# Patient Record
Sex: MALE | Race: OTHER | HISPANIC OR LATINO | URBAN - METROPOLITAN AREA
[De-identification: names, ages, dates, MRNs, and addresses within clinical notes are randomized per-mention and may not be internally consistent; named-entity substitution may affect disease eponyms.]

---

## 2020-04-13 ENCOUNTER — EMERGENCY (EMERGENCY)
Facility: HOSPITAL | Age: 30
LOS: 1 days | Discharge: HOME | End: 2020-04-13
Attending: EMERGENCY MEDICINE | Admitting: EMERGENCY MEDICINE
Payer: MEDICAID

## 2020-04-13 VITALS
TEMPERATURE: 100 F | RESPIRATION RATE: 24 BRPM | WEIGHT: 149.91 LBS | SYSTOLIC BLOOD PRESSURE: 134 MMHG | OXYGEN SATURATION: 98 % | DIASTOLIC BLOOD PRESSURE: 80 MMHG | HEART RATE: 108 BPM

## 2020-04-13 VITALS
HEART RATE: 89 BPM | OXYGEN SATURATION: 99 % | TEMPERATURE: 98 F | SYSTOLIC BLOOD PRESSURE: 177 MMHG | RESPIRATION RATE: 18 BRPM | DIASTOLIC BLOOD PRESSURE: 83 MMHG

## 2020-04-13 DIAGNOSIS — R05 COUGH: ICD-10-CM

## 2020-04-13 DIAGNOSIS — B34.9 VIRAL INFECTION, UNSPECIFIED: ICD-10-CM

## 2020-04-13 DIAGNOSIS — Z20.828 CONTACT WITH AND (SUSPECTED) EXPOSURE TO OTHER VIRAL COMMUNICABLE DISEASES: ICD-10-CM

## 2020-04-13 DIAGNOSIS — R19.7 DIARRHEA, UNSPECIFIED: ICD-10-CM

## 2020-04-13 DIAGNOSIS — R10.9 UNSPECIFIED ABDOMINAL PAIN: ICD-10-CM

## 2020-04-13 LAB — SARS-COV-2 RNA SPEC QL NAA+PROBE: DETECTED

## 2020-04-13 PROCEDURE — 99283 EMERGENCY DEPT VISIT LOW MDM: CPT | Mod: 25

## 2020-04-13 PROCEDURE — 99053 MED SERV 10PM-8AM 24 HR FAC: CPT

## 2020-04-13 PROCEDURE — 71045 X-RAY EXAM CHEST 1 VIEW: CPT

## 2020-04-13 PROCEDURE — 93010 ELECTROCARDIOGRAM REPORT: CPT

## 2020-04-13 PROCEDURE — 93005 ELECTROCARDIOGRAM TRACING: CPT

## 2020-04-13 PROCEDURE — 71045 X-RAY EXAM CHEST 1 VIEW: CPT | Mod: 26

## 2020-04-13 PROCEDURE — 87635 SARS-COV-2 COVID-19 AMP PRB: CPT

## 2020-04-13 PROCEDURE — 99284 EMERGENCY DEPT VISIT MOD MDM: CPT

## 2020-04-13 PROCEDURE — 94640 AIRWAY INHALATION TREATMENT: CPT

## 2020-04-13 RX ORDER — ALBUTEROL 90 UG/1
2 AEROSOL, METERED ORAL ONCE
Refills: 0 | Status: COMPLETED | OUTPATIENT
Start: 2020-04-13 | End: 2020-04-13

## 2020-04-13 RX ADMIN — ALBUTEROL 2 PUFF(S): 90 AEROSOL, METERED ORAL at 02:14

## 2020-04-13 NOTE — ED PROVIDER NOTE - DIAGNOSTIC INTERPRETATION
ER Physician:  Jie Director  CHEST XRAY INTERPRETATION: lungs clear, heart shadow normal, bony structures intact

## 2020-04-13 NOTE — ED ADULT NURSE NOTE - CHPI ED NUR SYMPTOMS NEG
no edema/no headache/no wheezing/no chills/no body aches/no diaphoresis/no hemoptysis/no shortness of breath
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
## 2020-04-13 NOTE — ED PROVIDER NOTE - NSFOLLOWUPINSTRUCTIONS_ED_ALL_ED_FT
Viral Syndrome    We are concerned you may have COVID.  Rest.  Drink plenty of fluids.  Try over the counter medications based on your symptoms for relies; use as directed: sudafed or similar and/or nasal sprays for congestion, robitussin or similar for cough, tylenol for fever, body aches, pain.     Use albuterol 2 puffs every 4-6 hours as needed for cough/shortness of breath.     Return for increased pain, increased sob, change in cough/sputum, increased fever, intractable vomiting, any other concerns.     ** Self-isolate until you are symptom free for 72 hours, UNLESS your COVID test is negative.  Cough/sneeze into your elbow area.  Wash your hands after touching your face and avoid touching your face if able; if you are sharing spaces, clean surfaces after you touch them.     ------  Síndrome viral    Nos preocupa que pueda tener COVID. [place]. Beber mucho líquido. Pruebe los medicamentos de venta mara basados ??en eveline síntomas para confiar; use según las indicaciones: sudafed o similar y / o aerosoles nasales para la congestión, robitussin o similar para la tos, tylenol para la fiebre, grace corporales, dolor.    Use albuterol 2 inhalaciones cada 4-6 horas según sea necesario para la tos / falta de aliento.    Regrese por aumento de dolor, aumento de sollozos, cambio de tos / esputo, aumento de fiebre, vómitos intratables, cualquier otra inquietud.     ** Autoaislante hasta que esté mara de síntomas elisa 72 horas, A MENOS QUE bennett prueba COVID sea negativa. Tosa / estornude en el área del codo. Lávese las faustina después de tocarse la nora y evite tocarse la nora si puede; Si está compartiendo espacios, limpie las superficies después de tocarlos.

## 2020-04-13 NOTE — ED PROVIDER NOTE - CLINICAL SUMMARY MEDICAL DECISION MAKING FREE TEXT BOX
Patient likely has a viral Upper respiratory infection; no evidence to suggest sepsis or meningitis;  The patient is non toxic appearing with no focal lung findings and acceptable oxygenation.  No increased wob or resp distress.  Will check covid, cxr.  Supportive care including increased fluids and over the counter therapy was advised and discussed.  Pt counseled to quarantine until sx free x 72 h, unless COVID neg.  Typical COVID course discussed; pt counseled to return for worsening ha, neck stiffness, cp, sob, fever, any other concerns.  Hand hygiene and isolation reviewed.

## 2020-04-13 NOTE — ED PROVIDER NOTE - PATIENT PORTAL LINK FT
You can access the FollowMyHealth Patient Portal offered by [facility] by registering at the following website: [email or web address]. By joining [employer]’s FollowMyHealth portal, you will also be able to view your health information using other applications (apps) compatible with our system.

## 2020-04-13 NOTE — ED PROVIDER NOTE - OBJECTIVE STATEMENT
30 yo male no pmh c/o 1 wk cough (clear sputum), sob, abd pain, diarrhea (3/d), myalgias, fever (last tylenol 1 hr pta) and concern for covid. Pt reports co-worker hospitalized w covid.  No travel.

## 2020-05-21 ENCOUNTER — EMERGENCY (EMERGENCY)
Facility: HOSPITAL | Age: 30
LOS: 1 days | Discharge: HOME | End: 2020-05-21
Attending: EMERGENCY MEDICINE | Admitting: EMERGENCY MEDICINE
Payer: MEDICAID

## 2020-05-21 VITALS
WEIGHT: 166.67 LBS | OXYGEN SATURATION: 96 % | HEART RATE: 95 BPM | DIASTOLIC BLOOD PRESSURE: 89 MMHG | RESPIRATION RATE: 17 BRPM | TEMPERATURE: 99 F | SYSTOLIC BLOOD PRESSURE: 140 MMHG | HEIGHT: 60 IN

## 2020-05-21 DIAGNOSIS — Z20.828 CONTACT WITH AND (SUSPECTED) EXPOSURE TO OTHER VIRAL COMMUNICABLE DISEASES: ICD-10-CM

## 2020-05-21 DIAGNOSIS — B34.9 VIRAL INFECTION, UNSPECIFIED: ICD-10-CM

## 2020-05-21 DIAGNOSIS — R05 COUGH: ICD-10-CM

## 2020-05-21 LAB — SARS-COV-2 RNA SPEC QL NAA+PROBE: SIGNIFICANT CHANGE UP

## 2020-05-21 PROCEDURE — 99284 EMERGENCY DEPT VISIT MOD MDM: CPT

## 2020-05-21 PROCEDURE — 71045 X-RAY EXAM CHEST 1 VIEW: CPT | Mod: 26

## 2020-05-21 RX ORDER — ACETAMINOPHEN 500 MG
1000 TABLET ORAL ONCE
Refills: 0 | Status: COMPLETED | OUTPATIENT
Start: 2020-05-21 | End: 2020-05-21

## 2020-05-21 RX ADMIN — Medication 1000 MILLIGRAM(S): at 19:03

## 2020-05-21 NOTE — ED PROVIDER NOTE - CLINICAL SUMMARY MEDICAL DECISION MAKING FREE TEXT BOX
30 y/o M, Swazi speaking translated by nurse, Keren, presents with tactile fever, chills cough and SOB for the past 2 days. No recent sick contact, has been staying at home. Pt has 1 episode of vomite and soft stool today. During exam pt is  well appearing, non toxic, highest temp of 99.3F, lungs clear bilaterally and regular rate and rhythm.  Will order COVID-19 swab, chest x-ray, ambulatory pulse, and discharge home. High suspicion for COVID-19 or other viral syndrome. 28 y/o M, Martiniquais speaking translated by nurse, Keren, presents with tactile fever, chills cough and SOB for the past 2 days. No recent sick contact, has been staying at home. Pt has 1 episode of vomite and soft stool today, able to tolerate po food and liquid since. During exam pt is  well appearing, non toxic, temp of 99.3F, lungs clear bilaterally and regular rate and rhythm. abdomen soft non-tender.  Will order COVID-19 swab, chest x-ray (no acute cardiopulmonary etiology noted), ambulatory pulse (98%). High suspicion for COVID-19 or other viral syndrome. At this time, the evidence for any other entities in the differential is insufficient to justify any further testing. This was discussed and explained to the patient. It was advised that persistent or worsening symptoms would require further evaluation. This was discussed with the patient and family using shared decision making. ED evaluation and management discussed with the patient and family (if available) in detail.  Close PMD follow up encouraged.  Strict ED return instructions discussed in detail and patient given the opportunity to ask any questions about their discharge diagnosis and instructions. Patient verbalized understanding. Patient is agreeable to plan.

## 2020-05-21 NOTE — ED PROVIDER NOTE - OBJECTIVE STATEMENT
28 y/o M, Guatemalan speaking, presents with tactile fever, chills cough and SOB for the past 2 days. No recent sick contact, has been staying at home. Pt is worried about cari COVID-19 and worries that if he lays down he might never get up. Denies CP, palpitations and abd pain. Had 1 episode of vomit and soft stool today. Pt has been able to tolerate foods and liquids since then. 28 y/o M, no pmhx Frisian speaking, presents with tactile fever, chills cough and SOB for the past few days. No recent sick contact, has been staying at home. Pt is worried about cari COVID-19 and worries that if he lays down he might never get up. Denies CP, palpitations and abd pain. Had 1 episode of vomit and soft stool today. Pt has been able to tolerate foods and liquids since then. states that he has not been taking medications for his symptoms.     translation provided by fluent Rn Nurse Keren

## 2020-05-21 NOTE — ED PROVIDER NOTE - PATIENT PORTAL LINK FT
You can access the FollowMyHealth Patient Portal offered by [facility] by registering at the following website: [email or web address]. By joining [employer]’s FollowMyHealth portal, you will also be able to view your health information using other applications (apps) compatible with our system.

## 2020-05-21 NOTE — ED PROVIDER NOTE - PHYSICAL EXAMINATION
General: Patient is well developed and well nourised. Patient is alert and oriented to person, place and date. Patient is laying comfortably in stretcher and appears in no acute distress.  HEENT: Head is normocephalic and atraumatic. Pupils are equal, round and reactive to light and accommodation. Extraocular movements intact. No evidence of nystagmus, conjunctival injection, or scleral icterus. External ears symmetric and non-tender without evidence of discharge. Ear canals are clear without evidence of edema or erythema. Cone of light evident on tympanic membrade without evidence of erythema, retraction or bulge. No hemotympanum present bilaterally.  Nose is symmetric, non-tender, patent without evidence of discharge. Teeth in good repair. Uvula midline. Pharynx without erythema, edema, tonsillar enlargement or exudates.   Neck: Supple and nontender, with no evidence of lymphadenopathy. No cervical spinal tenderness. Full range of motion.  Heart: Regular rate and rhythm. No murmurs, rubs or gallops.   Lungs: Clear to auscultation bilaterally with equal chest expansion. No note of wheezes, rhonchi, rales. Equal chest expansion. No note of retractions.  Abdomen: Bowel sounds present in all four quadrants. Soft, non-tender, non-distended without signs of masses, rebound or guarding. No note of hepatosplenomegaly. No CVA tenderness bilaterally. Negative Mcintosh sign. No pain present over McBurney's point.  Musculoskeletal: No edema, erythema, ecchymosis, atrophy or deformity. Full range of motion in all four extremities.  No clubbing or cyanosis. No point tenderness to palpation.   Neuro: Cranial nerves intact. GCS 15. Moving all extremities without discomfort. Sensation intact. Gait steady  Skin: Warm, dry and intact without evidence of rashes, bruising, pallor, jaundice or cyanosis.   Psych: Mood and affect appropriate.

## 2020-05-21 NOTE — ED PROVIDER NOTE - NSFOLLOWUPINSTRUCTIONS_ED_ALL_ED_FT
[facility]    Enfermedades virales en los adultos  (Viral Illness, Adult)  Los virus son microbios diminutos que entran en el organismo de nikos persona y causan enfermedades. Hay muchos tipos de virus diferentes y causan muchas clases de enfermedades. Las enfermedades virales pueden ser leves o graves. Pueden afectar diferentes partes del cuerpo.  Las enfermedades frecuentes causadas por virus incluyen los resfríos y la gripe. Además, las enfermedades virales abarcan rahul clínicos graves, christophe el VIH/sida (virus de inmunodeficiencia humana/síndrome de inmunodeficiencia adquirida). Se hardin identificado unos pocos virus asociados con determinados tipos de cáncer.  ¿CUÁLES SON LAS CAUSAS?  Muchos tipos de virus pueden causar enfermedades. Los virus invaden las células del organismo, se multiplican y provocan la disfunción o la muerte de las células infectadas. Cuando la célula muere, libera más virus. Cuando esto ocurre, aparecen síntomas de la enfermedad, y el virus sigue diseminándose a otras células. Si el virus asume la función de la célula, puede hacer que esta se divida y crezca fuera de control, y stephanie es el ashley en el que un virus causa cáncer.  Los diferentes virus ingresan al organismo de distintas formas. Puede contraer un virus de la siguiente manera:  Al ingerir alimentos o beber agua contaminados con el virus.Al inhalar gotitas que nikos persona infectada liberó en el aire al toser o estornudar.Al tocar nikos superficie contaminada con el virus y luego llevarse la mano a la boca, la nariz o los ojos.Al ser caesar por un insecto o mordido por un animal que son portadores del virus.Al tener contacto sexual con nikos persona infectada por el virus.Al tener contacto con ye o líquidos que contienen el virus, ya sea a través de un gera abierto o elisa nikos transfusión.Si el virus ingresa al organismo, el sistema de defensa del cuerpo (sistema inmunitario) intentará combatirlo. Puede correr un riesgo más alto de tener nikos enfermedad viral si tiene el sistema inmunitario debilitado.  ¿CUÁLES SON LOS SIGNOS O LOS SÍNTOMAS?  Los síntomas varían en función del tipo de virus y de la ubicación de las células que stephanie invade. Los síntomas frecuentes de los principales tipos de enfermedades virales incluyen los siguientes:  Virus del resfrío y de la gripe   Fiebre.Dolor de yasmin.Dolor de garganta.[place]an musculares.Congestión nasal.Tos.Virus del aparato digestivo (gastrointestinales)  Fiebre.Dolor abdominal.Náuseas.Diarrea.Virus hepáticos (hepatitis)   Pérdida del apetito.Cansancio.Jae amarillento de la piel (ictericia).Virus del cerebro y la médula conway   Fiebre.Dolor de yasmin.Rigidez en el sebastian.Náuseas y vómitos.Confusión o somnolencia.Virus de la piel   Verrugas.Picazón.Erupción cutánea.Virus de transmisión sexual   Secreción.Hinchazón.Enrojecimiento.Erupción cutánea.¿CÓMO SE TRATA ESTA AFECCIÓN?  Los virus pueden ser difíciles de tratar porque se hospedan en las células. Los antibióticos no tratan los virus porque no llegan al interior de las células. El tratamiento de nikos enfermedad viral puede incluir lo siguiente:  Descansar y beber abundantes líquidos.Medicamentos para aliviar los síntomas. Estos pueden incluir medicamentos de venta mara para el dolor y la fiebre, medicamentos para la tos o la congestión y medicamentos para aliviar la diarrea.Medicamentos antivirales. Estos fármacos están disponibles únicamente para determinados tipos de virus. Pueden ayudar a aliviar los síntomas de la gripe, si se chata apenas comienza el cuadro. También hay antivirales para la hepatitis y el VIH/sida.Algunas enfermedades virales pueden evitarse con vacunas. Un ejemplo frecuente es la vacuna antigripal.  SIGA ESTAS INDICACIONES EN SCHWARTZ CASA:  Medicamentos   Maxeys los medicamentos de venta mara y los recetados solamente christophe se lo haya indicado el médico.Si le recetaron un antiviral, tómelo christophe se lo haya indicado el médico. No deje de mirza los medicamentos aunque comience a sentirse mejor.Infórmese sobre cuándo los antibióticos son necesarios y cuándo no. Los antibióticos no combaten a los virus. Si el médico benjamín que usted puede tener nikos infección bacteriana así christophe nikos viral, herrera vez le receten un antibiótico.  No solicite nikos receta de antibióticos si le hardin diagnosticado nikos enfermedad viral. Eso no hará que la enfermedad pase más rápidamente.Mirza antibióticos con frecuencia cuando no son necesarios puede derivar en resistencia a los antibióticos. Cuando esto ocurre, el medicamento pierde schwartz eficacia contra las bacterias que normalmente combate.Instrucciones generales   [place] suficiente líquido para mantener la orina carmen o de color amarillo pálido.Descanse todo lo que pueda.Retome eveline actividades normales christophe se lo haya indicado el médico. Pregúntele al médico qué actividades son seguras para usted.Concurra a todas las visitas de control christophe se lo haya indicado el médico. [place] es importante.¿CÓMO SE QUINN ESTO?  Maxeys estas medidas para reducir el riesgo de tener nikos infección viral:  Consuma nikos dieta brigido y descanse mucho.Lávese las faustina frecuentemente con agua y jabón. [place] es especialmente importante cuando está en lugares públicos. Use desinfectante para faustina si no dispone de agua y jabón.Evite el contacto cercano con amigos y familiares que tengan nikos infección viral.Si viaja a las regiones donde las infecciones virales gastrointestinales son frecuentes, no tome agua ni coma alimentos crudos.Manténgase al día con las vacunas. Vacúnese contra la gripe todos los años christophe se lo haya indicado el médico.No comparta cepillos de dientes, cortaúñas, rasuradoras o agujas con otras personas.Siempre tenga sexo con protección.COMUNÍQUESE CON UN MÉDICO SI:  Tiene síntomas de nikos enfermedad viral que no desaparecen.Los síntomas regresan después de miguel desaparecido.Los síntomas empeoran.SOLICITE AYUDA DE INMEDIATO SI:  Tiene dificultad para respirar.Siente un dolor de yasmin intenso o rigidez en el sebastian.Tiene vómitos dc o dolor abdominal.Esta información no tiene christophe fin reemplazar el consejo del médico. Asegúrese de hacerle al médico cualquier pregunta que tenga.    Document Released: 08/24/2017 Document Revised: 08/24/2017 Document Reviewed: 04/28/2017  Elsevier Patient Education © 2020 Elsevier Inc.

## 2020-05-23 LAB — SARS-COV-2 RNA SPEC QL NAA+PROBE: SIGNIFICANT CHANGE UP

## 2020-06-07 ENCOUNTER — EMERGENCY (EMERGENCY)
Facility: HOSPITAL | Age: 30
LOS: 1 days | Discharge: HOME | End: 2020-06-07
Attending: EMERGENCY MEDICINE | Admitting: EMERGENCY MEDICINE
Payer: SELF-PAY

## 2020-06-07 VITALS
RESPIRATION RATE: 17 BRPM | HEART RATE: 112 BPM | SYSTOLIC BLOOD PRESSURE: 158 MMHG | WEIGHT: 160.06 LBS | TEMPERATURE: 98 F | OXYGEN SATURATION: 97 % | DIASTOLIC BLOOD PRESSURE: 106 MMHG

## 2020-06-07 VITALS
OXYGEN SATURATION: 96 % | SYSTOLIC BLOOD PRESSURE: 140 MMHG | HEART RATE: 93 BPM | TEMPERATURE: 99 F | RESPIRATION RATE: 18 BRPM | DIASTOLIC BLOOD PRESSURE: 77 MMHG

## 2020-06-07 LAB
ALBUMIN SERPL ELPH-MCNC: 4.3 G/DL — SIGNIFICANT CHANGE UP (ref 3.3–5)
ALP SERPL-CCNC: 93 U/L — SIGNIFICANT CHANGE UP (ref 40–120)
ALT FLD-CCNC: 37 U/L — SIGNIFICANT CHANGE UP (ref 10–45)
ANION GAP SERPL CALC-SCNC: 18 MMOL/L — HIGH (ref 5–17)
APTT BLD: 28.7 SEC — SIGNIFICANT CHANGE UP (ref 27.5–36.3)
AST SERPL-CCNC: 58 U/L — HIGH (ref 10–40)
BASOPHILS # BLD AUTO: 0.03 K/UL — SIGNIFICANT CHANGE UP (ref 0–0.2)
BASOPHILS NFR BLD AUTO: 0.4 % — SIGNIFICANT CHANGE UP (ref 0–2)
BILIRUB SERPL-MCNC: 0.6 MG/DL — SIGNIFICANT CHANGE UP (ref 0.2–1.2)
BUN SERPL-MCNC: 7 MG/DL — SIGNIFICANT CHANGE UP (ref 7–23)
CALCIUM SERPL-MCNC: 8.4 MG/DL — SIGNIFICANT CHANGE UP (ref 8.4–10.5)
CHLORIDE SERPL-SCNC: 93 MMOL/L — LOW (ref 96–108)
CO2 SERPL-SCNC: 22 MMOL/L — SIGNIFICANT CHANGE UP (ref 22–31)
CREAT SERPL-MCNC: 0.62 MG/DL — SIGNIFICANT CHANGE UP (ref 0.5–1.3)
D DIMER BLD IA.RAPID-MCNC: <150 NG/ML DDU — SIGNIFICANT CHANGE UP
EOSINOPHIL # BLD AUTO: 0 K/UL — SIGNIFICANT CHANGE UP (ref 0–0.5)
EOSINOPHIL NFR BLD AUTO: 0 % — SIGNIFICANT CHANGE UP (ref 0–6)
GLUCOSE SERPL-MCNC: 103 MG/DL — HIGH (ref 70–99)
HCT VFR BLD CALC: 40.3 % — SIGNIFICANT CHANGE UP (ref 39–50)
HGB BLD-MCNC: 13.8 G/DL — SIGNIFICANT CHANGE UP (ref 13–17)
IMM GRANULOCYTES NFR BLD AUTO: 0.2 % — SIGNIFICANT CHANGE UP (ref 0–1.5)
INR BLD: 1.05 — SIGNIFICANT CHANGE UP (ref 0.88–1.16)
LIDOCAIN IGE QN: 17 U/L — SIGNIFICANT CHANGE UP (ref 7–60)
LYMPHOCYTES # BLD AUTO: 1.04 K/UL — SIGNIFICANT CHANGE UP (ref 1–3.3)
LYMPHOCYTES # BLD AUTO: 12.5 % — LOW (ref 13–44)
MCHC RBC-ENTMCNC: 28.3 PG — SIGNIFICANT CHANGE UP (ref 27–34)
MCHC RBC-ENTMCNC: 34.2 GM/DL — SIGNIFICANT CHANGE UP (ref 32–36)
MCV RBC AUTO: 82.8 FL — SIGNIFICANT CHANGE UP (ref 80–100)
MONOCYTES # BLD AUTO: 0.32 K/UL — SIGNIFICANT CHANGE UP (ref 0–0.9)
MONOCYTES NFR BLD AUTO: 3.8 % — SIGNIFICANT CHANGE UP (ref 2–14)
NEUTROPHILS # BLD AUTO: 6.94 K/UL — SIGNIFICANT CHANGE UP (ref 1.8–7.4)
NEUTROPHILS NFR BLD AUTO: 83.1 % — HIGH (ref 43–77)
NRBC # BLD: 0 /100 WBCS — SIGNIFICANT CHANGE UP (ref 0–0)
PLATELET # BLD AUTO: 302 K/UL — SIGNIFICANT CHANGE UP (ref 150–400)
POTASSIUM SERPL-MCNC: 3.7 MMOL/L — SIGNIFICANT CHANGE UP (ref 3.5–5.3)
POTASSIUM SERPL-SCNC: 3.7 MMOL/L — SIGNIFICANT CHANGE UP (ref 3.5–5.3)
PROT SERPL-MCNC: 8 G/DL — SIGNIFICANT CHANGE UP (ref 6–8.3)
PROTHROM AB SERPL-ACNC: 12 SEC — SIGNIFICANT CHANGE UP (ref 10–12.9)
RBC # BLD: 4.87 M/UL — SIGNIFICANT CHANGE UP (ref 4.2–5.8)
RBC # FLD: 13 % — SIGNIFICANT CHANGE UP (ref 10.3–14.5)
SARS-COV-2 RNA SPEC QL NAA+PROBE: SIGNIFICANT CHANGE UP
SODIUM SERPL-SCNC: 133 MMOL/L — LOW (ref 135–145)
WBC # BLD: 8.35 K/UL — SIGNIFICANT CHANGE UP (ref 3.8–10.5)
WBC # FLD AUTO: 8.35 K/UL — SIGNIFICANT CHANGE UP (ref 3.8–10.5)

## 2020-06-07 PROCEDURE — 70450 CT HEAD/BRAIN W/O DYE: CPT | Mod: 26

## 2020-06-07 PROCEDURE — 99285 EMERGENCY DEPT VISIT HI MDM: CPT

## 2020-06-07 PROCEDURE — 71045 X-RAY EXAM CHEST 1 VIEW: CPT | Mod: 26

## 2020-06-07 RX ORDER — ACETAMINOPHEN 500 MG
650 TABLET ORAL ONCE
Refills: 0 | Status: COMPLETED | OUTPATIENT
Start: 2020-06-07 | End: 2020-06-07

## 2020-06-07 RX ORDER — FAMOTIDINE 10 MG/ML
20 INJECTION INTRAVENOUS ONCE
Refills: 0 | Status: COMPLETED | OUTPATIENT
Start: 2020-06-07 | End: 2020-06-07

## 2020-06-07 RX ADMIN — FAMOTIDINE 20 MILLIGRAM(S): 10 INJECTION INTRAVENOUS at 16:05

## 2020-06-07 RX ADMIN — Medication 650 MILLIGRAM(S): at 16:05

## 2020-06-07 NOTE — ED ADULT NURSE NOTE - NURSING NEURO ORIENTATION
disoriented to place/disoriented to time/situation oriented to person, place and time/disoriented to time/situation
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
## 2020-06-07 NOTE — ED PROVIDER NOTE - NSFOLLOWUPINSTRUCTIONS_ED_ALL_ED_FT
Consulte a bennett proveedor de atención primaria en 2-3 días. Llame para nikos bhargavi. Si tiene algún problema con el seguimiento, llame al Coordinador de referencias de ED al [phone]. Regrese a la shana de emergencias si los síntomas empeoran u otras preocupaciones.    Disnea en los adultos  Shortness of Breath, Adult  Nikos persona tiene disnea cuando no puede inhalar suficiente aire o cuando siente que tiene dificultades para hacerlo. La disnea puede ser un signo de un problema médico.  Siga estas indicaciones en bennett casa:     Esté atento a cualquier cambio en los síntomas.No consuma ningún producto que contenga nicotina o tabaco, christophe cigarrillos, cigarrillos electrónicos y tabaco de mascar. No fume. Fumar es nikos causa frecuente de disnea. Si necesita ayuda para dejar de fumar, consulte al médico.Evite cosas que pueden irritar las vías respiratorias, por ejemplo:  Moho.Polvo.Contaminación del aire.Vapores de productos químicos.Cosas que causan síntomas de alergia (alérgenos), si tiene alergias.Mantenga bennett casa limpia y sin moho ni polvo.Descanse todo lo que sea necesario. Retome gradualmente eveline actividades habituales.[place] los medicamentos de venta mara y los recetados solamente christophe se lo haya indicado el médico. [place] incluye la oxigenoterapia y los medicamentos inhalados.Concurra a todas las visitas de seguimiento christophe se lo haya indicado el médico. [place] es importante.Comuníquese con un médico si:  Bennett afección no mejora tan pronto christophe se espera.Le poppy hacer las actividades cotidianas, incluso después de descansar.Aparecen nuevos síntomas.Solicite ayuda inmediatamente si:  La disnea empeora.Tiene dificultad para respirar cuando está en reposo.Se siente mareado o se desmaya.Tiene tos que no puede controlar con la medicación.Tose y escupe ye.Siente dolor al respirar.Tiene dolor en el pecho, los brazos, los hombros o el abdomen.Tiene fiebre.No puede subir escaleras o realizar ejercicio del modo en que lo hacía habitualmente.Estos síntomas pueden representar un problema grave que constituye nikos emergencia. No espere a mana si los síntomas desaparecen. Solicite atención médica de inmediato. Comuníquese con el servicio de emergencias de bennett localidad (911 en los [place]). No conduzca por eveline propios medios hasta el hospital.   Resumen  Nikos persona tiene disnea cuando tiene dificultad para inhalar la cantidad suficiente de aire. Puede ser signo de un problema médico.Evite las cosas que irritan los pulmones, christophe el hábito de fumar, la contaminación, el moho y el polvo.Preste atención a los cambios en los síntomas y comuníquese con el médico si le resulta difícil realizar eveline actividades cotidianas debido a la disnea.Esta información no tiene christophe fin reemplazar el consejo del médico. Asegúrese de hacerle al médico cualquier pregunta que tenga.

## 2020-06-07 NOTE — ED ADULT NURSE NOTE - NSIMPLEMENTINTERV_GEN_ALL_ED
Implemented All Fall Risk Interventions:  [place] to call system. Call bell, personal items and telephone within reach. Instruct patient to call for assistance. Room bathroom lighting operational. Non-slip footwear when patient is off stretcher. Physically safe environment: no spills, clutter or unnecessary equipment. Stretcher in lowest position, wheels locked, appropriate side rails in place. Provide visual cue, wrist band, yellow gown, etc. Monitor gait and stability. Monitor for mental status changes and reorient to person, place, and time. Review medications for side effects contributing to fall risk. Reinforce activity limits and safety measures with patient and family.
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
## 2020-06-07 NOTE — ED PROVIDER NOTE - PATIENT PORTAL LINK FT
You can access the FollowMyHealth Patient Portal offered by [facility] by registering at the following website: [email or web address]. By joining [employer]’s FollowMyHealth portal, you will also be able to view your health information using other applications (apps) compatible with our system.

## 2020-06-07 NOTE — ED ADULT TRIAGE NOTE - CHIEF COMPLAINT QUOTE
Pt states "my stomach hurts and I have trouble breathing."  Pt verbalized his been drinking alcohol and last drink was this morning.  Denies trauma, drug use, suicidal ideation / hallucinations.

## 2020-06-11 DIAGNOSIS — Z20.828 CONTACT WITH AND (SUSPECTED) EXPOSURE TO OTHER VIRAL COMMUNICABLE DISEASES: ICD-10-CM

## 2020-06-11 DIAGNOSIS — R06.02 SHORTNESS OF BREATH: ICD-10-CM

## 2020-06-11 DIAGNOSIS — R41.82 ALTERED MENTAL STATUS, UNSPECIFIED: ICD-10-CM

## 2020-06-11 DIAGNOSIS — F10.129 ALCOHOL ABUSE WITH INTOXICATION, UNSPECIFIED: ICD-10-CM

## 2020-07-09 NOTE — ED ADULT NURSE NOTE - PLAN OF CARE
Medical record reviewed, VM left for pt to check on well being and further discuss how palliative team can support him.  Requested return call  
Call bell/Explanation of exam/test/Position of comfort

## 2020-11-11 ENCOUNTER — EMERGENCY (EMERGENCY)
Facility: HOSPITAL | Age: 30
LOS: 1 days | Discharge: HOME | End: 2020-11-11
Attending: EMERGENCY MEDICINE | Admitting: EMERGENCY MEDICINE
Payer: MEDICAID

## 2020-11-11 VITALS
OXYGEN SATURATION: 98 % | SYSTOLIC BLOOD PRESSURE: 130 MMHG | DIASTOLIC BLOOD PRESSURE: 79 MMHG | TEMPERATURE: 98 F | HEART RATE: 76 BPM | RESPIRATION RATE: 18 BRPM

## 2020-11-11 VITALS
TEMPERATURE: 98 F | HEIGHT: 65 IN | RESPIRATION RATE: 18 BRPM | SYSTOLIC BLOOD PRESSURE: 146 MMHG | HEART RATE: 105 BPM | OXYGEN SATURATION: 98 % | DIASTOLIC BLOOD PRESSURE: 96 MMHG

## 2020-11-11 DIAGNOSIS — R41.82 ALTERED MENTAL STATUS, UNSPECIFIED: ICD-10-CM

## 2020-11-11 DIAGNOSIS — F10.129 ALCOHOL ABUSE WITH INTOXICATION, UNSPECIFIED: ICD-10-CM

## 2020-11-11 LAB — ETHANOL SERPL-MCNC: 328 MG/DL — HIGH (ref 0–10)

## 2020-11-11 PROCEDURE — 99284 EMERGENCY DEPT VISIT MOD MDM: CPT

## 2020-11-11 RX ORDER — ONDANSETRON 8 MG/1
4 TABLET, FILM COATED ORAL ONCE
Refills: 0 | Status: COMPLETED | OUTPATIENT
Start: 2020-11-11 | End: 2020-11-11

## 2020-11-11 RX ADMIN — ONDANSETRON 4 MILLIGRAM(S): 8 TABLET, FILM COATED ORAL at 19:39

## 2020-11-11 NOTE — ED PROVIDER NOTE - OBJECTIVE STATEMENT
brought in by ems with altered mental status.  Admits to drinking 20 beers today. Says he has been drinking for 4 days.  Denies trauma.  Unable to provide further coherent history due to ams

## 2020-11-11 NOTE — ED ADULT NURSE NOTE - OBJECTIVE STATEMENT
31 y/o male received into the ED, axox3, stating that he drank 4 beers this morning and began feeling numbness throughout the body.  Pt. states that he is also feeling dizzy.  Pt. denies having any withdrawal symptoms.  Pt. does admit to drinking alcohol every day.  Pt. was dressed in yellow hospital gown, slippers placed on his feet.  Will continue to monitor.

## 2020-11-11 NOTE — ED PROVIDER NOTE - CLINICAL SUMMARY MEDICAL DECISION MAKING FREE TEXT BOX
clinically intoxicated without trauma.  accucheck wnl.  no signs of infection clinically.  protecting airway. observed for sobriety.  reassessment with improved mental status.  alert and oriented x3 with steady gait at time of discharge

## 2020-11-11 NOTE — ED ADULT TRIAGE NOTE - CHIEF COMPLAINT QUOTE
Pt CO AMS and possible ETOH withdrawal.  Pt noted with tremors and states "I had 3 drinks today."  Denies Hx of seizures.  Denies N/V/D, SOB, Fevers, CP.

## 2020-11-11 NOTE — ED PROVIDER NOTE - PHYSICAL EXAMINATION
· CONSTITUTIONAL: smells of alcohol, well nourished, awake, alert, oriented to person, place and in no apparent distress.  · ENMT: Airway patent, Nasal mucosa clear. Mouth with normal mucosa.  · HEAD: Head is atraumatic. Head shape is symmetrical.  · EYES: Clear bilaterally, pupils equal, round and reactive to light.  · CARDIAC: Normal rate, regular rhythm.  Heart sounds S1, S2.  · RESPIRATORY: Breath sounds clear and equal bilaterally.  · GASTROINTESTINAL: Abdomen soft, no guarding.  · MUSCULOSKELETAL: Spine appears normal, range of motion is not limited  · NEUROLOGICAL: Alert and oriented to self, speech slurred, not following formal neuro exam  · SKIN: Skin normal color for race, warm, dry and intact. No evidence of rash.  · PSYCHIATRIC: Alert and oriented to person. intoxicated affect. no apparent risk to self or others.  · HEME LYMPH: No adenopathy
31-Dec-2017

## 2020-11-11 NOTE — ED PROVIDER NOTE - PATIENT PORTAL LINK FT
You can access the FollowMyHealth Patient Portal offered by [facility] by registering at the following website: [email or web address]. By joining [employer]’s FollowMyHealth portal, you will also be able to view your health information using other applications (apps) compatible with our system.

## 2020-11-11 NOTE — ED ADULT NURSE REASSESSMENT NOTE - NS ED NURSE REASSESS COMMENT FT1
Pt. remains in the ED, axox3, stating that he feels better.  Pt. is pending to be sober and further medical disposition.  Will continue to monitor.

## 2020-11-11 NOTE — ED PROVIDER NOTE - NSFOLLOWUPINSTRUCTIONS_ED_ALL_ED_FT
Please see your primary care provider in 2-3 days.  Call for appointment.  If you have any problems with followup, please call the ED Referral Coordinator at [phone].  Return to the ER if symptoms worsen or other concerns.    Alcohol Abuse    Alcohol intoxication occurs when the amount of alcohol that a person has consumed impairs his or her ability to mentally and physically function. Chronic alcohol consumption can also lead to a variety of health issues including neurological disease, stomach disease, heart disease, liver disease, etc. Do not drive after drinking alcohol. Drinking enough alcohol to end up in an Emergency Room suggests you may have an alcohol abuse problem. Seek help at a drug addiction center.    SEEK IMMEDIATE MEDICAL CARE IF YOU HAVE ANY OF THE FOLLOWING SYMPTOMS: seizures, vomiting blood, blood in your stool, lightheadedness/dizziness, or becoming shaky to tremulous when you stop drinking.

## 2021-01-29 ENCOUNTER — INPATIENT (INPATIENT)
Facility: HOSPITAL | Age: 31
LOS: 3 days | Discharge: ANOTHER HOSPITAL | DRG: 897 | End: 2021-02-02
Attending: INTERNAL MEDICINE | Admitting: INTERNAL MEDICINE
Payer: MEDICAID

## 2021-01-29 VITALS
SYSTOLIC BLOOD PRESSURE: 145 MMHG | HEART RATE: 94 BPM | HEIGHT: 62 IN | TEMPERATURE: 99 F | RESPIRATION RATE: 18 BRPM | WEIGHT: 160.06 LBS | OXYGEN SATURATION: 97 % | DIASTOLIC BLOOD PRESSURE: 91 MMHG

## 2021-01-29 DIAGNOSIS — F10.239 ALCOHOL DEPENDENCE WITH WITHDRAWAL, UNSPECIFIED: ICD-10-CM

## 2021-01-29 DIAGNOSIS — R68.89 OTHER GENERAL SYMPTOMS AND SIGNS: ICD-10-CM

## 2021-01-29 DIAGNOSIS — Z29.9 ENCOUNTER FOR PROPHYLACTIC MEASURES, UNSPECIFIED: ICD-10-CM

## 2021-01-29 DIAGNOSIS — R10.13 EPIGASTRIC PAIN: ICD-10-CM

## 2021-01-29 LAB
ALBUMIN SERPL ELPH-MCNC: 3.8 G/DL — SIGNIFICANT CHANGE UP (ref 3.5–5)
ALBUMIN SERPL ELPH-MCNC: 4 G/DL — SIGNIFICANT CHANGE UP (ref 3.5–5)
ALP SERPL-CCNC: 86 U/L — SIGNIFICANT CHANGE UP (ref 40–120)
ALP SERPL-CCNC: 88 U/L — SIGNIFICANT CHANGE UP (ref 40–120)
ALT FLD-CCNC: 19 U/L DA — SIGNIFICANT CHANGE UP (ref 10–60)
ALT FLD-CCNC: 21 U/L DA — SIGNIFICANT CHANGE UP (ref 10–60)
ANION GAP SERPL CALC-SCNC: 11 MMOL/L — SIGNIFICANT CHANGE UP (ref 5–17)
APTT BLD: 30.2 SEC — SIGNIFICANT CHANGE UP (ref 27.5–35.5)
AST SERPL-CCNC: 14 U/L — SIGNIFICANT CHANGE UP (ref 10–40)
AST SERPL-CCNC: 16 U/L — SIGNIFICANT CHANGE UP (ref 10–40)
BASOPHILS # BLD AUTO: 0.03 K/UL — SIGNIFICANT CHANGE UP (ref 0–0.2)
BASOPHILS NFR BLD AUTO: 0.4 % — SIGNIFICANT CHANGE UP (ref 0–2)
BILIRUB DIRECT SERPL-MCNC: <0.1 MG/DL — SIGNIFICANT CHANGE UP (ref 0–0.2)
BILIRUB INDIRECT FLD-MCNC: >0.3 MG/DL — SIGNIFICANT CHANGE UP (ref 0.2–1)
BILIRUB SERPL-MCNC: 0.2 MG/DL — SIGNIFICANT CHANGE UP (ref 0.2–1.2)
BILIRUB SERPL-MCNC: 0.4 MG/DL — SIGNIFICANT CHANGE UP (ref 0.2–1.2)
BUN SERPL-MCNC: 8 MG/DL — SIGNIFICANT CHANGE UP (ref 7–18)
CALCIUM SERPL-MCNC: 8.1 MG/DL — LOW (ref 8.4–10.5)
CHLORIDE SERPL-SCNC: 102 MMOL/L — SIGNIFICANT CHANGE UP (ref 96–108)
CHOLEST SERPL-MCNC: 186 MG/DL — SIGNIFICANT CHANGE UP
CO2 SERPL-SCNC: 26 MMOL/L — SIGNIFICANT CHANGE UP (ref 22–31)
CREAT SERPL-MCNC: 0.68 MG/DL — SIGNIFICANT CHANGE UP (ref 0.5–1.3)
EOSINOPHIL # BLD AUTO: 0.01 K/UL — SIGNIFICANT CHANGE UP (ref 0–0.5)
EOSINOPHIL NFR BLD AUTO: 0.1 % — SIGNIFICANT CHANGE UP (ref 0–6)
ETHANOL SERPL-MCNC: 117 MG/DL — HIGH (ref 0–10)
GLUCOSE SERPL-MCNC: 90 MG/DL — SIGNIFICANT CHANGE UP (ref 70–99)
HCT VFR BLD CALC: 45.2 % — SIGNIFICANT CHANGE UP (ref 39–50)
HDLC SERPL-MCNC: 75 MG/DL — SIGNIFICANT CHANGE UP
HGB BLD-MCNC: 14.6 G/DL — SIGNIFICANT CHANGE UP (ref 13–17)
IMM GRANULOCYTES NFR BLD AUTO: 0 % — SIGNIFICANT CHANGE UP (ref 0–1.5)
INR BLD: 1.09 RATIO — SIGNIFICANT CHANGE UP (ref 0.88–1.16)
INR BLD: 1.11 RATIO — SIGNIFICANT CHANGE UP (ref 0.88–1.16)
LIDOCAIN IGE QN: 56 U/L — LOW (ref 73–393)
LIPID PNL WITH DIRECT LDL SERPL: 88 MG/DL — SIGNIFICANT CHANGE UP
LYMPHOCYTES # BLD AUTO: 1.96 K/UL — SIGNIFICANT CHANGE UP (ref 1–3.3)
LYMPHOCYTES # BLD AUTO: 29 % — SIGNIFICANT CHANGE UP (ref 13–44)
MAGNESIUM SERPL-MCNC: 1.9 MG/DL — SIGNIFICANT CHANGE UP (ref 1.6–2.6)
MCHC RBC-ENTMCNC: 27.1 PG — SIGNIFICANT CHANGE UP (ref 27–34)
MCHC RBC-ENTMCNC: 32.3 GM/DL — SIGNIFICANT CHANGE UP (ref 32–36)
MCV RBC AUTO: 84 FL — SIGNIFICANT CHANGE UP (ref 80–100)
MONOCYTES # BLD AUTO: 0.28 K/UL — SIGNIFICANT CHANGE UP (ref 0–0.9)
MONOCYTES NFR BLD AUTO: 4.1 % — SIGNIFICANT CHANGE UP (ref 2–14)
NEUTROPHILS # BLD AUTO: 4.48 K/UL — SIGNIFICANT CHANGE UP (ref 1.8–7.4)
NEUTROPHILS NFR BLD AUTO: 66.4 % — SIGNIFICANT CHANGE UP (ref 43–77)
NON HDL CHOLESTEROL: 111 MG/DL — SIGNIFICANT CHANGE UP
NRBC # BLD: 0 /100 WBCS — SIGNIFICANT CHANGE UP (ref 0–0)
PHOSPHATE SERPL-MCNC: 3.6 MG/DL — SIGNIFICANT CHANGE UP (ref 2.5–4.5)
PLATELET # BLD AUTO: 353 K/UL — SIGNIFICANT CHANGE UP (ref 150–400)
POTASSIUM SERPL-MCNC: 3.7 MMOL/L — SIGNIFICANT CHANGE UP (ref 3.5–5.3)
POTASSIUM SERPL-SCNC: 3.7 MMOL/L — SIGNIFICANT CHANGE UP (ref 3.5–5.3)
PROT SERPL-MCNC: 8.4 G/DL — HIGH (ref 6–8.3)
PROT SERPL-MCNC: 8.7 G/DL — HIGH (ref 6–8.3)
PROTHROM AB SERPL-ACNC: 12.9 SEC — SIGNIFICANT CHANGE UP (ref 10.6–13.6)
PROTHROM AB SERPL-ACNC: 13.1 SEC — SIGNIFICANT CHANGE UP (ref 10.6–13.6)
RAPID RVP RESULT: SIGNIFICANT CHANGE UP
RBC # BLD: 5.38 M/UL — SIGNIFICANT CHANGE UP (ref 4.2–5.8)
RBC # FLD: 13.8 % — SIGNIFICANT CHANGE UP (ref 10.3–14.5)
SARS-COV-2 RNA SPEC QL NAA+PROBE: SIGNIFICANT CHANGE UP
SODIUM SERPL-SCNC: 139 MMOL/L — SIGNIFICANT CHANGE UP (ref 135–145)
TRIGL SERPL-MCNC: 117 MG/DL — SIGNIFICANT CHANGE UP
TSH SERPL-MCNC: 0.68 UU/ML — SIGNIFICANT CHANGE UP (ref 0.34–4.82)
WBC # BLD: 6.76 K/UL — SIGNIFICANT CHANGE UP (ref 3.8–10.5)
WBC # FLD AUTO: 6.76 K/UL — SIGNIFICANT CHANGE UP (ref 3.8–10.5)

## 2021-01-29 PROCEDURE — 99285 EMERGENCY DEPT VISIT HI MDM: CPT

## 2021-01-29 RX ORDER — THIAMINE MONONITRATE (VIT B1) 100 MG
500 TABLET ORAL DAILY
Refills: 0 | Status: COMPLETED | OUTPATIENT
Start: 2021-01-29 | End: 2021-02-01

## 2021-01-29 RX ORDER — FOLIC ACID 0.8 MG
1 TABLET ORAL DAILY
Refills: 0 | Status: DISCONTINUED | OUTPATIENT
Start: 2021-01-29 | End: 2021-02-02

## 2021-01-29 RX ORDER — SODIUM CHLORIDE 9 MG/ML
1000 INJECTION INTRAMUSCULAR; INTRAVENOUS; SUBCUTANEOUS
Refills: 0 | Status: DISCONTINUED | OUTPATIENT
Start: 2021-01-29 | End: 2021-02-02

## 2021-01-29 RX ORDER — ENOXAPARIN SODIUM 100 MG/ML
40 INJECTION SUBCUTANEOUS DAILY
Refills: 0 | Status: DISCONTINUED | OUTPATIENT
Start: 2021-01-29 | End: 2021-02-02

## 2021-01-29 RX ORDER — PANTOPRAZOLE SODIUM 20 MG/1
40 TABLET, DELAYED RELEASE ORAL
Refills: 0 | Status: DISCONTINUED | OUTPATIENT
Start: 2021-01-29 | End: 2021-02-02

## 2021-01-29 RX ADMIN — SODIUM CHLORIDE 100 MILLILITER(S): 9 INJECTION INTRAMUSCULAR; INTRAVENOUS; SUBCUTANEOUS at 15:24

## 2021-01-29 RX ADMIN — SODIUM CHLORIDE 100 MILLILITER(S): 9 INJECTION INTRAMUSCULAR; INTRAVENOUS; SUBCUTANEOUS at 23:25

## 2021-01-29 RX ADMIN — Medication 50 MILLIGRAM(S): at 10:33

## 2021-01-29 RX ADMIN — Medication 50 MILLIGRAM(S): at 23:25

## 2021-01-29 RX ADMIN — Medication 50 MILLIGRAM(S): at 15:23

## 2021-01-29 RX ADMIN — Medication 1 MILLIGRAM(S): at 10:33

## 2021-01-29 NOTE — H&P ADULT - NSHPPHYSICALEXAM_GEN_ALL_CORE
Vital Signs Last 24 Hrs  T(C): 36.9 (29 Jan 2021 11:57), Max: 37.2 (29 Jan 2021 09:32)  T(F): 98.5 (29 Jan 2021 11:57), Max: 99 (29 Jan 2021 09:32)  HR: 103 (29 Jan 2021 11:57) (94 - 103)  BP: 113/72 (29 Jan 2021 11:57) (113/72 - 145/91)  RR: 18 (29 Jan 2021 11:57) (18 - 18)  SpO2: 97% (29 Jan 2021 11:57) (97% - 97%) Vital Signs Last 24 Hrs  T(C): 36.9 (29 Jan 2021 11:57), Max: 37.2 (29 Jan 2021 09:32)  T(F): 98.5 (29 Jan 2021 11:57), Max: 99 (29 Jan 2021 09:32)  HR: 103 (29 Jan 2021 11:57) (94 - 103)  BP: 113/72 (29 Jan 2021 11:57) (113/72 - 145/91)  RR: 18 (29 Jan 2021 11:57) (18 - 18)  SpO2: 97% (29 Jan 2021 11:57) (97% - 97%)    PHYSICAL EXAM:  GENERAL: NAD, lying in bed comfortably  HEAD:  Atraumatic, Normocephalic  EYES: EOMI, PERRLA, conjunctiva and sclera clear  ENT: tongue fasciculations   NECK: Supple, No JVD  CHEST/LUNG: Clear to auscultation bilaterally; No rales, rhonchi, wheezing, or rubs. Unlabored respirations  HEART: Regular rate and rhythm; No murmurs, rubs, or gallops  ABDOMEN: Bowel sounds present; Soft, tenderness in epigastric region, Nondistended  EXTREMITIES:  +tremors of hands. No clubbing, cyanosis, or edema  NERVOUS SYSTEM:  Alert & Oriented X3, speech clear. No deficits   MSK: FROM all 4 extremities, full and equal strength  SKIN: No rashes or lesions

## 2021-01-29 NOTE — H&P ADULT - PROBLEM SELECTOR PLAN 1
patient p/w tremors, headache, N/V  - alcohol intoxication  patient seen to be having tremors on exam  CIWA on admission 9, now 5  s/p librium and ativan in ED  start on standing librium 50mg q8   prn ativan 2mg q2h  c/w IVF  thiamine, multivitamin and folic acid  SW consult

## 2021-01-29 NOTE — ED ADULT NURSE NOTE - ED STAT RN HANDOFF DETAILS
Patient admitted to tele, pending bed assignment. Transferred to [facility] via stretcher. Safety maintained. IV assessed no redness/swelling noted.

## 2021-01-29 NOTE — H&P ADULT - PROBLEM SELECTOR PLAN 3
[ ] Previous VTE                                    3  [ ] Thrombophilia                                  2  [ ] Lower limb paralysis                        2  (unable to hold up >15 seconds)    [ ] Current Cancer (within 6 months)        2   [ ] Immobilization > 24 hrs                    1  [ ] ICU/CCU stay > 24 hrs                      1  [ ] Age > 60                                         1   lovenox for prophylaxis

## 2021-01-29 NOTE — H&P ADULT - ASSESSMENT
29 yo male with no significant medical history comes in with alcohol intoxication along with nausea, vomiting, headache and abdominal for 2 days.  . EKG showed NSR. CIWA on admission 9.    Patient admitted for alcohol withdrawal.

## 2021-01-29 NOTE — ED PROVIDER NOTE - OBJECTIVE STATEMENT
29 y/o M patient with no significant PMHx and no significant PSHx presents to the ED with c/o nausea and vomiting for 2d. Patient states that he has been drinking everyday and his last drink was yesterday. Patient reports having tremors. Patient denies any chest pain, abdominal pain, or any other complains.

## 2021-01-29 NOTE — ED ADULT NURSE NOTE - OBJECTIVE STATEMENT
Patient presents to ED with c/o headache and abdominal pain x2days. Patient reports drinking beer everyday for 5days.

## 2021-01-29 NOTE — H&P ADULT - HISTORY OF PRESENT ILLNESS
29 yo male with no significant medical history comes in with nausea and vomiting for 2 days. He has NBNB vomiting     episodes. Patient states that he has been drinking everyday and his last drink was yesterday 1/28. Patient reports having tremors. Patient denies any chest pain, abdominal pain, blood in vomitus, urinary symptoms, fever, fall, trauma, seizures. No history of recent travel, sick contacts, recent illness. 31 yo male with no significant medical history comes in with nausea and vomiting for 2 days. Patient has been drinking for past 3-4 days and has had vomiting episodes, patient reports vomitus contained blood. Patient reports having headache and abdominal pain. He says he drinks 20 bottles of tequila every day and his last drink was yesterday 1/28 at night. Patient reports having tremors. Patient denies any chest pain, urinary symptoms, fever, fall, trauma, seizures. No history of recent travel, sick contacts, recent illness.

## 2021-01-29 NOTE — ED PROVIDER NOTE - CLINICAL SUMMARY MEDICAL DECISION MAKING FREE TEXT BOX
Patient presenting for tremor, noting tremor and fasciculation. signs of withdrawal. last drink last night. will obtain lab, treat for withdrawal and reassess

## 2021-01-30 LAB
A1C WITH ESTIMATED AVERAGE GLUCOSE RESULT: 5.7 % — HIGH (ref 4–5.6)
ALBUMIN SERPL ELPH-MCNC: 3.3 G/DL — LOW (ref 3.5–5)
ALP SERPL-CCNC: 81 U/L — SIGNIFICANT CHANGE UP (ref 40–120)
ALT FLD-CCNC: 17 U/L DA — SIGNIFICANT CHANGE UP (ref 10–60)
ANION GAP SERPL CALC-SCNC: 8 MMOL/L — SIGNIFICANT CHANGE UP (ref 5–17)
AST SERPL-CCNC: 12 U/L — SIGNIFICANT CHANGE UP (ref 10–40)
BASOPHILS # BLD AUTO: 0.03 K/UL — SIGNIFICANT CHANGE UP (ref 0–0.2)
BASOPHILS NFR BLD AUTO: 0.4 % — SIGNIFICANT CHANGE UP (ref 0–2)
BILIRUB SERPL-MCNC: 0.6 MG/DL — SIGNIFICANT CHANGE UP (ref 0.2–1.2)
BUN SERPL-MCNC: 10 MG/DL — SIGNIFICANT CHANGE UP (ref 7–18)
CALCIUM SERPL-MCNC: 8.7 MG/DL — SIGNIFICANT CHANGE UP (ref 8.4–10.5)
CHLORIDE SERPL-SCNC: 104 MMOL/L — SIGNIFICANT CHANGE UP (ref 96–108)
CHOLEST SERPL-MCNC: 168 MG/DL — SIGNIFICANT CHANGE UP
CO2 SERPL-SCNC: 27 MMOL/L — SIGNIFICANT CHANGE UP (ref 22–31)
CREAT SERPL-MCNC: 0.73 MG/DL — SIGNIFICANT CHANGE UP (ref 0.5–1.3)
EOSINOPHIL # BLD AUTO: 0.08 K/UL — SIGNIFICANT CHANGE UP (ref 0–0.5)
EOSINOPHIL NFR BLD AUTO: 1.1 % — SIGNIFICANT CHANGE UP (ref 0–6)
ESTIMATED AVERAGE GLUCOSE: 117 MG/DL — HIGH (ref 68–114)
FOLATE SERPL-MCNC: 10.1 NG/ML — SIGNIFICANT CHANGE UP
FOLATE SERPL-MCNC: 11.7 NG/ML — SIGNIFICANT CHANGE UP
GLUCOSE SERPL-MCNC: 86 MG/DL — SIGNIFICANT CHANGE UP (ref 70–99)
HCT VFR BLD CALC: 43.8 % — SIGNIFICANT CHANGE UP (ref 39–50)
HDLC SERPL-MCNC: 62 MG/DL — SIGNIFICANT CHANGE UP
HGB BLD-MCNC: 14.2 G/DL — SIGNIFICANT CHANGE UP (ref 13–17)
IMM GRANULOCYTES NFR BLD AUTO: 0.4 % — SIGNIFICANT CHANGE UP (ref 0–1.5)
LIPID PNL WITH DIRECT LDL SERPL: 77 MG/DL — SIGNIFICANT CHANGE UP
LYMPHOCYTES # BLD AUTO: 1.98 K/UL — SIGNIFICANT CHANGE UP (ref 1–3.3)
LYMPHOCYTES # BLD AUTO: 28.4 % — SIGNIFICANT CHANGE UP (ref 13–44)
MAGNESIUM SERPL-MCNC: 2.2 MG/DL — SIGNIFICANT CHANGE UP (ref 1.6–2.6)
MCHC RBC-ENTMCNC: 27.6 PG — SIGNIFICANT CHANGE UP (ref 27–34)
MCHC RBC-ENTMCNC: 32.4 GM/DL — SIGNIFICANT CHANGE UP (ref 32–36)
MCV RBC AUTO: 85 FL — SIGNIFICANT CHANGE UP (ref 80–100)
MONOCYTES # BLD AUTO: 0.47 K/UL — SIGNIFICANT CHANGE UP (ref 0–0.9)
MONOCYTES NFR BLD AUTO: 6.7 % — SIGNIFICANT CHANGE UP (ref 2–14)
NEUTROPHILS # BLD AUTO: 4.38 K/UL — SIGNIFICANT CHANGE UP (ref 1.8–7.4)
NEUTROPHILS NFR BLD AUTO: 63 % — SIGNIFICANT CHANGE UP (ref 43–77)
NON HDL CHOLESTEROL: 106 MG/DL — SIGNIFICANT CHANGE UP
NRBC # BLD: 0 /100 WBCS — SIGNIFICANT CHANGE UP (ref 0–0)
PHOSPHATE SERPL-MCNC: 3.9 MG/DL — SIGNIFICANT CHANGE UP (ref 2.5–4.5)
PLATELET # BLD AUTO: 298 K/UL — SIGNIFICANT CHANGE UP (ref 150–400)
POTASSIUM SERPL-MCNC: 3.2 MMOL/L — LOW (ref 3.5–5.3)
POTASSIUM SERPL-SCNC: 3.2 MMOL/L — LOW (ref 3.5–5.3)
PROT SERPL-MCNC: 7.2 G/DL — SIGNIFICANT CHANGE UP (ref 6–8.3)
RBC # BLD: 5.15 M/UL — SIGNIFICANT CHANGE UP (ref 4.2–5.8)
RBC # FLD: 13.6 % — SIGNIFICANT CHANGE UP (ref 10.3–14.5)
SODIUM SERPL-SCNC: 139 MMOL/L — SIGNIFICANT CHANGE UP (ref 135–145)
TRIGL SERPL-MCNC: 147 MG/DL — SIGNIFICANT CHANGE UP
TSH SERPL-MCNC: 0.93 UU/ML — SIGNIFICANT CHANGE UP (ref 0.34–4.82)
VIT B12 SERPL-MCNC: 164 PG/ML — LOW (ref 232–1245)
VIT B12 SERPL-MCNC: 192 PG/ML — LOW (ref 232–1245)
WBC # BLD: 6.97 K/UL — SIGNIFICANT CHANGE UP (ref 3.8–10.5)
WBC # FLD AUTO: 6.97 K/UL — SIGNIFICANT CHANGE UP (ref 3.8–10.5)

## 2021-01-30 RX ORDER — POTASSIUM CHLORIDE 20 MEQ
40 PACKET (EA) ORAL ONCE
Refills: 0 | Status: COMPLETED | OUTPATIENT
Start: 2021-01-30 | End: 2021-01-30

## 2021-01-30 RX ORDER — INFLUENZA VIRUS VACCINE 15; 15; 15; 15 UG/.5ML; UG/.5ML; UG/.5ML; UG/.5ML
0.5 SUSPENSION INTRAMUSCULAR ONCE
Refills: 0 | Status: DISCONTINUED | OUTPATIENT
Start: 2021-01-30 | End: 2021-02-02

## 2021-01-30 RX ADMIN — Medication 1 TABLET(S): at 12:00

## 2021-01-30 RX ADMIN — Medication 40 MILLIEQUIVALENT(S): at 12:01

## 2021-01-30 RX ADMIN — Medication 50 MILLIGRAM(S): at 04:51

## 2021-01-30 RX ADMIN — Medication 105 MILLIGRAM(S): at 16:10

## 2021-01-30 RX ADMIN — Medication 50 MILLIGRAM(S): at 12:01

## 2021-01-30 RX ADMIN — Medication 50 MILLIGRAM(S): at 21:12

## 2021-01-30 RX ADMIN — PANTOPRAZOLE SODIUM 40 MILLIGRAM(S): 20 TABLET, DELAYED RELEASE ORAL at 05:31

## 2021-01-30 RX ADMIN — ENOXAPARIN SODIUM 40 MILLIGRAM(S): 100 INJECTION SUBCUTANEOUS at 12:01

## 2021-01-30 RX ADMIN — Medication 1 MILLIGRAM(S): at 12:01

## 2021-01-30 NOTE — PROGRESS NOTE ADULT - SUBJECTIVE AND OBJECTIVE BOX
SUBJECTIVE / OVERNIGHT EVENTS: pt seen and examined      MEDICATIONS  (STANDING):  chlordiazePOXIDE 50 milliGRAM(s) Oral every 8 hours  enoxaparin Injectable 40 milliGRAM(s) SubCutaneous daily  folic acid 1 milliGRAM(s) Oral daily  influenza   Vaccine 0.5 milliLiter(s) IntraMuscular once  multivitamin 1 Tablet(s) Oral daily  pantoprazole    Tablet 40 milliGRAM(s) Oral before breakfast  sodium chloride 0.9%. 1000 milliLiter(s) (100 mL/Hr) IV Continuous <Continuous>  thiamine IVPB 500 milliGRAM(s) IV Intermittent daily    MEDICATIONS  (PRN):  LORazepam   Injectable 2 milliGRAM(s) IV Push every 2 hours PRN Symptom-triggered: each CIWA -Ar score 8 or GREATER    Vital Signs Last 24 Hrs  T(C): 36.4 (30 Jan 2021 15:42), Max: 36.9 (30 Jan 2021 00:11)  T(F): 97.6 (30 Jan 2021 15:42), Max: 98.4 (30 Jan 2021 00:11)  HR: 71 (30 Jan 2021 15:42) (65 - 80)  BP: 115/66 (30 Jan 2021 15:42) (115/66 - 124/74)  BP(mean): --  RR: 18 (30 Jan 2021 15:42) (16 - 18)  SpO2: 100% (30 Jan 2021 15:42) (97% - 100%)    CAPILLARY BLOOD GLUCOSE        I&O's Summary      Constitutional: No fever, fatigue  Skin: No rash.  Eyes: No recent vision problems or eye pain.  ENT: No congestion, ear pain, or sore throat.  Cardiovascular: No chest pain or palpation.  Respiratory: No cough, shortness of breath, congestion, or wheezing.  Gastrointestinal: No abdominal pain, nausea, vomiting, or diarrhea.  Genitourinary: No dysuria.  Musculoskeletal: No joint swelling.  Neurologic: No headache.    PHYSICAL EXAM:  GENERAL: NAD  EYES: EOMI, PERRLA  NECK: Supple, No JVD  CHEST/LUNG: cta onel  HEART:  S1 , S2 +  ABDOMEN: soft , bs+  EXTREMITIES:  tremors+  NEUROLOGY:alert awake      LABS:                        14.2   6.97  )-----------( 298      ( 30 Jan 2021 07:31 )             43.8     01-30    139  |  104  |  10  ----------------------------<  86  3.2<L>   |  27  |  0.73    Ca    8.7      30 Jan 2021 07:31  Phos  3.9     01-30  Mg     2.2     01-30    TPro  7.2  /  Alb  3.3<L>  /  TBili  0.6  /  DBili  x   /  AST  12  /  ALT  17  /  AlkPhos  81  01-30    PT/INR - ( 29 Jan 2021 15:57 )   PT: 13.1 sec;   INR: 1.11 ratio         PTT - ( 29 Jan 2021 10:28 )  PTT:30.2 sec          RADIOLOGY & ADDITIONAL TESTS:    Imaging Personally Reviewed:    Consultant(s) Notes Reviewed:      Care Discussed with Consultants/Other Providers:

## 2021-01-31 LAB
ALBUMIN SERPL ELPH-MCNC: 3.4 G/DL — LOW (ref 3.5–5)
ALP SERPL-CCNC: 86 U/L — SIGNIFICANT CHANGE UP (ref 40–120)
ALT FLD-CCNC: 18 U/L DA — SIGNIFICANT CHANGE UP (ref 10–60)
ANION GAP SERPL CALC-SCNC: 6 MMOL/L — SIGNIFICANT CHANGE UP (ref 5–17)
AST SERPL-CCNC: 8 U/L — LOW (ref 10–40)
BASOPHILS # BLD AUTO: 0.04 K/UL — SIGNIFICANT CHANGE UP (ref 0–0.2)
BASOPHILS NFR BLD AUTO: 0.4 % — SIGNIFICANT CHANGE UP (ref 0–2)
BILIRUB SERPL-MCNC: 0.5 MG/DL — SIGNIFICANT CHANGE UP (ref 0.2–1.2)
BUN SERPL-MCNC: 8 MG/DL — SIGNIFICANT CHANGE UP (ref 7–18)
CALCIUM SERPL-MCNC: 8.5 MG/DL — SIGNIFICANT CHANGE UP (ref 8.4–10.5)
CHLORIDE SERPL-SCNC: 104 MMOL/L — SIGNIFICANT CHANGE UP (ref 96–108)
CO2 SERPL-SCNC: 28 MMOL/L — SIGNIFICANT CHANGE UP (ref 22–31)
CREAT SERPL-MCNC: 0.92 MG/DL — SIGNIFICANT CHANGE UP (ref 0.5–1.3)
EOSINOPHIL # BLD AUTO: 0.08 K/UL — SIGNIFICANT CHANGE UP (ref 0–0.5)
EOSINOPHIL NFR BLD AUTO: 0.9 % — SIGNIFICANT CHANGE UP (ref 0–6)
GLUCOSE BLDC GLUCOMTR-MCNC: 115 MG/DL — HIGH (ref 70–99)
GLUCOSE SERPL-MCNC: 96 MG/DL — SIGNIFICANT CHANGE UP (ref 70–99)
HCT VFR BLD CALC: 45.7 % — SIGNIFICANT CHANGE UP (ref 39–50)
HGB BLD-MCNC: 14.6 G/DL — SIGNIFICANT CHANGE UP (ref 13–17)
IMM GRANULOCYTES NFR BLD AUTO: 0.4 % — SIGNIFICANT CHANGE UP (ref 0–1.5)
LYMPHOCYTES # BLD AUTO: 1.76 K/UL — SIGNIFICANT CHANGE UP (ref 1–3.3)
LYMPHOCYTES # BLD AUTO: 19.1 % — SIGNIFICANT CHANGE UP (ref 13–44)
MAGNESIUM SERPL-MCNC: 2.2 MG/DL — SIGNIFICANT CHANGE UP (ref 1.6–2.6)
MCHC RBC-ENTMCNC: 27.2 PG — SIGNIFICANT CHANGE UP (ref 27–34)
MCHC RBC-ENTMCNC: 31.9 GM/DL — LOW (ref 32–36)
MCV RBC AUTO: 85.3 FL — SIGNIFICANT CHANGE UP (ref 80–100)
MONOCYTES # BLD AUTO: 0.62 K/UL — SIGNIFICANT CHANGE UP (ref 0–0.9)
MONOCYTES NFR BLD AUTO: 6.7 % — SIGNIFICANT CHANGE UP (ref 2–14)
NEUTROPHILS # BLD AUTO: 6.66 K/UL — SIGNIFICANT CHANGE UP (ref 1.8–7.4)
NEUTROPHILS NFR BLD AUTO: 72.5 % — SIGNIFICANT CHANGE UP (ref 43–77)
NRBC # BLD: 0 /100 WBCS — SIGNIFICANT CHANGE UP (ref 0–0)
PHOSPHATE SERPL-MCNC: 3.9 MG/DL — SIGNIFICANT CHANGE UP (ref 2.5–4.5)
PLATELET # BLD AUTO: 305 K/UL — SIGNIFICANT CHANGE UP (ref 150–400)
POTASSIUM SERPL-MCNC: 3.4 MMOL/L — LOW (ref 3.5–5.3)
POTASSIUM SERPL-SCNC: 3.4 MMOL/L — LOW (ref 3.5–5.3)
PROT SERPL-MCNC: 7.5 G/DL — SIGNIFICANT CHANGE UP (ref 6–8.3)
RBC # BLD: 5.36 M/UL — SIGNIFICANT CHANGE UP (ref 4.2–5.8)
RBC # FLD: 13.4 % — SIGNIFICANT CHANGE UP (ref 10.3–14.5)
SODIUM SERPL-SCNC: 138 MMOL/L — SIGNIFICANT CHANGE UP (ref 135–145)
WBC # BLD: 9.2 K/UL — SIGNIFICANT CHANGE UP (ref 3.8–10.5)
WBC # FLD AUTO: 9.2 K/UL — SIGNIFICANT CHANGE UP (ref 3.8–10.5)

## 2021-01-31 RX ORDER — POTASSIUM CHLORIDE 20 MEQ
40 PACKET (EA) ORAL EVERY 4 HOURS
Refills: 0 | Status: COMPLETED | OUTPATIENT
Start: 2021-01-31 | End: 2021-01-31

## 2021-01-31 RX ADMIN — PANTOPRAZOLE SODIUM 40 MILLIGRAM(S): 20 TABLET, DELAYED RELEASE ORAL at 05:03

## 2021-01-31 RX ADMIN — Medication 40 MILLIEQUIVALENT(S): at 14:23

## 2021-01-31 RX ADMIN — Medication 50 MILLIGRAM(S): at 21:47

## 2021-01-31 RX ADMIN — ENOXAPARIN SODIUM 40 MILLIGRAM(S): 100 INJECTION SUBCUTANEOUS at 11:41

## 2021-01-31 RX ADMIN — Medication 50 MILLIGRAM(S): at 14:23

## 2021-01-31 RX ADMIN — Medication 40 MILLIEQUIVALENT(S): at 08:43

## 2021-01-31 RX ADMIN — Medication 1 MILLIGRAM(S): at 11:41

## 2021-01-31 RX ADMIN — Medication 50 MILLIGRAM(S): at 05:02

## 2021-01-31 RX ADMIN — Medication 1 TABLET(S): at 11:41

## 2021-01-31 RX ADMIN — Medication 105 MILLIGRAM(S): at 11:41

## 2021-01-31 NOTE — PROGRESS NOTE ADULT - SUBJECTIVE AND OBJECTIVE BOX
SUBJECTIVE / OVERNIGHT EVENTS: pt seen and examined    MEDICATIONS  (STANDING):  chlordiazePOXIDE 50 milliGRAM(s) Oral every 8 hours  enoxaparin Injectable 40 milliGRAM(s) SubCutaneous daily  folic acid 1 milliGRAM(s) Oral daily  influenza   Vaccine 0.5 milliLiter(s) IntraMuscular once  multivitamin 1 Tablet(s) Oral daily  pantoprazole    Tablet 40 milliGRAM(s) Oral before breakfast  sodium chloride 0.9%. 1000 milliLiter(s) (100 mL/Hr) IV Continuous <Continuous>  thiamine IVPB 500 milliGRAM(s) IV Intermittent daily    MEDICATIONS  (PRN):  LORazepam   Injectable 2 milliGRAM(s) IV Push every 2 hours PRN Symptom-triggered: each CIWA -Ar score 8 or GREATER    Vital Signs Last 24 Hrs  T(C): 36.7 (31 Jan 2021 15:57), Max: 37.1 (31 Jan 2021 09:08)  T(F): 98 (31 Jan 2021 15:57), Max: 98.7 (31 Jan 2021 09:08)  HR: 68 (31 Jan 2021 15:57) (68 - 72)  BP: 116/67 (31 Jan 2021 15:57) (107/61 - 117/67)  BP(mean): --  RR: 17 (31 Jan 2021 15:57) (17 - 18)  SpO2: 100% (31 Jan 2021 15:57) (98% - 100%)    Constitutional: No fever, fatigue  Skin: No rash.  Eyes: No recent vision problems or eye pain.  ENT: No congestion, ear pain, or sore throat.  Cardiovascular: No chest pain or palpation.  Respiratory: No cough, shortness of breath, congestion, or wheezing.  Gastrointestinal: No abdominal pain, nausea, vomiting, or diarrhea.  Genitourinary: No dysuria.  Musculoskeletal: No joint swelling.  Neurologic: No headache.    PHYSICAL EXAM:  GENERAL: NAD  EYES: EOMI, PERRLA  NECK: Supple, No JVD  CHEST/LUNG: cta onel  HEART:  S1 , S2 +  ABDOMEN: soft , bs+  EXTREMITIES:  tremors+  NEUROLOGY:alert awake    LABS:  01-31    138  |  104  |  8   ----------------------------<  96  3.4<L>   |  28  |  0.92    Ca    8.5      31 Jan 2021 07:48  Phos  3.9     01-31  Mg     2.2     01-31    TPro  7.5  /  Alb  3.4<L>  /  TBili  0.5  /  DBili      /  AST  8<L>  /  ALT  18  /  AlkPhos  86  01-31    Creatinine Trend: 0.92 <--, 0.73 <--, 0.68 <--                        14.6   9.20  )-----------( 305      ( 31 Jan 2021 07:48 )             45.7     Urine Studies:            LIVER FUNCTIONS - ( 31 Jan 2021 07:48 )  Alb: 3.4 g/dL / Pro: 7.5 g/dL / ALK PHOS: 86 U/L / ALT: 18 U/L DA / AST: 8 U/L / GGT: x               PT/INR - ( 29 Jan 2021 15:57 )   PT: 13.1 sec;   INR: 1.11 ratio         PTT - ( 29 Jan 2021 10:28 )  PTT:30.2 sec          RADIOLOGY & ADDITIONAL TESTS:    Imaging Personally Reviewed:    Consultant(s) Notes Reviewed:      Care Discussed with Consultants/Other Providers:

## 2021-02-01 DIAGNOSIS — K35.33 ACUTE APPENDICITIS WITH PERFORATION, LOCALIZED PERITONITIS, AND GANGRENE, WITH ABSCESS: Chronic | ICD-10-CM

## 2021-02-01 DIAGNOSIS — R51.9 HEADACHE, UNSPECIFIED: ICD-10-CM

## 2021-02-01 LAB
ALBUMIN SERPL ELPH-MCNC: 3.6 G/DL — SIGNIFICANT CHANGE UP (ref 3.5–5)
ALP SERPL-CCNC: 90 U/L — SIGNIFICANT CHANGE UP (ref 40–120)
ALT FLD-CCNC: 23 U/L DA — SIGNIFICANT CHANGE UP (ref 10–60)
ANION GAP SERPL CALC-SCNC: 4 MMOL/L — LOW (ref 5–17)
AST SERPL-CCNC: 11 U/L — SIGNIFICANT CHANGE UP (ref 10–40)
BASOPHILS # BLD AUTO: 0.02 K/UL — SIGNIFICANT CHANGE UP (ref 0–0.2)
BASOPHILS NFR BLD AUTO: 0.3 % — SIGNIFICANT CHANGE UP (ref 0–2)
BILIRUB SERPL-MCNC: 0.3 MG/DL — SIGNIFICANT CHANGE UP (ref 0.2–1.2)
BUN SERPL-MCNC: 9 MG/DL — SIGNIFICANT CHANGE UP (ref 7–18)
CALCIUM SERPL-MCNC: 8.8 MG/DL — SIGNIFICANT CHANGE UP (ref 8.4–10.5)
CHLORIDE SERPL-SCNC: 103 MMOL/L — SIGNIFICANT CHANGE UP (ref 96–108)
CO2 SERPL-SCNC: 28 MMOL/L — SIGNIFICANT CHANGE UP (ref 22–31)
CREAT SERPL-MCNC: 1.01 MG/DL — SIGNIFICANT CHANGE UP (ref 0.5–1.3)
EOSINOPHIL # BLD AUTO: 0.07 K/UL — SIGNIFICANT CHANGE UP (ref 0–0.5)
EOSINOPHIL NFR BLD AUTO: 0.9 % — SIGNIFICANT CHANGE UP (ref 0–6)
GLUCOSE SERPL-MCNC: 107 MG/DL — HIGH (ref 70–99)
HCT VFR BLD CALC: 47.3 % — SIGNIFICANT CHANGE UP (ref 39–50)
HGB BLD-MCNC: 15.4 G/DL — SIGNIFICANT CHANGE UP (ref 13–17)
IMM GRANULOCYTES NFR BLD AUTO: 0.3 % — SIGNIFICANT CHANGE UP (ref 0–1.5)
LYMPHOCYTES # BLD AUTO: 1.86 K/UL — SIGNIFICANT CHANGE UP (ref 1–3.3)
LYMPHOCYTES # BLD AUTO: 24.8 % — SIGNIFICANT CHANGE UP (ref 13–44)
MAGNESIUM SERPL-MCNC: 2.1 MG/DL — SIGNIFICANT CHANGE UP (ref 1.6–2.6)
MCHC RBC-ENTMCNC: 28.1 PG — SIGNIFICANT CHANGE UP (ref 27–34)
MCHC RBC-ENTMCNC: 32.6 GM/DL — SIGNIFICANT CHANGE UP (ref 32–36)
MCV RBC AUTO: 86.2 FL — SIGNIFICANT CHANGE UP (ref 80–100)
MONOCYTES # BLD AUTO: 0.38 K/UL — SIGNIFICANT CHANGE UP (ref 0–0.9)
MONOCYTES NFR BLD AUTO: 5.1 % — SIGNIFICANT CHANGE UP (ref 2–14)
NEUTROPHILS # BLD AUTO: 5.15 K/UL — SIGNIFICANT CHANGE UP (ref 1.8–7.4)
NEUTROPHILS NFR BLD AUTO: 68.6 % — SIGNIFICANT CHANGE UP (ref 43–77)
NRBC # BLD: 0 /100 WBCS — SIGNIFICANT CHANGE UP (ref 0–0)
PHOSPHATE SERPL-MCNC: 4 MG/DL — SIGNIFICANT CHANGE UP (ref 2.5–4.5)
PLATELET # BLD AUTO: 314 K/UL — SIGNIFICANT CHANGE UP (ref 150–400)
POTASSIUM SERPL-MCNC: 4.7 MMOL/L — SIGNIFICANT CHANGE UP (ref 3.5–5.3)
POTASSIUM SERPL-SCNC: 4.7 MMOL/L — SIGNIFICANT CHANGE UP (ref 3.5–5.3)
PROT SERPL-MCNC: 8.5 G/DL — HIGH (ref 6–8.3)
RBC # BLD: 5.49 M/UL — SIGNIFICANT CHANGE UP (ref 4.2–5.8)
RBC # FLD: 13.4 % — SIGNIFICANT CHANGE UP (ref 10.3–14.5)
SODIUM SERPL-SCNC: 135 MMOL/L — SIGNIFICANT CHANGE UP (ref 135–145)
WBC # BLD: 7.5 K/UL — SIGNIFICANT CHANGE UP (ref 3.8–10.5)
WBC # FLD AUTO: 7.5 K/UL — SIGNIFICANT CHANGE UP (ref 3.8–10.5)

## 2021-02-01 RX ORDER — FOLIC ACID 0.8 MG
1 TABLET ORAL
Qty: 0 | Refills: 0 | DISCHARGE
Start: 2021-02-01

## 2021-02-01 RX ORDER — IBUPROFEN 200 MG
400 TABLET ORAL EVERY 8 HOURS
Refills: 0 | Status: DISCONTINUED | OUTPATIENT
Start: 2021-02-01 | End: 2021-02-02

## 2021-02-01 RX ADMIN — ENOXAPARIN SODIUM 40 MILLIGRAM(S): 100 INJECTION SUBCUTANEOUS at 12:59

## 2021-02-01 RX ADMIN — Medication 1 MILLIGRAM(S): at 12:59

## 2021-02-01 RX ADMIN — Medication 50 MILLIGRAM(S): at 22:26

## 2021-02-01 RX ADMIN — Medication 50 MILLIGRAM(S): at 05:44

## 2021-02-01 RX ADMIN — Medication 105 MILLIGRAM(S): at 13:00

## 2021-02-01 RX ADMIN — Medication 1 TABLET(S): at 12:59

## 2021-02-01 RX ADMIN — Medication 50 MILLIGRAM(S): at 13:02

## 2021-02-01 RX ADMIN — PANTOPRAZOLE SODIUM 40 MILLIGRAM(S): 20 TABLET, DELAYED RELEASE ORAL at 05:42

## 2021-02-01 NOTE — PROGRESS NOTE ADULT - PROBLEM SELECTOR PLAN 3
Most likely due to ETOH withdrawal   - Ibuprofen 400mg PO q6h PRN moderate pain.   - Per pt, denies allergy to Tylenol, takes Tylenol PRN at home. - Most likely due to ETOH withdrawal   - Ibuprofen 400mg PO q6h PRN moderate pain.   - Per pt, denies allergy to Tylenol, takes Tylenol PRN at home.

## 2021-02-01 NOTE — DISCHARGE NOTE PROVIDER - HOSPITAL COURSE
This is a Patient is a 30y old  Male pt with PMH ETOH use who presents with a chief complaint of nausea, vomiting, headache and abdominal pain. Found to have alcohol intoxication. Blood alcohol level 117. Pt on CIWA protocol, started on chlordiazepoxide 50mg PO q8h,  multivitamin PO daily, Thiamine IV daily x3 days, and folic acid 1mg PO daily. CIWA score remained stable, pt did not require and IV ativan   Educated on ETOH cessation. Clinically improving, medically optimized for discharge with outpt follow up.  Please note that this a brief summary of hospital course please refer to daily progress notes and consult notes for full course and events           This is a Patient is a 30y old  Male pt with PMH ETOH use who presents with a chief complaint of nausea, vomiting, headache and abdominal pain. Found to have alcohol intoxication. Blood alcohol level 117. Pt on CIWA protocol, started on chlordiazepoxide 50mg PO q8h,  multivitamin PO daily, Thiamine IV daily x3 days, and folic acid 1mg PO daily. CIWA score remained stable, pt did not require and IV ativan   Educated on ETOH cessation. Clinically improving, transferred to [facility] to complete course of hospitalization.  Please note that this a brief summary of hospital course please refer to daily progress notes and consult notes for full course and events

## 2021-02-01 NOTE — PROGRESS NOTE ADULT - PROBLEM SELECTOR PLAN 1
- .   - Continue CIWA as per protocol. Latest CIWA 3.   - Continue chlordiazepoxide 50mg PO q8h.  - Continue lorazepam 2mg IV q2h PRN as per CIWA protocol.   - Continue multivitamin PO daily, Thiamine IV daily x3 days, and folic acid 1mg PO daily.   - Monitor labs, awaiting 2/1 lab results.   - Pending Social work and SBIRT consult - .   - Continue CIWA as per protocol. Latest CIWA 3.   - Continue chlordiazepoxide 50mg PO q8h.  - Continue lorazepam 2mg IV q2h PRN as per CIWA protocol.   - Continue multivitamin PO daily, Thiamine IV daily x3 days, and folic acid 1mg PO daily.   - Pending Social work and SBIRT consult

## 2021-02-01 NOTE — PROGRESS NOTE ADULT - SUBJECTIVE AND OBJECTIVE BOX
late note entry    SUBJECTIVE / OVERNIGHT EVENTS: pt seen and examined    MEDICATIONS  (STANDING):  chlordiazePOXIDE 50 milliGRAM(s) Oral every 8 hours  enoxaparin Injectable 40 milliGRAM(s) SubCutaneous daily  folic acid 1 milliGRAM(s) Oral daily  influenza   Vaccine 0.5 milliLiter(s) IntraMuscular once  multivitamin 1 Tablet(s) Oral daily  pantoprazole    Tablet 40 milliGRAM(s) Oral before breakfast  sodium chloride 0.9%. 1000 milliLiter(s) (100 mL/Hr) IV Continuous <Continuous>  thiamine IVPB 500 milliGRAM(s) IV Intermittent daily    MEDICATIONS  (PRN):  LORazepam   Injectable 2 milliGRAM(s) IV Push every 2 hours PRN Symptom-triggered: each CIWA -Ar score 8 or GREATER    Vital Signs Last 24 Hrs  T(C): 36.7 (31 Jan 2021 15:57), Max: 37.1 (31 Jan 2021 09:08)  T(F): 98 (31 Jan 2021 15:57), Max: 98.7 (31 Jan 2021 09:08)  HR: 68 (31 Jan 2021 15:57) (68 - 72)  BP: 116/67 (31 Jan 2021 15:57) (107/61 - 117/67)  BP(mean): --  RR: 17 (31 Jan 2021 15:57) (17 - 18)  SpO2: 100% (31 Jan 2021 15:57) (98% - 100%)    Constitutional: No fever, fatigue  Skin: No rash.  Eyes: No recent vision problems or eye pain.  ENT: No congestion, ear pain, or sore throat.  Cardiovascular: No chest pain or palpation.  Respiratory: No cough, shortness of breath, congestion, or wheezing.  Gastrointestinal: No abdominal pain, nausea, vomiting, or diarrhea.  Genitourinary: No dysuria.  Musculoskeletal: No joint swelling.  Neurologic: No headache.    PHYSICAL EXAM:  GENERAL: NAD  EYES: EOMI, PERRLA  NECK: Supple, No JVD  CHEST/LUNG: cta onel  HEART:  S1 , S2 +  ABDOMEN: soft , bs+  EXTREMITIES:  tremors+  NEUROLOGY:alert awake    LABS:  01-31    138  |  104  |  8   ----------------------------<  96  3.4<L>   |  28  |  0.92    Ca    8.5      31 Jan 2021 07:48  Phos  3.9     01-31  Mg     2.2     01-31    TPro  7.5  /  Alb  3.4<L>  /  TBili  0.5  /  DBili      /  AST  8<L>  /  ALT  18  /  AlkPhos  86  01-31    Creatinine Trend: 0.92 <--, 0.73 <--, 0.68 <--                        14.6   9.20  )-----------( 305      ( 31 Jan 2021 07:48 )             45.7     Urine Studies:            LIVER FUNCTIONS - ( 31 Jan 2021 07:48 )  Alb: 3.4 g/dL / Pro: 7.5 g/dL / ALK PHOS: 86 U/L / ALT: 18 U/L DA / AST: 8 U/L / GGT: x               PT/INR - ( 29 Jan 2021 15:57 )   PT: 13.1 sec;   INR: 1.11 ratio         PTT - ( 29 Jan 2021 10:28 )  PTT:30.2 sec          RADIOLOGY & ADDITIONAL TESTS:    Imaging Personally Reviewed:    Consultant(s) Notes Reviewed:      Care Discussed with Consultants/Other Providers:

## 2021-02-01 NOTE — DISCHARGE NOTE PROVIDER - PROVIDER TOKENS
FREE:[LAST:[na],FIRST:[na],PHONE:[(   )    -],FAX:[(   )    -],ADDRESS:[Follow up with PCP within 1 week],FOLLOWUP:[1 week]]

## 2021-02-01 NOTE — PROGRESS NOTE ADULT - SUBJECTIVE AND OBJECTIVE BOX
Source of information: [employer], Chart review  Patient language: [place]  : [id number]    HPI:  31 yo male with no significant medical history comes in with nausea and vomiting for 2 days. Patient has been drinking for past 3-4 days and has had vomiting episodes, patient reports vomitus contained blood. Patient reports having headache and abdominal pain. He says he drinks 20 bottles of tequila every day and his last drink was yesterday 1/28 at night. Patient reports having tremors. Patient denies any chest pain, urinary symptoms, fever, fall, trauma, seizures. No history of recent travel, sick contacts, recent illness. (29 Jan 2021 13:06)      This is a Patient is a 30y old  Male who presents with a chief complaint of nausea, vomiting, headache and abdominal pain. Found to have alcohol intoxication. Blood alcohol level 117.   . Pt seen and examined at bedside. Pt reports PAIN SCORE:  *** SCALE USED: (1-10 VNRS).Pt tolerating PO diet. Pt denies lethargy, dyspnea, chest pain, nausea, vomiting, constipation and itchiness. Reports last BM ***.     INTERVAL HPI/OVERNIGHT EVENTS:      PAST MEDICAL & SURGICAL HISTORY:  No pertinent past medical history    past surgical history:  appendix surgery         FAMILY HISTORY: Denies significant family history       Social History:  drinks 20 bottles of beer 16 oz every day   denies smoking or other drugs (29 Jan 2021 13:06)    Allergies  - Denies allergy to Tylenol. Took Tyelnol in the past PRN- no reaction  - NKDA     MEDICATIONS  (STANDING):  chlordiazePOXIDE 50 milliGRAM(s) Oral every 8 hours  enoxaparin Injectable 40 milliGRAM(s) SubCutaneous daily  folic acid 1 milliGRAM(s) Oral daily  influenza   Vaccine 0.5 milliLiter(s) IntraMuscular once  multivitamin 1 Tablet(s) Oral daily  pantoprazole    Tablet 40 milliGRAM(s) Oral before breakfast  sodium chloride 0.9%. 1000 milliLiter(s) (100 mL/Hr) IV Continuous <Continuous>  thiamine IVPB 500 milliGRAM(s) IV Intermittent daily    MEDICATIONS  (PRN):  ibuprofen  Tablet. 400 milliGRAM(s) Oral every 8 hours PRN Moderate Pain (4 - 6)  LORazepam   Injectable 2 milliGRAM(s) IV Push every 2 hours PRN Symptom-triggered: each CIWA -Ar score 8 or GREATER      Vital Signs Last 24 Hrs  T(C): 36.2 (01 Feb 2021 08:32), Max: 37 (01 Feb 2021 00:32)  T(F): 97.1 (01 Feb 2021 08:32), Max: 98.6 (01 Feb 2021 00:32)  HR: 81 (01 Feb 2021 08:32) (68 - 81)  BP: 120/75 (01 Feb 2021 08:32) (116/67 - 120/75)  BP(mean): --  RR: 18 (01 Feb 2021 08:32) (17 - 18)  SpO2: 100% (01 Feb 2021 08:32) (99% - 100%)    LABS: Reviewed                          14.6   9.20  )-----------( 305      ( 31 Jan 2021 07:48 )             45.7     01-31    138  |  104  |  8   ----------------------------<  96  3.4<L>   |  28  |  0.92    Ca    8.5      31 Jan 2021 07:48  Phos  3.9     01-31  Mg     2.2     01-31    TPro  7.5  /  Alb  3.4<L>  /  TBili  0.5  /  DBili  x   /  AST  8<L>  /  ALT  18  /  AlkPhos  86  01-31      LIVER FUNCTIONS - ( 31 Jan 2021 07:48 )  Alb: 3.4 g/dL / Pro: 7.5 g/dL / ALK PHOS: 86 U/L / ALT: 18 U/L DA / AST: 8 U/L / GGT: x           SARS-CoV-2: NotDetec (29 Jan 2021 13:38)    REVIEW OF SYSTEMS:  CONSTITUTIONAL: No fever or fatigue  HEENT:  No difficulty hearing, no change in vision  NECK: No pain or stiffness  RESPIRATORY: No cough, wheezing, chills or hemoptysis; No shortness of breath  CARDIOVASCULAR: No chest pain, palpitations, dizziness, or leg swelling  GASTROINTESTINAL: No abdominal or epigastric pain. No nausea, vomiting; No diarrhea or constipation.   GENITOURINARY: No dysuria, frequency, hematuria, retention or incontinence  MUSCULOSKELETAL: No joint pain or swelling; No muscle, back, or extremity pain, no upper or lower motor strength weakness, no saddle anesthesia, bowel/bladder incontinence, no falls   NEURO: No headaches, No numbness/tingling b/l LE, No weakness  ENDOCRINE: No heat or cold intolerance; No hair loss  PSYCHIATRIC: No depression, anxiety or difficulty sleeping    PHYSICAL EXAM:  GENERAL:  Alert & Oriented X3, NAD, Good concentration  CHEST/LUNG: Clear to auscultation bilaterally; No rales, rhonchi, wheezing, or rubs  HEART: Regular rate and rhythm; No murmurs, rubs, or gallops  ABDOMEN: +right lower abdominal surgical scar Soft, Nontender, Nondistended; Bowel sounds present  EXTREMITIES:  2+ Peripheral Pulses, No cyanosis, or edema, No calf tenderness  MUSCULOSKELETAL: Motor Strength 5/5 B/L upper and lower extremities; moves all extremities equally against gravity; ROM intact; negative SLR  SKIN: No rashes or lesions    Consultant(s) Notes Reviewed:  [x ] YES  [ ] NO  Care Discussed with Consultants/Other Providers [ x] YES  [ ] NO   Source of information: [employer], Chart review  Patient language: Hungarian  : [id number]    HPI:  31 yo male with no significant medical history comes in with nausea and vomiting for 2 days. Patient has been drinking for past 3-4 days and has had vomiting episodes, patient reports vomitus contained blood. Patient reports having headache and abdominal pain. He says he drinks 20 bottles of tequila every day and his last drink was yesterday 1/28 at night. Patient reports having tremors. Patient denies any chest pain, urinary symptoms, fever, fall, trauma, seizures. No history of recent travel, sick contacts, recent illness. (29 Jan 2021 13:06)      This is a Patient is a 30y old  Male pt with PMH ETOH use who presents with a chief complaint of nausea, vomiting, headache and abdominal pain. Found to have alcohol intoxication. Blood alcohol level 117. Pt on CIWA protocol, last CIWA 3.  Pt seen and examined at bedside. Pt sitting in chair eating breakfast. Reports fatigue and a posterior headache, aching in character, rates pain 6/10 and not tolerable. Denies visual/hearing changes, neck pain. Pt also reports aching epigastric pain, improving since admission to the hospital. Pt tolerating PO diet. Pt denies lethargy, dyspnea, chest pain, nausea, vomiting, constipation and itchiness. Reports last BM 1/31 at night.     INTERVAL HPI/OVERNIGHT EVENTS: none       PAST MEDICAL & SURGICAL HISTORY:  No pertinent past medical history    past surgical history:  appendix surgery         FAMILY HISTORY: Denies significant family history       Social History:  drinks 20 bottles of beer 16 oz every day   denies smoking or other drugs (29 Jan 2021 13:06)    Allergies  - Denies allergy to Tylenol. Took Tyelnol in the past PRN- no reaction  - NKDA     MEDICATIONS  (STANDING):  chlordiazePOXIDE 50 milliGRAM(s) Oral every 8 hours  enoxaparin Injectable 40 milliGRAM(s) SubCutaneous daily  folic acid 1 milliGRAM(s) Oral daily  influenza   Vaccine 0.5 milliLiter(s) IntraMuscular once  multivitamin 1 Tablet(s) Oral daily  pantoprazole    Tablet 40 milliGRAM(s) Oral before breakfast  sodium chloride 0.9%. 1000 milliLiter(s) (100 mL/Hr) IV Continuous <Continuous>  thiamine IVPB 500 milliGRAM(s) IV Intermittent daily    MEDICATIONS  (PRN):  ibuprofen  Tablet. 400 milliGRAM(s) Oral every 8 hours PRN Moderate Pain (4 - 6)  LORazepam   Injectable 2 milliGRAM(s) IV Push every 2 hours PRN Symptom-triggered: each CIWA -Ar score 8 or GREATER      Vital Signs Last 24 Hrs  T(C): 36.2 (01 Feb 2021 08:32), Max: 37 (01 Feb 2021 00:32)  T(F): 97.1 (01 Feb 2021 08:32), Max: 98.6 (01 Feb 2021 00:32)  HR: 81 (01 Feb 2021 08:32) (68 - 81)  BP: 120/75 (01 Feb 2021 08:32) (116/67 - 120/75)  BP(mean): --  RR: 18 (01 Feb 2021 08:32) (17 - 18)  SpO2: 100% (01 Feb 2021 08:32) (99% - 100%)    LABS: Reviewed                          14.6   9.20  )-----------( 305      ( 31 Jan 2021 07:48 )             45.7     01-31    138  |  104  |  8   ----------------------------<  96  3.4<L>   |  28  |  0.92    Ca    8.5      31 Jan 2021 07:48  Phos  3.9     01-31  Mg     2.2     01-31    TPro  7.5  /  Alb  3.4<L>  /  TBili  0.5  /  DBili  x   /  AST  8<L>  /  ALT  18  /  AlkPhos  86  01-31      LIVER FUNCTIONS - ( 31 Jan 2021 07:48 )  Alb: 3.4 g/dL / Pro: 7.5 g/dL / ALK PHOS: 86 U/L / ALT: 18 U/L DA / AST: 8 U/L / GGT: x           SARS-CoV-2: NotDetec (29 Jan 2021 13:38)    REVIEW OF SYSTEMS:  CONSTITUTIONAL: No fever + fatigue  HEENT: + posterior headache, No difficulty hearing, no change in vision  NECK: No pain or stiffness  RESPIRATORY: No cough, wheezing, chills or hemoptysis; No shortness of breath  CARDIOVASCULAR: No chest pain, palpitations, dizziness, or leg swelling  GASTROINTESTINAL: + epigastric pain. No nausea, vomiting; No diarrhea or constipation.   GENITOURINARY: No dysuria, frequency, hematuria, retention or incontinence  MUSCULOSKELETAL: No joint pain or swelling; No muscle, back, or extremity pain, no upper or lower motor strength weakness, no saddle anesthesia, bowel/bladder incontinence, no falls   NEURO: No headaches, No numbness/tingling b/l LE, No weakness  ENDOCRINE: No heat or cold intolerance; No hair loss  PSYCHIATRIC: No depression, anxiety or difficulty sleeping    PHYSICAL EXAM:  GENERAL:  Alert & Oriented X3, NAD, Good concentration, Sitting in chair  HEENT: atraumatic, normocephalic, occipital region tender to palpation   RESPIRATORY: Clear to auscultation bilaterally; No rales, rhonchi, wheezing, or rubs  CARDIOVASCULAR: Regular rate and rhythm; No murmurs, rubs, or gallops  ABDOMEN: + epigastric region tender to palpation, soft, Nontender, Nondistended; Bowel sounds present, + right lower abdominal surgical scar   EXTREMITIES:  2+ Peripheral Pulses, No cyanosis, or edema, No calf tenderness  MUSCULOSKELETAL: Motor Strength 5/5 B/L upper and lower extremities; moves all extremities equally against gravity; ROM intact; negative SLR  SKIN: No rashes or lesions    Consultant(s) Notes Reviewed:  [x ] YES  [ ] NO  Care Discussed with Consultants/Other Providers [ x] YES  [ ] NO   Source of information: [employer], Chart review  Patient language: Guinean  : Connie Amezquita HPI/OVERNIGHT EVENTS: none       PAST MEDICAL & SURGICAL HISTORY:  No pertinent past medical history    past surgical history:  appendix surgery     FAMILY HISTORY: Denies significant family history       Social History:  drinks 20 bottles of beer 16 oz every day   denies smoking or other drugs (29 Jan 2021 13:06)    Allergies  - Denies allergy to Tylenol. Took Tyelnol in the past PRN- no reaction  - NKDA     MEDICATIONS  (STANDING):  chlordiazePOXIDE 50 milliGRAM(s) Oral every 8 hours  enoxaparin Injectable 40 milliGRAM(s) SubCutaneous daily  folic acid 1 milliGRAM(s) Oral daily  influenza   Vaccine 0.5 milliLiter(s) IntraMuscular once  multivitamin 1 Tablet(s) Oral daily  pantoprazole    Tablet 40 milliGRAM(s) Oral before breakfast  sodium chloride 0.9%. 1000 milliLiter(s) (100 mL/Hr) IV Continuous <Continuous>  thiamine IVPB 500 milliGRAM(s) IV Intermittent daily    MEDICATIONS  (PRN):  ibuprofen  Tablet. 400 milliGRAM(s) Oral every 8 hours PRN Moderate Pain (4 - 6)  LORazepam   Injectable 2 milliGRAM(s) IV Push every 2 hours PRN Symptom-triggered: each CIWA -Ar score 8 or GREATER      Vital Signs Last 24 Hrs  T(C): 36.2 (01 Feb 2021 08:32), Max: 37 (01 Feb 2021 00:32)  T(F): 97.1 (01 Feb 2021 08:32), Max: 98.6 (01 Feb 2021 00:32)  HR: 81 (01 Feb 2021 08:32) (68 - 81)  BP: 120/75 (01 Feb 2021 08:32) (116/67 - 120/75)  BP(mean): --  RR: 18 (01 Feb 2021 08:32) (17 - 18)  SpO2: 100% (01 Feb 2021 08:32) (99% - 100%)    LABS: Reviewed                          14.6   9.20  )-----------( 305      ( 31 Jan 2021 07:48 )             45.7     01-31    138  |  104  |  8   ----------------------------<  96  3.4<L>   |  28  |  0.92    Ca    8.5      31 Jan 2021 07:48  Phos  3.9     01-31  Mg     2.2     01-31    TPro  7.5  /  Alb  3.4<L>  /  TBili  0.5  /  DBili  x   /  AST  8<L>  /  ALT  18  /  AlkPhos  86  01-31      LIVER FUNCTIONS - ( 31 Jan 2021 07:48 )  Alb: 3.4 g/dL / Pro: 7.5 g/dL / ALK PHOS: 86 U/L / ALT: 18 U/L DA / AST: 8 U/L / GGT: x           SARS-CoV-2: NotDetec (29 Jan 2021 13:38)    REVIEW OF SYSTEMS:  CONSTITUTIONAL: No fever + fatigue  HEENT: + posterior headache, No difficulty hearing, no change in vision  NECK: No pain or stiffness  RESPIRATORY: No cough, wheezing, chills or hemoptysis; No shortness of breath  CARDIOVASCULAR: No chest pain, palpitations, dizziness, or leg swelling  GASTROINTESTINAL: + epigastric pain. No nausea, vomiting; No diarrhea or constipation.   GENITOURINARY: No dysuria, frequency, hematuria, retention or incontinence  MUSCULOSKELETAL: No joint pain or swelling; No muscle, back, or extremity pain, no upper or lower motor strength weakness, no saddle anesthesia, bowel/bladder incontinence, no falls   NEURO: No headaches, No numbness/tingling b/l LE, No weakness  ENDOCRINE: No heat or cold intolerance; No hair loss  PSYCHIATRIC: No depression, anxiety or difficulty sleeping    PHYSICAL EXAM:  GENERAL:  Alert & Oriented X3, NAD, Good concentration, Sitting in chair  HEENT: atraumatic, normocephalic, occipital region tender to palpation   RESPIRATORY: Clear to auscultation bilaterally; No rales, rhonchi, wheezing, or rubs  CARDIOVASCULAR: Regular rate and rhythm; No murmurs, rubs, or gallops  ABDOMEN: + epigastric region tender to palpation, soft, Nontender, Nondistended; Bowel sounds present, + right lower abdominal surgical scar   EXTREMITIES:  2+ Peripheral Pulses, No cyanosis, or edema, No calf tenderness  MUSCULOSKELETAL: Motor Strength 5/5 B/L upper and lower extremities; moves all extremities equally against gravity; ROM intact; negative SLR  SKIN: No rashes or lesions    Consultant(s) Notes Reviewed:  [x ] YES  [ ] NO  Care Discussed with Consultants/Other Providers [ x] YES  [ ] NO

## 2021-02-01 NOTE — DISCHARGE NOTE PROVIDER - CARE PROVIDER_API CALL
na, na  Follow up with PCP within 1 week  Phone: (   )    -  Fax: (   )    -  Follow Up Time: 1 week

## 2021-02-01 NOTE — DISCHARGE NOTE PROVIDER - NSDCMRMEDTOKEN_GEN_ALL_CORE_FT
folic acid 1 mg oral tablet: 1 tab(s) orally once a day  Multiple Vitamins oral tablet: 1 tab(s) orally once a day   chlordiazePOXIDE 25 mg oral capsule: 2 cap(s) orally every 12 hours  folic acid 1 mg oral tablet: 1 tab(s) orally once a day  ibuprofen 400 mg oral tablet: 1 tab(s) orally every 8 hours, As needed, Moderate Pain (4 - 6)  Multiple Vitamins oral tablet: 1 tab(s) orally once a day

## 2021-02-01 NOTE — DISCHARGE NOTE PROVIDER - NSCORESITESY/N_GEN_A_CORE_RD
Chief Complaint     Right Knee - Pain            HISTORY OF PRESENT ILLNESS:    Myron reports that overall he is not improved. He continues to have right knee pain which is progressively getting worse. He attended physical therapy which he states was helping his hamstring but not his left knee. He states a couple weeks ago his knee locked up and when it let loose he heard a loud pop. He is here to go over MRI results. He does not need any assistive device to walk. His knee hyperextend about 5 times per week. The swelling has been present but has improved.     Ongoing symptoms: pain , locking and limping.    Medications taking for current symptoms: Aleve and it is helping.    Treatment since last visit: ice and physical therapy                    Past Medical History:   Diagnosis Date   • Chronic lymphocytic leukemia (CMS/HCC)    • Dupuytren's contracture    • Internal derangement of shoulder     right   • Migraine syndrome    • Parkinson disease (CMS/HCC)        Current Outpatient Medications   Medication Sig   • carbidopa-levodopa ER (RYTARY) 61. MG per ER capsule Take 6 capsules by mouth daily.   • imiquimod (ALDARA) 5 % cream Apply topically 2 days a week. Apply to AKs on left eyebrow for 4 months   • valACYclovir (VALTREX) 500 MG tablet Take 2 by mouth twice daily at onset of cold sore   • LORazepam (ATIVAN) 1 MG tablet Take 1 mg by mouth nightly.   • amitriptyline (ELAVIL) 50 MG tablet TAKE 1 TABLET BY MOUTH EVERY NIGHT AT BEDTIME   • rizatriptan (MAXALT) 10 MG tablet TAKE 1 TABLET BY MOUTH AT ONSET OF MIGRAINE. MAY REPEAT AFTER 2 HOURS AS NEEDED     No current facility-administered medications for this visit.        ALLERGIES:  No Known Allergies    Vitals:    05/05/20 1015   Temp: 96.7 °F (35.9 °C)   Weight: 88.5 kg   Height: 5' 8\" (1.727 m)       Wt Readings from Last 4 Encounters:   05/05/20 88.5 kg   03/19/20 92.5 kg   02/28/20 90.7 kg   09/19/19 84.3 kg       REVIEW OF SYSTEMS: The remainder of  the review of systems are negative.     PHYSICAL EXAM:  KNEE Right    GENERAL: Well developed, alert and orientated, no acute distress.  INSPECTION:  No redness, no erythema on the joint, no ecchymosis, no scars from previous knee surgery.  Edema trace  RANGE OF MOTION: Full extension, flexion 90 degrees    CREPITUS: none    PALPATION: positive tenderness on medial joint line, no tenderness on lateral joint line, no tenderness over the MCL, no tenderness over the LCL,  no warmth.  No tenderness over the prepatellar bursa, no tenderness over the infrapatellar bursa, no tenderness over the pes anserine bursa.           ASSESSMENT:  1. Acute medial meniscus tear of right knee, subsequent encounter        PLAN:  No orders of the defined types were placed in this encounter.      Myron comes in today for a follow up on his right knee, he has had had an MRI which shows a meniscus tear of the posterior horn of the medial meniscus.  He does have mechanical symptoms including giving way with hyperextension frequently.  He did not improve with a trial of corticosteroid injection or physical therapy.  At this time I am recommending that he follow up with Dr. Razo for discussion on his further recommendations.       Return for Orthopedics Dr Razo, .   Yes

## 2021-02-01 NOTE — DISCHARGE NOTE PROVIDER - NSDCCPCAREPLAN_GEN_ALL_CORE_FT
PRINCIPAL DISCHARGE DIAGNOSIS  Diagnosis: Alcohol withdrawal  Assessment and Plan of Treatment: you were admitted for alcohol withdrawal symptoms, you were started on medications to treat withdrawal symptoms and you were monitored closely for sign and symptoms of withdrawal symptoms and you remained stable   it is strongly recommended that you stop drinking alcohol.  quit drinking alcohol and seek out patient resources to help you quit.         PRINCIPAL DISCHARGE DIAGNOSIS  Diagnosis: Alcohol withdrawal  Assessment and Plan of Treatment: Pt. admitted for alcohol withdrawal symptoms, you were started on medications to treat withdrawal symptoms and you were monitored closely for signs and symptoms of withdrawal symptoms and you remained stable   it is strongly recommended that you stop drinking alcohol.  quit drinking alcohol and seek out patient resources to help you quit.  -         PRINCIPAL DISCHARGE DIAGNOSIS  Diagnosis: Alcohol withdrawal  Assessment and Plan of Treatment: Pt. admitted for alcohol withdrawal symptoms, was started on Librium and Ativan in addition to vitamin supplements, monitored closely per [facility] protocol. Pt. Remained stable on Librium, did not require Ativan prn.  Pt. was counselled re risks associated with ETOH abuse, encouraged to seek out patient resources to help him quit.        SECONDARY DISCHARGE DIAGNOSES  Diagnosis: Headache  Assessment and Plan of Treatment: Likely due to withdrawals, subsided with prn Ibuprofen.    Diagnosis: Epigastric pain  Assessment and Plan of Treatment: Presented with epigastric pain likely due to ETOH.  Epigastric pain is now resolved, tolerating po diet well with no nausea/vomitting.

## 2021-02-02 VITALS
TEMPERATURE: 98 F | OXYGEN SATURATION: 99 % | DIASTOLIC BLOOD PRESSURE: 67 MMHG | RESPIRATION RATE: 19 BRPM | HEART RATE: 92 BPM | SYSTOLIC BLOOD PRESSURE: 110 MMHG

## 2021-02-02 LAB
ALBUMIN SERPL ELPH-MCNC: 3.2 G/DL — LOW (ref 3.5–5)
ALP SERPL-CCNC: 81 U/L — SIGNIFICANT CHANGE UP (ref 40–120)
ALT FLD-CCNC: 20 U/L DA — SIGNIFICANT CHANGE UP (ref 10–60)
ANION GAP SERPL CALC-SCNC: 6 MMOL/L — SIGNIFICANT CHANGE UP (ref 5–17)
AST SERPL-CCNC: 9 U/L — LOW (ref 10–40)
BILIRUB SERPL-MCNC: 0.3 MG/DL — SIGNIFICANT CHANGE UP (ref 0.2–1.2)
BUN SERPL-MCNC: 12 MG/DL — SIGNIFICANT CHANGE UP (ref 7–18)
CALCIUM SERPL-MCNC: 9 MG/DL — SIGNIFICANT CHANGE UP (ref 8.4–10.5)
CHLORIDE SERPL-SCNC: 105 MMOL/L — SIGNIFICANT CHANGE UP (ref 96–108)
CO2 SERPL-SCNC: 26 MMOL/L — SIGNIFICANT CHANGE UP (ref 22–31)
CREAT SERPL-MCNC: 1.02 MG/DL — SIGNIFICANT CHANGE UP (ref 0.5–1.3)
GLUCOSE SERPL-MCNC: 96 MG/DL — SIGNIFICANT CHANGE UP (ref 70–99)
HCT VFR BLD CALC: 43.8 % — SIGNIFICANT CHANGE UP (ref 39–50)
HGB BLD-MCNC: 14.1 G/DL — SIGNIFICANT CHANGE UP (ref 13–17)
MCHC RBC-ENTMCNC: 27.3 PG — SIGNIFICANT CHANGE UP (ref 27–34)
MCHC RBC-ENTMCNC: 32.2 GM/DL — SIGNIFICANT CHANGE UP (ref 32–36)
MCV RBC AUTO: 84.7 FL — SIGNIFICANT CHANGE UP (ref 80–100)
NRBC # BLD: 0 /100 WBCS — SIGNIFICANT CHANGE UP (ref 0–0)
PLATELET # BLD AUTO: 285 K/UL — SIGNIFICANT CHANGE UP (ref 150–400)
POTASSIUM SERPL-MCNC: 3.4 MMOL/L — LOW (ref 3.5–5.3)
POTASSIUM SERPL-SCNC: 3.4 MMOL/L — LOW (ref 3.5–5.3)
PROT SERPL-MCNC: 7.7 G/DL — SIGNIFICANT CHANGE UP (ref 6–8.3)
RBC # BLD: 5.17 M/UL — SIGNIFICANT CHANGE UP (ref 4.2–5.8)
RBC # FLD: 13.5 % — SIGNIFICANT CHANGE UP (ref 10.3–14.5)
SARS-COV-2 RNA SPEC QL NAA+PROBE: SIGNIFICANT CHANGE UP
SODIUM SERPL-SCNC: 137 MMOL/L — SIGNIFICANT CHANGE UP (ref 135–145)
WBC # BLD: 9.38 K/UL — SIGNIFICANT CHANGE UP (ref 3.8–10.5)
WBC # FLD AUTO: 9.38 K/UL — SIGNIFICANT CHANGE UP (ref 3.8–10.5)

## 2021-02-02 PROCEDURE — 93005 ELECTROCARDIOGRAM TRACING: CPT

## 2021-02-02 PROCEDURE — 82746 ASSAY OF FOLIC ACID SERUM: CPT

## 2021-02-02 PROCEDURE — 83036 HEMOGLOBIN GLYCOSYLATED A1C: CPT

## 2021-02-02 PROCEDURE — 0225U NFCT DS DNA&RNA 21 SARSCOV2: CPT

## 2021-02-02 PROCEDURE — 83735 ASSAY OF MAGNESIUM: CPT

## 2021-02-02 PROCEDURE — 84443 ASSAY THYROID STIM HORMONE: CPT

## 2021-02-02 PROCEDURE — 85610 PROTHROMBIN TIME: CPT

## 2021-02-02 PROCEDURE — 36415 COLL VENOUS BLD VENIPUNCTURE: CPT

## 2021-02-02 PROCEDURE — 85025 COMPLETE CBC W/AUTO DIFF WBC: CPT

## 2021-02-02 PROCEDURE — 80307 DRUG TEST PRSMV CHEM ANLYZR: CPT

## 2021-02-02 PROCEDURE — 80053 COMPREHEN METABOLIC PANEL: CPT

## 2021-02-02 PROCEDURE — U0005: CPT

## 2021-02-02 PROCEDURE — U0003: CPT

## 2021-02-02 PROCEDURE — 85027 COMPLETE CBC AUTOMATED: CPT

## 2021-02-02 PROCEDURE — 96374 THER/PROPH/DIAG INJ IV PUSH: CPT

## 2021-02-02 PROCEDURE — 85730 THROMBOPLASTIN TIME PARTIAL: CPT

## 2021-02-02 PROCEDURE — 84100 ASSAY OF PHOSPHORUS: CPT

## 2021-02-02 PROCEDURE — 82962 GLUCOSE BLOOD TEST: CPT

## 2021-02-02 PROCEDURE — 99285 EMERGENCY DEPT VISIT HI MDM: CPT | Mod: 25

## 2021-02-02 PROCEDURE — 82607 VITAMIN B-12: CPT

## 2021-02-02 PROCEDURE — 80076 HEPATIC FUNCTION PANEL: CPT

## 2021-02-02 PROCEDURE — 80061 LIPID PANEL: CPT

## 2021-02-02 PROCEDURE — 83690 ASSAY OF LIPASE: CPT

## 2021-02-02 RX ORDER — POTASSIUM CHLORIDE 20 MEQ
40 PACKET (EA) ORAL ONCE
Refills: 0 | Status: COMPLETED | OUTPATIENT
Start: 2021-02-02 | End: 2021-02-02

## 2021-02-02 RX ORDER — IBUPROFEN 200 MG
1 TABLET ORAL
Qty: 0 | Refills: 0 | DISCHARGE
Start: 2021-02-02

## 2021-02-02 RX ADMIN — Medication 50 MILLIGRAM(S): at 17:19

## 2021-02-02 RX ADMIN — PANTOPRAZOLE SODIUM 40 MILLIGRAM(S): 20 TABLET, DELAYED RELEASE ORAL at 05:06

## 2021-02-02 RX ADMIN — Medication 50 MILLIGRAM(S): at 05:06

## 2021-02-02 RX ADMIN — Medication 40 MILLIEQUIVALENT(S): at 11:16

## 2021-02-02 RX ADMIN — Medication 1 MILLIGRAM(S): at 11:16

## 2021-02-02 RX ADMIN — ENOXAPARIN SODIUM 40 MILLIGRAM(S): 100 INJECTION SUBCUTANEOUS at 11:16

## 2021-02-02 RX ADMIN — Medication 1 TABLET(S): at 11:16

## 2021-02-02 NOTE — PROGRESS NOTE ADULT - PROBLEM SELECTOR PROBLEM 3
Headache
Need for prophylactic measure

## 2021-02-02 NOTE — DISCHARGE NOTE NURSING/CASE MANAGEMENT/SOCIAL WORK - PATIENT PORTAL LINK FT
You can access the FollowMyHealth Patient Portal offered by [facility] by registering at the following website: [email or web address]. By joining [employer]’s FollowMyHealth portal, you will also be able to view your health information using other applications (apps) compatible with our system.

## 2021-02-02 NOTE — PROGRESS NOTE ADULT - ASSESSMENT
31 yo male with no significant medical history comes in with alcohol intoxication along with nausea, vomiting, headache and abdominal for 2 days.  . EKG showed NSR. CIWA on admission 9.    Patient admitted for alcohol withdrawal. 
29 yo male with no significant medical history comes in with alcohol intoxication along with nausea, vomiting, headache and abdominal for 2 days.  . EKG showed NSR. CIWA on admission 9.    Patient admitted for alcohol withdrawal. 
31 yo male with no significant medical history comes in with alcohol intoxication along with nausea, vomiting, headache and abdominal for 2 days.  . EKG showed NSR. CIWA on admission 9.    Patient admitted for alcohol withdrawal. 
29 yo male with no significant medical history comes in with alcohol intoxication along with nausea, vomiting, headache and abdominal for 2 days.  . EKG showed NSR. CIWA on admission 9.    Patient admitted for alcohol withdrawal. 
This is a Patient is a 30y old  Male pt with PMH ETOH use who presents with a chief complaint of nausea, vomiting, headache and abdominal pain. Found to have alcohol intoxication. Blood alcohol level 117. Pt on CIWA protocol, last CIWA 3.  Pt seen and examined at bedside. Pt sitting in chair eating breakfast. Reports fatigue and a posterior headache, aching in character, rates pain 6/10 and not tolerable. Denies visual/hearing changes, neck pain. Pt also reports aching epigastric pain, improving since admission to the hospital. Pt tolerating PO diet.  Reports last BM 1/31 at night

## 2021-02-02 NOTE — CHART NOTE - NSCHARTNOTEFT_GEN_A_CORE
Pt. for transfer to [facility] per attending.  Discharge note completed, pt. informed and agreed.  Pt. is stable with no s&s of ETOH withdrawal, Librium and Ativan being titrated down.

## 2021-02-02 NOTE — PROGRESS NOTE ADULT - REASON FOR ADMISSION
Alcohol intoxication

## 2021-02-02 NOTE — PROGRESS NOTE ADULT - SUBJECTIVE AND OBJECTIVE BOX
SUBJECTIVE / OVERNIGHT EVENTS: pt seen and examined    MEDICATIONS  (STANDING):  chlordiazePOXIDE 50 milliGRAM(s) Oral every 12 hours  enoxaparin Injectable 40 milliGRAM(s) SubCutaneous daily  folic acid 1 milliGRAM(s) Oral daily  influenza   Vaccine 0.5 milliLiter(s) IntraMuscular once  multivitamin 1 Tablet(s) Oral daily  pantoprazole    Tablet 40 milliGRAM(s) Oral before breakfast  sodium chloride 0.9%. 1000 milliLiter(s) (100 mL/Hr) IV Continuous <Continuous>    MEDICATIONS  (PRN):  ibuprofen  Tablet. 400 milliGRAM(s) Oral every 8 hours PRN Moderate Pain (4 - 6)  LORazepam   Injectable 2 milliGRAM(s) IV Push every 6 hours PRN Symptom-triggered: each CIWA -Ar score 8 or GREATER    Vital Signs Last 24 Hrs  T(C): 36.7 (02 Feb 2021 16:32), Max: 36.8 (02 Feb 2021 00:12)  T(F): 98 (02 Feb 2021 16:32), Max: 98.3 (02 Feb 2021 00:12)  HR: 92 (02 Feb 2021 16:32) (69 - 92)  BP: 110/67 (02 Feb 2021 16:32) (108/72 - 117/65)  BP(mean): --  RR: 19 (02 Feb 2021 16:32) (18 - 19)  SpO2: 99% (02 Feb 2021 16:32) (95% - 100%)    Constitutional: No fever, fatigue  Skin: No rash.  Eyes: No recent vision problems or eye pain.  ENT: No congestion, ear pain, or sore throat.  Cardiovascular: No chest pain or palpation.  Respiratory: No cough, shortness of breath, congestion, or wheezing.  Gastrointestinal: No abdominal pain, nausea, vomiting, or diarrhea.  Genitourinary: No dysuria.  Musculoskeletal: No joint swelling.  Neurologic: No headache.    PHYSICAL EXAM:  GENERAL: NAD  EYES: EOMI, PERRLA  NECK: Supple, No JVD  CHEST/LUNG: cta onel  HEART:  S1 , S2 +  ABDOMEN: soft , bs+  EXTREMITIES:  tremors+  NEUROLOGY:alert awake    LABS:  02-02    137  |  105  |  12  ----------------------------<  96  3.4<L>   |  26  |  1.02    Ca    9.0      02 Feb 2021 07:01  Phos  4.0     02-01  Mg     2.1     02-01    TPro  7.7  /  Alb  3.2<L>  /  TBili  0.3  /  DBili      /  AST  9<L>  /  ALT  20  /  AlkPhos  81  02-02    Creatinine Trend: 1.02 <--, 1.01 <--, 0.92 <--, 0.73 <--, 0.68 <--                        14.1   9.38  )-----------( 285      ( 02 Feb 2021 07:01 )             43.8     Urine Studies:            LIVER FUNCTIONS - ( 02 Feb 2021 07:01 )  Alb: 3.2 g/dL / Pro: 7.7 g/dL / ALK PHOS: 81 U/L / ALT: 20 U/L DA / AST: 9 U/L / GGT: x               Care Discussed with Consultants/Other Providers:

## 2021-02-03 ENCOUNTER — INPATIENT (INPATIENT)
Facility: HOSPITAL | Age: 31
LOS: 0 days | Discharge: HOME | DRG: 897 | End: 2021-02-04
Attending: STUDENT IN AN ORGANIZED HEALTH CARE EDUCATION/TRAINING PROGRAM | Admitting: STUDENT IN AN ORGANIZED HEALTH CARE EDUCATION/TRAINING PROGRAM
Payer: MEDICAID

## 2021-02-03 VITALS
TEMPERATURE: 99 F | RESPIRATION RATE: 17 BRPM | DIASTOLIC BLOOD PRESSURE: 76 MMHG | WEIGHT: 143.74 LBS | OXYGEN SATURATION: 96 % | HEART RATE: 97 BPM | SYSTOLIC BLOOD PRESSURE: 117 MMHG

## 2021-02-03 DIAGNOSIS — R68.89 OTHER GENERAL SYMPTOMS AND SIGNS: ICD-10-CM

## 2021-02-03 DIAGNOSIS — K35.33 ACUTE APPENDICITIS WITH PERFORATION, LOCALIZED PERITONITIS, AND GANGRENE, WITH ABSCESS: Chronic | ICD-10-CM

## 2021-02-03 DIAGNOSIS — F10.24 ALCOHOL DEPENDENCE WITH ALCOHOL-INDUCED MOOD DISORDER: ICD-10-CM

## 2021-02-03 LAB
ALBUMIN SERPL ELPH-MCNC: 3.2 G/DL — LOW (ref 3.3–5)
ALP SERPL-CCNC: 67 U/L — SIGNIFICANT CHANGE UP (ref 30–120)
ALT FLD-CCNC: 20 U/L DA — SIGNIFICANT CHANGE UP (ref 10–60)
ANION GAP SERPL CALC-SCNC: 12 MMOL/L — SIGNIFICANT CHANGE UP (ref 5–17)
AST SERPL-CCNC: 17 U/L — SIGNIFICANT CHANGE UP (ref 10–40)
BASOPHILS # BLD AUTO: 0.03 K/UL — SIGNIFICANT CHANGE UP (ref 0–0.2)
BASOPHILS NFR BLD AUTO: 0.3 % — SIGNIFICANT CHANGE UP (ref 0–2)
BILIRUB SERPL-MCNC: 0.2 MG/DL — SIGNIFICANT CHANGE UP (ref 0.2–1.2)
BUN SERPL-MCNC: 15 MG/DL — SIGNIFICANT CHANGE UP (ref 7–23)
CALCIUM SERPL-MCNC: 8.7 MG/DL — SIGNIFICANT CHANGE UP (ref 8.4–10.5)
CHLORIDE SERPL-SCNC: 102 MMOL/L — SIGNIFICANT CHANGE UP (ref 96–108)
CO2 SERPL-SCNC: 24 MMOL/L — SIGNIFICANT CHANGE UP (ref 22–31)
CREAT SERPL-MCNC: 0.77 MG/DL — SIGNIFICANT CHANGE UP (ref 0.5–1.3)
EOSINOPHIL # BLD AUTO: 0.13 K/UL — SIGNIFICANT CHANGE UP (ref 0–0.5)
EOSINOPHIL NFR BLD AUTO: 1.5 % — SIGNIFICANT CHANGE UP (ref 0–6)
GLUCOSE SERPL-MCNC: 102 MG/DL — HIGH (ref 70–99)
HCT VFR BLD CALC: 43.5 % — SIGNIFICANT CHANGE UP (ref 39–50)
HGB BLD-MCNC: 14 G/DL — SIGNIFICANT CHANGE UP (ref 13–17)
IMM GRANULOCYTES NFR BLD AUTO: 0.1 % — SIGNIFICANT CHANGE UP (ref 0–1.5)
LYMPHOCYTES # BLD AUTO: 2.45 K/UL — SIGNIFICANT CHANGE UP (ref 1–3.3)
LYMPHOCYTES # BLD AUTO: 28.5 % — SIGNIFICANT CHANGE UP (ref 13–44)
MAGNESIUM SERPL-MCNC: 1.9 MG/DL — SIGNIFICANT CHANGE UP (ref 1.6–2.6)
MCHC RBC-ENTMCNC: 27.4 PG — SIGNIFICANT CHANGE UP (ref 27–34)
MCHC RBC-ENTMCNC: 32.2 GM/DL — SIGNIFICANT CHANGE UP (ref 32–36)
MCV RBC AUTO: 85.1 FL — SIGNIFICANT CHANGE UP (ref 80–100)
MONOCYTES # BLD AUTO: 0.57 K/UL — SIGNIFICANT CHANGE UP (ref 0–0.9)
MONOCYTES NFR BLD AUTO: 6.6 % — SIGNIFICANT CHANGE UP (ref 2–14)
NEUTROPHILS # BLD AUTO: 5.42 K/UL — SIGNIFICANT CHANGE UP (ref 1.8–7.4)
NEUTROPHILS NFR BLD AUTO: 63 % — SIGNIFICANT CHANGE UP (ref 43–77)
NRBC # BLD: 0 /100 WBCS — SIGNIFICANT CHANGE UP (ref 0–0)
PHOSPHATE SERPL-MCNC: 5 MG/DL — HIGH (ref 2.5–4.5)
PLATELET # BLD AUTO: 293 K/UL — SIGNIFICANT CHANGE UP (ref 150–400)
POTASSIUM SERPL-MCNC: 3.3 MMOL/L — LOW (ref 3.5–5.3)
POTASSIUM SERPL-SCNC: 3.3 MMOL/L — LOW (ref 3.5–5.3)
PROT SERPL-MCNC: 7.5 G/DL — SIGNIFICANT CHANGE UP (ref 6–8.3)
RBC # BLD: 5.11 M/UL — SIGNIFICANT CHANGE UP (ref 4.2–5.8)
RBC # FLD: 13.6 % — SIGNIFICANT CHANGE UP (ref 10.3–14.5)
SARS-COV-2 IGG SERPL QL IA: POSITIVE
SARS-COV-2 IGM SERPL IA-ACNC: 21.4 INDEX — HIGH
SODIUM SERPL-SCNC: 138 MMOL/L — SIGNIFICANT CHANGE UP (ref 135–145)
WBC # BLD: 8.61 K/UL — SIGNIFICANT CHANGE UP (ref 3.8–10.5)
WBC # FLD AUTO: 8.61 K/UL — SIGNIFICANT CHANGE UP (ref 3.8–10.5)

## 2021-02-03 PROCEDURE — 12345: CPT | Mod: NC

## 2021-02-03 PROCEDURE — 93010 ELECTROCARDIOGRAM REPORT: CPT

## 2021-02-03 PROCEDURE — 99223 1ST HOSP IP/OBS HIGH 75: CPT

## 2021-02-03 PROCEDURE — 99253 IP/OBS CNSLTJ NEW/EST LOW 45: CPT

## 2021-02-03 RX ORDER — FOLIC ACID 0.8 MG
1 TABLET ORAL DAILY
Refills: 0 | Status: DISCONTINUED | OUTPATIENT
Start: 2021-02-03 | End: 2021-02-04

## 2021-02-03 RX ORDER — POTASSIUM CHLORIDE 20 MEQ
40 PACKET (EA) ORAL ONCE
Refills: 0 | Status: COMPLETED | OUTPATIENT
Start: 2021-02-03 | End: 2021-02-03

## 2021-02-03 RX ORDER — THIAMINE MONONITRATE (VIT B1) 100 MG
100 TABLET ORAL DAILY
Refills: 0 | Status: DISCONTINUED | OUTPATIENT
Start: 2021-02-03 | End: 2021-02-03

## 2021-02-03 RX ORDER — IBUPROFEN 200 MG
400 TABLET ORAL EVERY 8 HOURS
Refills: 0 | Status: DISCONTINUED | OUTPATIENT
Start: 2021-02-03 | End: 2021-02-04

## 2021-02-03 RX ORDER — ENOXAPARIN SODIUM 100 MG/ML
40 INJECTION SUBCUTANEOUS DAILY
Refills: 0 | Status: DISCONTINUED | OUTPATIENT
Start: 2021-02-03 | End: 2021-02-04

## 2021-02-03 RX ORDER — FLUOXETINE HCL 10 MG
10 CAPSULE ORAL DAILY
Refills: 0 | Status: DISCONTINUED | OUTPATIENT
Start: 2021-02-03 | End: 2021-02-04

## 2021-02-03 RX ADMIN — Medication 40 MILLIEQUIVALENT(S): at 12:26

## 2021-02-03 RX ADMIN — ENOXAPARIN SODIUM 40 MILLIGRAM(S): 100 INJECTION SUBCUTANEOUS at 12:26

## 2021-02-03 RX ADMIN — Medication 50 MILLIGRAM(S): at 05:49

## 2021-02-03 RX ADMIN — Medication 1 TABLET(S): at 14:36

## 2021-02-03 RX ADMIN — Medication 50 MILLIGRAM(S): at 19:05

## 2021-02-03 RX ADMIN — Medication 2 MILLIGRAM(S): at 01:23

## 2021-02-03 RX ADMIN — Medication 400 MILLIGRAM(S): at 01:23

## 2021-02-03 RX ADMIN — Medication 1 MILLIGRAM(S): at 14:36

## 2021-02-03 RX ADMIN — Medication 10 MILLIGRAM(S): at 14:39

## 2021-02-03 NOTE — H&P ADULT - NSHPPHYSICALEXAM_GEN_ALL_CORE
PHYSICAL EXAM:  Vital Signs Last 24 Hrs  T(C): 37.1 (03 Feb 2021 00:29), Max: 37.1 (03 Feb 2021 00:29)  T(F): 98.7 (03 Feb 2021 00:29), Max: 98.7 (03 Feb 2021 00:29)  HR: 97 (03 Feb 2021 00:29) (69 - 97)  BP: 117/76 (03 Feb 2021 00:29) (108/72 - 117/76)  BP(mean): --  RR: 17 (03 Feb 2021 00:29) (17 - 19)  SpO2: 96% (03 Feb 2021 00:29) (95% - 99%)    GENERAL:     young appearing male, slighlty anxious but in no distress  HEAD:     atraumatic  EYES:     EOMI  RESPIRATORY:     clear to auscultation bilaterally, no rales or rhonchi or wheezing or rubs  CARDIOVASCULAR:     regular rate and rhythm, no murmurs or rubs or gallops, 2+ peripheral pulses  GASTROINTESTINAL:     soft, tender to palpation in epigastric area, nondistended, no hepatosplenomegaly palpated, bowel sounds present  EXTREMITIES:     no clubbing or cyanosis or edema  MUSCULOSKELETAL:     no joint pain or swelling or deformities  NERVOUS SYSTEM:     no asterixis seen, motor strength intact with 5/5 B/L upper and lower extremities, no gross sensory deficits  SKIN:     no rashes or lesions  PSYCH:     appropriate

## 2021-02-03 NOTE — CHART NOTE - NSCHARTNOTEFT_GEN_A_CORE
Patient seen and examined at bedside.  Notes mild tremors of b/l UE.  Last drink he says was approx 5 days ago, does not drink daily, but often binges for 5-6 days straight.  Mild abd discomfort in epigastrium.  Continue current management as per H&P from earlier today.  Potassium supplemented.  Psych eval requested.

## 2021-02-03 NOTE — BEHAVIORAL HEALTH ASSESSMENT NOTE - NSBHCHARTREVIEWLAB_PSY_A_CORE FT
14.0   8.61  )-----------( 293      ( 03 Feb 2021 07:30 )             43.5   02-03    138  |  102  |  15  ----------------------------<  102<H>  3.3<L>   |  24  |  0.77    Ca    8.7      03 Feb 2021 07:30  Phos  5.0     02-03  Mg     1.9     02-03    TPro  7.5  /  Alb  3.2<L>  /  TBili  0.2  /  DBili  x   /  AST  17  /  ALT  20  /  AlkPhos  67  02-03

## 2021-02-03 NOTE — H&P ADULT - HISTORY OF PRESENT ILLNESS
30M with no signficant pmhx who originally presented to [place] on 1/29 for nausea and vomiting.  Associated with abdominal pain and nausea/vomiting with some hematemesis.  Patient reported that he was drinking for about 5 days with about 20 bottles of beer and tequila per day (last drink was 1/28).  Reported that he was trying to drink "away his problems." Said that he was depressed and has been under a lot stress due to work and not having enough money.  Said he was trying to hurt himself but denied any suicidal ideation.  Did have one previous drinking binge about 2 months ago.  Went to the ED where he was discharged with "just pills."  Patient does report that when he does stop drinking he does get anxious, headaches, and even hallucinates (though denied every having any seizures).  Of note patient denied any recent illnesses such as fevers, cough, diarrhea.  At [place], patient was found to have BAL of 117 with a CIWA of 9.  Admitted for etoh withdrawal.   Started on CIWA protocol with librium 50mg q8h standing and ativan 2mg q2h PRN.  Was also given thiamine and folic acid.   Patient complaining of abdominal pain and was likely 2/2 alcoholic gastritis and was placed on protonix.  Currently the patient says he feels a little nervous with some epigastric discomfort.  Otherwise no other complaints.
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
## 2021-02-03 NOTE — H&P ADULT - NSHPREVIEWOFSYSTEMS_GEN_ALL_CORE
REVIEW OF SYSTEMS:  CONSTITUTIONAL:    +nervousness, no fever or weight loss or fatigue  EYES:    no eye pain or visual disturbances or discharge  ENMT:     no difficulty hearing or tinnitus or vertigo, no sinus or throat pain  NECK:    no pain or stiffness  RESPIRATORY:    no cough or wheezing or chills or hemoptysis, no shortness of breath  CARDIOVASCULAR:    no chest pain or palpitations or dizziness or leg swelling  GASTROINTESTINAL:    +epigastric pain, no nausea or vomiting or hematemesis, no diarrhea or constipation. no melena or hematochezia.  GENITOURINARY:    no dysuria or frequency or hematuria or incontinence  NEUROLOGICAL:    no headaches or memory loss or loss of strength or numbness or tremors  SKIN:    no itching or burning or rashes or lesions   LYMPH NODES:    no enlarged glands  ENDOCRINE:    no heat or cold intolerance, no hair loss, no polydipsia or polyuria  MUSCULOSKELETAL:    no joint pain or swelling, no muscle or back or extremity pain  PSYCHIATRIC:    no depression or anxiety or mood swings or difficulty sleeping  HEME/LYMPH:    no easy bruising or bleeding gums  ALLERGY AND IMMUNOLOGIC:    no hives or eczema

## 2021-02-03 NOTE — SBIRT NOTE ADULT - NSSBIRTALCPASSREFTXDET_GEN_A_CORE
Patient reports to be agreeable to provision of resources for Alcoholics Anonymous and local outpatient mental health clinics. Please refer to psychosocial assessment for further details due to character limitations in SBIRT Note.

## 2021-02-03 NOTE — BEHAVIORAL HEALTH ASSESSMENT NOTE - RISK ASSESSMENT
Low Acute Suicide Risk Patient has no prior psychiatric history, is future-oriented, is employed and is able to contract for safety

## 2021-02-03 NOTE — BEHAVIORAL HEALTH ASSESSMENT NOTE - NSBHCHARTREVIEWINVESTIGATE_PSY_A_CORE FT
`< from: 12 Lead ECG (02.03.21 @ 01:57) >    Ventricular Rate 97 BPM    Atrial Rate 97 BPM    P-R Interval 172 ms    QRS Duration 90 ms    Q-T Interval 336 ms    QTC Calculation(Bazett) 426 ms    P Axis 119 degrees    R Axis 109 degrees    T Axis 142 degrees    Diagnosis Line *** Suspect arm lead reversal, interpretation assumes no reversal  Normal sinus rhythm  Nonspecific ST abnormality  Abnormal ECG  When compared with ECG of 03-FEB-2021 01:56, (Unconfirmed)  No significant change was found    < end of copied text >

## 2021-02-03 NOTE — BEHAVIORAL HEALTH ASSESSMENT NOTE - NSBHCHARTREVIEWVS_PSY_A_CORE FT
Vital Signs Last 24 Hrs  T(C): 36.5 (03 Feb 2021 10:00), Max: 37.1 (03 Feb 2021 00:29)  T(F): 97.7 (03 Feb 2021 10:00), Max: 98.7 (03 Feb 2021 00:29)  HR: 79 (03 Feb 2021 10:00) (74 - 97)  BP: 112/65 (03 Feb 2021 10:00) (104/66 - 117/76)  BP(mean): --  RR: 18 (03 Feb 2021 10:00) (16 - 19)  SpO2: 99% (03 Feb 2021 10:00) (96% - 99%)

## 2021-02-03 NOTE — H&P ADULT - NSHPSOCIALHISTORY_GEN_ALL_CORE
+ has drinking binges with about 20 bottles of beer/tequila   - no smoking or illegal drug use  - works in a restaurant  - lives alone

## 2021-02-03 NOTE — H&P ADULT - ASSESSMENT
30M with no signficant pmhx who originally presented to [place] on 1/29 for etoh withdrawal and alcoholic gastritis.  COVID neg and therefore transferred here.      Alcohol withdrawal  - admit to telemetry  - continue with librium taper  - continue with ativan PRN  - will give one does of ativan now for anxiety  - SW consult   - monitor electrolytes  - continue with MVI and folate  - already received thiamine x3 days    Alcohol gastritis  - monitor for now   - if worsens, consider GI consult    Depression  - would like to see a psychiatrist   - psych consult  - not actively expressing any intent for self-harm or SI -> no need for 1:1 for now    Preventive measures    - low risk for DVT -> SCD only 30M with no signficant pmhx who originally presented to [place] on 1/29 for etoh withdrawal and alcoholic gastritis.  COVID neg and therefore transferred here.      Alcohol withdrawal  - admit to telemetry  - continue with librium taper  - continue with ativan PRN  - will give one does of ativan now for anxiety  - SW consult   - monitor electrolytes  - continue with MVI and folate  - already received thiamine x3 days    Alcohol gastritis  - monitor for now   - if worsens, consider GI consult    Depression  - would like to see a psychiatrist   - psych consult  - not actively expressing any intent for self-harm or SI -> no need for 1:1 for now    Preventive measures    - lovenox for DVT ppx
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
## 2021-02-03 NOTE — H&P ADULT - NSHPLABSRESULTS_GEN_ALL_CORE
LABS:                        14.1   9.38  )-----------( 285      ( 02 Feb 2021 07:01 )             43.8     137    |  105    |  12     ----------------------------<  96       02 Feb 2021 07:01  3.4<L>   |  26     |  1.02         Ca 9.0           02 Feb 2021 07:01        TPro  7.7    /  Alb  3.2<L>  /  TBili  0.3    /  DBili  x      /  AST  9<L>   /  ALT  20     /  AlkPhos  81     02 Feb 2021 07:01      EKG:    Radiology:

## 2021-02-04 VITALS
HEART RATE: 93 BPM | DIASTOLIC BLOOD PRESSURE: 80 MMHG | RESPIRATION RATE: 17 BRPM | TEMPERATURE: 98 F | OXYGEN SATURATION: 97 % | SYSTOLIC BLOOD PRESSURE: 125 MMHG

## 2021-02-04 LAB
ALBUMIN SERPL ELPH-MCNC: 3.4 G/DL — SIGNIFICANT CHANGE UP (ref 3.3–5)
ALP SERPL-CCNC: 62 U/L — SIGNIFICANT CHANGE UP (ref 30–120)
ALT FLD-CCNC: 25 U/L DA — SIGNIFICANT CHANGE UP (ref 10–60)
ANION GAP SERPL CALC-SCNC: 9 MMOL/L — SIGNIFICANT CHANGE UP (ref 5–17)
AST SERPL-CCNC: 13 U/L — SIGNIFICANT CHANGE UP (ref 10–40)
BILIRUB SERPL-MCNC: 0.2 MG/DL — SIGNIFICANT CHANGE UP (ref 0.2–1.2)
BUN SERPL-MCNC: 8 MG/DL — SIGNIFICANT CHANGE UP (ref 7–23)
CALCIUM SERPL-MCNC: 9.1 MG/DL — SIGNIFICANT CHANGE UP (ref 8.4–10.5)
CHLORIDE SERPL-SCNC: 104 MMOL/L — SIGNIFICANT CHANGE UP (ref 96–108)
CO2 SERPL-SCNC: 27 MMOL/L — SIGNIFICANT CHANGE UP (ref 22–31)
CREAT SERPL-MCNC: 0.75 MG/DL — SIGNIFICANT CHANGE UP (ref 0.5–1.3)
GLUCOSE SERPL-MCNC: 89 MG/DL — SIGNIFICANT CHANGE UP (ref 70–99)
HCT VFR BLD CALC: 45.4 % — SIGNIFICANT CHANGE UP (ref 39–50)
HGB BLD-MCNC: 14.3 G/DL — SIGNIFICANT CHANGE UP (ref 13–17)
MCHC RBC-ENTMCNC: 27.1 PG — SIGNIFICANT CHANGE UP (ref 27–34)
MCHC RBC-ENTMCNC: 31.5 GM/DL — LOW (ref 32–36)
MCV RBC AUTO: 86.1 FL — SIGNIFICANT CHANGE UP (ref 80–100)
NRBC # BLD: 0 /100 WBCS — SIGNIFICANT CHANGE UP (ref 0–0)
PLATELET # BLD AUTO: 282 K/UL — SIGNIFICANT CHANGE UP (ref 150–400)
POTASSIUM SERPL-MCNC: 3.7 MMOL/L — SIGNIFICANT CHANGE UP (ref 3.5–5.3)
POTASSIUM SERPL-SCNC: 3.7 MMOL/L — SIGNIFICANT CHANGE UP (ref 3.5–5.3)
PROT SERPL-MCNC: 7.9 G/DL — SIGNIFICANT CHANGE UP (ref 6–8.3)
RBC # BLD: 5.27 M/UL — SIGNIFICANT CHANGE UP (ref 4.2–5.8)
RBC # FLD: 13.6 % — SIGNIFICANT CHANGE UP (ref 10.3–14.5)
SODIUM SERPL-SCNC: 140 MMOL/L — SIGNIFICANT CHANGE UP (ref 135–145)
WBC # BLD: 6.57 K/UL — SIGNIFICANT CHANGE UP (ref 3.8–10.5)
WBC # FLD AUTO: 6.57 K/UL — SIGNIFICANT CHANGE UP (ref 3.8–10.5)

## 2021-02-04 PROCEDURE — 80053 COMPREHEN METABOLIC PANEL: CPT

## 2021-02-04 PROCEDURE — 99239 HOSP IP/OBS DSCHRG MGMT >30: CPT

## 2021-02-04 PROCEDURE — 36415 COLL VENOUS BLD VENIPUNCTURE: CPT

## 2021-02-04 PROCEDURE — 84100 ASSAY OF PHOSPHORUS: CPT

## 2021-02-04 PROCEDURE — 85027 COMPLETE CBC AUTOMATED: CPT

## 2021-02-04 PROCEDURE — 93005 ELECTROCARDIOGRAM TRACING: CPT

## 2021-02-04 PROCEDURE — 86769 SARS-COV-2 COVID-19 ANTIBODY: CPT

## 2021-02-04 PROCEDURE — 85025 COMPLETE CBC W/AUTO DIFF WBC: CPT

## 2021-02-04 PROCEDURE — 83735 ASSAY OF MAGNESIUM: CPT

## 2021-02-04 RX ORDER — THIAMINE MONONITRATE (VIT B1) 100 MG
1 TABLET ORAL
Qty: 30 | Refills: 1
Start: 2021-02-04 | End: 2021-04-04

## 2021-02-04 RX ORDER — FLUOXETINE HCL 10 MG
1 CAPSULE ORAL
Qty: 30 | Refills: 1
Start: 2021-02-04

## 2021-02-04 RX ADMIN — Medication 1 MILLIGRAM(S): at 11:06

## 2021-02-04 RX ADMIN — Medication 1 TABLET(S): at 11:06

## 2021-02-04 RX ADMIN — Medication 50 MILLIGRAM(S): at 06:03

## 2021-02-04 RX ADMIN — Medication 10 MILLIGRAM(S): at 11:06

## 2021-02-04 RX ADMIN — ENOXAPARIN SODIUM 40 MILLIGRAM(S): 100 INJECTION SUBCUTANEOUS at 11:06

## 2021-02-04 NOTE — DISCHARGE NOTE PROVIDER - HOSPITAL COURSE
HPI:  30M with no signficant pmhx who originally presented to [place] on 1/29 for nausea and vomiting.  Associated with abdominal pain and nausea/vomiting with some hematemesis.  Patient reported that he was drinking for about 5 days with about 20 bottles of beer and tequila per day (last drink was 1/28).  Reported that he was trying to drink "away his problems." Said that he was depressed and has been under a lot stress due to work and not having enough money.  Said he was trying to hurt himself but denied any suicidal ideation.  Did have one previous drinking binge about 2 months ago.  Went to the ED where he was discharged with "just pills."  Patient does report that when he does stop drinking he does get anxious, headaches, and even hallucinates (though denied every having any seizures).  Of note patient denied any recent illnesses such as fevers, cough, diarrhea.  At [place], patient was found to have BAL of 117 with a CIWA of 9.  Admitted for etoh withdrawal.   Started on CIWA protocol with librium 50mg q8h standing and ativan 2mg q2h PRN.  Was also given thiamine and folic acid.   Patient complaining of abdominal pain and was likely 2/2 alcoholic gastritis and was placed on protonix.  Currently the patient says he feels a little nervous with some epigastric discomfort.  Otherwise no other complaints.       (03 Feb 2021 01:00)    Day of Discharge  Seen and examined at bedside.   used via phone.  Patient reports feeling well, no complaints, wants to go home.  Lives with friends.  Denies suicidal ideation.    Vital Signs Last 24 Hrs  T(C): 36.7 (04 Feb 2021 10:27), Max: 37.4 (03 Feb 2021 22:03)  T(F): 98.1 (04 Feb 2021 10:27), Max: 99.3 (03 Feb 2021 22:03)  HR: 75 (04 Feb 2021 10:27) (75 - 88)  BP: 120/77 (04 Feb 2021 10:27) (103/64 - 125/71)  BP(mean): --  RR: 17 (04 Feb 2021 10:27) (17 - 18)  SpO2: 99% (04 Feb 2021 10:27) (94% - 99%)    PHYSICAL EXAM:  Vital Signs Last 24 Hrs  T(C): 36.7 (04 Feb 2021 10:27), Max: 37.4 (03 Feb 2021 22:03)  T(F): 98.1 (04 Feb 2021 10:27), Max: 99.3 (03 Feb 2021 22:03)  HR: 75 (04 Feb 2021 10:27) (75 - 88)  BP: 120/77 (04 Feb 2021 10:27) (103/64 - 125/71)  BP(mean): --  RR: 17 (04 Feb 2021 10:27) (17 - 18)  SpO2: 99% (04 Feb 2021 10:27) (94% - 99%)    GENERAL: NAD, well-groomed, well-developed, awake, alert, oriented x 3, fluent and coherent speech  HEAD:  Atraumatic, Normocephalic  EYES: EOMI, PERRLA, conjunctiva and sclera clear  ENMT: No tonsillar erythema, exudates, or enlargement; Moist mucous membranes, Good dentition, No lesions  NECK: Supple, No JVD, No Cervical LAD, No thyromegaly  NERVOUS SYSTEM:  Good concentration; Moving all 4 extremities against gravity and resistance; No gross sensory deficits, no facial droop, no tremor  CHEST/LUNG: Clear to auscultation bilaterally; No rales, rhonchi, wheezing, or rubs  HEART: Regular rate and rhythm; No murmurs, rubs, or gallops  ABDOMEN: Soft, Nontender, Nondistended, Bowel sounds present, no palpable masses or organomegaly, no bruits  EXTREMITIES:  2+ Peripheral Pulses, No clubbing, cyanosis, or edema  LYMPH: No lymphadenopathy noted                           14.3   6.57  )-----------( 282      ( 04 Feb 2021 07:17 )             45.4     02-04    140  |  104  |  8   ----------------------------<  89  3.7   |  27  |  0.75    Ca    9.1      04 Feb 2021 07:17  Phos  5.0     02-03  Mg     1.9     02-03    TPro  7.9  /  Alb  3.4  /  TBili  0.2  /  DBili  x   /  AST  13  /  ALT  25  /  AlkPhos  62  02-04    Plan:    Alcohol Withrawal  - CIWA scores of 0, inconsistent with last drink timing, said it was last thursday yesterday, today saying it was Monday  - will give rx for 3 more days of librium  - do not drive or operate heavy machinery while taking librium    Depression  - seen by psych, cleared for discharge from psych perspective  - will give rx for prozac

## 2021-02-04 NOTE — DISCHARGE NOTE PROVIDER - NSDCCPCAREPLAN_GEN_ALL_CORE_FT
PRINCIPAL DISCHARGE DIAGNOSIS  Diagnosis: Alcohol dependence with alcohol-induced mood disorder  Assessment and Plan of Treatment: Alcohol Withrawal  - take medication chlordiazepoxide as prescribed for 3 more days  - do not consume alcoholic beverages  Depression  - seen by psych, cleared for discharge from psych perspective  - take prozac  - seek medical attention immediately if you feel like harming yourself

## 2021-02-04 NOTE — DIETITIAN INITIAL EVALUATION ADULT. - ENTER FROM (CAL/KG)
Spoke with patient regarding procedure instructions for 4-9-2020 with dr castillo.  Instructions sent via portal. Patient verbalized understandig of date, time and location of procedure.    
25

## 2021-02-04 NOTE — DIETITIAN INITIAL EVALUATION ADULT. - OTHER INFO
Per H&P, pt is a 30M with no significant pmhx who originally presented to [place] on 1/29 for nausea and vomiting.  Associated with abdominal pain and nausea/vomiting with some hematemesis.  Patient reported that he was drinking for about 5 days with about 20 bottles of beer and tequila per day (last drink was 1/28).  Reported that he was trying to drink "away his problems." Said that he was depressed and has been under a lot stress due to work and not having enough money.  Pt admitted for etoh withdrawal.   Started on CIWA protocol with librium 50mg q8h standing and ativan 2mg q2h PRN.  Was also given thiamine and folic acid.   Patient complaining of abdominal pain and was likely 2/2 alcoholic gastritis and was placed on protonix.  Currently the patient says he feels a little nervous with some epigastric discomfort.      Pt is [place] speaking (pacific  #[id number] Gage used to translate.  Pt presently on regular diet with reported good appetite and intake. Pt endorses consuming food throughout the day in addition to consuming excessive etoh.  Reports weight has been generally stable PTA.  Denies food allergies.  Briefly reviewed importance of vitamin supplementation (folic acid, thiamine, mvi) given etoh, encouraged sobriety; per SW note, noted pt was agreeable to resources for alcoholics anonymous.  Noted likely d/c later today.

## 2021-02-04 NOTE — DISCHARGE NOTE NURSING/CASE MANAGEMENT/SOCIAL WORK - PATIENT PORTAL LINK FT
You can access the FollowMyHealth Patient Portal offered by [facility] by registering at the following website: [email or web address]. By joining [employer]’s FollowMyHealth portal, you will also be able to view your health information using other applications (apps) compatible with our system.

## 2021-02-18 ENCOUNTER — EMERGENCY (EMERGENCY)
Facility: HOSPITAL | Age: 31
LOS: 1 days | Discharge: ANOTHER HOSPITAL | End: 2021-02-18
Attending: EMERGENCY MEDICINE
Payer: MEDICAID

## 2021-02-18 VITALS
RESPIRATION RATE: 17 BRPM | OXYGEN SATURATION: 100 % | SYSTOLIC BLOOD PRESSURE: 124 MMHG | DIASTOLIC BLOOD PRESSURE: 69 MMHG | HEART RATE: 74 BPM

## 2021-02-18 VITALS
SYSTOLIC BLOOD PRESSURE: 134 MMHG | TEMPERATURE: 98 F | OXYGEN SATURATION: 96 % | HEIGHT: 67 IN | RESPIRATION RATE: 17 BRPM | HEART RATE: 83 BPM | DIASTOLIC BLOOD PRESSURE: 79 MMHG | WEIGHT: 149.91 LBS

## 2021-02-18 DIAGNOSIS — K35.33 ACUTE APPENDICITIS WITH PERFORATION, LOCALIZED PERITONITIS, AND GANGRENE, WITH ABSCESS: Chronic | ICD-10-CM

## 2021-02-18 LAB
ALBUMIN SERPL ELPH-MCNC: 3.8 G/DL — SIGNIFICANT CHANGE UP (ref 3.5–5)
ALP SERPL-CCNC: 74 U/L — SIGNIFICANT CHANGE UP (ref 40–120)
ALT FLD-CCNC: 23 U/L DA — SIGNIFICANT CHANGE UP (ref 10–60)
AMPHET UR-MCNC: NEGATIVE — SIGNIFICANT CHANGE UP
ANION GAP SERPL CALC-SCNC: 6 MMOL/L — SIGNIFICANT CHANGE UP (ref 5–17)
APAP SERPL-MCNC: <2 UG/ML — LOW (ref 10–30)
APPEARANCE UR: CLEAR — SIGNIFICANT CHANGE UP
AST SERPL-CCNC: 26 U/L — SIGNIFICANT CHANGE UP (ref 10–40)
BACTERIA # UR AUTO: ABNORMAL /HPF
BARBITURATES UR SCN-MCNC: NEGATIVE — SIGNIFICANT CHANGE UP
BASOPHILS # BLD AUTO: 0.03 K/UL — SIGNIFICANT CHANGE UP (ref 0–0.2)
BASOPHILS NFR BLD AUTO: 0.6 % — SIGNIFICANT CHANGE UP (ref 0–2)
BENZODIAZ UR-MCNC: POSITIVE
BILIRUB SERPL-MCNC: 0.2 MG/DL — SIGNIFICANT CHANGE UP (ref 0.2–1.2)
BILIRUB UR-MCNC: NEGATIVE — SIGNIFICANT CHANGE UP
BUN SERPL-MCNC: 6 MG/DL — LOW (ref 7–18)
CALCIUM SERPL-MCNC: 8.1 MG/DL — LOW (ref 8.4–10.5)
CHLORIDE SERPL-SCNC: 105 MMOL/L — SIGNIFICANT CHANGE UP (ref 96–108)
CO2 SERPL-SCNC: 30 MMOL/L — SIGNIFICANT CHANGE UP (ref 22–31)
COCAINE METAB.OTHER UR-MCNC: NEGATIVE — SIGNIFICANT CHANGE UP
COLOR SPEC: YELLOW — SIGNIFICANT CHANGE UP
COMMENT - URINE: SIGNIFICANT CHANGE UP
CREAT SERPL-MCNC: 0.73 MG/DL — SIGNIFICANT CHANGE UP (ref 0.5–1.3)
DIFF PNL FLD: ABNORMAL
EOSINOPHIL # BLD AUTO: 0.03 K/UL — SIGNIFICANT CHANGE UP (ref 0–0.5)
EOSINOPHIL NFR BLD AUTO: 0.6 % — SIGNIFICANT CHANGE UP (ref 0–6)
EPI CELLS # UR: ABNORMAL /HPF
ETHANOL SERPL-MCNC: 220 MG/DL — HIGH (ref 0–10)
GLUCOSE SERPL-MCNC: 96 MG/DL — SIGNIFICANT CHANGE UP (ref 70–99)
GLUCOSE UR QL: NEGATIVE — SIGNIFICANT CHANGE UP
GRAN CASTS # UR COMP ASSIST: ABNORMAL /LPF
HCT VFR BLD CALC: 43.3 % — SIGNIFICANT CHANGE UP (ref 39–50)
HGB BLD-MCNC: 14.2 G/DL — SIGNIFICANT CHANGE UP (ref 13–17)
IMM GRANULOCYTES NFR BLD AUTO: 0.4 % — SIGNIFICANT CHANGE UP (ref 0–1.5)
KETONES UR-MCNC: NEGATIVE — SIGNIFICANT CHANGE UP
LEUKOCYTE ESTERASE UR-ACNC: NEGATIVE — SIGNIFICANT CHANGE UP
LYMPHOCYTES # BLD AUTO: 1.51 K/UL — SIGNIFICANT CHANGE UP (ref 1–3.3)
LYMPHOCYTES # BLD AUTO: 30.9 % — SIGNIFICANT CHANGE UP (ref 13–44)
MCHC RBC-ENTMCNC: 27.5 PG — SIGNIFICANT CHANGE UP (ref 27–34)
MCHC RBC-ENTMCNC: 32.8 GM/DL — SIGNIFICANT CHANGE UP (ref 32–36)
MCV RBC AUTO: 83.9 FL — SIGNIFICANT CHANGE UP (ref 80–100)
METHADONE UR-MCNC: NEGATIVE — SIGNIFICANT CHANGE UP
MONOCYTES # BLD AUTO: 0.29 K/UL — SIGNIFICANT CHANGE UP (ref 0–0.9)
MONOCYTES NFR BLD AUTO: 5.9 % — SIGNIFICANT CHANGE UP (ref 2–14)
NEUTROPHILS # BLD AUTO: 3 K/UL — SIGNIFICANT CHANGE UP (ref 1.8–7.4)
NEUTROPHILS NFR BLD AUTO: 61.6 % — SIGNIFICANT CHANGE UP (ref 43–77)
NITRITE UR-MCNC: NEGATIVE — SIGNIFICANT CHANGE UP
NRBC # BLD: 0 /100 WBCS — SIGNIFICANT CHANGE UP (ref 0–0)
OPIATES UR-MCNC: NEGATIVE — SIGNIFICANT CHANGE UP
PCP SPEC-MCNC: SIGNIFICANT CHANGE UP
PCP UR-MCNC: NEGATIVE — SIGNIFICANT CHANGE UP
PH UR: 6 — SIGNIFICANT CHANGE UP (ref 5–8)
PLATELET # BLD AUTO: 410 K/UL — HIGH (ref 150–400)
POTASSIUM SERPL-MCNC: 3.7 MMOL/L — SIGNIFICANT CHANGE UP (ref 3.5–5.3)
POTASSIUM SERPL-SCNC: 3.7 MMOL/L — SIGNIFICANT CHANGE UP (ref 3.5–5.3)
PROT SERPL-MCNC: 8.4 G/DL — HIGH (ref 6–8.3)
PROT UR-MCNC: 30 MG/DL
RBC # BLD: 5.16 M/UL — SIGNIFICANT CHANGE UP (ref 4.2–5.8)
RBC # FLD: 14.4 % — SIGNIFICANT CHANGE UP (ref 10.3–14.5)
RBC CASTS # UR COMP ASSIST: ABNORMAL /HPF (ref 0–2)
SALICYLATES SERPL-MCNC: <1.7 MG/DL — LOW (ref 2.8–20)
SARS-COV-2 RNA SPEC QL NAA+PROBE: SIGNIFICANT CHANGE UP
SODIUM SERPL-SCNC: 141 MMOL/L — SIGNIFICANT CHANGE UP (ref 135–145)
SP GR SPEC: 1.02 — SIGNIFICANT CHANGE UP (ref 1.01–1.02)
THC UR QL: NEGATIVE — SIGNIFICANT CHANGE UP
TSH SERPL-MCNC: 0.57 UU/ML — SIGNIFICANT CHANGE UP (ref 0.34–4.82)
UROBILINOGEN FLD QL: NEGATIVE — SIGNIFICANT CHANGE UP
WBC # BLD: 4.88 K/UL — SIGNIFICANT CHANGE UP (ref 3.8–10.5)
WBC # FLD AUTO: 4.88 K/UL — SIGNIFICANT CHANGE UP (ref 3.8–10.5)
WBC UR QL: SIGNIFICANT CHANGE UP /HPF (ref 0–5)

## 2021-02-18 PROCEDURE — 82962 GLUCOSE BLOOD TEST: CPT

## 2021-02-18 PROCEDURE — U0005: CPT

## 2021-02-18 PROCEDURE — 96374 THER/PROPH/DIAG INJ IV PUSH: CPT

## 2021-02-18 PROCEDURE — 36415 COLL VENOUS BLD VENIPUNCTURE: CPT

## 2021-02-18 PROCEDURE — 96361 HYDRATE IV INFUSION ADD-ON: CPT

## 2021-02-18 PROCEDURE — 85025 COMPLETE CBC W/AUTO DIFF WBC: CPT

## 2021-02-18 PROCEDURE — 93005 ELECTROCARDIOGRAM TRACING: CPT

## 2021-02-18 PROCEDURE — 99285 EMERGENCY DEPT VISIT HI MDM: CPT

## 2021-02-18 PROCEDURE — 84443 ASSAY THYROID STIM HORMONE: CPT

## 2021-02-18 PROCEDURE — 80053 COMPREHEN METABOLIC PANEL: CPT

## 2021-02-18 PROCEDURE — 90792 PSYCH DIAG EVAL W/MED SRVCS: CPT | Mod: 95

## 2021-02-18 PROCEDURE — 81001 URINALYSIS AUTO W/SCOPE: CPT

## 2021-02-18 PROCEDURE — 87635 SARS-COV-2 COVID-19 AMP PRB: CPT

## 2021-02-18 PROCEDURE — 99285 EMERGENCY DEPT VISIT HI MDM: CPT | Mod: 25

## 2021-02-18 PROCEDURE — 80307 DRUG TEST PRSMV CHEM ANLYZR: CPT

## 2021-02-18 RX ORDER — SODIUM CHLORIDE 9 MG/ML
1000 INJECTION INTRAMUSCULAR; INTRAVENOUS; SUBCUTANEOUS ONCE
Refills: 0 | Status: COMPLETED | OUTPATIENT
Start: 2021-02-18 | End: 2021-02-18

## 2021-02-18 RX ORDER — ONDANSETRON 8 MG/1
4 TABLET, FILM COATED ORAL ONCE
Refills: 0 | Status: COMPLETED | OUTPATIENT
Start: 2021-02-18 | End: 2021-02-18

## 2021-02-18 RX ORDER — ACETAMINOPHEN 500 MG
650 TABLET ORAL ONCE
Refills: 0 | Status: COMPLETED | OUTPATIENT
Start: 2021-02-18 | End: 2021-02-18

## 2021-02-18 RX ADMIN — ONDANSETRON 4 MILLIGRAM(S): 8 TABLET, FILM COATED ORAL at 11:06

## 2021-02-18 RX ADMIN — Medication 30 MILLILITER(S): at 23:39

## 2021-02-18 RX ADMIN — Medication 650 MILLIGRAM(S): at 17:17

## 2021-02-18 RX ADMIN — SODIUM CHLORIDE 1000 MILLILITER(S): 9 INJECTION INTRAMUSCULAR; INTRAVENOUS; SUBCUTANEOUS at 11:06

## 2021-02-18 RX ADMIN — SODIUM CHLORIDE 1000 MILLILITER(S): 9 INJECTION INTRAMUSCULAR; INTRAVENOUS; SUBCUTANEOUS at 23:41
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
## 2021-02-18 NOTE — ED PROVIDER NOTE - CLINICAL SUMMARY MEDICAL DECISION MAKING FREE TEXT BOX
As per physical exam and patient history, psych consult and 1:1 observation ordered for patient safety. Will follow up with consult. ETOH lab workup, urine tox, UA, EKG, IVF, medications ordered. Safe environment maintained.
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
## 2021-02-18 NOTE — ED PROVIDER NOTE - PROGRESS NOTE DETAILS
alcohol level 220.  pt to be observed pending sobriety and than psych to evaluate. [place]:  Pt received in signout from Dr. Cohn, pending alcohol metabolization and then telepsych evaluation.  Pt subsequently evaluated by psychiatrist and Dr. Soriano called back and says she is arranging voluntary psych transfer/admission.  Pt doing well at this time.  Currently no bed or accepting attending yet, still being arranged. [place]:  Voluntary psych transfer forms completed with assistance of Alessandra Blakely, #[id number].
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
## 2021-02-18 NOTE — ED ADULT NURSE NOTE - ED STAT RN HANDOFF DETAILS
pt.remained stable. denies pain. transfer to [facility] for  suicidal ideation and auditory hallucination. safety prec.maintained. report given to gui avery . ;[facility] not [facility]
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
## 2021-02-18 NOTE — ED BEHAVIORAL HEALTH ASSESSMENT NOTE - RISK ASSESSMENT
High Acute Suicide Risk moderate/high acute risk for suicide - endorsing SI, worsening in past month, psychosocial trigger/stressor, hopelessness, insomnia, anxiety, increased alcohol use.

## 2021-02-18 NOTE — ED BEHAVIORAL HEALTH ASSESSMENT NOTE - OTHER PAST PSYCHIATRIC HISTORY (INCLUDE DETAILS REGARDING ONSET, COURSE OF ILLNESS, INPATIENT/OUTPATIENT TREATMENT)
denies previous hospitalizations although winifred reports one brief inpatient admission to [place] 11/13-11/14/2020 for SI in past.

## 2021-02-19 ENCOUNTER — INPATIENT (INPATIENT)
Facility: HOSPITAL | Age: 31
LOS: 17 days | Discharge: HOME | End: 2021-03-09
Attending: PSYCHIATRY & NEUROLOGY | Admitting: PSYCHIATRY & NEUROLOGY
Payer: MEDICAID

## 2021-02-19 VITALS — TEMPERATURE: 97 F | HEIGHT: 67 IN | WEIGHT: 149.91 LBS | OXYGEN SATURATION: 100 % | RESPIRATION RATE: 18 BRPM

## 2021-02-19 DIAGNOSIS — F31.9 BIPOLAR DISORDER, UNSPECIFIED: ICD-10-CM

## 2021-02-19 DIAGNOSIS — K35.33 ACUTE APPENDICITIS WITH PERFORATION, LOCALIZED PERITONITIS, AND GANGRENE, WITH ABSCESS: Chronic | ICD-10-CM

## 2021-02-19 DIAGNOSIS — F29 UNSPECIFIED PSYCHOSIS NOT DUE TO A SUBSTANCE OR KNOWN PHYSIOLOGICAL CONDITION: ICD-10-CM

## 2021-02-19 LAB
A1C WITH ESTIMATED AVERAGE GLUCOSE RESULT: 5.5 % — SIGNIFICANT CHANGE UP (ref 4–5.6)
ESTIMATED AVERAGE GLUCOSE: 111 MG/DL — SIGNIFICANT CHANGE UP (ref 68–114)

## 2021-02-19 PROCEDURE — 99222 1ST HOSP IP/OBS MODERATE 55: CPT

## 2021-02-19 RX ORDER — ACETAMINOPHEN 500 MG
650 TABLET ORAL EVERY 6 HOURS
Refills: 0 | Status: DISCONTINUED | OUTPATIENT
Start: 2021-02-19 | End: 2021-03-09

## 2021-02-19 RX ORDER — DIPHENHYDRAMINE HCL 50 MG
50 CAPSULE ORAL ONCE
Refills: 0 | Status: DISCONTINUED | OUTPATIENT
Start: 2021-02-19 | End: 2021-03-09

## 2021-02-19 RX ORDER — FLUOXETINE HCL 10 MG
10 CAPSULE ORAL DAILY
Refills: 0 | Status: DISCONTINUED | OUTPATIENT
Start: 2021-02-19 | End: 2021-02-22

## 2021-02-19 RX ORDER — FOLIC ACID 0.8 MG
1 TABLET ORAL DAILY
Refills: 0 | Status: DISCONTINUED | OUTPATIENT
Start: 2021-02-19 | End: 2021-03-09

## 2021-02-19 RX ORDER — THIAMINE MONONITRATE (VIT B1) 100 MG
100 TABLET ORAL DAILY
Refills: 0 | Status: DISCONTINUED | OUTPATIENT
Start: 2021-02-19 | End: 2021-03-09

## 2021-02-19 RX ORDER — PANTOPRAZOLE SODIUM 20 MG/1
40 TABLET, DELAYED RELEASE ORAL
Refills: 0 | Status: DISCONTINUED | OUTPATIENT
Start: 2021-02-19 | End: 2021-03-09

## 2021-02-19 RX ORDER — HALOPERIDOL DECANOATE 100 MG/ML
5 INJECTION INTRAMUSCULAR ONCE
Refills: 0 | Status: DISCONTINUED | OUTPATIENT
Start: 2021-02-19 | End: 2021-03-09

## 2021-02-19 RX ORDER — HALOPERIDOL DECANOATE 100 MG/ML
5 INJECTION INTRAMUSCULAR AT BEDTIME
Refills: 0 | Status: DISCONTINUED | OUTPATIENT
Start: 2021-02-19 | End: 2021-02-22

## 2021-02-19 RX ORDER — HALOPERIDOL DECANOATE 100 MG/ML
5 INJECTION INTRAMUSCULAR EVERY 6 HOURS
Refills: 0 | Status: DISCONTINUED | OUTPATIENT
Start: 2021-02-19 | End: 2021-03-09

## 2021-02-19 RX ORDER — IBUPROFEN 200 MG
400 TABLET ORAL EVERY 8 HOURS
Refills: 0 | Status: DISCONTINUED | OUTPATIENT
Start: 2021-02-19 | End: 2021-03-09

## 2021-02-19 RX ORDER — DIPHENHYDRAMINE HCL 50 MG
50 CAPSULE ORAL EVERY 6 HOURS
Refills: 0 | Status: DISCONTINUED | OUTPATIENT
Start: 2021-02-19 | End: 2021-03-09

## 2021-02-19 RX ADMIN — Medication 50 MILLIGRAM(S): at 12:53

## 2021-02-19 RX ADMIN — Medication 1 TABLET(S): at 08:53

## 2021-02-19 RX ADMIN — Medication 2 MILLIGRAM(S): at 22:04

## 2021-02-19 RX ADMIN — Medication 2 MILLIGRAM(S): at 12:53

## 2021-02-19 RX ADMIN — Medication 2 MILLIGRAM(S): at 17:46

## 2021-02-19 RX ADMIN — Medication 100 MILLIGRAM(S): at 08:53

## 2021-02-19 RX ADMIN — HALOPERIDOL DECANOATE 5 MILLIGRAM(S): 100 INJECTION INTRAMUSCULAR at 12:54

## 2021-02-19 RX ADMIN — Medication 10 MILLIGRAM(S): at 14:48

## 2021-02-19 RX ADMIN — Medication 650 MILLIGRAM(S): at 12:52

## 2021-02-19 RX ADMIN — HALOPERIDOL DECANOATE 5 MILLIGRAM(S): 100 INJECTION INTRAMUSCULAR at 21:02

## 2021-02-19 RX ADMIN — Medication 1 MILLIGRAM(S): at 08:53

## 2021-02-19 NOTE — BH INPATIENT PSYCHIATRY ASSESSMENT NOTE - CURRENT MEDICATION
MEDICATIONS  (STANDING):  FLUoxetine 10 milliGRAM(s) Oral daily  folic acid 1 milliGRAM(s) Oral daily  haloperidol     Tablet 5 milliGRAM(s) Oral at bedtime  LORazepam     Tablet 2 milliGRAM(s) Oral every 4 hours  LORazepam     Tablet   Oral   multivitamin 1 Tablet(s) Oral daily  pantoprazole    Tablet 40 milliGRAM(s) Oral before breakfast  thiamine 100 milliGRAM(s) Oral daily    MEDICATIONS  (PRN):  acetaminophen   Tablet .. 650 milliGRAM(s) Oral every 6 hours PRN Temp greater or equal to 38C (100.4F), Mild Pain (1 - 3), Moderate Pain (4 - 6), Severe Pain (7 - 10)  aluminum hydroxide/magnesium hydroxide/simethicone Suspension 30 milliLiter(s) Oral every 4 hours PRN Dyspepsia  diphenhydrAMINE 50 milliGRAM(s) Oral every 6 hours PRN agitation  diphenhydrAMINE   Injectable 50 milliGRAM(s) IntraMuscular once PRN agitation  haloperidol     Tablet 5 milliGRAM(s) Oral every 6 hours PRN agitation  haloperidol    Injectable 5 milliGRAM(s) IntraMuscular once PRN agitation  ibuprofen  Tablet. 400 milliGRAM(s) Oral every 8 hours PRN Moderate Pain (4 - 6)  LORazepam     Tablet 2 milliGRAM(s) Oral every 6 hours PRN agitation  LORazepam   Injectable 2 milliGRAM(s) IntraMuscular once PRN agitation

## 2021-02-19 NOTE — BH PATIENT PROFILE - HOME MEDICATIONS
-- DO NOT REPLY / DO NOT REPLY ALL --  -- Message is from the [facility]--    COVID-19 Universal Screening: Negative      Patient is requesting a medication refill - medication is on active list    Was Medication Pended? Yes.    Rx Name and Dose:  zolpidem (AMBIEN) 10 MG tablet    Duration: 30 days    Pharmacy  [id number] - [place], [place] - [address] [address] [facility]    Patient confirmed the above pharmacy as correct?  Yes    Caller Information       Type Contact Phone    07/03/2020 04:21 PM Phone (Incoming) Sandra Morel (Self) [phone] (M)          Alternative phone number: n/a    Turnaround time given to caller:   \"This message will be sent to [state Provider's name]. The clinical team will fulfill your request as soon as they review your message when the office opens on Monday (or after the holiday).\"   thiamine 100 mg oral tablet , 1 tab(s) orally once a day   folic acid 1 mg oral tablet , 1 tab(s) orally once a day  Multiple Vitamins oral tablet , 1 tab(s) orally once a day

## 2021-02-19 NOTE — BH INPATIENT PSYCHIATRY ASSESSMENT NOTE - OTHER PAST PSYCHIATRIC HISTORY (INCLUDE DETAILS REGARDING ONSET, COURSE OF ILLNESS, INPATIENT/OUTPATIENT TREATMENT)
denies previous hospitalizations although winifred reports one brief inpatient admission to [place] 11/13-11/14/2020 for SI in past.

## 2021-02-19 NOTE — BH INPATIENT PSYCHIATRY ASSESSMENT NOTE - NSBHCONSIPREASONDETAILS_PSY_A_CORE FT
pt reports +CAH to SA x 3 days, pt reports this is ego dystonic and denies intent or plan to self harm. Pt agrees to come to staff immediately if SIIP or SALAS should occur.

## 2021-02-19 NOTE — BH INPATIENT PSYCHIATRY ASSESSMENT NOTE - NSBHCHARTREVIEWVS_PSY_A_CORE FT
Vital Signs Last 24 Hrs  T(C): 36.8 (19 Feb 2021 06:51), Max: 37.4 (18 Feb 2021 20:45)  T(F): 98.2 (19 Feb 2021 06:51), Max: 99.4 (18 Feb 2021 20:45)  HR: 73 (19 Feb 2021 06:51) (73 - 81)  BP: 124/69 (19 Feb 2021 06:51) (113/64 - 133/71)  BP(mean): --  RR: 18 (19 Feb 2021 01:35) (17 - 18)  SpO2: 100% (19 Feb 2021 01:35) (97% - 100%)

## 2021-02-19 NOTE — BH INPATIENT PSYCHIATRY ASSESSMENT NOTE - RISK ASSESSMENT
moderate/high acute risk for suicide - endorsing SI, worsening in past month, psychosocial trigger/stressor, hopelessness, insomnia, anxiety, increased alcohol use.

## 2021-02-19 NOTE — BH INPATIENT PSYCHIATRY ASSESSMENT NOTE - NSBHMETABOLIC_PSY_ALL_CORE_FT
BMI: BMI (kg/m2): 23.5 (02-19-21 @ 01:35)  HbA1c: A1C with Estimated Average Glucose Result: 5.5 % (02-19-21 @ 10:44)    Glucose: POCT Blood Glucose.: 106 mg/dL (02-18-21 @ 09:49)    BP: 124/69 (02-19-21 @ 06:51) (124/69 - 124/69)  Lipid Panel: Date/Time: 01-30-21 @ 07:31  Cholesterol, Serum: 168  Direct LDL: --  HDL Cholesterol, Serum: 62  Total Cholesterol/HDL Ration Measurement: --  Triglycerides, Serum: 147
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
## 2021-02-19 NOTE — PSYCHIATRIC REHAB INITIAL EVALUATION - NSBHPRRECOMMEND_PSY_ALL_CORE
Writer met patient in order to orient patient to unit and to introduce patient to psychiatric rehabilitation staff and department functions. Patient was provided with a copy of the unit schedule and welcome letter and one was placed in the patients chart for reference. Writer oriented patient to unit rules, unit programming and daily activities. Writer encouraged patient to attend psychiatric rehabilitation groups and engage in treatment. Upon approach was oriented x3, speaking with a normal pace, normal tone and normal volume. Pt was able to maintain fair eye contact throughout the conversation. Pts thought process was linear. Pts mood is depressed with anxious affect. Pts insight is fair and judgment is poor at this time. ADLs appear to be fairly maintained at this time.

## 2021-02-19 NOTE — BH INPATIENT PSYCHIATRY ASSESSMENT NOTE - NSICDXPASTSURGICALHX_GEN_ALL_CORE_FT
Problem: DISCHARGE PLANNING  Goal: Discharge to home or other facility with appropriate resources  INTERVENTIONS:  - Identify barriers to discharge w/patient and caregiver  - Arrange for needed discharge resources and transportation as appropriate  - Identify discharge learning needs (meds, wound care, etc )  - Arrange for interpretive services to assist at discharge as needed  - Refer to Case Management Department for coordinating discharge planning if the patient needs post-hospital services based on physician/advanced practitioner order or complex needs related to functional status, cognitive ability, or social support system   Outcome: Progressing  Pt met with CM to review & sign Tx Plan & ROIs & complete intake PAST SURGICAL HISTORY:  Appendix abscess

## 2021-02-19 NOTE — BH INPATIENT PSYCHIATRY ASSESSMENT NOTE - OTHER
pt reports +CAH to SA x 3 days, pt reports this is ego dystonic and denies intent or plan to self harm. Pt agrees to come to staff immediately if SIIP or SALAS should occur.  impaired Macedonian SPEAKING (states he speaks some English)

## 2021-02-19 NOTE — BH INPATIENT PSYCHIATRY ASSESSMENT NOTE - NSDCCRITERIA_PSY_ALL_CORE
CGI <=2, return to baseline functioning as evidenced by behavioral control, staff observations, pt self report
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
## 2021-02-19 NOTE — BH INPATIENT PSYCHIATRY ASSESSMENT NOTE - ADDITIONAL DETAILS ALL
denies suicide attempts or self harming  On unit, pt reports +CAH to SA x 3 days, pt reports this is ego dystonic and denies intent or plan to self harm. Pt agrees to come to staff immediately if SIIP or SALAS should occur.

## 2021-02-19 NOTE — BH INPATIENT PSYCHIATRY ASSESSMENT NOTE - DETAILS
denies hx of suicide attempts or self harming    On unit, pt reports +CAH to SA x 3 days, pt reports this is ego dystonic and denies intent or plan to self harm. Pt agrees to come to staff immediately if SIIP or SALAS should occur.
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
## 2021-02-19 NOTE — BH INPATIENT PSYCHIATRY ASSESSMENT NOTE - NSCOMMENTSUICRISKFACT_PSY_ALL_CORE
On unit, pt reports +CAH to SA x 3 days, pt reports this is ego dystonic and denies intent or plan to self harm. Pt agrees to come to staff immediately if SIIP or SALAS should occur.

## 2021-02-19 NOTE — BH INPATIENT PSYCHIATRY ASSESSMENT NOTE - NSPRESENTSXS_PSY_ALL_CORE
Depressed mood/Anhedonia/Psychosis/Hopelessness or despair/Global insomnia/Command hallucinations to hurt self/Severe anxiety, agitation or panic
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
## 2021-02-19 NOTE — BH INPATIENT PSYCHIATRY ASSESSMENT NOTE - NSTXALCDRGINTERMD_PSY_ALL_CORE
started ativan taper, CIWA monitoring, substance abuse counseling, refer to [facility], consider naltrexone

## 2021-02-20 LAB
ALBUMIN SERPL ELPH-MCNC: 3.9 G/DL — SIGNIFICANT CHANGE UP (ref 3.3–5)
ALP SERPL-CCNC: 76 U/L — SIGNIFICANT CHANGE UP (ref 40–120)
ALT FLD-CCNC: 13 U/L — SIGNIFICANT CHANGE UP (ref 4–41)
ANION GAP SERPL CALC-SCNC: 12 MMOL/L — SIGNIFICANT CHANGE UP (ref 7–14)
AST SERPL-CCNC: 15 U/L — SIGNIFICANT CHANGE UP (ref 4–40)
BILIRUB SERPL-MCNC: 0.4 MG/DL — SIGNIFICANT CHANGE UP (ref 0.2–1.2)
BUN SERPL-MCNC: 8 MG/DL — SIGNIFICANT CHANGE UP (ref 7–23)
CALCIUM SERPL-MCNC: 9.3 MG/DL — SIGNIFICANT CHANGE UP (ref 8.4–10.5)
CHLORIDE SERPL-SCNC: 100 MMOL/L — SIGNIFICANT CHANGE UP (ref 98–107)
CHOLEST SERPL-MCNC: 181 MG/DL — SIGNIFICANT CHANGE UP
CO2 SERPL-SCNC: 23 MMOL/L — SIGNIFICANT CHANGE UP (ref 22–31)
CREAT SERPL-MCNC: 0.73 MG/DL — SIGNIFICANT CHANGE UP (ref 0.5–1.3)
GLUCOSE SERPL-MCNC: 120 MG/DL — HIGH (ref 70–99)
HDLC SERPL-MCNC: 58 MG/DL — SIGNIFICANT CHANGE UP
LIPID PNL WITH DIRECT LDL SERPL: 95 MG/DL — SIGNIFICANT CHANGE UP
NON HDL CHOLESTEROL: 123 MG/DL — SIGNIFICANT CHANGE UP
POTASSIUM SERPL-MCNC: 4.2 MMOL/L — SIGNIFICANT CHANGE UP (ref 3.5–5.3)
POTASSIUM SERPL-SCNC: 4.2 MMOL/L — SIGNIFICANT CHANGE UP (ref 3.5–5.3)
PROT SERPL-MCNC: 8 G/DL — SIGNIFICANT CHANGE UP (ref 6–8.3)
SODIUM SERPL-SCNC: 135 MMOL/L — SIGNIFICANT CHANGE UP (ref 135–145)
TRIGL SERPL-MCNC: 142 MG/DL — SIGNIFICANT CHANGE UP

## 2021-02-20 PROCEDURE — 99232 SBSQ HOSP IP/OBS MODERATE 35: CPT

## 2021-02-20 RX ADMIN — Medication 1.5 MILLIGRAM(S): at 12:17

## 2021-02-20 RX ADMIN — Medication 2 MILLIGRAM(S): at 09:37

## 2021-02-20 RX ADMIN — HALOPERIDOL DECANOATE 5 MILLIGRAM(S): 100 INJECTION INTRAMUSCULAR at 12:14

## 2021-02-20 RX ADMIN — Medication 1 MILLIGRAM(S): at 08:53

## 2021-02-20 RX ADMIN — Medication 2 MILLIGRAM(S): at 02:02

## 2021-02-20 RX ADMIN — PANTOPRAZOLE SODIUM 40 MILLIGRAM(S): 20 TABLET, DELAYED RELEASE ORAL at 08:53

## 2021-02-20 RX ADMIN — Medication 10 MILLIGRAM(S): at 08:54

## 2021-02-20 RX ADMIN — HALOPERIDOL DECANOATE 5 MILLIGRAM(S): 100 INJECTION INTRAMUSCULAR at 17:21

## 2021-02-20 RX ADMIN — Medication 1.5 MILLIGRAM(S): at 17:21

## 2021-02-20 RX ADMIN — Medication 100 MILLIGRAM(S): at 08:53

## 2021-02-20 RX ADMIN — Medication 1.5 MILLIGRAM(S): at 22:07

## 2021-02-20 RX ADMIN — Medication 1 TABLET(S): at 08:54

## 2021-02-20 RX ADMIN — HALOPERIDOL DECANOATE 5 MILLIGRAM(S): 100 INJECTION INTRAMUSCULAR at 20:34

## 2021-02-20 RX ADMIN — Medication 2 MILLIGRAM(S): at 06:15

## 2021-02-20 NOTE — BH INPATIENT PSYCHIATRY PROGRESS NOTE - OTHER
Upper sorbian SPEAKING (states he speaks some English) pt reports +CAH to SA x 3 days, pt reports this is ego dystonic and denies intent or plan to self harm. Pt agrees to come to staff immediately if SIIP or SALAS should occur.

## 2021-02-20 NOTE — BH INPATIENT PSYCHIATRY PROGRESS NOTE - NSBHFUPINTERVALHXFT_PSY_A_CORE
: #[id number]  Patient is followed up for depression, SI and substance use disorder (ETOH abuse), Chart, medications and labs reviewed.  Patient is discussed with nursing staff. No significant overnight issues. Remains on CIWA protocol, and Ativan taper.  Patient is observed in room, he is approachable and agrees to interview.  Patient describes mood as “I feel weird.” Reports some withdrawal sx:  feels dizzy,  stomach discomfort, headache and jitterness.  Reports was drinking a lot.   Reports still feeling depressed, sad affect, poor eye contact staring at floor. Patient provided emotional support. Patient reports manageable level of anxiety at this time, but appears a bit restless, possible due to w/d sx.  Demeanor/posture/attitude during interview convey an underlying depressed mood. Symptoms of poor concentration, anhedonia, low mood, hopelessness/worthlessness are mentioned. Reports +AH, states multiple voices telling him bad things, to hurt himself, reports feeling scared of the voices.  Patient needed multiple probing to disclose content of voices.  Patient reports voices are telling him to “ride the horse and the bulls.”   Patient’s primary nurse is informed and po prn is encouraged. Compliance with medication is good, no reported or observed adverse effects.  Denies HI, no plan or intent. No VH, no signs of jackelyn.  No reports of sleep disturbances or appetite concerns.  No behavioral issues on unit. CMP is WNL.

## 2021-02-21 PROCEDURE — 99232 SBSQ HOSP IP/OBS MODERATE 35: CPT

## 2021-02-21 RX ADMIN — Medication 1.5 MILLIGRAM(S): at 01:58

## 2021-02-21 RX ADMIN — HALOPERIDOL DECANOATE 5 MILLIGRAM(S): 100 INJECTION INTRAMUSCULAR at 20:33

## 2021-02-21 RX ADMIN — Medication 1.5 MILLIGRAM(S): at 06:11

## 2021-02-21 RX ADMIN — Medication 1.5 MILLIGRAM(S): at 09:01

## 2021-02-21 RX ADMIN — Medication 10 MILLIGRAM(S): at 08:52

## 2021-02-21 RX ADMIN — Medication 100 MILLIGRAM(S): at 08:52

## 2021-02-21 RX ADMIN — PANTOPRAZOLE SODIUM 40 MILLIGRAM(S): 20 TABLET, DELAYED RELEASE ORAL at 08:51

## 2021-02-21 RX ADMIN — Medication 1 TABLET(S): at 08:52

## 2021-02-21 RX ADMIN — Medication 1 MILLIGRAM(S): at 08:52

## 2021-02-21 RX ADMIN — Medication 1 MILLIGRAM(S): at 14:28

## 2021-02-21 NOTE — BH INPATIENT PSYCHIATRY PROGRESS NOTE - OTHER
[place] SPEAKING (states he speaks some English) pt reports +CAH to SA x 3 days, pt reports this is ego dystonic and denies intent or plan to self harm. Pt agrees to come to staff immediately if SIIP or SALAS should occur.

## 2021-02-21 NOTE — BH INPATIENT PSYCHIATRY PROGRESS NOTE - NSBHFUPINTERVALHXFT_PSY_A_CORE
:   : #[id number]  Patient is followed up for depression, SI and substance use disorder (ETOH abuse), Chart, medications and labs reviewed.  Patient is discussed with nursing staff. No significant overnight issues. Remains on CIWA protocol, and Ativan taper.  Patient is approachable and agrees to interview.  Patient describes mood as “ deandre good.” Reports minimal withdrawal sx today, CIWA 1 . Reports still feeling depressed and anxious, sad affect, “same as yesterday.”  Endorses +AH, states multiple voices telling him bad things, to hurt himself, but voices also tells “not to do it here.” Reports voices have decreased in intensity and frequency.  Patient denies any urges to self-harm today, engages in safety planning.  Compliance with medication is good, no reported or observed adverse effects.  Denies HI, no plan or intent. No VH, no signs of jackelyn.  No reports of sleep disturbances or appetite concerns.  No behavioral issues on unit. CMP is WNL.

## 2021-02-22 PROCEDURE — 99232 SBSQ HOSP IP/OBS MODERATE 35: CPT

## 2021-02-22 RX ORDER — HALOPERIDOL DECANOATE 100 MG/ML
10 INJECTION INTRAMUSCULAR AT BEDTIME
Refills: 0 | Status: DISCONTINUED | OUTPATIENT
Start: 2021-02-22 | End: 2021-03-07

## 2021-02-22 RX ORDER — FLUOXETINE HCL 10 MG
20 CAPSULE ORAL DAILY
Refills: 0 | Status: DISCONTINUED | OUTPATIENT
Start: 2021-02-22 | End: 2021-03-03

## 2021-02-22 RX ADMIN — Medication 10 MILLIGRAM(S): at 08:22

## 2021-02-22 RX ADMIN — Medication 0.5 MILLIGRAM(S): at 18:05

## 2021-02-22 RX ADMIN — Medication 0.5 MILLIGRAM(S): at 22:14

## 2021-02-22 RX ADMIN — Medication 1 MILLIGRAM(S): at 02:16

## 2021-02-22 RX ADMIN — PANTOPRAZOLE SODIUM 40 MILLIGRAM(S): 20 TABLET, DELAYED RELEASE ORAL at 08:23

## 2021-02-22 RX ADMIN — Medication 1 TABLET(S): at 08:45

## 2021-02-22 RX ADMIN — Medication 1 MILLIGRAM(S): at 08:22

## 2021-02-22 RX ADMIN — Medication 100 MILLIGRAM(S): at 08:23

## 2021-02-22 RX ADMIN — HALOPERIDOL DECANOATE 10 MILLIGRAM(S): 100 INJECTION INTRAMUSCULAR at 20:54

## 2021-02-22 RX ADMIN — Medication 0.5 MILLIGRAM(S): at 13:34

## 2021-02-22 NOTE — BH INPATIENT PSYCHIATRY PROGRESS NOTE - OTHER
pt reports +CAH to SA x 3 days, pt reports this is ego dystonic and denies intent or plan to self harm. Pt agrees to come to staff immediately if SIIP or SALAS should occur.  Uzbek SPEAKING (states he speaks some English)

## 2021-02-23 PROCEDURE — 99232 SBSQ HOSP IP/OBS MODERATE 35: CPT

## 2021-02-23 RX ORDER — HALOPERIDOL DECANOATE 100 MG/ML
5 INJECTION INTRAMUSCULAR DAILY
Refills: 0 | Status: DISCONTINUED | OUTPATIENT
Start: 2021-02-23 | End: 2021-02-25

## 2021-02-23 RX ORDER — IBUPROFEN 200 MG
400 TABLET ORAL ONCE
Refills: 0 | Status: COMPLETED | OUTPATIENT
Start: 2021-02-23 | End: 2021-02-23

## 2021-02-23 RX ADMIN — Medication 650 MILLIGRAM(S): at 20:04

## 2021-02-23 RX ADMIN — Medication 0.5 MILLIGRAM(S): at 06:10

## 2021-02-23 RX ADMIN — Medication 100 MILLIGRAM(S): at 08:38

## 2021-02-23 RX ADMIN — Medication 1 TABLET(S): at 08:38

## 2021-02-23 RX ADMIN — Medication 1 MILLIGRAM(S): at 08:38

## 2021-02-23 RX ADMIN — HALOPERIDOL DECANOATE 10 MILLIGRAM(S): 100 INJECTION INTRAMUSCULAR at 20:05

## 2021-02-23 RX ADMIN — Medication 20 MILLIGRAM(S): at 08:38

## 2021-02-23 RX ADMIN — Medication 400 MILLIGRAM(S): at 22:53

## 2021-02-23 RX ADMIN — PANTOPRAZOLE SODIUM 40 MILLIGRAM(S): 20 TABLET, DELAYED RELEASE ORAL at 08:38
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
## 2021-02-23 NOTE — BH INPATIENT PSYCHIATRY PROGRESS NOTE - NSBHANTIPSYCHOTIC_PSY_ALL_CORE_FT
repeat CMP, lipid panel ordered for 2/20/21

## 2021-02-24 PROCEDURE — 99232 SBSQ HOSP IP/OBS MODERATE 35: CPT

## 2021-02-24 RX ADMIN — Medication 2 MILLIGRAM(S): at 00:01

## 2021-02-24 RX ADMIN — HALOPERIDOL DECANOATE 10 MILLIGRAM(S): 100 INJECTION INTRAMUSCULAR at 20:59

## 2021-02-24 RX ADMIN — Medication 1 MILLIGRAM(S): at 08:31

## 2021-02-24 RX ADMIN — Medication 20 MILLIGRAM(S): at 08:30

## 2021-02-24 RX ADMIN — Medication 50 MILLIGRAM(S): at 00:02

## 2021-02-24 RX ADMIN — HALOPERIDOL DECANOATE 5 MILLIGRAM(S): 100 INJECTION INTRAMUSCULAR at 00:01

## 2021-02-24 RX ADMIN — Medication 1 TABLET(S): at 08:32

## 2021-02-24 RX ADMIN — HALOPERIDOL DECANOATE 5 MILLIGRAM(S): 100 INJECTION INTRAMUSCULAR at 08:30

## 2021-02-24 RX ADMIN — Medication 100 MILLIGRAM(S): at 08:30

## 2021-02-24 RX ADMIN — PANTOPRAZOLE SODIUM 40 MILLIGRAM(S): 20 TABLET, DELAYED RELEASE ORAL at 08:31
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
## 2021-02-25 LAB — SARS-COV-2 RNA SPEC QL NAA+PROBE: SIGNIFICANT CHANGE UP

## 2021-02-25 PROCEDURE — 99232 SBSQ HOSP IP/OBS MODERATE 35: CPT

## 2021-02-25 RX ORDER — HALOPERIDOL DECANOATE 100 MG/ML
2.5 INJECTION INTRAMUSCULAR DAILY
Refills: 0 | Status: DISCONTINUED | OUTPATIENT
Start: 2021-02-25 | End: 2021-03-01

## 2021-02-25 RX ADMIN — Medication 20 MILLIGRAM(S): at 08:17

## 2021-02-25 RX ADMIN — Medication 650 MILLIGRAM(S): at 10:16

## 2021-02-25 RX ADMIN — PANTOPRAZOLE SODIUM 40 MILLIGRAM(S): 20 TABLET, DELAYED RELEASE ORAL at 08:18

## 2021-02-25 RX ADMIN — Medication 1 MILLIGRAM(S): at 08:17

## 2021-02-25 RX ADMIN — Medication 2 MILLIGRAM(S): at 10:22

## 2021-02-25 RX ADMIN — HALOPERIDOL DECANOATE 10 MILLIGRAM(S): 100 INJECTION INTRAMUSCULAR at 20:18

## 2021-02-25 RX ADMIN — Medication 1 TABLET(S): at 08:17

## 2021-02-25 RX ADMIN — Medication 400 MILLIGRAM(S): at 11:26

## 2021-02-25 RX ADMIN — HALOPERIDOL DECANOATE 5 MILLIGRAM(S): 100 INJECTION INTRAMUSCULAR at 08:18

## 2021-02-25 RX ADMIN — Medication 100 MILLIGRAM(S): at 08:18
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
## 2021-02-26 PROCEDURE — 99232 SBSQ HOSP IP/OBS MODERATE 35: CPT

## 2021-02-26 RX ADMIN — Medication 650 MILLIGRAM(S): at 16:55

## 2021-02-26 RX ADMIN — HALOPERIDOL DECANOATE 2.5 MILLIGRAM(S): 100 INJECTION INTRAMUSCULAR at 08:18

## 2021-02-26 RX ADMIN — Medication 100 MILLIGRAM(S): at 08:18

## 2021-02-26 RX ADMIN — Medication 20 MILLIGRAM(S): at 08:18

## 2021-02-26 RX ADMIN — PANTOPRAZOLE SODIUM 40 MILLIGRAM(S): 20 TABLET, DELAYED RELEASE ORAL at 08:18

## 2021-02-26 RX ADMIN — Medication 2 MILLIGRAM(S): at 19:25

## 2021-02-26 RX ADMIN — Medication 1 MILLIGRAM(S): at 08:17

## 2021-02-26 RX ADMIN — HALOPERIDOL DECANOATE 10 MILLIGRAM(S): 100 INJECTION INTRAMUSCULAR at 20:05

## 2021-02-26 RX ADMIN — Medication 1 TABLET(S): at 08:18
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
## 2021-02-27 RX ADMIN — Medication 20 MILLIGRAM(S): at 08:21

## 2021-02-27 RX ADMIN — HALOPERIDOL DECANOATE 10 MILLIGRAM(S): 100 INJECTION INTRAMUSCULAR at 20:36

## 2021-02-27 RX ADMIN — Medication 1 MILLIGRAM(S): at 08:21

## 2021-02-27 RX ADMIN — HALOPERIDOL DECANOATE 2.5 MILLIGRAM(S): 100 INJECTION INTRAMUSCULAR at 08:21

## 2021-02-27 RX ADMIN — PANTOPRAZOLE SODIUM 40 MILLIGRAM(S): 20 TABLET, DELAYED RELEASE ORAL at 08:21

## 2021-02-27 RX ADMIN — Medication 2 MILLIGRAM(S): at 23:29

## 2021-02-27 RX ADMIN — Medication 1 TABLET(S): at 08:21

## 2021-02-27 RX ADMIN — Medication 100 MILLIGRAM(S): at 08:21

## 2021-02-28 RX ADMIN — HALOPERIDOL DECANOATE 10 MILLIGRAM(S): 100 INJECTION INTRAMUSCULAR at 20:10

## 2021-02-28 RX ADMIN — PANTOPRAZOLE SODIUM 40 MILLIGRAM(S): 20 TABLET, DELAYED RELEASE ORAL at 09:49

## 2021-02-28 RX ADMIN — Medication 50 MILLIGRAM(S): at 22:36

## 2021-02-28 RX ADMIN — Medication 20 MILLIGRAM(S): at 09:48

## 2021-02-28 RX ADMIN — Medication 100 MILLIGRAM(S): at 09:49

## 2021-02-28 RX ADMIN — Medication 1 TABLET(S): at 09:49

## 2021-02-28 RX ADMIN — HALOPERIDOL DECANOATE 2.5 MILLIGRAM(S): 100 INJECTION INTRAMUSCULAR at 09:48

## 2021-02-28 RX ADMIN — Medication 1 MILLIGRAM(S): at 09:48

## 2021-03-01 PROCEDURE — 99232 SBSQ HOSP IP/OBS MODERATE 35: CPT

## 2021-03-01 RX ORDER — HALOPERIDOL DECANOATE 100 MG/ML
5 INJECTION INTRAMUSCULAR DAILY
Refills: 0 | Status: DISCONTINUED | OUTPATIENT
Start: 2021-03-01 | End: 2021-03-03

## 2021-03-01 RX ORDER — HYDROXYZINE HCL 10 MG
50 TABLET ORAL ONCE
Refills: 0 | Status: COMPLETED | OUTPATIENT
Start: 2021-03-01 | End: 2021-03-01

## 2021-03-01 RX ORDER — HYDROXYZINE HCL 10 MG
25 TABLET ORAL ONCE
Refills: 0 | Status: DISCONTINUED | OUTPATIENT
Start: 2021-03-01 | End: 2021-03-01

## 2021-03-01 RX ORDER — HYDROXYZINE HCL 10 MG
50 TABLET ORAL EVERY 6 HOURS
Refills: 0 | Status: DISCONTINUED | OUTPATIENT
Start: 2021-03-01 | End: 2021-03-09

## 2021-03-01 RX ORDER — BENZTROPINE MESYLATE 1 MG
1 TABLET ORAL AT BEDTIME
Refills: 0 | Status: DISCONTINUED | OUTPATIENT
Start: 2021-03-01 | End: 2021-03-09

## 2021-03-01 RX ADMIN — HALOPERIDOL DECANOATE 2.5 MILLIGRAM(S): 100 INJECTION INTRAMUSCULAR at 08:03

## 2021-03-01 RX ADMIN — Medication 650 MILLIGRAM(S): at 19:23

## 2021-03-01 RX ADMIN — Medication 100 MILLIGRAM(S): at 08:03

## 2021-03-01 RX ADMIN — Medication 1 MILLIGRAM(S): at 08:02

## 2021-03-01 RX ADMIN — PANTOPRAZOLE SODIUM 40 MILLIGRAM(S): 20 TABLET, DELAYED RELEASE ORAL at 08:02

## 2021-03-01 RX ADMIN — Medication 50 MILLIGRAM(S): at 00:26

## 2021-03-01 RX ADMIN — HALOPERIDOL DECANOATE 10 MILLIGRAM(S): 100 INJECTION INTRAMUSCULAR at 21:19

## 2021-03-01 RX ADMIN — Medication 1 TABLET(S): at 08:03

## 2021-03-01 RX ADMIN — Medication 20 MILLIGRAM(S): at 08:03

## 2021-03-01 RX ADMIN — Medication 2 MILLIGRAM(S): at 19:24

## 2021-03-01 RX ADMIN — Medication 50 MILLIGRAM(S): at 15:44

## 2021-03-01 RX ADMIN — Medication 1 MILLIGRAM(S): at 21:19
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
## 2021-03-02 PROCEDURE — 99232 SBSQ HOSP IP/OBS MODERATE 35: CPT

## 2021-03-02 RX ORDER — LANOLIN ALCOHOL/MO/W.PET/CERES
3 CREAM (GRAM) TOPICAL ONCE
Refills: 0 | Status: COMPLETED | OUTPATIENT
Start: 2021-03-02 | End: 2021-03-02

## 2021-03-02 RX ORDER — HALOPERIDOL DECANOATE 100 MG/ML
100 INJECTION INTRAMUSCULAR ONCE
Refills: 0 | Status: COMPLETED | OUTPATIENT
Start: 2021-03-02 | End: 2021-03-02

## 2021-03-02 RX ADMIN — Medication 20 MILLIGRAM(S): at 08:31

## 2021-03-02 RX ADMIN — Medication 100 MILLIGRAM(S): at 08:31

## 2021-03-02 RX ADMIN — PANTOPRAZOLE SODIUM 40 MILLIGRAM(S): 20 TABLET, DELAYED RELEASE ORAL at 08:31

## 2021-03-02 RX ADMIN — Medication 1 MILLIGRAM(S): at 20:24

## 2021-03-02 RX ADMIN — Medication 50 MILLIGRAM(S): at 23:38

## 2021-03-02 RX ADMIN — Medication 2 MILLIGRAM(S): at 22:43

## 2021-03-02 RX ADMIN — Medication 1 MILLIGRAM(S): at 08:31

## 2021-03-02 RX ADMIN — HALOPERIDOL DECANOATE 100 MILLIGRAM(S): 100 INJECTION INTRAMUSCULAR at 12:15

## 2021-03-02 RX ADMIN — HALOPERIDOL DECANOATE 5 MILLIGRAM(S): 100 INJECTION INTRAMUSCULAR at 08:31

## 2021-03-02 RX ADMIN — Medication 1 TABLET(S): at 08:31

## 2021-03-02 RX ADMIN — HALOPERIDOL DECANOATE 10 MILLIGRAM(S): 100 INJECTION INTRAMUSCULAR at 20:24

## 2021-03-02 NOTE — BH SCALES AND SCREENS - NSBHSCALESCRN_PSY_ALL_CORE
Brief Psychiatric Rating Scale (BPRS)
Brief Psychiatric Rating Scale (BPRS)
Abnormal Involuntary Movement Scale (AIMS)

## 2021-03-02 NOTE — BH SCALES AND SCREENS - NSBPRSSUSPIC_PSY_ALL_CORE
5 = Moderately Severe – pervasive suspiciousness, or frequent ideas of reference
3 = Mild – occasional instances of suspiciousness that are definitely not warranted by the situation

## 2021-03-02 NOTE — BH SCALES AND SCREENS - NSBPRSBLUNTAFFECT_PSY_ALL_CORE
6 = Severe – e.g., profound flattening of affect
4 = Moderate – e.g., as above, but more intense, prolonged or frequent

## 2021-03-02 NOTE — BH SCALES AND SCREENS - NSBPRSHALLBEH_PSY_ALL_CORE
2 = Very Mild – suspected hallucinations only
4 = Moderate – as above, but more frequent or extensive (e.g. frequently sees the devil’s face, two voices carry on lengthy conversations)
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
## 2021-03-02 NOTE — BH SCALES AND SCREENS - NSBPRSCONCDISORG_PSY_ALL_CORE
3 = Mild - e.g., frequently vague, but the interview is able to progress smoothly
1 = Not Observed (not present)

## 2021-03-03 PROCEDURE — 99232 SBSQ HOSP IP/OBS MODERATE 35: CPT

## 2021-03-03 RX ORDER — FLUOXETINE HCL 10 MG
30 CAPSULE ORAL DAILY
Refills: 0 | Status: DISCONTINUED | OUTPATIENT
Start: 2021-03-03 | End: 2021-03-09

## 2021-03-03 RX ADMIN — PANTOPRAZOLE SODIUM 40 MILLIGRAM(S): 20 TABLET, DELAYED RELEASE ORAL at 09:12

## 2021-03-03 RX ADMIN — Medication 1 MILLIGRAM(S): at 20:17

## 2021-03-03 RX ADMIN — HALOPERIDOL DECANOATE 10 MILLIGRAM(S): 100 INJECTION INTRAMUSCULAR at 20:18

## 2021-03-03 RX ADMIN — Medication 1 MILLIGRAM(S): at 09:11

## 2021-03-03 RX ADMIN — HALOPERIDOL DECANOATE 5 MILLIGRAM(S): 100 INJECTION INTRAMUSCULAR at 09:11

## 2021-03-03 RX ADMIN — Medication 100 MILLIGRAM(S): at 09:11

## 2021-03-03 RX ADMIN — Medication 1 TABLET(S): at 09:12

## 2021-03-03 RX ADMIN — Medication 20 MILLIGRAM(S): at 09:12
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
## 2021-03-04 PROCEDURE — 99232 SBSQ HOSP IP/OBS MODERATE 35: CPT

## 2021-03-04 RX ADMIN — HALOPERIDOL DECANOATE 10 MILLIGRAM(S): 100 INJECTION INTRAMUSCULAR at 20:40

## 2021-03-04 RX ADMIN — Medication 1 MILLIGRAM(S): at 08:54

## 2021-03-04 RX ADMIN — Medication 50 MILLIGRAM(S): at 22:31

## 2021-03-04 RX ADMIN — Medication 30 MILLIGRAM(S): at 08:53

## 2021-03-04 RX ADMIN — Medication 1 TABLET(S): at 08:54

## 2021-03-04 RX ADMIN — Medication 1 MILLIGRAM(S): at 20:40

## 2021-03-04 RX ADMIN — Medication 100 MILLIGRAM(S): at 08:54

## 2021-03-04 RX ADMIN — PANTOPRAZOLE SODIUM 40 MILLIGRAM(S): 20 TABLET, DELAYED RELEASE ORAL at 08:54

## 2021-03-04 NOTE — BH TREATMENT PLAN - NSTXANXGOAL_PSY_ALL_CORE
Identify and practice 3 coping skills to manage anxiety
Identify and practice 3 coping skills to manage anxiety
Be able to perform ADLs and maintain safety despite anxiety/panic daily

## 2021-03-04 NOTE — BH TREATMENT PLAN - NSTXALCDRGINTERMD_PSY_ALL_CORE
started ativan taper, CIWA monitoring, substance abuse counseling, refer to [facility], consider naltrexone 
started ativan taper, CIWA monitoring, substance abuse counseling, refer to [facility], consider naltrexone 
started ativan taper, CIWA monitoring, substance abuse counseling, refer to [facility], consider naltrexone

## 2021-03-04 NOTE — BH TREATMENT PLAN - NSTXANXINTERPR_PSY_ALL_CORE
Pt would benefit from identifying and practicing 3 healthy coping skills to manage symptoms of anxiety for improved symptom management by day 7.
Pt would benefit from identifying and practicing 3 healthy coping skills to manage symptoms of anxiety for improved symptom management by day 7.     Psych rehab staff will engage pt on a 1:1 basis so that pt can benefit from goal progress. Psych rehab staff will provide pt with support, encouragement, and therapeutic space. Pt continues to report a reduction in smyptoms including depression and paranoia. Pt denies SI/HI and AH/VH/TH.

## 2021-03-04 NOTE — BH TREATMENT PLAN - NSPTSTATEDGOAL_PSY_ALL_CORE
Stabilize on medications, referral for outpatient [facility] treatment
Stabilize on medications, referral for outpatient [facility] treatment

## 2021-03-04 NOTE — BH TREATMENT PLAN - NSCMSPTSTRENGTHS_PSY_ALL_CORE
Expressive of emotions/Future/goal oriented

## 2021-03-04 NOTE — BH TREATMENT PLAN - NSTXCAREGIVERAGREEMENT_PSY_P_CORE
Patient does not have family/caregiver involved in care

## 2021-03-04 NOTE — BH TREATMENT PLAN - NSBHPRIMARYDX_PSY_ALL_CORE
Substance induced mood disorder    

## 2021-03-04 NOTE — BH INPATIENT PSYCHIATRY PROGRESS NOTE - NSBHFUPINTERVALHXFT_PSY_A_CORE
: #[id number]  Chart reviewed and case discussed with treatment team. No events overnight. Pt reports depression and anxiety is better after increase of prozac to 30mg. Pt reports sleeping well last night and denied any further feelings of "pressure" with addition of cogentin. Pt received haldol decanoate 100 mg IM Q 4 weeks on 3/2/21 and denies any adverse reaction from this. Pt denies any A/VH now, but remains oddly related.  Pt states he no longer feels that someone is behind him or following him and denies other feeling of paranoia. Pt states he feels safe on the unit. Patient denies any urges to self-harm, denies SIIP or HIIP and agrees to come to staff immediately with any safety concerns. Discussed with  for referrals to [facility] for outpatient follow up.  : #[id number]  Chart reviewed and case discussed with treatment team. No events overnight. Increase of prozac to 30mg today for depression and anxiety. Pt reports sleeping well last night and denied any further feelings of "pressure" with addition of cogentin. Pt received haldol decanoate 100 mg IM Q 4 weeks on 3/2/21 and denies any adverse reaction from this. Pt denies any A/VH now, but remains oddly related.  Pt states he no longer feels that someone is behind him or following him and denies other feeling of paranoia. Pt states he feels safe on the unit. Patient denies any urges to self-harm, denies SIIP or HIIP and agrees to come to staff immediately with any safety concerns. Discussed with SW for referrals to [facility] for outpatient follow up.

## 2021-03-04 NOTE — BH TREATMENT PLAN - ANXIETY/PANIC/FEAR NURSING INTERVENTIONS/RECOMMENDATIONS
Encouraged patient to ventilate his thoughts and feelings. Encouraged continued compliance of medications.

## 2021-03-04 NOTE — BH TREATMENT PLAN - NSTXALCDRGGOAL_PSY_ALL_CORE
Score on withdrawal scale will be WNL

## 2021-03-04 NOTE — BH TREATMENT PLAN - NSTXCAREGIVERPARTICIPATE_PSY_P_CORE
No, patient unwilling to involve family/caregiver

## 2021-03-04 NOTE — BH TREATMENT PLAN - NSTXPATIENTPARTICIPATE_PSY_ALL_CORE
Patient participated in identification of needs/problems/goals for treatment

## 2021-03-04 NOTE — BH TREATMENT PLAN - NSTXPSYCHOINTERMD_PSY_ALL_CORE
start and titrate haldol with goal of LAURA
start and titrate haldol with goal of LAURA
start and titrate haldol with goal of LAURA - pt took haldol decanoate 100 mg IM Q 4 weeks on 3/2/21

## 2021-03-04 NOTE — BH TREATMENT PLAN - NSTXDCOPNOINTERSW_PSY_ALL_CORE
Writer will provide ongoing support, psychoed and encouragement in effort to comply with meds, tx and dscharge plans.
Writer will provide ongoing support, psychoed and encouragement in effort to comply with meds, tx and dscharge plans.
Ongoing support, psychoed and encouragement provided daily.

## 2021-03-04 NOTE — BH TREATMENT PLAN - NSTXPLANTHERAPYSESSIONSFT_PSY_ALL_CORE
02-25-21  Type of therapy: none at this time.  Type of session: Individual  Level of patient participation: pt sleeping and unable to be roused.  Duration of participation: Less than 15 minutes  Therapy conducted by: Psych rehab  Therapy Summary: Pt seems to be making some progress towards specified psych rehab goals at this time. According to chart, pt remains isolative to personal room, however is able to express self and needs when needed. Pt seems to be taking prescribed daily medications with good effect. Pt is intermittently visible in the day areas of the unit. When interacted with, pt is calm, cooperative, and polite. Pt demonstrates orientation to the unit as well as time and space. Pt eating adequatley. According to medical chart, pt denies SI/HI/I/P and psychotic symptomology at this time. Psych rehab staff will continue to meet with pt on an individual basis, and provide pt with psychoeducation, coping resourses, support, encouragement, and therapeutic space.

## 2021-03-04 NOTE — BH TREATMENT PLAN - NSTXDEPRESINTERRN_PSY_ALL_CORE
Encouarged patient to ventilate his thoughts and feelings. Encouraged patient to join the community. Encouraged compliance of medications.

## 2021-03-04 NOTE — BH TREATMENT PLAN - NSTXSUICIDGOAL_PSY_ALL_CORE
Will identify and utilize 2 coping skills

## 2021-03-04 NOTE — BH TREATMENT PLAN - NSTXPSYCHOGOAL_PSY_ALL_CORE
Will identify 2 coping skills that help mitigate hallucinations

## 2021-03-04 NOTE — BH TREATMENT PLAN - NSTXDEPRESGOAL_PSY_ALL_CORE
Exhibit improvements in self-grooming, hygiene, sleep and appetite

## 2021-03-05 PROCEDURE — 99232 SBSQ HOSP IP/OBS MODERATE 35: CPT

## 2021-03-05 RX ORDER — HALOPERIDOL DECANOATE 100 MG/ML
5 INJECTION INTRAMUSCULAR DAILY
Refills: 0 | Status: DISCONTINUED | OUTPATIENT
Start: 2021-03-05 | End: 2021-03-06

## 2021-03-05 RX ADMIN — Medication 30 MILLIGRAM(S): at 09:08

## 2021-03-05 RX ADMIN — PANTOPRAZOLE SODIUM 40 MILLIGRAM(S): 20 TABLET, DELAYED RELEASE ORAL at 09:09

## 2021-03-05 RX ADMIN — Medication 1 MILLIGRAM(S): at 20:41

## 2021-03-05 RX ADMIN — Medication 2 MILLIGRAM(S): at 23:14

## 2021-03-05 RX ADMIN — Medication 400 MILLIGRAM(S): at 00:12

## 2021-03-05 RX ADMIN — Medication 1 TABLET(S): at 09:09

## 2021-03-05 RX ADMIN — Medication 100 MILLIGRAM(S): at 09:09

## 2021-03-05 RX ADMIN — Medication 1 MILLIGRAM(S): at 09:09

## 2021-03-05 RX ADMIN — Medication 50 MILLIGRAM(S): at 23:14

## 2021-03-05 RX ADMIN — HALOPERIDOL DECANOATE 10 MILLIGRAM(S): 100 INJECTION INTRAMUSCULAR at 20:41

## 2021-03-05 NOTE — BH INPATIENT PSYCHIATRY PROGRESS NOTE - NSBHFUPINTERVALHXFT_PSY_A_CORE
: #[id number]  Chart reviewed and case discussed with treatment team. Overnight, staff reports pt was pacing most of the night and complained of shoulder pain and was given motrin PRN for this. On assessment, pt stated that he did not have pain, but that he felt a "pressure." When asked to elaborate, the pt stated he felt anxious and did not know why. Pt educated that when he feels anxious, he should tell the nurses this so as to get the right PO PRNs. Was tapering haldol because pt received haldol dec, but will keep PO haldol at 5 mg PO QD and 10 mg PO QHS for the weekend to help mitigate any feelings of anxiety/agitation returning until the decanoate is bioavailable. Pt received haldol decanoate 100 mg IM Q 4 weeks on 3/2/21 and denies any adverse reaction from this. Pt denies any A/VH now, but remains oddly related.  Pt states he no longer feels that someone is behind him or following him and denies other feeling of paranoia. Pt states he feels safe on the unit. Patient denies any urges to self-harm, denies SIIP or HIIP and agrees to come to staff immediately with any safety concerns. Discussed with  for referrals to [facility] for outpatient follow up ([facility] declined referral today).

## 2021-03-06 RX ADMIN — Medication 30 MILLIGRAM(S): at 09:04

## 2021-03-06 RX ADMIN — HALOPERIDOL DECANOATE 5 MILLIGRAM(S): 100 INJECTION INTRAMUSCULAR at 09:05

## 2021-03-06 RX ADMIN — Medication 100 MILLIGRAM(S): at 09:04

## 2021-03-06 RX ADMIN — Medication 1 MILLIGRAM(S): at 20:21

## 2021-03-06 RX ADMIN — HALOPERIDOL DECANOATE 10 MILLIGRAM(S): 100 INJECTION INTRAMUSCULAR at 20:20

## 2021-03-06 RX ADMIN — Medication 1 TABLET(S): at 09:04

## 2021-03-06 RX ADMIN — Medication 1 MILLIGRAM(S): at 09:05

## 2021-03-06 RX ADMIN — PANTOPRAZOLE SODIUM 40 MILLIGRAM(S): 20 TABLET, DELAYED RELEASE ORAL at 09:05

## 2021-03-07 RX ORDER — HALOPERIDOL DECANOATE 100 MG/ML
5 INJECTION INTRAMUSCULAR AT BEDTIME
Refills: 0 | Status: DISCONTINUED | OUTPATIENT
Start: 2021-03-07 | End: 2021-03-09

## 2021-03-07 RX ADMIN — Medication 1 MILLIGRAM(S): at 20:42

## 2021-03-07 RX ADMIN — HALOPERIDOL DECANOATE 5 MILLIGRAM(S): 100 INJECTION INTRAMUSCULAR at 20:42

## 2021-03-07 RX ADMIN — Medication 100 MILLIGRAM(S): at 09:31

## 2021-03-07 RX ADMIN — Medication 1 MILLIGRAM(S): at 09:29

## 2021-03-07 RX ADMIN — PANTOPRAZOLE SODIUM 40 MILLIGRAM(S): 20 TABLET, DELAYED RELEASE ORAL at 09:30

## 2021-03-07 RX ADMIN — Medication 1 TABLET(S): at 09:29

## 2021-03-07 RX ADMIN — Medication 2 MILLIGRAM(S): at 21:54

## 2021-03-07 RX ADMIN — Medication 30 MILLIGRAM(S): at 09:30

## 2021-03-08 PROCEDURE — 99232 SBSQ HOSP IP/OBS MODERATE 35: CPT

## 2021-03-08 RX ORDER — PANTOPRAZOLE SODIUM 20 MG/1
1 TABLET, DELAYED RELEASE ORAL
Qty: 15 | Refills: 0
Start: 2021-03-08 | End: 2021-03-22

## 2021-03-08 RX ORDER — BENZTROPINE MESYLATE 1 MG
1 TABLET ORAL
Qty: 15 | Refills: 0
Start: 2021-03-08 | End: 2021-03-22

## 2021-03-08 RX ORDER — HALOPERIDOL DECANOATE 100 MG/ML
1 INJECTION INTRAMUSCULAR
Qty: 15 | Refills: 0
Start: 2021-03-08 | End: 2021-03-22

## 2021-03-08 RX ORDER — THIAMINE MONONITRATE (VIT B1) 100 MG
1 TABLET ORAL
Qty: 15 | Refills: 0
Start: 2021-03-08 | End: 2021-03-22

## 2021-03-08 RX ORDER — FOLIC ACID 0.8 MG
1 TABLET ORAL
Qty: 15 | Refills: 0
Start: 2021-03-08 | End: 2021-03-22

## 2021-03-08 RX ORDER — FLUOXETINE HCL 10 MG
3 CAPSULE ORAL
Qty: 45 | Refills: 0
Start: 2021-03-08 | End: 2021-03-22

## 2021-03-08 RX ORDER — DIPHENHYDRAMINE HCL 50 MG
50 CAPSULE ORAL AT BEDTIME
Refills: 0 | Status: DISCONTINUED | OUTPATIENT
Start: 2021-03-08 | End: 2021-03-09

## 2021-03-08 RX ORDER — DIPHENHYDRAMINE HCL 50 MG
1 CAPSULE ORAL
Qty: 15 | Refills: 0
Start: 2021-03-08 | End: 2021-03-22

## 2021-03-08 RX ORDER — HALOPERIDOL DECANOATE 100 MG/ML
1 INJECTION INTRAMUSCULAR
Qty: 1 | Refills: 0
Start: 2021-03-08 | End: 2021-03-08

## 2021-03-08 RX ADMIN — HALOPERIDOL DECANOATE 5 MILLIGRAM(S): 100 INJECTION INTRAMUSCULAR at 20:31

## 2021-03-08 RX ADMIN — PANTOPRAZOLE SODIUM 40 MILLIGRAM(S): 20 TABLET, DELAYED RELEASE ORAL at 08:42

## 2021-03-08 RX ADMIN — Medication 50 MILLIGRAM(S): at 23:43

## 2021-03-08 RX ADMIN — Medication 1 MILLIGRAM(S): at 08:41

## 2021-03-08 RX ADMIN — Medication 1 MILLIGRAM(S): at 20:31

## 2021-03-08 RX ADMIN — Medication 50 MILLIGRAM(S): at 20:31

## 2021-03-08 RX ADMIN — Medication 100 MILLIGRAM(S): at 08:41

## 2021-03-08 RX ADMIN — Medication 1 TABLET(S): at 08:42

## 2021-03-08 RX ADMIN — Medication 30 MILLIGRAM(S): at 08:41
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
## 2021-03-08 NOTE — BH INPATIENT PSYCHIATRY DISCHARGE NOTE - NSDCMRMEDTOKEN_GEN_ALL_CORE_FT
folic acid 1 mg oral tablet: 1 tab(s) orally once a day  Multiple Vitamins oral tablet: 1 tab(s) orally once a day  thiamine 100 mg oral tablet: 1 tab(s) orally once a day    benztropine 1 mg oral tablet: 1 tab(s) orally once a day (at bedtime)  diphenhydrAMINE 50 mg oral capsule: 1 cap(s) orally once a day (at bedtime)  FLUoxetine 10 mg oral capsule: 3 cap(s) orally once a day  folic acid 1 mg oral tablet: 1 tab(s) orally once a day  Haldol Decanoate 100 mg/mL intramuscular solution: 1 application intramuscularly every 4 weeks (LAST GIVEN ON 3/2/21)  haloperidol 5 mg oral tablet: 1 tab(s) orally once a day (at bedtime)  Multiple Vitamins oral tablet: 1 tab(s) orally once a day  Protonix 40 mg oral delayed release tablet: 1 tab(s) orally once a day (before a meal)  thiamine 100 mg oral tablet: 1 tab(s) orally once a day

## 2021-03-08 NOTE — BH INPATIENT PSYCHIATRY DISCHARGE NOTE - NSDCCPCAREPLAN_GEN_ALL_CORE_FT
PRINCIPAL DISCHARGE DIAGNOSIS  Diagnosis: Substance induced mood disorder  Assessment and Plan of Treatment:

## 2021-03-08 NOTE — BH INPATIENT PSYCHIATRY DISCHARGE NOTE - NSBHMETABOLIC_PSY_ALL_CORE_FT
BMI: BMI (kg/m2): 27.6 (02-27-21 @ 19:03)  HbA1c: A1C with Estimated Average Glucose Result: 5.5 % (02-19-21 @ 10:44)    Glucose: POCT Blood Glucose.: 106 mg/dL (02-18-21 @ 09:49)    BP: 79/54 (03-06-21 @ 08:27) (79/54 - 79/54)  Lipid Panel: Date/Time: 02-20-21 @ 11:14  Cholesterol, Serum: 181  Direct LDL: --  HDL Cholesterol, Serum: 58  Total Cholesterol/HDL Ration Measurement: --  Triglycerides, Serum: 142

## 2021-03-08 NOTE — BH INPATIENT PSYCHIATRY PROGRESS NOTE - NSBHFUPINTERVALHXFT_PSY_A_CORE
: #[id number]  Chart reviewed and case discussed with treatment team. Overnight, pt noted to take several ativan PRNs at bedtime. On assessment, pt stated he felt anxious prior to bedtime and did not know why. Pt denies any anxiety during the day and was unable to identify any triggers to his anxiety. Alternative medications discussed and pt agreed to start benadryl 50 mg PO QHS for insomnia and anxiety. Pt was provided with medication education including but not limited to possible side effects like sedation, dizziness, urinary retention, dry mouth, and metabolic changes; pt verbalized understanding. Pt received haldol decanoate 100 mg IM Q 4 weeks on 3/2/21 (next due 3/30/21) and denies any adverse reaction from this. Pt denies any AH (x 3 days), VH, SIIP, HIIP, or paranoia today.  Pt states he no longer feels that someone is behind him or following him and denies other feeling of paranoia. Pt states he feels safe on the unit. Patient denies any urges to self-harm, denies SIIP or HIIP and agrees to come to staff immediately with any safety concerns. Pt noted to be more visible on the unit. Pt states he feels his energy and concentration have improved and now denies anhedonia. Discussed with  for referrals to [facility] for outpatient follow up ([facility] declined referral). Projected discharge 3/9/21

## 2021-03-08 NOTE — BH INPATIENT PSYCHIATRY DISCHARGE NOTE - NSBHDCHANDOFFFT_PSY_ALL_CORE
Pt referred to [facility] outpt clinic and has not yet been assigned a provider yet. Clinic was sent pt's discharge summary, medication list, and this writer's contact # [phone] for any further questions or concerns

## 2021-03-09 ENCOUNTER — EMERGENCY (EMERGENCY)
Facility: HOSPITAL | Age: 31
LOS: 1 days | Discharge: HOME | End: 2021-03-09
Attending: EMERGENCY MEDICINE
Payer: MEDICAID

## 2021-03-09 VITALS — SYSTOLIC BLOOD PRESSURE: 116 MMHG | DIASTOLIC BLOOD PRESSURE: 66 MMHG | TEMPERATURE: 96 F

## 2021-03-09 VITALS
HEIGHT: 67 IN | TEMPERATURE: 99 F | WEIGHT: 165.35 LBS | RESPIRATION RATE: 16 BRPM | OXYGEN SATURATION: 98 % | HEART RATE: 103 BPM | SYSTOLIC BLOOD PRESSURE: 151 MMHG | DIASTOLIC BLOOD PRESSURE: 94 MMHG

## 2021-03-09 DIAGNOSIS — K35.33 ACUTE APPENDICITIS WITH PERFORATION, LOCALIZED PERITONITIS, AND GANGRENE, WITH ABSCESS: Chronic | ICD-10-CM

## 2021-03-09 LAB
ALBUMIN SERPL ELPH-MCNC: 3.8 G/DL — SIGNIFICANT CHANGE UP (ref 3.5–5)
ALP SERPL-CCNC: 95 U/L — SIGNIFICANT CHANGE UP (ref 40–120)
ALT FLD-CCNC: 41 U/L DA — SIGNIFICANT CHANGE UP (ref 10–60)
AMPHET UR-MCNC: NEGATIVE — SIGNIFICANT CHANGE UP
ANION GAP SERPL CALC-SCNC: 6 MMOL/L — SIGNIFICANT CHANGE UP (ref 5–17)
AST SERPL-CCNC: 18 U/L — SIGNIFICANT CHANGE UP (ref 10–40)
BARBITURATES UR SCN-MCNC: NEGATIVE — SIGNIFICANT CHANGE UP
BASOPHILS # BLD AUTO: 0.05 K/UL — SIGNIFICANT CHANGE UP (ref 0–0.2)
BASOPHILS NFR BLD AUTO: 0.4 % — SIGNIFICANT CHANGE UP (ref 0–2)
BENZODIAZ UR-MCNC: NEGATIVE — SIGNIFICANT CHANGE UP
BILIRUB SERPL-MCNC: 0.2 MG/DL — SIGNIFICANT CHANGE UP (ref 0.2–1.2)
BUN SERPL-MCNC: 9 MG/DL — SIGNIFICANT CHANGE UP (ref 7–18)
CALCIUM SERPL-MCNC: 8.6 MG/DL — SIGNIFICANT CHANGE UP (ref 8.4–10.5)
CHLORIDE SERPL-SCNC: 104 MMOL/L — SIGNIFICANT CHANGE UP (ref 96–108)
CK SERPL-CCNC: 280 U/L — HIGH (ref 35–232)
CO2 SERPL-SCNC: 27 MMOL/L — SIGNIFICANT CHANGE UP (ref 22–31)
COCAINE METAB.OTHER UR-MCNC: NEGATIVE — SIGNIFICANT CHANGE UP
CREAT SERPL-MCNC: 0.98 MG/DL — SIGNIFICANT CHANGE UP (ref 0.5–1.3)
D DIMER BLD IA.RAPID-MCNC: 168 NG/ML DDU — SIGNIFICANT CHANGE UP
EOSINOPHIL # BLD AUTO: 0.02 K/UL — SIGNIFICANT CHANGE UP (ref 0–0.5)
EOSINOPHIL NFR BLD AUTO: 0.2 % — SIGNIFICANT CHANGE UP (ref 0–6)
GLUCOSE SERPL-MCNC: 86 MG/DL — SIGNIFICANT CHANGE UP (ref 70–99)
HCT VFR BLD CALC: 43.8 % — SIGNIFICANT CHANGE UP (ref 39–50)
HGB BLD-MCNC: 14.5 G/DL — SIGNIFICANT CHANGE UP (ref 13–17)
IMM GRANULOCYTES NFR BLD AUTO: 0.3 % — SIGNIFICANT CHANGE UP (ref 0–1.5)
LYMPHOCYTES # BLD AUTO: 17.7 % — SIGNIFICANT CHANGE UP (ref 13–44)
LYMPHOCYTES # BLD AUTO: 2.08 K/UL — SIGNIFICANT CHANGE UP (ref 1–3.3)
MCHC RBC-ENTMCNC: 27.7 PG — SIGNIFICANT CHANGE UP (ref 27–34)
MCHC RBC-ENTMCNC: 33.1 GM/DL — SIGNIFICANT CHANGE UP (ref 32–36)
MCV RBC AUTO: 83.6 FL — SIGNIFICANT CHANGE UP (ref 80–100)
METHADONE UR-MCNC: NEGATIVE — SIGNIFICANT CHANGE UP
MONOCYTES # BLD AUTO: 0.64 K/UL — SIGNIFICANT CHANGE UP (ref 0–0.9)
MONOCYTES NFR BLD AUTO: 5.5 % — SIGNIFICANT CHANGE UP (ref 2–14)
NEUTROPHILS # BLD AUTO: 8.89 K/UL — HIGH (ref 1.8–7.4)
NEUTROPHILS NFR BLD AUTO: 75.9 % — SIGNIFICANT CHANGE UP (ref 43–77)
NRBC # BLD: 0 /100 WBCS — SIGNIFICANT CHANGE UP (ref 0–0)
OPIATES UR-MCNC: NEGATIVE — SIGNIFICANT CHANGE UP
PCP SPEC-MCNC: SIGNIFICANT CHANGE UP
PCP UR-MCNC: NEGATIVE — SIGNIFICANT CHANGE UP
PLATELET # BLD AUTO: 405 K/UL — HIGH (ref 150–400)
POTASSIUM SERPL-MCNC: 3.6 MMOL/L — SIGNIFICANT CHANGE UP (ref 3.5–5.3)
POTASSIUM SERPL-SCNC: 3.6 MMOL/L — SIGNIFICANT CHANGE UP (ref 3.5–5.3)
PROT SERPL-MCNC: 8.5 G/DL — HIGH (ref 6–8.3)
RBC # BLD: 5.24 M/UL — SIGNIFICANT CHANGE UP (ref 4.2–5.8)
RBC # FLD: 13.2 % — SIGNIFICANT CHANGE UP (ref 10.3–14.5)
SODIUM SERPL-SCNC: 137 MMOL/L — SIGNIFICANT CHANGE UP (ref 135–145)
T4 AB SER-ACNC: 10.2 UG/DL — SIGNIFICANT CHANGE UP (ref 4.6–12)
THC UR QL: NEGATIVE — SIGNIFICANT CHANGE UP
TROPONIN I SERPL-MCNC: <0.015 NG/ML — SIGNIFICANT CHANGE UP (ref 0–0.04)
TSH SERPL-MCNC: 1.48 UU/ML — SIGNIFICANT CHANGE UP (ref 0.34–4.82)
WBC # BLD: 11.72 K/UL — HIGH (ref 3.8–10.5)
WBC # FLD AUTO: 11.72 K/UL — HIGH (ref 3.8–10.5)

## 2021-03-09 PROCEDURE — 99285 EMERGENCY DEPT VISIT HI MDM: CPT

## 2021-03-09 PROCEDURE — 71046 X-RAY EXAM CHEST 2 VIEWS: CPT | Mod: 26

## 2021-03-09 RX ORDER — LANOLIN ALCOHOL/MO/W.PET/CERES
6 CREAM (GRAM) TOPICAL ONCE
Refills: 0 | Status: COMPLETED | OUTPATIENT
Start: 2021-03-09 | End: 2021-03-09

## 2021-03-09 RX ADMIN — Medication 6 MILLIGRAM(S): at 02:52

## 2021-03-09 RX ADMIN — Medication 1 MILLIGRAM(S): at 08:55

## 2021-03-09 RX ADMIN — Medication 30 MILLIGRAM(S): at 08:54

## 2021-03-09 RX ADMIN — Medication 1 TABLET(S): at 08:54

## 2021-03-09 RX ADMIN — Medication 100 MILLIGRAM(S): at 08:54

## 2021-03-09 RX ADMIN — PANTOPRAZOLE SODIUM 40 MILLIGRAM(S): 20 TABLET, DELAYED RELEASE ORAL at 08:55

## 2021-03-09 NOTE — ED PROVIDER NOTE - CLINICAL SUMMARY MEDICAL DECISION MAKING FREE TEXT BOX
pt with atypical CP, anxious, just d/ from [facility], will get EKG. labs, Trop., reassess

## 2021-03-09 NOTE — BH INPATIENT PSYCHIATRY PROGRESS NOTE - NSBHFUPINTERVALCCFT_PSY_A_CORE
SI, paranoia, ETOH

## 2021-03-09 NOTE — BH INPATIENT PSYCHIATRY PROGRESS NOTE - NSBHMETABOLIC_PSY_ALL_CORE_FT
BMI: BMI (kg/m2): 23.5 (02-19-21 @ 01:35)  HbA1c: A1C with Estimated Average Glucose Result: 5.5 % (02-19-21 @ 10:44)    Glucose: POCT Blood Glucose.: 106 mg/dL (02-18-21 @ 09:49)    BP: 130/82 (02-19-21 @ 20:19) (124/69 - 130/82)  Lipid Panel: Date/Time: 02-20-21 @ 11:14  Cholesterol, Serum: 181  Direct LDL: --  HDL Cholesterol, Serum: 58  Total Cholesterol/HDL Ration Measurement: --  Triglycerides, Serum: 142  
BMI: BMI (kg/m2): 27.6 (02-27-21 @ 19:03)  HbA1c: A1C with Estimated Average Glucose Result: 5.5 % (02-19-21 @ 10:44)    Glucose: POCT Blood Glucose.: 106 mg/dL (02-18-21 @ 09:49)    BP: 79/54 (03-06-21 @ 08:27) (79/54 - 79/54)  Lipid Panel: Date/Time: 02-20-21 @ 11:14  Cholesterol, Serum: 181  Direct LDL: --  HDL Cholesterol, Serum: 58  Total Cholesterol/HDL Ration Measurement: --  Triglycerides, Serum: 142  
BMI: BMI (kg/m2): 23.5 (02-19-21 @ 01:35)  HbA1c: A1C with Estimated Average Glucose Result: 5.5 % (02-19-21 @ 10:44)    Glucose: POCT Blood Glucose.: 106 mg/dL (02-18-21 @ 09:49)    BP: 125/82 (02-20-21 @ 22:09) (124/69 - 130/82)  Lipid Panel: Date/Time: 02-20-21 @ 11:14  Cholesterol, Serum: 181  Direct LDL: --  HDL Cholesterol, Serum: 58  Total Cholesterol/HDL Ration Measurement: --  Triglycerides, Serum: 142  
BMI: BMI (kg/m2): 27.6 (02-27-21 @ 19:03)  HbA1c: A1C with Estimated Average Glucose Result: 5.5 % (02-19-21 @ 10:44)    Glucose: POCT Blood Glucose.: 106 mg/dL (02-18-21 @ 09:49)    BP: 116/78 (03-01-21 @ 06:02) (116/78 - 116/78)  Lipid Panel: Date/Time: 02-20-21 @ 11:14  Cholesterol, Serum: 181  Direct LDL: --  HDL Cholesterol, Serum: 58  Total Cholesterol/HDL Ration Measurement: --  Triglycerides, Serum: 142  
BMI: BMI (kg/m2): 27.6 (02-27-21 @ 19:03)  HbA1c: A1C with Estimated Average Glucose Result: 5.5 % (02-19-21 @ 10:44)    Glucose: POCT Blood Glucose.: 106 mg/dL (02-18-21 @ 09:49)    BP: 114/65 (03-02-21 @ 08:23) (114/65 - 116/78)  Lipid Panel: Date/Time: 02-20-21 @ 11:14  Cholesterol, Serum: 181  Direct LDL: --  HDL Cholesterol, Serum: 58  Total Cholesterol/HDL Ration Measurement: --  Triglycerides, Serum: 142  
BMI: BMI (kg/m2): 23.5 (02-19-21 @ 01:35)  HbA1c: A1C with Estimated Average Glucose Result: 5.5 % (02-19-21 @ 10:44)    Glucose: POCT Blood Glucose.: 106 mg/dL (02-18-21 @ 09:49)    BP: 119/79 (02-21-21 @ 14:22) (119/79 - 125/82)  Lipid Panel: Date/Time: 02-20-21 @ 11:14  Cholesterol, Serum: 181  Direct LDL: --  HDL Cholesterol, Serum: 58  Total Cholesterol/HDL Ration Measurement: --  Triglycerides, Serum: 142  
BMI: BMI (kg/m2): 27.6 (02-27-21 @ 19:03)  HbA1c: A1C with Estimated Average Glucose Result: 5.5 % (02-19-21 @ 10:44)    Glucose: POCT Blood Glucose.: 106 mg/dL (02-18-21 @ 09:49)    BP: 116/66 (03-09-21 @ 08:27) (116/66 - 116/66)  Lipid Panel: Date/Time: 02-20-21 @ 11:14  Cholesterol, Serum: 181  Direct LDL: --  HDL Cholesterol, Serum: 58  Total Cholesterol/HDL Ration Measurement: --  Triglycerides, Serum: 142  
BMI: BMI (kg/m2): 27.6 (02-27-21 @ 19:03)  HbA1c: A1C with Estimated Average Glucose Result: 5.5 % (02-19-21 @ 10:44)    Glucose: POCT Blood Glucose.: 106 mg/dL (02-18-21 @ 09:49)    BP: 114/65 (03-02-21 @ 08:23) (114/65 - 114/65)  Lipid Panel: Date/Time: 02-20-21 @ 11:14  Cholesterol, Serum: 181  Direct LDL: --  HDL Cholesterol, Serum: 58  Total Cholesterol/HDL Ration Measurement: --  Triglycerides, Serum: 142  
BMI: BMI (kg/m2): 23.5 (02-19-21 @ 01:35)  HbA1c: A1C with Estimated Average Glucose Result: 5.5 % (02-19-21 @ 10:44)    Glucose: POCT Blood Glucose.: 106 mg/dL (02-18-21 @ 09:49)    BP: 119/79 (02-21-21 @ 14:22) (119/79 - 130/82)  Lipid Panel: Date/Time: 02-20-21 @ 11:14  Cholesterol, Serum: 181  Direct LDL: --  HDL Cholesterol, Serum: 58  Total Cholesterol/HDL Ration Measurement: --  Triglycerides, Serum: 142  
BMI: BMI (kg/m2): 27.6 (02-27-21 @ 19:03)  HbA1c: A1C with Estimated Average Glucose Result: 5.5 % (02-19-21 @ 10:44)    Glucose: POCT Blood Glucose.: 106 mg/dL (02-18-21 @ 09:49)    BP: 114/65 (03-02-21 @ 08:23) (114/65 - 116/78)  Lipid Panel: Date/Time: 02-20-21 @ 11:14  Cholesterol, Serum: 181  Direct LDL: --  HDL Cholesterol, Serum: 58  Total Cholesterol/HDL Ration Measurement: --  Triglycerides, Serum: 142  
BMI: BMI (kg/m2): 23.5 (02-19-21 @ 01:35)  HbA1c: A1C with Estimated Average Glucose Result: 5.5 % (02-19-21 @ 10:44)    Glucose: POCT Blood Glucose.: 106 mg/dL (02-18-21 @ 09:49)    BP: 119/79 (02-21-21 @ 14:22) (119/79 - 119/79)  Lipid Panel: Date/Time: 02-20-21 @ 11:14  Cholesterol, Serum: 181  Direct LDL: --  HDL Cholesterol, Serum: 58  Total Cholesterol/HDL Ration Measurement: --  Triglycerides, Serum: 142  
BMI: BMI (kg/m2): 23.5 (02-19-21 @ 01:35)  HbA1c: A1C with Estimated Average Glucose Result: 5.5 % (02-19-21 @ 10:44)    Glucose: POCT Blood Glucose.: 106 mg/dL (02-18-21 @ 09:49)    BP: --  Lipid Panel: Date/Time: 02-20-21 @ 11:14  Cholesterol, Serum: 181  Direct LDL: --  HDL Cholesterol, Serum: 58  Total Cholesterol/HDL Ration Measurement: --  Triglycerides, Serum: 142  
BMI: BMI (kg/m2): 23.5 (02-19-21 @ 01:35)  HbA1c: A1C with Estimated Average Glucose Result: 5.5 % (02-19-21 @ 10:44)    Glucose: POCT Blood Glucose.: 106 mg/dL (02-18-21 @ 09:49)    BP: --  Lipid Panel: Date/Time: 02-20-21 @ 11:14  Cholesterol, Serum: 181  Direct LDL: --  HDL Cholesterol, Serum: 58  Total Cholesterol/HDL Ration Measurement: --  Triglycerides, Serum: 142  
BMI: BMI (kg/m2): 27.6 (02-27-21 @ 19:03)  HbA1c: A1C with Estimated Average Glucose Result: 5.5 % (02-19-21 @ 10:44)    Glucose: POCT Blood Glucose.: 106 mg/dL (02-18-21 @ 09:49)    BP: --  Lipid Panel: Date/Time: 02-20-21 @ 11:14  Cholesterol, Serum: 181  Direct LDL: --  HDL Cholesterol, Serum: 58  Total Cholesterol/HDL Ration Measurement: --  Triglycerides, Serum: 142

## 2021-03-09 NOTE — BH INPATIENT PSYCHIATRY PROGRESS NOTE - NSTXPROBALCDRG_PSY_ALL_CORE
ALCOHOL OR DRUG WITHDRAWAL

## 2021-03-09 NOTE — BH INPATIENT PSYCHIATRY PROGRESS NOTE - NSBHCHARTREVIEWVS_PSY_A_CORE FT
Vital Signs Last 24 Hrs  T(C): 36.3 (23 Feb 2021 08:14), Max: 36.3 (23 Feb 2021 08:14)  T(F): 97.3 (23 Feb 2021 08:14), Max: 97.3 (23 Feb 2021 08:14)  HR: --68  BP: --105/63  BP(mean): --  RR: --  SpO2: --
Vital Signs Last 24 Hrs  T(C): 36.6 (08 Mar 2021 08:12), Max: 36.6 (08 Mar 2021 08:12)  T(F): 97.8 (08 Mar 2021 08:12), Max: 97.8 (08 Mar 2021 08:12)  HR: --60  BP: --115/62  BP(mean): --  RR: --  SpO2: --
Vital Signs Last 24 Hrs  T(C): 36.6 (22 Feb 2021 08:48), Max: 36.6 (21 Feb 2021 19:11)  T(F): 97.8 (22 Feb 2021 08:48), Max: 97.9 (21 Feb 2021 19:11)  HR: 92 (21 Feb 2021 14:22) (92 - 92)  BP: 119/79 (21 Feb 2021 14:22) (119/79 - 119/79)  BP(mean): --  RR: --  SpO2: --
Vital Signs Last 24 Hrs  T(C): 35.3 (21 Feb 2021 06:16), Max: 36.8 (20 Feb 2021 22:09)  T(F): 95.6 (21 Feb 2021 06:16), Max: 98.2 (20 Feb 2021 22:09)  HR: 85 (20 Feb 2021 22:09) (85 - 85)  BP: 125/82 (20 Feb 2021 22:09) (125/82 - 125/82)  BP(mean): --  RR: --  SpO2: --
Vital Signs Last 24 Hrs  T(C): 36.3 (03 Mar 2021 07:47), Max: 36.4 (02 Mar 2021 20:30)  T(F): 97.3 (03 Mar 2021 07:47), Max: 97.6 (02 Mar 2021 20:30)  HR: --66  BP: --116/72  BP(mean): --  RR: --  SpO2: --
Vital Signs Last 24 Hrs  T(C): 36.3 (05 Mar 2021 08:33), Max: 36.3 (05 Mar 2021 08:33)  T(F): 97.3 (05 Mar 2021 08:33), Max: 97.3 (05 Mar 2021 08:33)  HR: --66  BP: --118/76  BP(mean): --  RR: --  SpO2: --
Vital Signs Last 24 Hrs  T(C): 36.3 (01 Mar 2021 06:02), Max: 37.3 (28 Feb 2021 19:34)  T(F): 97.3 (01 Mar 2021 06:02), Max: 99.2 (28 Feb 2021 19:34)  HR: 58 (01 Mar 2021 06:02) (58 - 58)  BP: 116/78 (01 Mar 2021 06:02) (116/78 - 116/78)  BP(mean): --  RR: --  SpO2: --
Vital Signs Last 24 Hrs  T(C): 36.9 (25 Feb 2021 08:46), Max: 36.9 (25 Feb 2021 08:46)  T(F): 98.5 (25 Feb 2021 08:46), Max: 98.5 (25 Feb 2021 08:46)  HR: --75  BP: --118/73  BP(mean): --  RR: --  SpO2: --
Vital Signs Last 24 Hrs  T(C): 36.3 (04 Mar 2021 08:53), Max: 36.6 (03 Mar 2021 19:55)  T(F): 97.4 (04 Mar 2021 08:53), Max: 97.8 (03 Mar 2021 19:55)  HR: --  BP: --  BP(mean): --  RR: --  SpO2: --
Vital Signs Last 24 Hrs  T(C): 36.3 (20 Feb 2021 06:13), Max: 36.4 (19 Feb 2021 20:19)  T(F): 97.3 (20 Feb 2021 06:13), Max: 97.5 (19 Feb 2021 20:19)  HR: 102 (19 Feb 2021 20:19) (96 - 102)  BP: 130/82 (19 Feb 2021 20:19) (129/83 - 130/82)  BP(mean): --  RR: --  SpO2: --
Vital Signs Last 24 Hrs  T(C): 36.4 (24 Feb 2021 08:07), Max: 36.4 (24 Feb 2021 08:07)  T(F): 97.5 (24 Feb 2021 08:07), Max: 97.5 (24 Feb 2021 08:07)  HR: --67  BP: --106/58  BP(mean): --  RR: --  SpO2: --
Vital Signs Last 24 Hrs  T(C): 36.5 (26 Feb 2021 06:32), Max: 36.5 (26 Feb 2021 06:32)  T(F): 97.7 (26 Feb 2021 06:32), Max: 97.7 (26 Feb 2021 06:32)  HR: --64  BP: --112/72  BP(mean): --  RR: --  SpO2: --
Vital Signs Last 24 Hrs  T(C): 35.7 (09 Mar 2021 08:27), Max: 36.6 (08 Mar 2021 20:33)  T(F): 96.3 (09 Mar 2021 08:27), Max: 97.8 (08 Mar 2021 20:33)  HR: --  BP: 116/66 (09 Mar 2021 08:27) (116/66 - 116/66)  BP(mean): 59 (09 Mar 2021 08:27) (59 - 59)  RR: --  SpO2: --
Vital Signs Last 24 Hrs  T(C): 36.2 (02 Mar 2021 08:23), Max: 36.5 (01 Mar 2021 19:22)  T(F): 97.1 (02 Mar 2021 08:23), Max: 97.7 (01 Mar 2021 19:22)  HR: 66 (02 Mar 2021 08:23) (66 - 66)  BP: 114/65 (02 Mar 2021 08:23) (114/65 - 114/65)  BP(mean): --  RR: --  SpO2: --

## 2021-03-09 NOTE — ED PROVIDER NOTE - PSYCHIATRIC AFFECT
Not very active but does not complain of any pain at present and no non healing ulcers at this time. Can further evaluate as an outpatient.    FLAT

## 2021-03-09 NOTE — BH INPATIENT PSYCHIATRY PROGRESS NOTE - NSBHMSESPEECH_PSY_A_CORE
Normal volume, rate, productivity, spontaneity and articulation

## 2021-03-09 NOTE — BH INPATIENT PSYCHIATRY PROGRESS NOTE - NSTXDEPRESGOAL_PSY_ALL_CORE
Exhibit improvements in self-grooming, hygiene, sleep and appetite

## 2021-03-09 NOTE — BH DISCHARGE NOTE NURSING/SOCIAL WORK/PSYCH REHAB - NSCDUDCCRISIS_PSY_A_CORE
[facility]  [phone] [facility]-WELL ([phone])  Text "WELL" to [phone]  Website: www.MobileRQ/.[employer] 1 [phone] 031-QDSI (8811) Website: www.safehorizon.org/.National Suicide Prevention Lifeline 6 (901) 965-6886/.  [employer]  1 (628) LIFENET (648-6716)/.  [facility]  [phone] [place] [phone]   Hours:  Monday through Friday from 9 AM to 3 PM/.  [employer] [facility] Crisis Line  [phone], Option 1

## 2021-03-09 NOTE — BH INPATIENT PSYCHIATRY PROGRESS NOTE - MSE OPTIONS
Structured MSE

## 2021-03-09 NOTE — BH INPATIENT PSYCHIATRY PROGRESS NOTE - NSTXPROBPSYCHO_PSY_ALL_CORE
PSYCHOTIC SYMPTOMS

## 2021-03-09 NOTE — BH INPATIENT PSYCHIATRY PROGRESS NOTE - NSBHMSEPERCEPT_PSY_A_CORE
Auditory hallucinations
No abnormalities
Auditory hallucinations
No abnormalities
Auditory hallucinations
No abnormalities
No abnormalities
Auditory hallucinations
No abnormalities
No abnormalities
Auditory hallucinations
No abnormalities
Auditory hallucinations
No abnormalities

## 2021-03-09 NOTE — BH INPATIENT PSYCHIATRY PROGRESS NOTE - NSBHMSETHTCONTENT_PSY_A_CORE
Hopelessness/Suicidality
Unremarkable
Hopelessness/Suicidality
Delusions/Hopelessness
Unremarkable
Delusions
Unremarkable
Delusions/Hopelessness
Delusions/Hopelessness
Unremarkable
Delusions/Hopelessness
Unremarkable

## 2021-03-09 NOTE — BH INPATIENT PSYCHIATRY PROGRESS NOTE - NSICDXBHSECONDARYDX_PSY_ALL_CORE
Psychosis   F29  

## 2021-03-09 NOTE — BH INPATIENT PSYCHIATRY PROGRESS NOTE - NSICDXBHPRIMARYDX_PSY_ALL_CORE
Substance induced mood disorder   F19.53  
Substance induced mood disorder   F19.28  
Substance induced mood disorder   F19.81  
Substance induced mood disorder   F19.66  
Substance induced mood disorder   F19.78  
Substance induced mood disorder   F19.22  
Substance induced mood disorder   F19.67  
Substance induced mood disorder   F19.85  
Substance induced mood disorder   F19.61  
Substance induced mood disorder   F19.62  
Substance induced mood disorder   F19.54  
Substance induced mood disorder   F19.83  
Substance induced mood disorder   F19.49  
Substance induced mood disorder   F19.92

## 2021-03-09 NOTE — BH INPATIENT PSYCHIATRY PROGRESS NOTE - NSTXALCDRGPROGRES_PSY_ALL_CORE
Improving
Met - goal discontinued
Improving
Improving
No Change
Improving

## 2021-03-09 NOTE — BH INPATIENT PSYCHIATRY PROGRESS NOTE - NSBHMSELANG_PSY_A_CORE
No abnormalities noted/Other

## 2021-03-09 NOTE — BH INPATIENT PSYCHIATRY PROGRESS NOTE - NSTXPSYCHOGOAL_PSY_ALL_CORE
Will identify 2 coping skills that help mitigate hallucinations

## 2021-03-09 NOTE — BH INPATIENT PSYCHIATRY PROGRESS NOTE - NSBHMSEAFFCONG_PSY_A_CORE
Congruent
Non-congruent
Non-congruent
Congruent
Non-congruent

## 2021-03-09 NOTE — BH INPATIENT PSYCHIATRY PROGRESS NOTE - NSTXANXPROGRES_PSY_ALL_CORE
Improving
Met - goal discontinued
Improving

## 2021-03-09 NOTE — BH INPATIENT PSYCHIATRY PROGRESS NOTE - CURRENT MEDICATION
MEDICATIONS  (STANDING):  FLUoxetine 20 milliGRAM(s) Oral daily  folic acid 1 milliGRAM(s) Oral daily  haloperidol     Tablet 10 milliGRAM(s) Oral at bedtime  haloperidol     Tablet 5 milliGRAM(s) Oral daily  LORazepam     Tablet 0.5 milliGRAM(s) Oral every 4 hours  LORazepam     Tablet   Oral   multivitamin 1 Tablet(s) Oral daily  pantoprazole    Tablet 40 milliGRAM(s) Oral before breakfast  thiamine 100 milliGRAM(s) Oral daily    MEDICATIONS  (PRN):  acetaminophen   Tablet .. 650 milliGRAM(s) Oral every 6 hours PRN Temp greater or equal to 38C (100.4F), Mild Pain (1 - 3), Moderate Pain (4 - 6), Severe Pain (7 - 10)  aluminum hydroxide/magnesium hydroxide/simethicone Suspension 30 milliLiter(s) Oral every 4 hours PRN Dyspepsia  diphenhydrAMINE 50 milliGRAM(s) Oral every 6 hours PRN agitation  diphenhydrAMINE   Injectable 50 milliGRAM(s) IntraMuscular once PRN agitation  haloperidol     Tablet 5 milliGRAM(s) Oral every 6 hours PRN agitation  haloperidol    Injectable 5 milliGRAM(s) IntraMuscular once PRN agitation  ibuprofen  Tablet. 400 milliGRAM(s) Oral every 8 hours PRN Moderate Pain (4 - 6)  LORazepam     Tablet 2 milliGRAM(s) Oral every 6 hours PRN agitation  LORazepam   Injectable 2 milliGRAM(s) IntraMuscular once PRN agitation  
MEDICATIONS  (STANDING):  benztropine 1 milliGRAM(s) Oral at bedtime  FLUoxetine 20 milliGRAM(s) Oral daily  folic acid 1 milliGRAM(s) Oral daily  haloperidol     Tablet 10 milliGRAM(s) Oral at bedtime  haloperidol     Tablet 5 milliGRAM(s) Oral daily  multivitamin 1 Tablet(s) Oral daily  pantoprazole    Tablet 40 milliGRAM(s) Oral before breakfast  thiamine 100 milliGRAM(s) Oral daily    MEDICATIONS  (PRN):  acetaminophen   Tablet .. 650 milliGRAM(s) Oral every 6 hours PRN Temp greater or equal to 38C (100.4F), Mild Pain (1 - 3), Moderate Pain (4 - 6), Severe Pain (7 - 10)  aluminum hydroxide/magnesium hydroxide/simethicone Suspension 30 milliLiter(s) Oral every 4 hours PRN Dyspepsia  diphenhydrAMINE 50 milliGRAM(s) Oral every 6 hours PRN agitation  diphenhydrAMINE   Injectable 50 milliGRAM(s) IntraMuscular once PRN agitation  haloperidol     Tablet 5 milliGRAM(s) Oral every 6 hours PRN agitation  haloperidol    Injectable 5 milliGRAM(s) IntraMuscular once PRN agitation  hydrOXYzine hydrochloride 50 milliGRAM(s) Oral every 6 hours PRN anxiety  ibuprofen  Tablet. 400 milliGRAM(s) Oral every 8 hours PRN Moderate Pain (4 - 6)  LORazepam     Tablet 2 milliGRAM(s) Oral every 2 hours PRN CIWA score increase by 2 points and current CIWA score GREATER THAN 9  LORazepam     Tablet 2 milliGRAM(s) Oral every 6 hours PRN agitation  LORazepam   Injectable 2 milliGRAM(s) IntraMuscular once PRN agitation  
MEDICATIONS  (STANDING):  benztropine 1 milliGRAM(s) Oral at bedtime  FLUoxetine 30 milliGRAM(s) Oral daily  folic acid 1 milliGRAM(s) Oral daily  haloperidol     Tablet 10 milliGRAM(s) Oral at bedtime  multivitamin 1 Tablet(s) Oral daily  pantoprazole    Tablet 40 milliGRAM(s) Oral before breakfast  thiamine 100 milliGRAM(s) Oral daily    MEDICATIONS  (PRN):  acetaminophen   Tablet .. 650 milliGRAM(s) Oral every 6 hours PRN Temp greater or equal to 38C (100.4F), Mild Pain (1 - 3), Moderate Pain (4 - 6), Severe Pain (7 - 10)  aluminum hydroxide/magnesium hydroxide/simethicone Suspension 30 milliLiter(s) Oral every 4 hours PRN Dyspepsia  diphenhydrAMINE 50 milliGRAM(s) Oral every 6 hours PRN agitation  diphenhydrAMINE   Injectable 50 milliGRAM(s) IntraMuscular once PRN agitation  haloperidol     Tablet 5 milliGRAM(s) Oral every 6 hours PRN agitation  haloperidol    Injectable 5 milliGRAM(s) IntraMuscular once PRN agitation  hydrOXYzine hydrochloride 50 milliGRAM(s) Oral every 6 hours PRN anxiety  ibuprofen  Tablet. 400 milliGRAM(s) Oral every 8 hours PRN Moderate Pain (4 - 6)  LORazepam     Tablet 2 milliGRAM(s) Oral every 2 hours PRN CIWA score increase by 2 points and current CIWA score GREATER THAN 9  LORazepam     Tablet 2 milliGRAM(s) Oral every 6 hours PRN agitation  LORazepam   Injectable 2 milliGRAM(s) IntraMuscular once PRN agitation  
MEDICATIONS  (STANDING):  benztropine 1 milliGRAM(s) Oral at bedtime  FLUoxetine 20 milliGRAM(s) Oral daily  folic acid 1 milliGRAM(s) Oral daily  haloperidol     Tablet 10 milliGRAM(s) Oral at bedtime  haloperidol     Tablet 2.5 milliGRAM(s) Oral daily  multivitamin 1 Tablet(s) Oral daily  pantoprazole    Tablet 40 milliGRAM(s) Oral before breakfast  thiamine 100 milliGRAM(s) Oral daily    MEDICATIONS  (PRN):  acetaminophen   Tablet .. 650 milliGRAM(s) Oral every 6 hours PRN Temp greater or equal to 38C (100.4F), Mild Pain (1 - 3), Moderate Pain (4 - 6), Severe Pain (7 - 10)  aluminum hydroxide/magnesium hydroxide/simethicone Suspension 30 milliLiter(s) Oral every 4 hours PRN Dyspepsia  diphenhydrAMINE 50 milliGRAM(s) Oral every 6 hours PRN agitation  diphenhydrAMINE   Injectable 50 milliGRAM(s) IntraMuscular once PRN agitation  haloperidol     Tablet 5 milliGRAM(s) Oral every 6 hours PRN agitation  haloperidol    Injectable 5 milliGRAM(s) IntraMuscular once PRN agitation  ibuprofen  Tablet. 400 milliGRAM(s) Oral every 8 hours PRN Moderate Pain (4 - 6)  LORazepam     Tablet 2 milliGRAM(s) Oral every 2 hours PRN CIWA score increase by 2 points and current CIWA score GREATER THAN 9  LORazepam     Tablet 2 milliGRAM(s) Oral every 6 hours PRN agitation  LORazepam   Injectable 2 milliGRAM(s) IntraMuscular once PRN agitation  
MEDICATIONS  (STANDING):  FLUoxetine 20 milliGRAM(s) Oral daily  folic acid 1 milliGRAM(s) Oral daily  haloperidol     Tablet 10 milliGRAM(s) Oral at bedtime  haloperidol     Tablet 2.5 milliGRAM(s) Oral daily  multivitamin 1 Tablet(s) Oral daily  pantoprazole    Tablet 40 milliGRAM(s) Oral before breakfast  thiamine 100 milliGRAM(s) Oral daily    MEDICATIONS  (PRN):  acetaminophen   Tablet .. 650 milliGRAM(s) Oral every 6 hours PRN Temp greater or equal to 38C (100.4F), Mild Pain (1 - 3), Moderate Pain (4 - 6), Severe Pain (7 - 10)  aluminum hydroxide/magnesium hydroxide/simethicone Suspension 30 milliLiter(s) Oral every 4 hours PRN Dyspepsia  diphenhydrAMINE 50 milliGRAM(s) Oral every 6 hours PRN agitation  diphenhydrAMINE   Injectable 50 milliGRAM(s) IntraMuscular once PRN agitation  haloperidol     Tablet 5 milliGRAM(s) Oral every 6 hours PRN agitation  haloperidol    Injectable 5 milliGRAM(s) IntraMuscular once PRN agitation  ibuprofen  Tablet. 400 milliGRAM(s) Oral every 8 hours PRN Moderate Pain (4 - 6)  LORazepam     Tablet 2 milliGRAM(s) Oral every 2 hours PRN CIWA score increase by 2 points and current CIWA score GREATER THAN 9  LORazepam     Tablet 2 milliGRAM(s) Oral every 6 hours PRN agitation  LORazepam   Injectable 2 milliGRAM(s) IntraMuscular once PRN agitation  
MEDICATIONS  (STANDING):  FLUoxetine 10 milliGRAM(s) Oral daily  folic acid 1 milliGRAM(s) Oral daily  haloperidol     Tablet 5 milliGRAM(s) Oral at bedtime  LORazepam     Tablet 1.5 milliGRAM(s) Oral every 4 hours  LORazepam     Tablet   Oral   multivitamin 1 Tablet(s) Oral daily  pantoprazole    Tablet 40 milliGRAM(s) Oral before breakfast  thiamine 100 milliGRAM(s) Oral daily    MEDICATIONS  (PRN):  acetaminophen   Tablet .. 650 milliGRAM(s) Oral every 6 hours PRN Temp greater or equal to 38C (100.4F), Mild Pain (1 - 3), Moderate Pain (4 - 6), Severe Pain (7 - 10)  aluminum hydroxide/magnesium hydroxide/simethicone Suspension 30 milliLiter(s) Oral every 4 hours PRN Dyspepsia  diphenhydrAMINE 50 milliGRAM(s) Oral every 6 hours PRN agitation  diphenhydrAMINE   Injectable 50 milliGRAM(s) IntraMuscular once PRN agitation  haloperidol     Tablet 5 milliGRAM(s) Oral every 6 hours PRN agitation  haloperidol    Injectable 5 milliGRAM(s) IntraMuscular once PRN agitation  ibuprofen  Tablet. 400 milliGRAM(s) Oral every 8 hours PRN Moderate Pain (4 - 6)  LORazepam     Tablet 2 milliGRAM(s) Oral every 6 hours PRN agitation  LORazepam   Injectable 2 milliGRAM(s) IntraMuscular once PRN agitation  
MEDICATIONS  (STANDING):  FLUoxetine 20 milliGRAM(s) Oral daily  folic acid 1 milliGRAM(s) Oral daily  haloperidol     Tablet 10 milliGRAM(s) Oral at bedtime  haloperidol     Tablet 5 milliGRAM(s) Oral daily  multivitamin 1 Tablet(s) Oral daily  pantoprazole    Tablet 40 milliGRAM(s) Oral before breakfast  thiamine 100 milliGRAM(s) Oral daily    MEDICATIONS  (PRN):  acetaminophen   Tablet .. 650 milliGRAM(s) Oral every 6 hours PRN Temp greater or equal to 38C (100.4F), Mild Pain (1 - 3), Moderate Pain (4 - 6), Severe Pain (7 - 10)  aluminum hydroxide/magnesium hydroxide/simethicone Suspension 30 milliLiter(s) Oral every 4 hours PRN Dyspepsia  diphenhydrAMINE 50 milliGRAM(s) Oral every 6 hours PRN agitation  diphenhydrAMINE   Injectable 50 milliGRAM(s) IntraMuscular once PRN agitation  haloperidol     Tablet 5 milliGRAM(s) Oral every 6 hours PRN agitation  haloperidol    Injectable 5 milliGRAM(s) IntraMuscular once PRN agitation  ibuprofen  Tablet. 400 milliGRAM(s) Oral every 8 hours PRN Moderate Pain (4 - 6)  LORazepam     Tablet 2 milliGRAM(s) Oral every 2 hours PRN CIWA score increase by 2 points and current CIWA score GREATER THAN 9  LORazepam     Tablet 2 milliGRAM(s) Oral every 6 hours PRN agitation  LORazepam   Injectable 2 milliGRAM(s) IntraMuscular once PRN agitation  
MEDICATIONS  (STANDING):  FLUoxetine 20 milliGRAM(s) Oral daily  folic acid 1 milliGRAM(s) Oral daily  haloperidol     Tablet 10 milliGRAM(s) Oral at bedtime  haloperidol     Tablet 2.5 milliGRAM(s) Oral daily  multivitamin 1 Tablet(s) Oral daily  pantoprazole    Tablet 40 milliGRAM(s) Oral before breakfast  thiamine 100 milliGRAM(s) Oral daily    MEDICATIONS  (PRN):  acetaminophen   Tablet .. 650 milliGRAM(s) Oral every 6 hours PRN Temp greater or equal to 38C (100.4F), Mild Pain (1 - 3), Moderate Pain (4 - 6), Severe Pain (7 - 10)  aluminum hydroxide/magnesium hydroxide/simethicone Suspension 30 milliLiter(s) Oral every 4 hours PRN Dyspepsia  diphenhydrAMINE 50 milliGRAM(s) Oral every 6 hours PRN agitation  diphenhydrAMINE   Injectable 50 milliGRAM(s) IntraMuscular once PRN agitation  haloperidol     Tablet 5 milliGRAM(s) Oral every 6 hours PRN agitation  haloperidol    Injectable 5 milliGRAM(s) IntraMuscular once PRN agitation  ibuprofen  Tablet. 400 milliGRAM(s) Oral every 8 hours PRN Moderate Pain (4 - 6)  LORazepam     Tablet 2 milliGRAM(s) Oral every 2 hours PRN CIWA score increase by 2 points and current CIWA score GREATER THAN 9  LORazepam     Tablet 2 milliGRAM(s) Oral every 6 hours PRN agitation  LORazepam   Injectable 2 milliGRAM(s) IntraMuscular once PRN agitation  
MEDICATIONS  (STANDING):  benztropine 1 milliGRAM(s) Oral at bedtime  diphenhydrAMINE 50 milliGRAM(s) Oral at bedtime  FLUoxetine 30 milliGRAM(s) Oral daily  folic acid 1 milliGRAM(s) Oral daily  haloperidol     Tablet 5 milliGRAM(s) Oral at bedtime  multivitamin 1 Tablet(s) Oral daily  pantoprazole    Tablet 40 milliGRAM(s) Oral before breakfast  thiamine 100 milliGRAM(s) Oral daily    MEDICATIONS  (PRN):  acetaminophen   Tablet .. 650 milliGRAM(s) Oral every 6 hours PRN Temp greater or equal to 38C (100.4F), Mild Pain (1 - 3), Moderate Pain (4 - 6), Severe Pain (7 - 10)  aluminum hydroxide/magnesium hydroxide/simethicone Suspension 30 milliLiter(s) Oral every 4 hours PRN Dyspepsia  diphenhydrAMINE 50 milliGRAM(s) Oral every 6 hours PRN agitation  diphenhydrAMINE   Injectable 50 milliGRAM(s) IntraMuscular once PRN agitation  haloperidol     Tablet 5 milliGRAM(s) Oral every 6 hours PRN agitation  haloperidol    Injectable 5 milliGRAM(s) IntraMuscular once PRN agitation  hydrOXYzine hydrochloride 50 milliGRAM(s) Oral every 6 hours PRN anxiety  ibuprofen  Tablet. 400 milliGRAM(s) Oral every 8 hours PRN Moderate Pain (4 - 6)  LORazepam     Tablet 2 milliGRAM(s) Oral every 2 hours PRN CIWA score increase by 2 points and current CIWA score GREATER THAN 9  LORazepam     Tablet 2 milliGRAM(s) Oral every 6 hours PRN agitation  LORazepam   Injectable 2 milliGRAM(s) IntraMuscular once PRN agitation  
MEDICATIONS  (STANDING):  benztropine 1 milliGRAM(s) Oral at bedtime  FLUoxetine 30 milliGRAM(s) Oral daily  folic acid 1 milliGRAM(s) Oral daily  haloperidol     Tablet 10 milliGRAM(s) Oral at bedtime  haloperidol     Tablet 5 milliGRAM(s) Oral daily  multivitamin 1 Tablet(s) Oral daily  pantoprazole    Tablet 40 milliGRAM(s) Oral before breakfast  thiamine 100 milliGRAM(s) Oral daily    MEDICATIONS  (PRN):  acetaminophen   Tablet .. 650 milliGRAM(s) Oral every 6 hours PRN Temp greater or equal to 38C (100.4F), Mild Pain (1 - 3), Moderate Pain (4 - 6), Severe Pain (7 - 10)  aluminum hydroxide/magnesium hydroxide/simethicone Suspension 30 milliLiter(s) Oral every 4 hours PRN Dyspepsia  diphenhydrAMINE 50 milliGRAM(s) Oral every 6 hours PRN agitation  diphenhydrAMINE   Injectable 50 milliGRAM(s) IntraMuscular once PRN agitation  haloperidol     Tablet 5 milliGRAM(s) Oral every 6 hours PRN agitation  haloperidol    Injectable 5 milliGRAM(s) IntraMuscular once PRN agitation  hydrOXYzine hydrochloride 50 milliGRAM(s) Oral every 6 hours PRN anxiety  ibuprofen  Tablet. 400 milliGRAM(s) Oral every 8 hours PRN Moderate Pain (4 - 6)  LORazepam     Tablet 2 milliGRAM(s) Oral every 2 hours PRN CIWA score increase by 2 points and current CIWA score GREATER THAN 9  LORazepam     Tablet 2 milliGRAM(s) Oral every 6 hours PRN agitation  LORazepam   Injectable 2 milliGRAM(s) IntraMuscular once PRN agitation  
MEDICATIONS  (STANDING):  FLUoxetine 10 milliGRAM(s) Oral daily  folic acid 1 milliGRAM(s) Oral daily  haloperidol     Tablet 5 milliGRAM(s) Oral at bedtime  LORazepam     Tablet 1.5 milliGRAM(s) Oral every 4 hours  LORazepam     Tablet 1 milliGRAM(s) Oral every 4 hours  LORazepam     Tablet   Oral   multivitamin 1 Tablet(s) Oral daily  pantoprazole    Tablet 40 milliGRAM(s) Oral before breakfast  thiamine 100 milliGRAM(s) Oral daily    MEDICATIONS  (PRN):  acetaminophen   Tablet .. 650 milliGRAM(s) Oral every 6 hours PRN Temp greater or equal to 38C (100.4F), Mild Pain (1 - 3), Moderate Pain (4 - 6), Severe Pain (7 - 10)  aluminum hydroxide/magnesium hydroxide/simethicone Suspension 30 milliLiter(s) Oral every 4 hours PRN Dyspepsia  diphenhydrAMINE 50 milliGRAM(s) Oral every 6 hours PRN agitation  diphenhydrAMINE   Injectable 50 milliGRAM(s) IntraMuscular once PRN agitation  haloperidol     Tablet 5 milliGRAM(s) Oral every 6 hours PRN agitation  haloperidol    Injectable 5 milliGRAM(s) IntraMuscular once PRN agitation  ibuprofen  Tablet. 400 milliGRAM(s) Oral every 8 hours PRN Moderate Pain (4 - 6)  LORazepam     Tablet 2 milliGRAM(s) Oral every 6 hours PRN agitation  LORazepam   Injectable 2 milliGRAM(s) IntraMuscular once PRN agitation  
MEDICATIONS  (STANDING):  benztropine 1 milliGRAM(s) Oral at bedtime  diphenhydrAMINE 50 milliGRAM(s) Oral at bedtime  FLUoxetine 30 milliGRAM(s) Oral daily  folic acid 1 milliGRAM(s) Oral daily  haloperidol     Tablet 5 milliGRAM(s) Oral at bedtime  multivitamin 1 Tablet(s) Oral daily  pantoprazole    Tablet 40 milliGRAM(s) Oral before breakfast  thiamine 100 milliGRAM(s) Oral daily    MEDICATIONS  (PRN):  acetaminophen   Tablet .. 650 milliGRAM(s) Oral every 6 hours PRN Temp greater or equal to 38C (100.4F), Mild Pain (1 - 3), Moderate Pain (4 - 6), Severe Pain (7 - 10)  aluminum hydroxide/magnesium hydroxide/simethicone Suspension 30 milliLiter(s) Oral every 4 hours PRN Dyspepsia  diphenhydrAMINE 50 milliGRAM(s) Oral every 6 hours PRN agitation  diphenhydrAMINE   Injectable 50 milliGRAM(s) IntraMuscular once PRN agitation  haloperidol     Tablet 5 milliGRAM(s) Oral every 6 hours PRN agitation  haloperidol    Injectable 5 milliGRAM(s) IntraMuscular once PRN agitation  hydrOXYzine hydrochloride 50 milliGRAM(s) Oral every 6 hours PRN anxiety  ibuprofen  Tablet. 400 milliGRAM(s) Oral every 8 hours PRN Moderate Pain (4 - 6)  LORazepam     Tablet 2 milliGRAM(s) Oral every 2 hours PRN CIWA score increase by 2 points and current CIWA score GREATER THAN 9  LORazepam     Tablet 2 milliGRAM(s) Oral every 6 hours PRN agitation  LORazepam   Injectable 2 milliGRAM(s) IntraMuscular once PRN agitation  
MEDICATIONS  (STANDING):  benztropine 1 milliGRAM(s) Oral at bedtime  FLUoxetine 30 milliGRAM(s) Oral daily  folic acid 1 milliGRAM(s) Oral daily  haloperidol     Tablet 10 milliGRAM(s) Oral at bedtime  melatonin. 3 milliGRAM(s) Oral once  multivitamin 1 Tablet(s) Oral daily  pantoprazole    Tablet 40 milliGRAM(s) Oral before breakfast  thiamine 100 milliGRAM(s) Oral daily    MEDICATIONS  (PRN):  acetaminophen   Tablet .. 650 milliGRAM(s) Oral every 6 hours PRN Temp greater or equal to 38C (100.4F), Mild Pain (1 - 3), Moderate Pain (4 - 6), Severe Pain (7 - 10)  aluminum hydroxide/magnesium hydroxide/simethicone Suspension 30 milliLiter(s) Oral every 4 hours PRN Dyspepsia  diphenhydrAMINE 50 milliGRAM(s) Oral every 6 hours PRN agitation  diphenhydrAMINE   Injectable 50 milliGRAM(s) IntraMuscular once PRN agitation  haloperidol     Tablet 5 milliGRAM(s) Oral every 6 hours PRN agitation  haloperidol    Injectable 5 milliGRAM(s) IntraMuscular once PRN agitation  hydrOXYzine hydrochloride 50 milliGRAM(s) Oral every 6 hours PRN anxiety  ibuprofen  Tablet. 400 milliGRAM(s) Oral every 8 hours PRN Moderate Pain (4 - 6)  LORazepam     Tablet 2 milliGRAM(s) Oral every 2 hours PRN CIWA score increase by 2 points and current CIWA score GREATER THAN 9  LORazepam     Tablet 2 milliGRAM(s) Oral every 6 hours PRN agitation  LORazepam   Injectable 2 milliGRAM(s) IntraMuscular once PRN agitation  
MEDICATIONS  (STANDING):  FLUoxetine 20 milliGRAM(s) Oral daily  folic acid 1 milliGRAM(s) Oral daily  haloperidol     Tablet 10 milliGRAM(s) Oral at bedtime  LORazepam     Tablet 0.5 milliGRAM(s) Oral every 4 hours  LORazepam     Tablet   Oral   multivitamin 1 Tablet(s) Oral daily  pantoprazole    Tablet 40 milliGRAM(s) Oral before breakfast  thiamine 100 milliGRAM(s) Oral daily    MEDICATIONS  (PRN):  acetaminophen   Tablet .. 650 milliGRAM(s) Oral every 6 hours PRN Temp greater or equal to 38C (100.4F), Mild Pain (1 - 3), Moderate Pain (4 - 6), Severe Pain (7 - 10)  aluminum hydroxide/magnesium hydroxide/simethicone Suspension 30 milliLiter(s) Oral every 4 hours PRN Dyspepsia  diphenhydrAMINE 50 milliGRAM(s) Oral every 6 hours PRN agitation  diphenhydrAMINE   Injectable 50 milliGRAM(s) IntraMuscular once PRN agitation  haloperidol     Tablet 5 milliGRAM(s) Oral every 6 hours PRN agitation  haloperidol    Injectable 5 milliGRAM(s) IntraMuscular once PRN agitation  ibuprofen  Tablet. 400 milliGRAM(s) Oral every 8 hours PRN Moderate Pain (4 - 6)  LORazepam     Tablet 2 milliGRAM(s) Oral every 6 hours PRN agitation  LORazepam   Injectable 2 milliGRAM(s) IntraMuscular once PRN agitation
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
## 2021-03-09 NOTE — BH INPATIENT PSYCHIATRY PROGRESS NOTE - NSBHINPTBILLING_PSY_ALL_CORE
03458 - Inpatient Moderate Complexity
14102 - Inpatient Moderate Complexity
67844 - Inpatient Moderate Complexity
29701 - Inpatient Moderate Complexity
08821 - Inpatient Moderate Complexity
10631 - Inpatient Moderate Complexity
74626 - Inpatient Moderate Complexity
58097 - Inpatient Moderate Complexity
28520 - Inpatient Moderate Complexity
91212 - Inpatient Moderate Complexity
23407 - Inpatient Moderate Complexity
92974 - Inpatient Moderate Complexity
83398 - Hospital Discharge Day Management; 30 min or less
67713 - Inpatient Moderate Complexity

## 2021-03-09 NOTE — BH INPATIENT PSYCHIATRY PROGRESS NOTE - NSBHCONTPROVIDER_PSY_ALL_CORE
Not applicable

## 2021-03-09 NOTE — BH INPATIENT PSYCHIATRY PROGRESS NOTE - NSTREATMENTCERTY_PSY_ALL_CORE

## 2021-03-09 NOTE — BH INPATIENT PSYCHIATRY PROGRESS NOTE - NSTXANXDATETRGT_PSY_ALL_CORE
11-Mar-2021
05-Mar-2021
05-Mar-2021
26-Feb-2021
05-Mar-2021
26-Feb-2021
26-Feb-2021
05-Mar-2021
11-Mar-2021

## 2021-03-09 NOTE — BH INPATIENT PSYCHIATRY PROGRESS NOTE - NSBHFUPINTERVALHXFT_PSY_A_CORE
: #[id number]  Chart reviewed and case discussed with treatment team. Overnight, pt noted to take PO PRNs for anxiety. On assessment, pt stated he felt anxious prior to bedtime and today stated it may be in regards to discharge. Pt denies any anxiety during the day. Pt received haldol decanoate 100 mg IM Q 4 weeks on 3/2/21 (next due 3/30/21) and denies any adverse reaction from this. Pt denies any AH (x 3 days), VH, SIIP, HIIP, or paranoia today.  Pt states he no longer feels that someone is behind him or following him and denies other feeling of paranoia. Pt states he feels his energy and concentration have improved and now denies anhedonia. Pt provided with education on the use of 911 or going to the nearest ED if safety concerns should arise in the community; pt verbalized understanding.

## 2021-03-09 NOTE — BH INPATIENT PSYCHIATRY PROGRESS NOTE - NSTXALCDRGGOAL_PSY_ALL_CORE
Score on withdrawal scale will be WNL

## 2021-03-09 NOTE — BH INPATIENT PSYCHIATRY PROGRESS NOTE - NSTXANXINTERMD_PSY_ALL_CORE
re-start prozac

## 2021-03-09 NOTE — BH INPATIENT PSYCHIATRY PROGRESS NOTE - NSBHCHARTREVIEWINVESTIGATE_PSY_A_CORE FT
EKG QTc = 420, NSR

## 2021-03-09 NOTE — BH DISCHARGE NOTE NURSING/SOCIAL WORK/PSYCH REHAB - PATIENT PORTAL LINK FT
You can access the FollowMyHealth Patient Portal offered by [facility] by registering at the following website: [email or web address]. By joining [facility]s FollowMyHealth portal, you will also be able to view your health information using other applications (apps) compatible with our system.

## 2021-03-09 NOTE — BH INPATIENT PSYCHIATRY PROGRESS NOTE - NSCGIIMPROVESX_PSY_ALL_CORE
2 = Much improved - notably better with signficant reduction of symptoms; increase in the level of functioning but some symptoms remain
3 = Minimally improved - slightly better with little or no clinically meaningful reduction of symptoms.  Represents very little change in basic clinical status, level of care, or functional capacity.
4 = No change - symptoms remain essentially unchanged
3 = Minimally improved - slightly better with little or no clinically meaningful reduction of symptoms.  Represents very little change in basic clinical status, level of care, or functional capacity.

## 2021-03-09 NOTE — BH INPATIENT PSYCHIATRY PROGRESS NOTE - NSTXDCOPNOINTERMD_PSY_ALL_CORE
psychoed
psychoed; LAURA haldol 
psychoed
psychoed
psychoed; LAURA haldol 

## 2021-03-09 NOTE — BH INPATIENT PSYCHIATRY PROGRESS NOTE - NSTXALCDRGDATETRGT_PSY_ALL_CORE
05-Mar-2021
05-Mar-2021
26-Feb-2021
26-Feb-2021
10-Mar-2021
10-Mar-2021
26-Feb-2021
05-Mar-2021
10-Mar-2021
26-Feb-2021
10-Mar-2021
26-Feb-2021
05-Mar-2021
26-Feb-2021

## 2021-03-09 NOTE — BH INPATIENT PSYCHIATRY PROGRESS NOTE - NSTXDCOPNODATETRGT_PSY_ALL_CORE
05-Mar-2021
09-Mar-2021
05-Mar-2021
09-Mar-2021
12-Mar-2021
05-Mar-2021
12-Mar-2021
09-Mar-2021
05-Mar-2021
05-Mar-2021

## 2021-03-09 NOTE — BH INPATIENT PSYCHIATRY PROGRESS NOTE - NSTXPSYCHOINTERMD_PSY_ALL_CORE
start and titrate haldol with goal of LAURA - pt took haldol decanoate 100 mg IM Q 4 weeks on 3/2/21
start and titrate haldol with goal of LAURA
start and titrate haldol with goal of LAURA - pt took haldol decanoate 100 mg IM Q 4 weeks on 3/2/21
start and titrate haldol with goal of LAURA

## 2021-03-09 NOTE — BH INPATIENT PSYCHIATRY PROGRESS NOTE - NSBHMSEAFFRANGE_PSY_A_CORE
Blunted
Constricted
Blunted
Blunted
Constricted
Blunted
Blunted

## 2021-03-09 NOTE — BH INPATIENT PSYCHIATRY PROGRESS NOTE - NSBHMSEMOOD_PSY_A_CORE
Anxious
Anxious
Depressed/Anxious
Anxious
Depressed/Anxious
Anxious
Depressed/Anxious
Anxious
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
## 2021-03-09 NOTE — BH INPATIENT PSYCHIATRY PROGRESS NOTE - NSTXPSYCHOPROGRES_PSY_ALL_CORE
Improving
Met - goal discontinued
Improving
Improving
Met - goal discontinued
Improving
Improving

## 2021-03-09 NOTE — ED PROVIDER NOTE - OBJECTIVE STATEMENT
30 y.o. male c/o on & off occipital HA for past 4 days, feeling nervous, shaky, constant MSCP today, nonradiating, palpitations, lightheadedness, sob, no fever, diaphoresis.  Pt had similar episode in the past.  Pt took Tylenol with no relief  No SI/HI.  Pt's just d/c from [facility] today, was hospitalized since 2/19.  Last drank 20 days ago

## 2021-03-09 NOTE — BH INPATIENT PSYCHIATRY PROGRESS NOTE - NSTXDCOPNODATEEST_PSY_ALL_CORE
26-Feb-2021
19-Feb-2021
05-Mar-2021
05-Mar-2021
19-Feb-2021
05-Mar-2021
26-Feb-2021
26-Feb-2021
19-Feb-2021
26-Feb-2021

## 2021-03-09 NOTE — BH INPATIENT PSYCHIATRY PROGRESS NOTE - NSTXPROBANX_PSY_ALL_CORE
ANXIETY/PANIC/FEAR

## 2021-03-09 NOTE — BH INPATIENT PSYCHIATRY PROGRESS NOTE - NSTXDEPRESPROGRES_PSY_ALL_CORE
Improving
No Change
Improving
No Change
Improving
Met - goal discontinued
No Change
Improving

## 2021-03-09 NOTE — BH INPATIENT PSYCHIATRY PROGRESS NOTE - NSTXANXGOAL_PSY_ALL_CORE
Identify and practice 3 coping skills to manage anxiety

## 2021-03-09 NOTE — BH INPATIENT PSYCHIATRY PROGRESS NOTE - NSTXSUICIDPROGRES_PSY_ALL_CORE
Improving
Met - goal discontinued
Improving
Met - goal discontinued
Improving

## 2021-03-09 NOTE — BH INPATIENT PSYCHIATRY PROGRESS NOTE - NSTXDEPRESINTERMD_PSY_ALL_CORE
re-start prozac 

## 2021-03-09 NOTE — BH INPATIENT PSYCHIATRY PROGRESS NOTE - NSBHMSEAFFQUAL_PSY_A_CORE
Euthymic
Anxious
Depressed/Anxious
Depressed/Anxious
Depressed
Anxious
Depressed/Anxious
Depressed
Depressed/Anxious
Anxious
Euthymic

## 2021-03-09 NOTE — BH INPATIENT PSYCHIATRY PROGRESS NOTE - PRN MEDS
MEDICATIONS  (PRN):  acetaminophen   Tablet .. 650 milliGRAM(s) Oral every 6 hours PRN Temp greater or equal to 38C (100.4F), Mild Pain (1 - 3), Moderate Pain (4 - 6), Severe Pain (7 - 10)  aluminum hydroxide/magnesium hydroxide/simethicone Suspension 30 milliLiter(s) Oral every 4 hours PRN Dyspepsia  diphenhydrAMINE 50 milliGRAM(s) Oral every 6 hours PRN agitation  diphenhydrAMINE   Injectable 50 milliGRAM(s) IntraMuscular once PRN agitation  haloperidol     Tablet 5 milliGRAM(s) Oral every 6 hours PRN agitation  haloperidol    Injectable 5 milliGRAM(s) IntraMuscular once PRN agitation  hydrOXYzine hydrochloride 50 milliGRAM(s) Oral every 6 hours PRN anxiety  ibuprofen  Tablet. 400 milliGRAM(s) Oral every 8 hours PRN Moderate Pain (4 - 6)  LORazepam     Tablet 2 milliGRAM(s) Oral every 2 hours PRN CIWA score increase by 2 points and current CIWA score GREATER THAN 9  LORazepam     Tablet 2 milliGRAM(s) Oral every 6 hours PRN agitation  LORazepam   Injectable 2 milliGRAM(s) IntraMuscular once PRN agitation  
MEDICATIONS  (PRN):  acetaminophen   Tablet .. 650 milliGRAM(s) Oral every 6 hours PRN Temp greater or equal to 38C (100.4F), Mild Pain (1 - 3), Moderate Pain (4 - 6), Severe Pain (7 - 10)  aluminum hydroxide/magnesium hydroxide/simethicone Suspension 30 milliLiter(s) Oral every 4 hours PRN Dyspepsia  diphenhydrAMINE 50 milliGRAM(s) Oral every 6 hours PRN agitation  diphenhydrAMINE   Injectable 50 milliGRAM(s) IntraMuscular once PRN agitation  haloperidol     Tablet 5 milliGRAM(s) Oral every 6 hours PRN agitation  haloperidol    Injectable 5 milliGRAM(s) IntraMuscular once PRN agitation  hydrOXYzine hydrochloride 50 milliGRAM(s) Oral every 6 hours PRN anxiety  ibuprofen  Tablet. 400 milliGRAM(s) Oral every 8 hours PRN Moderate Pain (4 - 6)  LORazepam     Tablet 2 milliGRAM(s) Oral every 2 hours PRN CIWA score increase by 2 points and current CIWA score GREATER THAN 9  LORazepam     Tablet 2 milliGRAM(s) Oral every 6 hours PRN agitation  LORazepam   Injectable 2 milliGRAM(s) IntraMuscular once PRN agitation  
MEDICATIONS  (PRN):  acetaminophen   Tablet .. 650 milliGRAM(s) Oral every 6 hours PRN Temp greater or equal to 38C (100.4F), Mild Pain (1 - 3), Moderate Pain (4 - 6), Severe Pain (7 - 10)  aluminum hydroxide/magnesium hydroxide/simethicone Suspension 30 milliLiter(s) Oral every 4 hours PRN Dyspepsia  diphenhydrAMINE 50 milliGRAM(s) Oral every 6 hours PRN agitation  diphenhydrAMINE   Injectable 50 milliGRAM(s) IntraMuscular once PRN agitation  haloperidol     Tablet 5 milliGRAM(s) Oral every 6 hours PRN agitation  haloperidol    Injectable 5 milliGRAM(s) IntraMuscular once PRN agitation  ibuprofen  Tablet. 400 milliGRAM(s) Oral every 8 hours PRN Moderate Pain (4 - 6)  LORazepam     Tablet 2 milliGRAM(s) Oral every 6 hours PRN agitation  LORazepam   Injectable 2 milliGRAM(s) IntraMuscular once PRN agitation  
MEDICATIONS  (PRN):  acetaminophen   Tablet .. 650 milliGRAM(s) Oral every 6 hours PRN Temp greater or equal to 38C (100.4F), Mild Pain (1 - 3), Moderate Pain (4 - 6), Severe Pain (7 - 10)  aluminum hydroxide/magnesium hydroxide/simethicone Suspension 30 milliLiter(s) Oral every 4 hours PRN Dyspepsia  diphenhydrAMINE 50 milliGRAM(s) Oral every 6 hours PRN agitation  diphenhydrAMINE   Injectable 50 milliGRAM(s) IntraMuscular once PRN agitation  haloperidol     Tablet 5 milliGRAM(s) Oral every 6 hours PRN agitation  haloperidol    Injectable 5 milliGRAM(s) IntraMuscular once PRN agitation  hydrOXYzine hydrochloride 50 milliGRAM(s) Oral every 6 hours PRN anxiety  ibuprofen  Tablet. 400 milliGRAM(s) Oral every 8 hours PRN Moderate Pain (4 - 6)  LORazepam     Tablet 2 milliGRAM(s) Oral every 6 hours PRN agitation  LORazepam     Tablet 2 milliGRAM(s) Oral every 2 hours PRN CIWA score increase by 2 points and current CIWA score GREATER THAN 9  LORazepam   Injectable 2 milliGRAM(s) IntraMuscular once PRN agitation  
MEDICATIONS  (PRN):  acetaminophen   Tablet .. 650 milliGRAM(s) Oral every 6 hours PRN Temp greater or equal to 38C (100.4F), Mild Pain (1 - 3), Moderate Pain (4 - 6), Severe Pain (7 - 10)  aluminum hydroxide/magnesium hydroxide/simethicone Suspension 30 milliLiter(s) Oral every 4 hours PRN Dyspepsia  diphenhydrAMINE 50 milliGRAM(s) Oral every 6 hours PRN agitation  diphenhydrAMINE   Injectable 50 milliGRAM(s) IntraMuscular once PRN agitation  haloperidol     Tablet 5 milliGRAM(s) Oral every 6 hours PRN agitation  haloperidol    Injectable 5 milliGRAM(s) IntraMuscular once PRN agitation  ibuprofen  Tablet. 400 milliGRAM(s) Oral every 8 hours PRN Moderate Pain (4 - 6)  LORazepam     Tablet 2 milliGRAM(s) Oral every 2 hours PRN CIWA score increase by 2 points and current CIWA score GREATER THAN 9  LORazepam     Tablet 2 milliGRAM(s) Oral every 6 hours PRN agitation  LORazepam   Injectable 2 milliGRAM(s) IntraMuscular once PRN agitation  
MEDICATIONS  (PRN):  acetaminophen   Tablet .. 650 milliGRAM(s) Oral every 6 hours PRN Temp greater or equal to 38C (100.4F), Mild Pain (1 - 3), Moderate Pain (4 - 6), Severe Pain (7 - 10)  aluminum hydroxide/magnesium hydroxide/simethicone Suspension 30 milliLiter(s) Oral every 4 hours PRN Dyspepsia  diphenhydrAMINE 50 milliGRAM(s) Oral every 6 hours PRN agitation  diphenhydrAMINE   Injectable 50 milliGRAM(s) IntraMuscular once PRN agitation  haloperidol     Tablet 5 milliGRAM(s) Oral every 6 hours PRN agitation  haloperidol    Injectable 5 milliGRAM(s) IntraMuscular once PRN agitation  hydrOXYzine hydrochloride 50 milliGRAM(s) Oral every 6 hours PRN anxiety  ibuprofen  Tablet. 400 milliGRAM(s) Oral every 8 hours PRN Moderate Pain (4 - 6)  LORazepam     Tablet 2 milliGRAM(s) Oral every 2 hours PRN CIWA score increase by 2 points and current CIWA score GREATER THAN 9  LORazepam     Tablet 2 milliGRAM(s) Oral every 6 hours PRN agitation  LORazepam   Injectable 2 milliGRAM(s) IntraMuscular once PRN agitation  
MEDICATIONS  (PRN):  acetaminophen   Tablet .. 650 milliGRAM(s) Oral every 6 hours PRN Temp greater or equal to 38C (100.4F), Mild Pain (1 - 3), Moderate Pain (4 - 6), Severe Pain (7 - 10)  aluminum hydroxide/magnesium hydroxide/simethicone Suspension 30 milliLiter(s) Oral every 4 hours PRN Dyspepsia  diphenhydrAMINE 50 milliGRAM(s) Oral every 6 hours PRN agitation  diphenhydrAMINE   Injectable 50 milliGRAM(s) IntraMuscular once PRN agitation  haloperidol     Tablet 5 milliGRAM(s) Oral every 6 hours PRN agitation  haloperidol    Injectable 5 milliGRAM(s) IntraMuscular once PRN agitation  ibuprofen  Tablet. 400 milliGRAM(s) Oral every 8 hours PRN Moderate Pain (4 - 6)  LORazepam     Tablet 2 milliGRAM(s) Oral every 2 hours PRN CIWA score increase by 2 points and current CIWA score GREATER THAN 9  LORazepam     Tablet 2 milliGRAM(s) Oral every 6 hours PRN agitation  LORazepam   Injectable 2 milliGRAM(s) IntraMuscular once PRN agitation  
MEDICATIONS  (PRN):  acetaminophen   Tablet .. 650 milliGRAM(s) Oral every 6 hours PRN Temp greater or equal to 38C (100.4F), Mild Pain (1 - 3), Moderate Pain (4 - 6), Severe Pain (7 - 10)  aluminum hydroxide/magnesium hydroxide/simethicone Suspension 30 milliLiter(s) Oral every 4 hours PRN Dyspepsia  diphenhydrAMINE 50 milliGRAM(s) Oral every 6 hours PRN agitation  diphenhydrAMINE   Injectable 50 milliGRAM(s) IntraMuscular once PRN agitation  haloperidol     Tablet 5 milliGRAM(s) Oral every 6 hours PRN agitation  haloperidol    Injectable 5 milliGRAM(s) IntraMuscular once PRN agitation  ibuprofen  Tablet. 400 milliGRAM(s) Oral every 8 hours PRN Moderate Pain (4 - 6)  LORazepam     Tablet 2 milliGRAM(s) Oral every 2 hours PRN CIWA score increase by 2 points and current CIWA score GREATER THAN 9  LORazepam     Tablet 2 milliGRAM(s) Oral every 6 hours PRN agitation  LORazepam   Injectable 2 milliGRAM(s) IntraMuscular once PRN agitation  
MEDICATIONS  (PRN):  acetaminophen   Tablet .. 650 milliGRAM(s) Oral every 6 hours PRN Temp greater or equal to 38C (100.4F), Mild Pain (1 - 3), Moderate Pain (4 - 6), Severe Pain (7 - 10)  aluminum hydroxide/magnesium hydroxide/simethicone Suspension 30 milliLiter(s) Oral every 4 hours PRN Dyspepsia  diphenhydrAMINE 50 milliGRAM(s) Oral every 6 hours PRN agitation  diphenhydrAMINE   Injectable 50 milliGRAM(s) IntraMuscular once PRN agitation  haloperidol     Tablet 5 milliGRAM(s) Oral every 6 hours PRN agitation  haloperidol    Injectable 5 milliGRAM(s) IntraMuscular once PRN agitation  ibuprofen  Tablet. 400 milliGRAM(s) Oral every 8 hours PRN Moderate Pain (4 - 6)  LORazepam     Tablet 2 milliGRAM(s) Oral every 6 hours PRN agitation  LORazepam   Injectable 2 milliGRAM(s) IntraMuscular once PRN agitation  
MEDICATIONS  (PRN):  acetaminophen   Tablet .. 650 milliGRAM(s) Oral every 6 hours PRN Temp greater or equal to 38C (100.4F), Mild Pain (1 - 3), Moderate Pain (4 - 6), Severe Pain (7 - 10)  aluminum hydroxide/magnesium hydroxide/simethicone Suspension 30 milliLiter(s) Oral every 4 hours PRN Dyspepsia  diphenhydrAMINE 50 milliGRAM(s) Oral every 6 hours PRN agitation  diphenhydrAMINE   Injectable 50 milliGRAM(s) IntraMuscular once PRN agitation  haloperidol     Tablet 5 milliGRAM(s) Oral every 6 hours PRN agitation  haloperidol    Injectable 5 milliGRAM(s) IntraMuscular once PRN agitation  hydrOXYzine hydrochloride 50 milliGRAM(s) Oral every 6 hours PRN anxiety  ibuprofen  Tablet. 400 milliGRAM(s) Oral every 8 hours PRN Moderate Pain (4 - 6)  LORazepam     Tablet 2 milliGRAM(s) Oral every 2 hours PRN CIWA score increase by 2 points and current CIWA score GREATER THAN 9  LORazepam     Tablet 2 milliGRAM(s) Oral every 6 hours PRN agitation  LORazepam   Injectable 2 milliGRAM(s) IntraMuscular once PRN agitation  
MEDICATIONS  (PRN):  acetaminophen   Tablet .. 650 milliGRAM(s) Oral every 6 hours PRN Temp greater or equal to 38C (100.4F), Mild Pain (1 - 3), Moderate Pain (4 - 6), Severe Pain (7 - 10)  aluminum hydroxide/magnesium hydroxide/simethicone Suspension 30 milliLiter(s) Oral every 4 hours PRN Dyspepsia  diphenhydrAMINE 50 milliGRAM(s) Oral every 6 hours PRN agitation  diphenhydrAMINE   Injectable 50 milliGRAM(s) IntraMuscular once PRN agitation  haloperidol     Tablet 5 milliGRAM(s) Oral every 6 hours PRN agitation  haloperidol    Injectable 5 milliGRAM(s) IntraMuscular once PRN agitation  ibuprofen  Tablet. 400 milliGRAM(s) Oral every 8 hours PRN Moderate Pain (4 - 6)  LORazepam     Tablet 2 milliGRAM(s) Oral every 2 hours PRN CIWA score increase by 2 points and current CIWA score GREATER THAN 9  LORazepam     Tablet 2 milliGRAM(s) Oral every 6 hours PRN agitation  LORazepam   Injectable 2 milliGRAM(s) IntraMuscular once PRN agitation  
MEDICATIONS  (PRN):  acetaminophen   Tablet .. 650 milliGRAM(s) Oral every 6 hours PRN Temp greater or equal to 38C (100.4F), Mild Pain (1 - 3), Moderate Pain (4 - 6), Severe Pain (7 - 10)  aluminum hydroxide/magnesium hydroxide/simethicone Suspension 30 milliLiter(s) Oral every 4 hours PRN Dyspepsia  diphenhydrAMINE 50 milliGRAM(s) Oral every 6 hours PRN agitation  diphenhydrAMINE   Injectable 50 milliGRAM(s) IntraMuscular once PRN agitation  haloperidol     Tablet 5 milliGRAM(s) Oral every 6 hours PRN agitation  haloperidol    Injectable 5 milliGRAM(s) IntraMuscular once PRN agitation  hydrOXYzine hydrochloride 50 milliGRAM(s) Oral every 6 hours PRN anxiety  ibuprofen  Tablet. 400 milliGRAM(s) Oral every 8 hours PRN Moderate Pain (4 - 6)  LORazepam     Tablet 2 milliGRAM(s) Oral every 2 hours PRN CIWA score increase by 2 points and current CIWA score GREATER THAN 9  LORazepam     Tablet 2 milliGRAM(s) Oral every 6 hours PRN agitation  LORazepam   Injectable 2 milliGRAM(s) IntraMuscular once PRN agitation

## 2021-03-09 NOTE — BH INPATIENT PSYCHIATRY PROGRESS NOTE - NSCGISEVERILLNESS_PSY_ALL_CORE
4 = Moderately ill – overt symptoms causing noticeable, but modest, functional impairment or distress; symptom level may warrant medication
6 = Severely ill - disruptive pathology, behavior and function are frequently influenced by symptoms, may require assistance from others
5 = Markedly ill - intrusive symptoms that distinctly impair social/occupational function or cause intrusive levels of distress
4 = Moderately ill – overt symptoms causing noticeable, but modest, functional impairment or distress; symptom level may warrant medication
6 = Severely ill - disruptive pathology, behavior and function are frequently influenced by symptoms, may require assistance from others
4 = Moderately ill – overt symptoms causing noticeable, but modest, functional impairment or distress; symptom level may warrant medication
3 = Mildly ill – clearly established symptoms with minimal, if any, distress or difficulty in social and occupational function
4 = Moderately ill – overt symptoms causing noticeable, but modest, functional impairment or distress; symptom level may warrant medication
5 = Markedly ill - intrusive symptoms that distinctly impair social/occupational function or cause intrusive levels of distress
6 = Severely ill - disruptive pathology, behavior and function are frequently influenced by symptoms, may require assistance from others
4 = Moderately ill – overt symptoms causing noticeable, but modest, functional impairment or distress; symptom level may warrant medication
4 = Moderately ill – overt symptoms causing noticeable, but modest, functional impairment or distress; symptom level may warrant medication

## 2021-03-09 NOTE — ED ADULT NURSE NOTE - NSIMPLEMENTINTERV_GEN_ALL_ED
Implemented All Universal Safety Interventions:  [place] to call system. Call bell, personal items and telephone within reach. Instruct patient to call for assistance. Room bathroom lighting operational. Non-slip footwear when patient is off stretcher. Physically safe environment: no spills, clutter or unnecessary equipment. Stretcher in lowest position, wheels locked, appropriate side rails in place.

## 2021-03-09 NOTE — BH INPATIENT PSYCHIATRY PROGRESS NOTE - NSBHMETABOLICLABS_PSY_ALL_CORE
All labs not within last 12 months, ordered
Labs within last 12 months
Labs within last 12 months
All labs not within last 12 months, ordered
Labs within last 12 months
All labs not within last 12 months, ordered
Labs within last 12 months
Labs within last 12 months

## 2021-03-09 NOTE — BH INPATIENT PSYCHIATRY PROGRESS NOTE - NSTXALCDRGINTERMD_PSY_ALL_CORE
started ativan taper, CIWA monitoring, substance abuse counseling, refer to [place] [facility], consider naltrexone 
started ativan taper, CIWA monitoring, substance abuse counseling, refer to [place] [facility], consider naltrexone 
started ativan taper, CIWA monitoring, substance abuse counseling, refer to [place] [facility], consider naltrexone 
started ativan taper, CIWA monitoring, substance abuse counseling, refer to [place] [facility], consider naltrexone 
started ativan taper, CIWA monitoring, substance abuse counseling, refer to [place] [facility], consider naltrexone 
started ativan taper, CIWA monitoring, substance abuse counseling, refer to [place] [facility], consider naltrexone 
started ativan taper, CIWA monitoring, substance abuse counseling, refer to [place] [facility], consider naltrexone 
started ativan taper, CIWA monitoring, substance abuse counseling, refer to [place] [facility], consider naltrexone 
started ativan taper, CIWA monitoring, substance abuse counseling, refer to [place] [facility], consider naltrexone 
started ativan taper, CIWA monitoring, substance abuse counseling, refer to [place] [facility], consider naltrexone 
started ativan taper, CIWA monitoring, substance abuse counseling, refer to [place] clinic, consider naltrexone 
started ativan taper, CIWA monitoring, substance abuse counseling, refer to [place] [facility], consider naltrexone 
started ativan taper, CIWA monitoring, substance abuse counseling, refer to [place] clinic, consider naltrexone 
started ativan taper, CIWA monitoring, substance abuse counseling, refer to [place] [facility], consider naltrexone

## 2021-03-09 NOTE — BH DISCHARGE NOTE NURSING/SOCIAL WORK/PSYCH REHAB - NSDCPRRECOMMEND_PSY_ALL_CORE
Upon discharge, pt would benefit from returning back home and engaging in outpatient treatment at [facility] chemical dependency program for continued medication and symptom management.

## 2021-03-09 NOTE — BH INPATIENT PSYCHIATRY PROGRESS NOTE - NSTXPROBSUICID_PSY_ALL_CORE
SUICIDE/SELF-INJURIOUS BEHAVIOR

## 2021-03-09 NOTE — BH INPATIENT PSYCHIATRY PROGRESS NOTE - NSTXPROBDEPRES_PSY_ALL_CORE
DEPRESSIVE SYMPTOMS

## 2021-03-09 NOTE — BH INPATIENT PSYCHIATRY PROGRESS NOTE - NSBHCONSDANGERSELF_PSY_A_CORE
suicidal ideation with plan and means

## 2021-03-09 NOTE — BH INPATIENT PSYCHIATRY PROGRESS NOTE - NSTXDCOPNOPROGRES_PSY_ALL_CORE
No Change
Improving
Improving
No Change
Met - goal discontinued

## 2021-03-09 NOTE — BH INPATIENT PSYCHIATRY PROGRESS NOTE - NSTXANXDATEEST_PSY_ALL_CORE
19-Feb-2021

## 2021-03-09 NOTE — BH INPATIENT PSYCHIATRY PROGRESS NOTE - NSTXSUICIDGOAL_PSY_ALL_CORE
Will identify and utilize 2 coping skills

## 2021-03-09 NOTE — BH INPATIENT PSYCHIATRY PROGRESS NOTE - NSBHCONSULTIPREASON_PSY_A_CORE
Patient co headache that started around 1 pm today. Patient states he had a similar episode on Friday. Patient alert x4, no facial droop or arm weakness. Patient had a total knee replacement 11/15/18
danger to self; mental illness expected to respond to inpatient care

## 2021-03-09 NOTE — ED PROVIDER NOTE - PATIENT PORTAL LINK FT
You can access the FollowMyHealth Patient Portal offered by [facility] by registering at the following website: [email or web address]. By joining [employer]’s FollowMyHealth portal, you will also be able to view your health information using other applications (apps) compatible with our system.

## 2021-03-10 VITALS
DIASTOLIC BLOOD PRESSURE: 92 MMHG | TEMPERATURE: 98 F | RESPIRATION RATE: 16 BRPM | SYSTOLIC BLOOD PRESSURE: 137 MMHG | HEART RATE: 93 BPM | OXYGEN SATURATION: 98 %

## 2021-03-10 LAB — T3 SERPL-MCNC: 92 NG/DL — SIGNIFICANT CHANGE UP (ref 80–200)

## 2021-03-10 PROCEDURE — 84436 ASSAY OF TOTAL THYROXINE: CPT

## 2021-03-10 PROCEDURE — 80053 COMPREHEN METABOLIC PANEL: CPT

## 2021-03-10 PROCEDURE — 84484 ASSAY OF TROPONIN QUANT: CPT

## 2021-03-10 PROCEDURE — 82550 ASSAY OF CK (CPK): CPT

## 2021-03-10 PROCEDURE — 71046 X-RAY EXAM CHEST 2 VIEWS: CPT

## 2021-03-10 PROCEDURE — 84480 ASSAY TRIIODOTHYRONINE (T3): CPT

## 2021-03-10 PROCEDURE — 93005 ELECTROCARDIOGRAM TRACING: CPT

## 2021-03-10 PROCEDURE — 99284 EMERGENCY DEPT VISIT MOD MDM: CPT | Mod: 25

## 2021-03-10 PROCEDURE — 80307 DRUG TEST PRSMV CHEM ANLYZR: CPT

## 2021-03-10 PROCEDURE — 84443 ASSAY THYROID STIM HORMONE: CPT

## 2021-03-10 PROCEDURE — 36415 COLL VENOUS BLD VENIPUNCTURE: CPT

## 2021-03-10 PROCEDURE — 85379 FIBRIN DEGRADATION QUANT: CPT

## 2021-03-10 PROCEDURE — 85025 COMPLETE CBC W/AUTO DIFF WBC: CPT

## 2021-03-10 RX ADMIN — Medication 1 MILLIGRAM(S): at 00:35

## 2021-03-14 ENCOUNTER — EMERGENCY (EMERGENCY)
Facility: HOSPITAL | Age: 31
LOS: 1 days | Discharge: ANOTHER HOSPITAL | End: 2021-03-14
Attending: EMERGENCY MEDICINE
Payer: MEDICAID

## 2021-03-14 VITALS
WEIGHT: 164.91 LBS | HEART RATE: 92 BPM | RESPIRATION RATE: 18 BRPM | OXYGEN SATURATION: 98 % | SYSTOLIC BLOOD PRESSURE: 146 MMHG | TEMPERATURE: 98 F | DIASTOLIC BLOOD PRESSURE: 91 MMHG | HEIGHT: 67 IN

## 2021-03-14 DIAGNOSIS — K35.33 ACUTE APPENDICITIS WITH PERFORATION, LOCALIZED PERITONITIS, AND GANGRENE, WITH ABSCESS: Chronic | ICD-10-CM

## 2021-03-14 DIAGNOSIS — F29 UNSPECIFIED PSYCHOSIS NOT DUE TO A SUBSTANCE OR KNOWN PHYSIOLOGICAL CONDITION: ICD-10-CM

## 2021-03-14 LAB
ALBUMIN SERPL ELPH-MCNC: 3.8 G/DL — SIGNIFICANT CHANGE UP (ref 3.5–5)
ALP SERPL-CCNC: 79 U/L — SIGNIFICANT CHANGE UP (ref 40–120)
ALT FLD-CCNC: 32 U/L DA — SIGNIFICANT CHANGE UP (ref 10–60)
ANION GAP SERPL CALC-SCNC: 8 MMOL/L — SIGNIFICANT CHANGE UP (ref 5–17)
APAP SERPL-MCNC: <2 UG/ML — LOW (ref 10–30)
AST SERPL-CCNC: 20 U/L — SIGNIFICANT CHANGE UP (ref 10–40)
BASOPHILS # BLD AUTO: 0.03 K/UL — SIGNIFICANT CHANGE UP (ref 0–0.2)
BASOPHILS NFR BLD AUTO: 0.3 % — SIGNIFICANT CHANGE UP (ref 0–2)
BILIRUB SERPL-MCNC: 0.3 MG/DL — SIGNIFICANT CHANGE UP (ref 0.2–1.2)
BUN SERPL-MCNC: 8 MG/DL — SIGNIFICANT CHANGE UP (ref 7–18)
CALCIUM SERPL-MCNC: 8.8 MG/DL — SIGNIFICANT CHANGE UP (ref 8.4–10.5)
CHLORIDE SERPL-SCNC: 106 MMOL/L — SIGNIFICANT CHANGE UP (ref 96–108)
CO2 SERPL-SCNC: 25 MMOL/L — SIGNIFICANT CHANGE UP (ref 22–31)
CREAT SERPL-MCNC: 0.71 MG/DL — SIGNIFICANT CHANGE UP (ref 0.5–1.3)
EOSINOPHIL # BLD AUTO: 0.03 K/UL — SIGNIFICANT CHANGE UP (ref 0–0.5)
EOSINOPHIL NFR BLD AUTO: 0.3 % — SIGNIFICANT CHANGE UP (ref 0–6)
ETHANOL SERPL-MCNC: <3 MG/DL — SIGNIFICANT CHANGE UP (ref 0–10)
GLUCOSE SERPL-MCNC: 90 MG/DL — SIGNIFICANT CHANGE UP (ref 70–99)
HCT VFR BLD CALC: 42.3 % — SIGNIFICANT CHANGE UP (ref 39–50)
HGB BLD-MCNC: 13.8 G/DL — SIGNIFICANT CHANGE UP (ref 13–17)
IMM GRANULOCYTES NFR BLD AUTO: 0.2 % — SIGNIFICANT CHANGE UP (ref 0–1.5)
LYMPHOCYTES # BLD AUTO: 1.6 K/UL — SIGNIFICANT CHANGE UP (ref 1–3.3)
LYMPHOCYTES # BLD AUTO: 16.7 % — SIGNIFICANT CHANGE UP (ref 13–44)
MCHC RBC-ENTMCNC: 27.1 PG — SIGNIFICANT CHANGE UP (ref 27–34)
MCHC RBC-ENTMCNC: 32.6 GM/DL — SIGNIFICANT CHANGE UP (ref 32–36)
MCV RBC AUTO: 83.1 FL — SIGNIFICANT CHANGE UP (ref 80–100)
MONOCYTES # BLD AUTO: 0.58 K/UL — SIGNIFICANT CHANGE UP (ref 0–0.9)
MONOCYTES NFR BLD AUTO: 6.1 % — SIGNIFICANT CHANGE UP (ref 2–14)
NEUTROPHILS # BLD AUTO: 7.3 K/UL — SIGNIFICANT CHANGE UP (ref 1.8–7.4)
NEUTROPHILS NFR BLD AUTO: 76.4 % — SIGNIFICANT CHANGE UP (ref 43–77)
NRBC # BLD: 0 /100 WBCS — SIGNIFICANT CHANGE UP (ref 0–0)
PLATELET # BLD AUTO: 351 K/UL — SIGNIFICANT CHANGE UP (ref 150–400)
POTASSIUM SERPL-MCNC: 3.6 MMOL/L — SIGNIFICANT CHANGE UP (ref 3.5–5.3)
POTASSIUM SERPL-SCNC: 3.6 MMOL/L — SIGNIFICANT CHANGE UP (ref 3.5–5.3)
PROT SERPL-MCNC: 8.2 G/DL — SIGNIFICANT CHANGE UP (ref 6–8.3)
RBC # BLD: 5.09 M/UL — SIGNIFICANT CHANGE UP (ref 4.2–5.8)
RBC # FLD: 13.1 % — SIGNIFICANT CHANGE UP (ref 10.3–14.5)
SALICYLATES SERPL-MCNC: <1.7 MG/DL — LOW (ref 2.8–20)
SARS-COV-2 IGG SERPL QL IA: POSITIVE
SARS-COV-2 IGM SERPL IA-ACNC: 22.8 INDEX — HIGH
SARS-COV-2 RNA SPEC QL NAA+PROBE: SIGNIFICANT CHANGE UP
SODIUM SERPL-SCNC: 139 MMOL/L — SIGNIFICANT CHANGE UP (ref 135–145)
WBC # BLD: 9.56 K/UL — SIGNIFICANT CHANGE UP (ref 3.8–10.5)
WBC # FLD AUTO: 9.56 K/UL — SIGNIFICANT CHANGE UP (ref 3.8–10.5)

## 2021-03-14 PROCEDURE — 99285 EMERGENCY DEPT VISIT HI MDM: CPT

## 2021-03-14 PROCEDURE — 90792 PSYCH DIAG EVAL W/MED SRVCS: CPT | Mod: 95

## 2021-03-14 RX ORDER — HALOPERIDOL DECANOATE 100 MG/ML
5 INJECTION INTRAMUSCULAR ONCE
Refills: 0 | Status: COMPLETED | OUTPATIENT
Start: 2021-03-14 | End: 2021-03-14

## 2021-03-14 RX ORDER — ACETAMINOPHEN 500 MG
650 TABLET ORAL ONCE
Refills: 0 | Status: COMPLETED | OUTPATIENT
Start: 2021-03-14 | End: 2021-03-14

## 2021-03-14 RX ADMIN — Medication 650 MILLIGRAM(S): at 18:38

## 2021-03-14 RX ADMIN — HALOPERIDOL DECANOATE 5 MILLIGRAM(S): 100 INJECTION INTRAMUSCULAR at 22:07

## 2021-03-14 RX ADMIN — Medication 650 MILLIGRAM(S): at 19:00

## 2021-03-14 RX ADMIN — Medication 1 MILLIGRAM(S): at 22:07

## 2021-03-14 RX ADMIN — Medication 1 MILLIGRAM(S): at 18:38

## 2021-03-14 NOTE — ED BEHAVIORAL HEALTH ASSESSMENT NOTE - REASON
HOLD FOR COVID; hold for inpatient psych bed when available; if possible, would benefit from speaking to [facility] unit since patient was just discharged on 3/10/2021. Pt willing to be VOLUNTARY admission.

## 2021-03-14 NOTE — ED BEHAVIORAL HEALTH ASSESSMENT NOTE - SUBSTANCE ISSUES AND PLAN (INCLUDE STANDING AND PRN MEDICATION)
hx of alcohol use. no alcohol use in past 20 days. no sign/symptoms of withdrawal. thiamine 100 mg daily, folate 1 mg daily.

## 2021-03-14 NOTE — ED ADULT NURSE NOTE - TEMPLATE
Problem: Safety  Goal: Will remain free from injury  Outcome: PROGRESSING AS EXPECTED      Problem: Pain Management  Goal: Pain level will decrease to patient's comfort goal  Outcome: PROGRESSING AS EXPECTED         Psych/Behavioral
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
## 2021-03-14 NOTE — ED PROVIDER NOTE - OBJECTIVE STATEMENT
Patient reports he has been hearing voices telling him to hurt himself by cutting and to hurt other people. No one in particular, just other people. Patient feels anxious. Was recently discharged after a psychiatric hospitalization and states he is feeling worse. Also has a moderate occipital headache since he woke up this morning. No fever, cp, sob, ap, n/v/d, focal weakness, paresthesias, visual hallucinations.

## 2021-03-14 NOTE — ED BEHAVIORAL HEALTH ASSESSMENT NOTE - DIFFERENTIAL
depression, substance-induced mood disorder Unspec. schizophrenia spectrum and other psychotic disorders F29

## 2021-03-14 NOTE — ED BEHAVIORAL HEALTH ASSESSMENT NOTE - RISK ASSESSMENT
moderate/high acute risk for suicide - endorsing SI, worsening in past month, psychosocial trigger/stressor, hopelessness, insomnia, anxiety, increased alcohol use. High Acute Suicide Risk HIGH acute risk for suicide: acutely psychotic, AH, recent inpt psych admission/discharge, depression/hopelessness, +SI with thoughts of cutting himself. HIGH chronic risk for suicide due to psychotic symptoms, poor response to treatment, hx of psych hospitalization.

## 2021-03-14 NOTE — ED ADULT NURSE NOTE - ED STAT RN HANDOFF DETAILS 2
patient to be transferred to [facility] accepting MD Dr. Mendelowitz 2 [phone] . Report given to Oneyda Archer transportation being arranged.

## 2021-03-14 NOTE — ED BEHAVIORAL HEALTH ASSESSMENT NOTE - PSYCHIATRIC ISSUES AND PLAN (INCLUDE STANDING AND PRN MEDICATION)
psychosis, depression: received haldol decanoate 100 mg IM Q 4 weeks on 3/2/21 (next due 3/30/21); benztropine 1 mg bedtime; benadryl 50 mg at bedtime; prozac 30 mg daily; haldol 5 mg bedtime

## 2021-03-14 NOTE — ED PROVIDER NOTE - PROGRESS NOTE DETAILS
Patient is resting comfortably, NAD. I called telepsych, they will call back. Spoke to Dr. Soriano from telepsych. patient to be involuntary transfer. No beds available at this time. Requested haldol 5mg PO and ativan 1mg PO. Patient signed out to Dr. Torres, waiting for bed assignment. CT head also to be performed Brian SINGH: Pt has been resting in ED, no distress, no incidents. CT head was reported IMPRESSION:  Normal imaging of the brain for the patient's age.   Pt awaiting admission as per psych. S.O to Dr. Jacques, pt awaiting psych disposition. Pt resting, no distress. pt remained NAD all day, telepsych states still no bed. signout to Dr Peter Moore Moore: still no bed available. prozac 30mg po, ativan 1mg, haldol 10mg and cogentin 1mg stat while waiting for bed Moore: 1:1 maintained. pending bed. s/o to dr borrero patient slept comfortably all night. Patient signedout to Dr. Holman at 730am. awaiting psych inpatient bed availability. Carlos HALL Patient signed out to me pending bed availability. sleeping comfortably, will monitor Per telepsyche, potential bed available for patient. voluntary form with interpretor [id number] for translation for voluntary form. per telepsych, potential bed no longer available. will continue to look. patient stable. 7pm med ordered per psych Moore: accepted to [facility] by Dr mendelowitz. maintain on 1:1 legal sent

## 2021-03-14 NOTE — ED PROVIDER NOTE - CLINICAL SUMMARY MEDICAL DECISION MAKING FREE TEXT BOX
Patient with command auditory hallucinations telling him to hurt himself and others. Placed on 1:1. Will get CT head due to headache. will consult psych.
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
## 2021-03-14 NOTE — ED BEHAVIORAL HEALTH ASSESSMENT NOTE - OTHER PAST PSYCHIATRIC HISTORY (INCLUDE DETAILS REGARDING ONSET, COURSE OF ILLNESS, INPATIENT/OUTPATIENT TREATMENT)
denies previous hospitalizations although [facility] reports one brief inpatient admission to [facility] 11/13-11/14/2020 for SI in past. recent hospitalization at [facility] discharged on 3/10/2021; one brief inpatient admission to [facility] 11/13-11/14/2020 for SI in past.

## 2021-03-14 NOTE — ED BEHAVIORAL HEALTH ASSESSMENT NOTE - DETAILS
denies hx of suicide attempts or self harming discharged on 3/10/2021 from [facility] inpatient spoke with attending RE: plan to hold for bed self N/A at this time

## 2021-03-15 PROCEDURE — G1004: CPT

## 2021-03-15 PROCEDURE — 70450 CT HEAD/BRAIN W/O DYE: CPT | Mod: 26,MG

## 2021-03-15 RX ORDER — HALOPERIDOL DECANOATE 100 MG/ML
10 INJECTION INTRAMUSCULAR ONCE
Refills: 0 | Status: COMPLETED | OUTPATIENT
Start: 2021-03-15 | End: 2021-03-15

## 2021-03-15 RX ORDER — FLUOXETINE HCL 10 MG
30 CAPSULE ORAL ONCE
Refills: 0 | Status: COMPLETED | OUTPATIENT
Start: 2021-03-15 | End: 2021-03-15

## 2021-03-15 RX ORDER — BENZTROPINE MESYLATE 1 MG
1 TABLET ORAL ONCE
Refills: 0 | Status: COMPLETED | OUTPATIENT
Start: 2021-03-15 | End: 2021-03-15

## 2021-03-15 RX ADMIN — Medication 1 MILLIGRAM(S): at 19:10

## 2021-03-15 RX ADMIN — HALOPERIDOL DECANOATE 10 MILLIGRAM(S): 100 INJECTION INTRAMUSCULAR at 20:04

## 2021-03-15 RX ADMIN — Medication 30 MILLIGRAM(S): at 20:04

## 2021-03-15 NOTE — ED BEHAVIORAL HEALTH NOTE - BEHAVIORAL HEALTH NOTE
==================  PRE-HOSPITAL COURSE  ===================  SOURCE: MAYNOR Martin and secondhand nursing documentation.  DETAILS:  Patient walked into ED due to patient experiencing command AH telling him to hurt himself, ambiguous about plan. Patient reported to RN that he was feeling unsafe.    ============  ED COURSE   ============  SOURCE:  MAYNOR Martin and secondhand nursing documentation.  ARRIVAL:  RN stated patient was a walk-in, no issues with transport to ED. Patient was compliant and cooperative with triage process, is currently gowned.   BELONGINGS:  RN stated patient provided belongings to security, is currently in hospital gown with 1:1.  BEHAVIOR: RN described patient to be calm, cooperative, maintains flat affect and at times has limited thought content, though remains engaged with ED staff. RN stated that the patient is currently sleeping in his room, is not currently violent/aggressive. RN stated patient was reporting SI as a result of having command AH telling him to hurt himself. RN stated patient reported having a plan of SA however when asked about the method, patient stated he did not know his plan, therefore remains ambiguous about his plan.   TREATMENT:  None  VISITORS:  None    ========================  FOR EACH COLLATERAL  ========================  NAME: Unknown  NUMBER: [phone]  RELATIONSHIP: Contact in chart  RELIABILITY: N/A  COMMENTS: [facility] attempted to contact number listed in chart however there was no response, [facility] left a v/m requesting a call back.

## 2021-03-15 NOTE — ED ADULT NURSE REASSESSMENT NOTE - NS ED NURSE REASSESS COMMENT FT1
received pt awake alert ,not in distress,pt calm,woth saline lock intac no redness no swelling noted,on constant observation.

## 2021-03-15 NOTE — SOCIAL WORK POST DISCHARGE FOLLOW UP NOTE - NSBHSWFOLLOWUP_PSY_ALL_CORE_FT
Writer attempted to contact pt several times [phone] with no return call. Pt familiar to community supports and emergency contacts. Pt was stable at discharge and not a danger to self or others.

## 2021-03-15 NOTE — ED BEHAVIORAL HEALTH NOTE - BEHAVIORAL HEALTH NOTE
TELEPSYCHIATRY REASSESSMENT:   [employer] used for evaluation:  ID #[id number]    Subjective:  Patient relays "feeling good" at this current time. He denies any hallucinations currently but notes he feels "very anxious and very nervous" since he arrived. He relays thoughts of self harm, specifically "to cut myself or do something to me." He is uncertain whether he would act on it if given the opportunity. He denies any thoughts of wanting to harm others. He endorses paranoia but relays feeling safe where he is currently. He relays the medications he received last night were helpful in alleviating his symptoms and relays consent in increasing the dose of Haldol for tonight's dose. He has no spontaneous complaints otherwise, just reiterates how anxious he feels.       Objective:  MAR: No PRNs since last assessment. Last received Haldol 5 mg and Ativan 1 mg HS last night  Vital signs: stable and within normal limits  MSE: notable psychomotor retardation, concrete, thought blocked thinking and flat affect with thought content notable for suicidality, paranoia and anxiety. Otherwise unremarkable.       Assessment:   As per Dr. Soriano; This is a 29 YO Prydeinig-speaking male domiciled in an apartment with friends, employed at a restaurant, pphx alcohol use, substance-induced psychosis, depression, several ED/CPEP encounters for alcohol related psychosis, recent psych admission at [facility] (for two weeks, discharged 3/10/2021), one brief inpatient admission to [facility] 11/13-11/14/2020 for SI, no known violence hx, no known legal history, presenting with worsening depression, AH since recent discharge from [facility] inpatient psych hospitalization on 3/10/2021, despite reported compliance on psych meds and haldol dec 100 mg received on 3/2/21. This patient was evaluated by me on last ED encounter and currently appears more internally preoccupied, thought blocking, distracted, depressed compared to last encounter with this provider. He endorses SI and AH, reports compliance with meds, unable to report plans for outpt followup. This patient will need inpatient psych admission for acute psychosis and SI for acute stabilization.    His assessment is largely unchanged on subsequent exam though patient is now denying hallucinations but continues to express SI, paranoia and anxiety. He continues to meet criteria for inpatient psychiatric stabilization and is awaiting an available psychiatric unit bed.       Plan:   Admit to inpatient psychiatry (currently holding in ED for bed availability)  Voluntary status at this time (9.13)  Increased Haldol to 10 mg PO HS and give dose now along with Ativan 1 mg PO  Also give Fluoxetine 30 mg and Benztropine 1 mg as per home medications  For breakthrough anxiety thereafter, can give Ativan 1 mg PO q6 PRN  For insomnia if awake after 10pm and still in ED, give Benadryl 50 mg (first line) and Trazodone 50 mg HS PRN (second line) if still awake after 1 hour post-benadryl  Continue constant observation while in ED

## 2021-03-15 NOTE — ED BEHAVIORAL HEALTH NOTE - BEHAVIORAL HEALTH NOTE
Reassessed around 5:50AM.    Pt is sleeping. No updates, per nursing.     Continue plan for patient to be admitted to an inpatient psych unit when available.    See outpt med list:  --pt received haldol decanoate 100 mg IM Q 4 weeks on 3/2/21 (next due 3/30/21).   --benztropine 1 milliGRAM(s) Oral at bedtime  --diphenhydrAMINE 50 milliGRAM(s) Oral at bedtime  --FLUoxetine 30 milliGRAM(s) Oral daily  --folic acid 1 milliGRAM(s) Oral daily  --haloperidol     Tablet 5 milliGRAM(s) Oral at bedtime  --multivitamin 1 Tablet(s) Oral daily  --pantoprazole    Tablet 40 milliGRAM(s) Oral before breakfast  --thiamine 100 milliGRAM(s) Oral daily

## 2021-03-16 ENCOUNTER — INPATIENT (INPATIENT)
Facility: HOSPITAL | Age: 31
LOS: 7 days | Discharge: HOME | End: 2021-03-24
Attending: PSYCHIATRY & NEUROLOGY | Admitting: PSYCHIATRY & NEUROLOGY
Payer: MEDICAID

## 2021-03-16 VITALS
OXYGEN SATURATION: 98 % | SYSTOLIC BLOOD PRESSURE: 132 MMHG | DIASTOLIC BLOOD PRESSURE: 84 MMHG | HEART RATE: 70 BPM | TEMPERATURE: 99 F | RESPIRATION RATE: 18 BRPM

## 2021-03-16 VITALS — TEMPERATURE: 97 F | WEIGHT: 168.21 LBS | HEIGHT: 60 IN | RESPIRATION RATE: 18 BRPM

## 2021-03-16 DIAGNOSIS — K35.33 ACUTE APPENDICITIS WITH PERFORATION, LOCALIZED PERITONITIS, AND GANGRENE, WITH ABSCESS: Chronic | ICD-10-CM

## 2021-03-16 DIAGNOSIS — F25.9 SCHIZOAFFECTIVE DISORDER, UNSPECIFIED: ICD-10-CM

## 2021-03-16 PROCEDURE — 93005 ELECTROCARDIOGRAM TRACING: CPT

## 2021-03-16 PROCEDURE — 80307 DRUG TEST PRSMV CHEM ANLYZR: CPT

## 2021-03-16 PROCEDURE — 87635 SARS-COV-2 COVID-19 AMP PRB: CPT

## 2021-03-16 PROCEDURE — 99221 1ST HOSP IP/OBS SF/LOW 40: CPT

## 2021-03-16 PROCEDURE — 82962 GLUCOSE BLOOD TEST: CPT

## 2021-03-16 PROCEDURE — 36415 COLL VENOUS BLD VENIPUNCTURE: CPT

## 2021-03-16 PROCEDURE — 70450 CT HEAD/BRAIN W/O DYE: CPT

## 2021-03-16 PROCEDURE — U0005: CPT

## 2021-03-16 PROCEDURE — 80053 COMPREHEN METABOLIC PANEL: CPT

## 2021-03-16 PROCEDURE — 85025 COMPLETE CBC W/AUTO DIFF WBC: CPT

## 2021-03-16 PROCEDURE — 86769 SARS-COV-2 COVID-19 ANTIBODY: CPT

## 2021-03-16 PROCEDURE — 99285 EMERGENCY DEPT VISIT HI MDM: CPT

## 2021-03-16 RX ORDER — ACETAMINOPHEN 500 MG
650 TABLET ORAL EVERY 6 HOURS
Refills: 0 | Status: DISCONTINUED | OUTPATIENT
Start: 2021-03-16 | End: 2021-03-24

## 2021-03-16 RX ORDER — PANTOPRAZOLE SODIUM 20 MG/1
40 TABLET, DELAYED RELEASE ORAL
Refills: 0 | Status: DISCONTINUED | OUTPATIENT
Start: 2021-03-16 | End: 2021-03-24

## 2021-03-16 RX ORDER — FLUOXETINE HCL 10 MG
30 CAPSULE ORAL ONCE
Refills: 0 | Status: COMPLETED | OUTPATIENT
Start: 2021-03-16 | End: 2021-03-16

## 2021-03-16 RX ORDER — FLUOXETINE HCL 10 MG
30 CAPSULE ORAL DAILY
Refills: 0 | Status: DISCONTINUED | OUTPATIENT
Start: 2021-03-16 | End: 2021-03-17

## 2021-03-16 RX ORDER — HALOPERIDOL DECANOATE 100 MG/ML
10 INJECTION INTRAMUSCULAR ONCE
Refills: 0 | Status: COMPLETED | OUTPATIENT
Start: 2021-03-16 | End: 2021-03-16

## 2021-03-16 RX ORDER — THIAMINE MONONITRATE (VIT B1) 100 MG
100 TABLET ORAL DAILY
Refills: 0 | Status: DISCONTINUED | OUTPATIENT
Start: 2021-03-16 | End: 2021-03-22

## 2021-03-16 RX ORDER — BENZTROPINE MESYLATE 1 MG
1 TABLET ORAL ONCE
Refills: 0 | Status: COMPLETED | OUTPATIENT
Start: 2021-03-16 | End: 2021-03-16

## 2021-03-16 RX ORDER — DIPHENHYDRAMINE HCL 50 MG
50 CAPSULE ORAL AT BEDTIME
Refills: 0 | Status: DISCONTINUED | OUTPATIENT
Start: 2021-03-16 | End: 2021-03-24

## 2021-03-16 RX ORDER — FOLIC ACID 0.8 MG
1 TABLET ORAL DAILY
Refills: 0 | Status: DISCONTINUED | OUTPATIENT
Start: 2021-03-16 | End: 2021-03-22

## 2021-03-16 RX ADMIN — HALOPERIDOL DECANOATE 10 MILLIGRAM(S): 100 INJECTION INTRAMUSCULAR at 18:25

## 2021-03-16 RX ADMIN — Medication 2 MILLIGRAM(S): at 22:23

## 2021-03-16 RX ADMIN — Medication 1 MILLIGRAM(S): at 18:25

## 2021-03-16 RX ADMIN — Medication 50 MILLIGRAM(S): at 22:23

## 2021-03-16 RX ADMIN — Medication 30 MILLIGRAM(S): at 18:25

## 2021-03-16 NOTE — PROGRESS NOTE BEHAVIORAL HEALTH - NSBHCHARTREVIEWVS_PSY_A_CORE FT
Vital Signs Last 24 Hrs  T(C): 36.7 (15 Mar 2021 07:59), Max: 36.7 (14 Mar 2021 19:10)  T(F): 98 (15 Mar 2021 07:59), Max: 98 (14 Mar 2021 19:10)  HR: 70 (15 Mar 2021 07:59) (70 - 92)  BP: 133/87 (15 Mar 2021 07:59) (115/71 - 146/91)  BP(mean): --  RR: 18 (15 Mar 2021 07:59) (16 - 18)  SpO2: 99% (15 Mar 2021 07:59) (98% - 99%)
Vital Signs Last 24 Hrs  T(C): 36.7 (16 Mar 2021 07:11), Max: 37.2 (15 Mar 2021 22:44)  T(F): 98 (16 Mar 2021 07:11), Max: 99 (15 Mar 2021 22:44)  HR: 67 (16 Mar 2021 07:11) (62 - 78)  BP: 139/78 (16 Mar 2021 07:11) (117/73 - 139/78)  BP(mean): --  RR: 20 (16 Mar 2021 07:11) (16 - 20)  SpO2: 99% (16 Mar 2021 07:11) (99% - 99%)

## 2021-03-16 NOTE — CHART NOTE - NSCHARTNOTEFT_GEN_A_CORE
Patient is a 30 year old male, presented to [facility] for hearing voices telling him to hurt himself by cutting and to hurt other people.     SW obtained call from Alisha at telepsych (phone [phone]) explaining that patient is for voluntary inpatient behavioral health placement and that pending available bed was not able to be secured.  Assistance requested from this SW to assist in locating an inpatient behavioral health bed outside within the [facility] system.  Patient's EMR reviewed; patient insured through Medicaid.  Patient Access contacted to confirm health insurance information prior to attempting to secure an inpatient behavioral health bed.  Patient insured through [employer] only.  [facility] will not be able to secure an inpatient behavioral health bed outside of [facility] due to health insurance status of [employer] [facility].    SW remains available as needed.

## 2021-03-16 NOTE — PROGRESS NOTE BEHAVIORAL HEALTH - SUMMARY
This is a 29 YO Citizen of [place]-speaking male domiciled in an apartment with friends, employed at a restaurant, pphx alcohol use, substance-induced psychosis, depression, several ED/CPEP encounters for alcohol related psychosis, recent psych admission at [facility] (for two weeks, discharged 3/10/2021), one brief inpatient admission to [place] 11/13-11/14/2020 for SI, no known violence hx, no known legal history, presenting with worsening depression, AH since recent discharge from [facility] inpatient psych hospitalization on 3/10/2021, despite reported compliance on psych meds and haldol dec 100 mg received on 3/2/21. This patient was evaluated by me on last ED encounter and currently appears more internally preoccupied, thought blocking, distracted, depressed compared to last encounter with this provider. He endorses SI and AH, reports compliance with meds, unable to report plans for outpt followup. This patient will need inpatient psych admission for acute psychosis and SI for acute stabilization.
As per previous provider, "This is a 31 YO [place] Sudanese-speaking male domiciled in an apartment with friends, employed at a restaurant, pphx alcohol use, substance-induced psychosis, depression, several ED/CPEP encounters for alcohol related psychosis, recent psych admission at [facility] (for two weeks, discharged 3/10/2021), one brief inpatient admission to [place] 11/13-11/14/2020 for SI, no known violence hx, no known legal history, presenting with worsening depression, AH since recent discharge from [facility] inpatient psych hospitalization on 3/10/2021, despite reported compliance on psych meds and haldol dec 100 mg received on 3/2/21. This patient was evaluated by me on last ED encounter and currently appears more internally preoccupied, thought blocking, distracted, depressed compared to last encounter with this provider. He endorses SI and AH, reports compliance with meds, unable to report plans for outpt followup. This patient will need inpatient psych admission for acute psychosis and SI for acute stabilization."    On reassessment, pt reports intermittent CAH and SI and remains in agreement with inpatient admission.    Plan:   Admit to inpatient psychiatry (currently holding in ED for bed availability)  Voluntary status at this time (9.13)  Continue Haldol 10 mg PO HS  Continue Fluoxetine 30 mg and Benztropine 1 mg as per home medications  For breakthrough anxiety, can give Ativan 1 mg PO q6h PRN  For insomnia if awake after 10pm and still in ED, give Benadryl 50 mg (first line) and Trazodone 50 mg HS PRN (second line) if still awake after 1 hour post-benadryl  Continue constant observation while in ED.

## 2021-03-16 NOTE — BH INPATIENT PSYCHIATRY ASSESSMENT NOTE - NSBHMETABOLIC_PSY_ALL_CORE_FT
BMI: BMI (kg/m2): 32.9 (03-16-21 @ 21:42)  HbA1c: A1C with Estimated Average Glucose Result: 5.5 % (02-19-21 @ 10:44)    Glucose: POCT Blood Glucose.: 82 mg/dL (03-15-21 @ 06:52)    BP: --  Lipid Panel: Date/Time: 02-20-21 @ 11:14  Cholesterol, Serum: 181  Direct LDL: --  HDL Cholesterol, Serum: 58  Total Cholesterol/HDL Ration Measurement: --  Triglycerides, Serum: 142

## 2021-03-16 NOTE — BH INPATIENT PSYCHIATRY ASSESSMENT NOTE - NSBHASSESSSUMMFT_PSY_ALL_CORE
30 year-old with history of ETOH abuse versus dependence with psychotic episodes previously in the context of ETOH intox, presenting now with   some persistent psychosis in the absence of intox, also with possible akathisia (has haloperidol LAURA 100 mg 3/2 and has been getting 10 mg hs).   Presents as voluntary  Routine checks  Will give lorazepam 2 mg for akathisia, consider propranolol if it persists  Continue fluoxetine  Consider naltrexone prior to d/c as per previous plan

## 2021-03-16 NOTE — PROGRESS NOTE BEHAVIORAL HEALTH - NSBHCONSULTMEDS_PSY_A_CORE FT
[facility]:   --pt received haldol decanoate 100 mg IM Q 4 weeks on 3/2/21 (next due 3/30/21).   --benztropine 1 milliGRAM(s) Oral at bedtime  --diphenhydrAMINE 50 milliGRAM(s) Oral at bedtime  --FLUoxetine 30 milliGRAM(s) Oral daily  --folic acid 1 milliGRAM(s) Oral daily  --haloperidol     Tablet 5 milliGRAM(s) Oral at bedtime  --multivitamin 1 Tablet(s) Oral daily  --pantoprazole    Tablet 40 milliGRAM(s) Oral before breakfast  --thiamine 100 milliGRAM(s) Oral daily    COVID exposure screening:   --pt has been tested for covid: 3/14/21 NEGATIVE, 2/24/21 NEGATIVE, 2/18/21 NEGATIVE.  --COVID antibodies? YES 2/3/21 POSITIVE at 21.40.  --COVID vaccines? no  --pt denies travel outside of [facility] in past ten days.  --pt denies exposure to anyone who tested +covid in past 10 days.
as above

## 2021-03-16 NOTE — BH PATIENT PROFILE - HOME MEDICATIONS
Haldol Decanoate 100 mg/mL intramuscular solution , 1 application intramuscularly every 4 weeks (LAST GIVEN ON 3/2/21)  diphenhydrAMINE 50 mg oral capsule , 1 cap(s) orally once a day (at bedtime)  thiamine 100 mg oral tablet , 1 tab(s) orally once a day  folic acid 1 mg oral tablet , 1 tab(s) orally once a day  Multiple Vitamins oral tablet , 1 tab(s) orally once a day  Protonix 40 mg oral delayed release tablet , 1 tab(s) orally once a day (before a meal)  haloperidol 5 mg oral tablet , 1 tab(s) orally once a day (at bedtime)  benztropine 1 mg oral tablet , 1 tab(s) orally once a day (at bedtime)  FLUoxetine 10 mg oral capsule , 3 cap(s) orally once a day

## 2021-03-16 NOTE — BH INPATIENT PSYCHIATRY ASSESSMENT NOTE - MSE UNSTRUCTURED FT
Patient is awake and alert. Affect is anxious. Via  reports "I am restless and nervous and I have a headache." Decreased arm swing. Some pacing   behavior. Speech is fluent. TP is concrete, coherent. Appears guarded. Denies AH, denies CAH. Denies thoughts of hurting himself on the unit. Poor insight.

## 2021-03-16 NOTE — BH INPATIENT PSYCHIATRY ASSESSMENT NOTE - NSBHCHARTREVIEWVS_PSY_A_CORE FT
Vital Signs Last 24 Hrs  T(C): 36.3 (16 Mar 2021 21:42), Max: 37.3 (16 Mar 2021 11:47)  T(F): 97.3 (16 Mar 2021 21:42), Max: 99.1 (16 Mar 2021 11:47)  HR: 70 (16 Mar 2021 19:19) (62 - 79)  BP: 132/84 (16 Mar 2021 19:19) (114/74 - 139/78)  BP(mean): --  RR: 18 (16 Mar 2021 21:42) (16 - 20)  SpO2: 98% (16 Mar 2021 19:19) (98% - 99%)

## 2021-03-16 NOTE — BH INPATIENT PSYCHIATRY ASSESSMENT NOTE - OTHER PAST PSYCHIATRIC HISTORY (INCLUDE DETAILS REGARDING ONSET, COURSE OF ILLNESS, INPATIENT/OUTPATIENT TREATMENT)
recent hospitalization at [facility] discharged on 3/10/2021; one brief inpatient admission to [place] 11/13-11/14/2020 for SI in past.

## 2021-03-16 NOTE — PROGRESS NOTE BEHAVIORAL HEALTH - RISK ASSESSMENT
risk factors: male, single, hx substance use, psych dx, past admissions  protective factors: help-seeking, no known hx of violence

## 2021-03-16 NOTE — ED BEHAVIORAL HEALTH NOTE - BEHAVIORAL HEALTH NOTE
TELEPSYCHIATRY REASSESSMENT:    Telepsychiatry service consulting on patient for SI and psychosis.  Currently he remains in the ED awaiting bed availability for voluntary psychiatric admission (though likely meets criteria for involuntary admission if he were to change his mind).     Patient sleeping at this time so not visualized on camera.  Nursing/CO chart update as such: Patient was awake and calm subsequent to last psychiatric assessment. He received Cogentin 1 mg, Ativan 1 mg then Prozac 30 mg and Haldol 10 mg all PO at 19:10 and 20:04 respectively. He has been sleeping since approximately 20:30, briefly awake in the 22:00 hour with stable vital signs obtained at that time. No behavioral issues noted when awake.  Nursing staff not available to provide verbal update.     Assessment and Plan unchanged as per most recent  note: bed search ongoing (currently none available) and will update ED once bed is found or if plan changes.  Telepsychiatry remains available overnight for any psychiatric issues pertaining to patient.

## 2021-03-16 NOTE — PROGRESS NOTE BEHAVIORAL HEALTH - NSBHFUPINTERVALHXFT_PSY_A_CORE
Pt states that he last had SI and heard voices last night telling him to hurt himself.  He worries that if he were to leave the hospital he would hurt himself.  Pt denies side effects from the medications given last night.  He reports poor sleep overnight.  Pt remains in agreement with plan for inpatient admission.
Patient was reassessed with  # [id number], states still hearing voices telling him to kill himself, distressed about CAH.  Denied active SI/HI/VH.  Understands he needs admission.

## 2021-03-16 NOTE — BH INPATIENT PSYCHIATRY ASSESSMENT NOTE - CURRENT MEDICATION
MEDICATIONS  (STANDING):  FLUoxetine 30 milliGRAM(s) Oral daily  folic acid 1 milliGRAM(s) Oral daily  LORazepam     Tablet 2 milliGRAM(s) Oral once  multivitamin 1 Tablet(s) Oral daily  pantoprazole    Tablet 40 milliGRAM(s) Oral before breakfast  thiamine 100 milliGRAM(s) Oral daily    MEDICATIONS  (PRN):  acetaminophen   Tablet .. 650 milliGRAM(s) Oral every 6 hours PRN Mild Pain (1 - 3), Moderate Pain (4 - 6)  diphenhydrAMINE 50 milliGRAM(s) Oral at bedtime PRN sleep

## 2021-03-16 NOTE — BH INPATIENT PSYCHIATRY ASSESSMENT NOTE - NSICDXPASTMEDICALHX_GEN_ALL_CORE_FT
PAST MEDICAL HISTORY:  Acute appendicitis, unspecified acute appendicitis type     Gastritis     Schizophrenia

## 2021-03-16 NOTE — ED ADULT NURSE REASSESSMENT NOTE - NS ED NURSE REASSESS COMMENT FT1
Received patient on constant observation staff member at bedside , patient awaiting transfer. Denies any pain ,SI/HI. Continue to monitor patient.

## 2021-03-17 PROCEDURE — 99232 SBSQ HOSP IP/OBS MODERATE 35: CPT

## 2021-03-17 RX ORDER — OLANZAPINE 15 MG/1
5 TABLET, FILM COATED ORAL AT BEDTIME
Refills: 0 | Status: DISCONTINUED | OUTPATIENT
Start: 2021-03-17 | End: 2021-03-19

## 2021-03-17 RX ADMIN — Medication 30 MILLIGRAM(S): at 09:13

## 2021-03-17 RX ADMIN — Medication 100 MILLIGRAM(S): at 09:13

## 2021-03-17 RX ADMIN — PANTOPRAZOLE SODIUM 40 MILLIGRAM(S): 20 TABLET, DELAYED RELEASE ORAL at 09:13

## 2021-03-17 RX ADMIN — Medication 1 MILLIGRAM(S): at 09:13

## 2021-03-17 RX ADMIN — OLANZAPINE 5 MILLIGRAM(S): 15 TABLET, FILM COATED ORAL at 20:27

## 2021-03-17 RX ADMIN — Medication 1 TABLET(S): at 09:13

## 2021-03-17 NOTE — BH SCALES AND SCREENS - NSBPRSHALLBEH_PSY_ALL_CORE
7 = Very Severe – has also had a severe impact (e.g., attempts suicide in response to command hallucinations)

## 2021-03-17 NOTE — PSYCHIATRIC REHAB INITIAL EVALUATION - NSBHALCSUBCHOICE_PSY_ALL_CORE
Patient has hx of substance use in the context of alcohol. However, patient denies current substance use.

## 2021-03-17 NOTE — PSYCHIATRIC REHAB INITIAL EVALUATION - NSBHPRRECOMMEND_PSY_ALL_CORE
Writer conducted this session in patient's primary language of Camero[place]. Writer is bilingual. Writer met with patient in order to orient patient to unit, and introduce patient to psychiatric staff and department functions. Patient was minimally verbal, and guarded with personal information. Writer collaborated with patient to select an appropriate psychiatric rehabilitation goal. Psychiatric rehabilitation staff will continue to engage patient daily in order to develop therapeutic rapport. In response to COVID19, unit programming will be re-evaluated on a consistent basis in effort to maintain safety guidelines.

## 2021-03-17 NOTE — PSYCHIATRIC REHAB INITIAL EVALUATION - NSBHPRTOOLBOX_PSY_ALL_CORE
Patient is unable to identify coping skills at this moment. Patient reports the only thing that helps with his symptoms is medication/Other

## 2021-03-17 NOTE — BH TREATMENT PLAN - NSTXPATIENTPARTICIPATE_PSY_ALL_CORE
Patient participated in identification of needs/problems/goals for treatment/Patient participated in defining interventions/Patient participated in development of after care plan

## 2021-03-17 NOTE — BH TREATMENT PLAN - NSTXDISORGGOAL_PSY_ALL_CORE
Will demonstrate the ability to maintain reality orientation and communicate clearly with others during 2 conversations with staff daily

## 2021-03-18 DIAGNOSIS — F10.20 ALCOHOL DEPENDENCE, UNCOMPLICATED: ICD-10-CM

## 2021-03-18 PROCEDURE — 99231 SBSQ HOSP IP/OBS SF/LOW 25: CPT

## 2021-03-18 RX ADMIN — Medication 1 TABLET(S): at 09:52

## 2021-03-18 RX ADMIN — Medication 1 MILLIGRAM(S): at 09:51

## 2021-03-18 RX ADMIN — OLANZAPINE 5 MILLIGRAM(S): 15 TABLET, FILM COATED ORAL at 22:25

## 2021-03-18 RX ADMIN — PANTOPRAZOLE SODIUM 40 MILLIGRAM(S): 20 TABLET, DELAYED RELEASE ORAL at 09:51

## 2021-03-18 RX ADMIN — Medication 100 MILLIGRAM(S): at 09:51

## 2021-03-18 NOTE — BH SOCIAL WORK INITIAL PSYCHOSOCIAL EVALUATION - OTHER PAST PSYCHIATRIC HISTORY (INCLUDE DETAILS REGARDING ONSET, COURSE OF ILLNESS, INPATIENT/OUTPATIENT TREATMENT)
Per EMR Pt has a hx of  substance-induced psychosis, depression, had several recent inpt admits @ [facility] 2/19/-3/10/21 for psychosis, no known prior hx of treatment, was linked to outpt care @ [facility] however pt never followed-up. Per EMR pt has a SI, no SA, hx of AH in context of ETOH induced, no hx of violent aggression, has a hx of ETOH misuse, no legal issues

## 2021-03-19 PROCEDURE — 99231 SBSQ HOSP IP/OBS SF/LOW 25: CPT

## 2021-03-19 RX ORDER — OLANZAPINE 15 MG/1
10 TABLET, FILM COATED ORAL AT BEDTIME
Refills: 0 | Status: DISCONTINUED | OUTPATIENT
Start: 2021-03-19 | End: 2021-03-24

## 2021-03-19 RX ADMIN — Medication 1 TABLET(S): at 08:26

## 2021-03-19 RX ADMIN — Medication 1 MILLIGRAM(S): at 08:26

## 2021-03-19 RX ADMIN — Medication 100 MILLIGRAM(S): at 08:26

## 2021-03-19 RX ADMIN — OLANZAPINE 10 MILLIGRAM(S): 15 TABLET, FILM COATED ORAL at 21:15

## 2021-03-19 RX ADMIN — PANTOPRAZOLE SODIUM 40 MILLIGRAM(S): 20 TABLET, DELAYED RELEASE ORAL at 06:38

## 2021-03-20 PROCEDURE — 99231 SBSQ HOSP IP/OBS SF/LOW 25: CPT

## 2021-03-20 RX ADMIN — Medication 1 MILLIGRAM(S): at 09:19

## 2021-03-20 RX ADMIN — Medication 100 MILLIGRAM(S): at 09:19

## 2021-03-20 RX ADMIN — OLANZAPINE 10 MILLIGRAM(S): 15 TABLET, FILM COATED ORAL at 20:45

## 2021-03-20 RX ADMIN — Medication 1 TABLET(S): at 09:19

## 2021-03-21 PROCEDURE — 99231 SBSQ HOSP IP/OBS SF/LOW 25: CPT

## 2021-03-21 RX ADMIN — Medication 100 MILLIGRAM(S): at 08:46

## 2021-03-21 RX ADMIN — Medication 1 TABLET(S): at 08:46

## 2021-03-21 RX ADMIN — Medication 1 MILLIGRAM(S): at 08:46

## 2021-03-21 RX ADMIN — OLANZAPINE 10 MILLIGRAM(S): 15 TABLET, FILM COATED ORAL at 21:18

## 2021-03-21 RX ADMIN — PANTOPRAZOLE SODIUM 40 MILLIGRAM(S): 20 TABLET, DELAYED RELEASE ORAL at 06:52

## 2021-03-22 PROCEDURE — 99231 SBSQ HOSP IP/OBS SF/LOW 25: CPT

## 2021-03-22 RX ADMIN — Medication 1 TABLET(S): at 08:38

## 2021-03-22 RX ADMIN — Medication 1 MILLIGRAM(S): at 08:38

## 2021-03-22 RX ADMIN — OLANZAPINE 10 MILLIGRAM(S): 15 TABLET, FILM COATED ORAL at 21:10

## 2021-03-22 RX ADMIN — PANTOPRAZOLE SODIUM 40 MILLIGRAM(S): 20 TABLET, DELAYED RELEASE ORAL at 08:39

## 2021-03-22 RX ADMIN — Medication 100 MILLIGRAM(S): at 08:39

## 2021-03-22 NOTE — BH INPATIENT PSYCHIATRY DISCHARGE NOTE - HPI (INCLUDE ILLNESS QUALITY, SEVERITY, DURATION, TIMING, CONTEXT, MODIFYING FACTORS, ASSOCIATED SIGNS AND SYMPTOMS)
This is a 31 YO Danish-speaking male domiciled in an apartment with friends, employed in a restaurant, no past med history, pphx alcohol dependence with detox history, frequently evaluated in EDs for agitation/psychosis in the context of ETOH intox, substance-induced psychosis, depression, several ED/CPEP encounters for alcohol related psychosis, recent psych admission at [facility] (for two weeks, discharged 3/10/2021) after presenting to ED with BAL in the 200s and psychotic symptoms, one brief inpatient admission to [place] 11/13-11/14/2020 for SI, no known violence hx, no known legal history, presented with worsening depression to [facility] on 3/14/2021, reports AH since recent discharge from [facility] inpatient psych hospitalization on 3/10/2021. Review of [facility] inpatient records reveal  that working diagnosis was substance induced psychosis, with ETOH intox mediating psychotic symptoms. The patient reports that has not had any ETOH   since d/c from [facility]. He reports that he has had hallucinations, at times telling him to hurt himself or other people. He was medicated at [facility], presents as voluntary transfer to [facility]. Here he denies AH, reports that he has a headache ("pain" rather than voices), did get a head CT at [facility] (was negative). He also  reports feeling anxious and restless, and does appear to have some increased motor tone.

## 2021-03-22 NOTE — BH INPATIENT PSYCHIATRY DISCHARGE NOTE - NSDCMRMEDTOKEN_GEN_ALL_CORE_FT
benztropine 1 mg oral tablet: 1 tab(s) orally once a day (at bedtime)  diphenhydrAMINE 50 mg oral capsule: 1 cap(s) orally once a day (at bedtime)  FLUoxetine 10 mg oral capsule: 3 cap(s) orally once a day  folic acid 1 mg oral tablet: 1 tab(s) orally once a day  Haldol Decanoate 100 mg/mL intramuscular solution: 1 application intramuscularly every 4 weeks (LAST GIVEN ON 3/2/21)  haloperidol 5 mg oral tablet: 1 tab(s) orally once a day (at bedtime)  Multiple Vitamins oral tablet: 1 tab(s) orally once a day  Protonix 40 mg oral delayed release tablet: 1 tab(s) orally once a day (before a meal)  thiamine 100 mg oral tablet: 1 tab(s) orally once a day   Multiple Vitamins oral tablet: 1 tab(s) orally once a day  OLANZapine 10 mg oral tablet: 1 tab(s) orally once a day (at bedtime)  pantoprazole 40 mg oral delayed release tablet: 1 tab(s) orally once a day (before a meal)

## 2021-03-22 NOTE — BH INPATIENT PSYCHIATRY PROGRESS NOTE - NSCGINOTASSEIMPRO_PSY_ALL_CORE
CGI not assessed

## 2021-03-22 NOTE — BH INPATIENT PSYCHIATRY DISCHARGE NOTE - NSBHMETABOLIC_PSY_ALL_CORE_FT
BMI: BMI (kg/m2): 32.9 (03-16-21 @ 21:42)  HbA1c: A1C with Estimated Average Glucose Result: 5.5 % (02-19-21 @ 10:44)    Glucose: POCT Blood Glucose.: 82 mg/dL (03-15-21 @ 06:52)    BP: 106/69 (03-21-21 @ 08:29) (106/69 - 134/60)  Lipid Panel: Date/Time: 02-20-21 @ 11:14  Cholesterol, Serum: 181  Direct LDL: --  HDL Cholesterol, Serum: 58  Total Cholesterol/HDL Ration Measurement: --  Triglycerides, Serum: 142

## 2021-03-22 NOTE — BH INPATIENT PSYCHIATRY DISCHARGE NOTE - NSDCCPCAREPLAN_GEN_ALL_CORE_FT
PRINCIPAL DISCHARGE DIAGNOSIS  Diagnosis: Psychosis  Assessment and Plan of Treatment:       SECONDARY DISCHARGE DIAGNOSES  Diagnosis: Chronic GERD  Assessment and Plan of Treatment:     Diagnosis: Moderate alcohol use disorder  Assessment and Plan of Treatment:

## 2021-03-22 NOTE — BH INPATIENT PSYCHIATRY DISCHARGE NOTE - OTHER PAST PSYCHIATRIC HISTORY (INCLUDE DETAILS REGARDING ONSET, COURSE OF ILLNESS, INPATIENT/OUTPATIENT TREATMENT)
Per EMR Pt has a hx of  substance-induced psychosis, depression, had several recent inpt admits @ [facility] 2/19/-3/10/21 for psychosis, no known prior hx of treatment, was linked to outpt care @ [facility] however pt never followed-up. Per EMR pt has a SI, no SA, hx of AH in context of ETOH induced, no hx of violent aggression, has a hx of ETOH misuse, no legal issues

## 2021-03-22 NOTE — BH INPATIENT PSYCHIATRY DISCHARGE NOTE - HOSPITAL COURSE
Patient admitted to [address] [place] [address] from 3/16/2021-3/25/2021    On admission patient was complaining of severe restlessness which was felt to be secondary to a combination of the haldol oral and LAURA as well as potentially the fluoxetine.  Patient was adamant that he was n ot depressed and that he had been alcohol free for at least 3 weeks with a return of his auditory hallucinations.    It was felt that his AH were not consistent with an alcoholic hallucinosis or with MDD but required treatment and plan was to crossover from haloperidol to olanzapine.    Initially patient was also treated with propranolol which was later discontinued.  On the regimen of olanzapine the patient reported slow but continued improvement in his AH and subsequently his mood improved as well.       On exam on discharge the patient is generally cooperative and makes fair eye contact.   Speech is clear and of normal rate.  Thought process: with no disorder of thought process.   Thought content: with no evidence of delusional beliefs.   Perception: Denies hallucinations.  Mood: Describes as "improved"   Affect: flat.  Patient denies suicidal and aggressive ideation, intent and plan.   AAO X3. Cognitively grossly intact.   Insight and judgment are improved.  Impulse control is intact at this time    Patient’s admission history and course of treatment as above.    Suicide and risk assessment performed prior to discharge. The patient has a low acute risk and low chronic risk of self-harm and aggression towards others. Protective factors include denying SI, no SIB, denying HI, good social supports in their family, no substance abuse, no current mood symptoms, no hopelessness, future-oriented in returning to home, no access to firearms.  Risk factors include presenting illness. Immediate risk was minimized by inpatient admission to a safe environment with appropriate supervision and limited access to lethal means. Future risk was minimized before discharge by treatment of acute episode, maximizing outpatient support, providing relevant patient education, discussing emergency procedures, and ensuring close follow-up. The patient remains at a low risk of self-harm, and such risk cannot be further ameliorated by continued inpatient treatment and the patient is therefore appropriate for discharge.       There were no behavioral problems on the unit.  Patient did not become agitated and did not require emergent intramuscular medications or seclusion / restraints.  Patient did not self-harm on the unit.  Patient remained actively engaged in treatment.  Patient participated in individual, group, and milieu therapy.  Patient got along appropriately with staff and peers.   Patient did not have any medical problems during this hospitalization.  There were no medical consultations.    A full discussion of the factors that predict treatment success and relapse was held including safety planning.  A discussion of the risks and benefits of patient’s medication was held including a discussion of the metabolic risks and risk of EPS and TD was done       The patient has improved significantly and no longer requires inpatient treatment and care. Patient denies all suicidal and aggressive ideation, intent and plan. Patient denies anxiety symptoms and panic attacks. Patient is not judged to be an acute danger to self or others at this time. Patient will be discharged today to home and outpatient follow up.

## 2021-03-23 PROCEDURE — 99231 SBSQ HOSP IP/OBS SF/LOW 25: CPT

## 2021-03-23 RX ORDER — PANTOPRAZOLE SODIUM 20 MG/1
1 TABLET, DELAYED RELEASE ORAL
Qty: 30 | Refills: 0
Start: 2021-03-23 | End: 2021-04-21

## 2021-03-23 RX ORDER — OLANZAPINE 15 MG/1
1 TABLET, FILM COATED ORAL
Qty: 30 | Refills: 0
Start: 2021-03-23 | End: 2021-04-21

## 2021-03-23 RX ADMIN — OLANZAPINE 10 MILLIGRAM(S): 15 TABLET, FILM COATED ORAL at 20:27

## 2021-03-23 RX ADMIN — Medication 1 TABLET(S): at 09:58

## 2021-03-23 RX ADMIN — PANTOPRAZOLE SODIUM 40 MILLIGRAM(S): 20 TABLET, DELAYED RELEASE ORAL at 09:58

## 2021-03-23 NOTE — BH DISCHARGE NOTE NURSING/SOCIAL WORK/PSYCH REHAB - NSCDUDCCRISIS_PSY_A_CORE
[facility]  1 [phone]) [facility]-WELL ([phone])  Text "WELL" to [phone]  Website: www.[employer]/.[employer] 1 (681) 031-MUPO (2858) Website: www.safehorizon.org/.National Suicide Prevention Lifeline 4 (575) 540-3586/.  [employer]  1 (520) LIFENET (095-9993)/.  [facility]  [address] [place] [phone]   Hours:  Monday through Friday from 9 AM to 3 PM/.  [employer] of  Aff[employer] - Veterans Crisis Line  6 [phone] [phone], Option 1

## 2021-03-23 NOTE — BH DISCHARGE NOTE NURSING/SOCIAL WORK/PSYCH REHAB - NSDCPRRECOMMEND_PSY_ALL_CORE
Psychiatric rehabilitation staff recommends patient will benefit from attending [facility] for outpatient services.

## 2021-03-23 NOTE — BH DISCHARGE NOTE NURSING/SOCIAL WORK/PSYCH REHAB - PATIENT PORTAL LINK FT
You can access the FollowMyHealth Patient Portal offered by [facility] by registering at the following website: [email or web address]. By joining [employer]’s FollowMyHealth portal, you will also be able to view your health information using other applications (apps) compatible with our system.

## 2021-03-23 NOTE — BH DISCHARGE NOTE NURSING/SOCIAL WORK/PSYCH REHAB - NSDCPRGOAL_PSY_ALL_CORE
Writer conducted this session in patient’s primary language of Dutch. Writer is bilingual. During the current hospitalization, patient has been addressing psychiatric rehabilitation goals pertaining to demonstrate the ability to maintain reality orientation and communicate clearly with others. Patient has demonstrated progress towards psychiatric rehabilitation goals during the current hospitalization. Patient exhibited progress through participating in individual and group therapy and developing additional coping skills to assist with managing negative emotions such as watching TV and exercise. Writer encouraged patient to continue to strengthen and practice effective skills. Patient was receptive. Patient met goal of demonstrating the ability to maintain reality orientation and communicate clearly with others  as evidenced by patient being able to ask for help when needed, and participating in individual therapy. patient reports the medications helped a lot and reports he plans to continue taking it post discharge. Patient reports overall improvement in mood. Patient reports feeling ready to go home. Writer and patient engaged in safety planning. Patient was compliant with medication during current hospitalization. Patient was visible on the unit and was appropriate with peers and staff. Patient was provided with a Press Ganey survey prior to discharge.

## 2021-03-24 VITALS — DIASTOLIC BLOOD PRESSURE: 66 MMHG | SYSTOLIC BLOOD PRESSURE: 103 MMHG | HEART RATE: 68 BPM | TEMPERATURE: 98 F

## 2021-03-24 PROCEDURE — 99232 SBSQ HOSP IP/OBS MODERATE 35: CPT

## 2021-03-24 RX ADMIN — PANTOPRAZOLE SODIUM 40 MILLIGRAM(S): 20 TABLET, DELAYED RELEASE ORAL at 10:14

## 2021-03-24 RX ADMIN — Medication 1 TABLET(S): at 10:13

## 2021-03-24 NOTE — BH INPATIENT PSYCHIATRY PROGRESS NOTE - PRN MEDS
MEDICATIONS  (PRN):  acetaminophen   Tablet .. 650 milliGRAM(s) Oral every 6 hours PRN Mild Pain (1 - 3), Moderate Pain (4 - 6)  diphenhydrAMINE 50 milliGRAM(s) Oral at bedtime PRN sleep  
MEDICATIONS  (PRN):  acetaminophen   Tablet .. 650 milliGRAM(s) Oral every 6 hours PRN Mild Pain (1 - 3), Moderate Pain (4 - 6)  diphenhydrAMINE 50 milliGRAM(s) Oral at bedtime PRN sleep  LORazepam     Tablet 2 milliGRAM(s) Oral every 6 hours PRN agitation  LORazepam   Injectable 2 milliGRAM(s) IntraMuscular once PRN severe agitation  

## 2021-03-24 NOTE — BH INPATIENT PSYCHIATRY PROGRESS NOTE - MSE UNSTRUCTURED FT
On exam today the patient is generally cooperative.    Speech is clear and of normal rate.  Thought process: with no evidence of a disorder of thought process  Thought content: Denies paranoid beliefs.   Perception: denies  AH  .    Mood: Describes as "better".  Affect: flat.    Patient feels helpless but denies active suicidal ideation, intent and plan.    Patient denies active aggressive/homicidal ideation, intent or plan.   Patient is Alert and oriented in all spheres. Patient is cognitively grossly intact. Fund of knowledge is fair. Memory is intact  Insight and judgment are impaired. Impulse control is intact at this time.    Vital signs are stable. Gait and station WNL  
On exam today the patient is generally cooperative.    Speech is clear and of normal rate.  Thought process: with no evidence of a disorder of thought process (as per ) .    Thought content: with paranoid beliefs.   Perception: with AH including multiple voices telling him he is worthless .    Mood: Describes as "tired".  Affect: flat.    Patient feels helpless but denies active suicidal ideation, intent and plan.    Patient denies active aggressive/homicidal ideation, intent or plan.   Patient is Alert and oriented in all spheres. Patient is cognitively grossly intact. Fund of knowledge is fair. Memory is intact  Insight and judgment are impaired. Impulse control is intact at this time.    Vital signs are stable. Gait and station WNL  
On exam today the patient is generally cooperative.    Speech is clear and of normal rate.  Thought process: with no evidence of a disorder of thought process  Thought content: with less paranoid beliefs.   Perception: decrease  AH  .    Mood: Describes as "tired".  Affect: flat.    Patient feels helpless but denies active suicidal ideation, intent and plan.    Patient denies active aggressive/homicidal ideation, intent or plan.   Patient is Alert and oriented in all spheres. Patient is cognitively grossly intact. Fund of knowledge is fair. Memory is intact  Insight and judgment are impaired. Impulse control is intact at this time.    Vital signs are stable. Gait and station WNL  
On exam today the patient is generally cooperative.    Speech is clear and of normal rate.  Thought process: with no evidence of a disorder of thought process (as per ) .    Thought content: with paranoid beliefs that he is being watched.   Perception: with AH including multiple voices telling him he is worthless and to end his life.    Mood: Describes as "OK".  Affect: flat.    Patient feels helpless but denies active suicidal ideation, intent and plan.    Patient denies active aggressive/homicidal ideation, intent or plan.   Patient is Alert and oriented in all spheres. Patient is cognitively grossly intact. Fund of knowledge is fair. Memory is intact  Insight and judgment are impaired. Impulse control is intact at this time.    Vital signs are stable. Gait and station WNL  
On exam today the patient is generally cooperative.    Speech is clear and of normal rate.  Thought process: with no evidence of a disorder of thought process  Thought content: Denies paranoid beliefs.   Perception: decrease but persistent  AH  .    Mood: Describes as "a little better".  Affect: flat.    Patient feels helpless but denies active suicidal ideation, intent and plan.    Patient denies active aggressive/homicidal ideation, intent or plan.   Patient is Alert and oriented in all spheres. Patient is cognitively grossly intact. Fund of knowledge is fair. Memory is intact  Insight and judgment are impaired. Impulse control is intact at this time.    Vital signs are stable. Gait and station WNL  
On exam today the patient is generally cooperative.    Speech is clear and of normal rate.  Thought process: with no evidence of a disorder of thought process  Thought content: Denies paranoid beliefs.   Perception: decrease in  AH  .    Mood: Describes as "better".  Affect: flat.    Patient feels helpless but denies active suicidal ideation, intent and plan.    Patient denies active aggressive/homicidal ideation, intent or plan.   Patient is Alert and oriented in all spheres. Patient is cognitively grossly intact. Fund of knowledge is fair. Memory is intact  Insight and judgment are impaired. Impulse control is intact at this time.    Vital signs are stable. Gait and station WNL  
On exam today the patient is generally cooperative.    Speech is clear and of normal rate.  Thought process: with no evidence of a disorder of thought process  Thought content: Denies paranoid beliefs.   Perception: decrease in  AH  .    Mood: Describes as "better".  Affect: flat.    Patient feels helpless but denies active suicidal ideation, intent and plan.    Patient denies active aggressive/homicidal ideation, intent or plan.   Patient is Alert and oriented in all spheres. Patient is cognitively grossly intact. Fund of knowledge is fair. Memory is intact  Insight and judgment are impaired. Impulse control is intact at this time.    Vital signs are stable. Gait and station WNL  
On exam today the patient is generally cooperative.    Speech is clear and of normal rate.  Thought process: with no evidence of a disorder of thought process (as per ) .    Thought content: with less paranoid beliefs.   Perception: with AH but less intense .    Mood: Describes as "tired".  Affect: flat.    Patient feels helpless but denies active suicidal ideation, intent and plan.    Patient denies active aggressive/homicidal ideation, intent or plan.   Patient is Alert and oriented in all spheres. Patient is cognitively grossly intact. Fund of knowledge is fair. Memory is intact  Insight and judgment are impaired. Impulse control is intact at this time.    Vital signs are stable. Gait and station WNL

## 2021-03-24 NOTE — BH INPATIENT PSYCHIATRY PROGRESS NOTE - NSBHMETABOLIC_PSY_ALL_CORE_FT
BMI: BMI (kg/m2): 32.9 (03-16-21 @ 21:42)  HbA1c: A1C with Estimated Average Glucose Result: 5.5 % (02-19-21 @ 10:44)    Glucose: POCT Blood Glucose.: 82 mg/dL (03-15-21 @ 06:52)    BP: 106/69 (03-21-21 @ 08:29) (106/69 - 134/60)  Lipid Panel: Date/Time: 02-20-21 @ 11:14  Cholesterol, Serum: 181  Direct LDL: --  HDL Cholesterol, Serum: 58  Total Cholesterol/HDL Ration Measurement: --  Triglycerides, Serum: 142  
BMI: BMI (kg/m2): 32.9 (03-16-21 @ 21:42)  HbA1c: A1C with Estimated Average Glucose Result: 5.5 % (02-19-21 @ 10:44)    Glucose: POCT Blood Glucose.: 82 mg/dL (03-15-21 @ 06:52)    BP: 103/62 (03-24-21 @ 08:12) (100/68 - 103/62)  Lipid Panel: Date/Time: 02-20-21 @ 11:14  Cholesterol, Serum: 181  Direct LDL: --  HDL Cholesterol, Serum: 58  Total Cholesterol/HDL Ration Measurement: --  Triglycerides, Serum: 142  
BMI: BMI (kg/m2): 32.9 (03-16-21 @ 21:42)  HbA1c: A1C with Estimated Average Glucose Result: 5.5 % (02-19-21 @ 10:44)    Glucose: POCT Blood Glucose.: 82 mg/dL (03-15-21 @ 06:52)    BP: 101/61 (03-17-21 @ 08:08) (101/61 - 101/61)  Lipid Panel: Date/Time: 02-20-21 @ 11:14  Cholesterol, Serum: 181  Direct LDL: --  HDL Cholesterol, Serum: 58  Total Cholesterol/HDL Ration Measurement: --  Triglycerides, Serum: 142  
BMI: BMI (kg/m2): 32.9 (03-16-21 @ 21:42)  HbA1c: A1C with Estimated Average Glucose Result: 5.5 % (02-19-21 @ 10:44)    Glucose: POCT Blood Glucose.: 82 mg/dL (03-15-21 @ 06:52)    BP: 108/60 (03-18-21 @ 08:15) (101/61 - 108/60)  Lipid Panel: Date/Time: 02-20-21 @ 11:14  Cholesterol, Serum: 181  Direct LDL: --  HDL Cholesterol, Serum: 58  Total Cholesterol/HDL Ration Measurement: --  Triglycerides, Serum: 142  
BMI: BMI (kg/m2): 32.9 (03-16-21 @ 21:42)  HbA1c: A1C with Estimated Average Glucose Result: 5.5 % (02-19-21 @ 10:44)    Glucose: POCT Blood Glucose.: 82 mg/dL (03-15-21 @ 06:52)    BP: 106/69 (03-21-21 @ 08:29) (106/69 - 134/60)  Lipid Panel: Date/Time: 02-20-21 @ 11:14  Cholesterol, Serum: 181  Direct LDL: --  HDL Cholesterol, Serum: 58  Total Cholesterol/HDL Ration Measurement: --  Triglycerides, Serum: 142  
BMI: BMI (kg/m2): 32.9 (03-16-21 @ 21:42)  HbA1c: A1C with Estimated Average Glucose Result: 5.5 % (02-19-21 @ 10:44)    Glucose: POCT Blood Glucose.: 82 mg/dL (03-15-21 @ 06:52)    BP: 100/68 (03-23-21 @ 08:26) (100/68 - 106/69)  Lipid Panel: Date/Time: 02-20-21 @ 11:14  Cholesterol, Serum: 181  Direct LDL: --  HDL Cholesterol, Serum: 58  Total Cholesterol/HDL Ration Measurement: --  Triglycerides, Serum: 142  
BMI: BMI (kg/m2): 32.9 (03-16-21 @ 21:42)  HbA1c: A1C with Estimated Average Glucose Result: 5.5 % (02-19-21 @ 10:44)    Glucose: POCT Blood Glucose.: 82 mg/dL (03-15-21 @ 06:52)    BP: 108/60 (03-18-21 @ 08:15) (101/61 - 108/60)  Lipid Panel: Date/Time: 02-20-21 @ 11:14  Cholesterol, Serum: 181  Direct LDL: --  HDL Cholesterol, Serum: 58  Total Cholesterol/HDL Ration Measurement: --  Triglycerides, Serum: 142  
BMI: BMI (kg/m2): 32.9 (03-16-21 @ 21:42)  HbA1c: A1C with Estimated Average Glucose Result: 5.5 % (02-19-21 @ 10:44)    Glucose: POCT Blood Glucose.: 82 mg/dL (03-15-21 @ 06:52)    BP: 134/60 (03-20-21 @ 08:29) (108/60 - 134/60)  Lipid Panel: Date/Time: 02-20-21 @ 11:14  Cholesterol, Serum: 181  Direct LDL: --  HDL Cholesterol, Serum: 58  Total Cholesterol/HDL Ration Measurement: --  Triglycerides, Serum: 142

## 2021-03-24 NOTE — BH INPATIENT PSYCHIATRY PROGRESS NOTE - NSBHINPTBILLING_PSY_ALL_CORE
65049 - Inpatient Low Complexity
93531 - Inpatient Low Complexity
05628 - Inpatient Low Complexity
76329 - Inpatient Low Complexity
74551 - Hospital Discharge Day Management; 30 min or less
65293 - Inpatient Moderate Complexity
20905 - Inpatient Low Complexity
51324 - Inpatient Low Complexity

## 2021-03-24 NOTE — BH INPATIENT PSYCHIATRY PROGRESS NOTE - NSBHCONSDANGERSELF_PSY_A_CORE
suicidal ideation with plan and means/unable to care for self

## 2021-03-24 NOTE — BH INPATIENT PSYCHIATRY PROGRESS NOTE - NSBHFUPINTERVALCCFT_PSY_A_CORE
Patient interviewed with [facility] [employer] #[id number]  I slept better but tired during the day"
Patient interviewed with [facility] [employer] #[id number]  "I am hearing Voices"
Patient interviewed with [facility] [employer] #[id number]  "I'm feeling better today"
Patient interviewed with [facility] [employer] #[id number]  "I slept better but still hearing Voices"
Patient interviewed with [facility] [employer] #[id number]  I still have voices"
Patient interviewed with [facility] [employer] #[id number]  I am tired but OK"
Patient interviewed with [facility] [employer] #[id number]  "I'm feeling a little better today with less voices"
Patient interviewed with [facility] [employer] #[id number]  "I'm ready to go.. Thank you"

## 2021-03-24 NOTE — BH INPATIENT PSYCHIATRY PROGRESS NOTE - NSBHASSESSSUMMFT_PSY_ALL_CORE
30 year-old with history of ETOH abuse versus dependence with psychotic episodes previously in the context of ETOH intox, presenting now with   some persistent psychosis in the absence of intox, also with possible akathisia (has haloperidol LAURA 100 mg 3/2 and has been getting 10 mg hs).       PLAN:  Pt requires acute care  Legal: Presents as voluntary 9.13  Status No need for CO and will continue routine checks  Will discontinue fluoxetine as it may be contributing to anxiety and few mood sxs  olanzapine 7.5 mg HS  Treatment will include individual therapy/supportive therapy/ rehab therapy/ psychopharmacological therapy and milieu therapy
30 year-old with history of ETOH abuse versus dependence with psychotic episodes previously in the context of ETOH intox, presenting now with   some persistent psychosis in the absence of intox, also with possible akathisia (has haloperidol LAURA 100 mg 3/2 and has been getting 10 mg hs).     Patient has improved markedly  Patient’s admission history and course of treatment as above.    Suicide and risk assessment performed prior to discharge. The patient has a low acute risk and low chronic risk of self-harm and aggression towards others. Protective factors include denying SI, no SIB, denying HI, good social supports in their family, no substance abuse, no current mood symptoms, no hopelessness, future-oriented in returning to home, no access to firearms.  Risk factors include presenting illness. Immediate risk was minimized by inpatient admission to a safe environment with appropriate supervision and limited access to lethal means. Future risk was minimized before discharge by treatment of acute episode, maximizing outpatient support, providing relevant patient education, discussing emergency procedures, and ensuring close follow-up. The patient remains at a low risk of self-harm, and such risk cannot be further ameliorated by continued inpatient treatment and the patient is therefore appropriate for discharge.       There were no behavioral problems on the unit.  Patient did not become agitated and did not require emergent intramuscular medications or seclusion / restraints.  Patient did not self-harm on the unit.  Patient remained actively engaged in treatment.  Patient participated in individual, group, and milieu therapy.  Patient got along appropriately with staff and peers.   Patient did not have any medical problems during this hospitalization.  There were no medical consultations.    A full discussion of the factors that predict treatment success and relapse was held including safety planning.  A discussion of the risks and benefits of patient’s medication was held including a discussion of the metabolic risks and risk of EPS and TD was done       The patient has improved significantly and no longer requires inpatient treatment and care. Patient denies all suicidal and aggressive ideation, intent and plan. Patient denies anxiety symptoms and panic attacks. Patient is not judged to be an acute danger to self or others at this time. Patient will be discharged today to home and outpatient follow up.      
30 year-old with history of ETOH abuse versus dependence with psychotic episodes previously in the context of ETOH intox, presenting now with   some persistent psychosis in the absence of intox, also with possible akathisia (has haloperidol LAURA 100 mg 3/2 and has been getting 10 mg hs).       PLAN:  Pt requires acute care  Legal: Presents as voluntary 9.13  Status No need for CO and will continue routine checks  Will discontinue fluoxetine as it may be contributing to anxiety and few mood sxs  will start inderal 10 BID and olanzapine 10 mg HS
30 year-old with history of ETOH abuse versus dependence with psychotic episodes previously in the context of ETOH intox, presenting now with   some persistent psychosis in the absence of intox, also with possible akathisia (has haloperidol LAURA 100 mg 3/2 and has been getting 10 mg hs).       PLAN:  Pt requires acute care  Legal: Presents as voluntary 9.13  Status No need for CO and will continue routine checks  Will discontinue fluoxetine as it may be contributing to anxiety and few mood sxs  will start inderal 10 BID and olanzapine 10 mg HS
30 year-old with history of ETOH abuse versus dependence with psychotic episodes previously in the context of ETOH intox, presenting now with   some persistent psychosis in the absence of intox, also with possible akathisia (has haloperidol LAURA 100 mg 3/2 and has been getting 10 mg hs).       PLAN:  Pt requires acute care  Legal: Presents as voluntary 9.13  Status No need for CO and will continue routine checks  Will discontinue fluoxetine as it may be contributing to anxiety and few mood sxs  olanzapine 7.5 mg HS  Treatment will include individual therapy/supportive therapy/ rehab therapy/ psychopharmacological therapy and milieu therapy
30 year-old with history of ETOH abuse versus dependence with psychotic episodes previously in the context of ETOH intox, presenting now with   some persistent psychosis in the absence of intox, also with possible akathisia (has haloperidol LAURA 100 mg 3/2 and has been getting 10 mg hs).       PLAN:  Pt requires acute care  Legal: Presents as voluntary 9.13  Status No need for CO and will continue routine checks  Will discontinue fluoxetine as it may be contributing to anxiety and few mood sxs  olanzapine 7.5 mg HS  Treatment will include individual therapy/supportive therapy/ rehab therapy/ psychopharmacological therapy and milieu therapy

## 2021-03-24 NOTE — BH INPATIENT PSYCHIATRY PROGRESS NOTE - NSICDXBHSECONDARYDX_PSY_ALL_CORE
Moderate alcohol use disorder   F10.20  

## 2021-03-24 NOTE — BH INPATIENT PSYCHIATRY PROGRESS NOTE - NSBHCHARTREVIEWVS_PSY_A_CORE FT
Vital Signs Last 24 Hrs  T(C): 36.4 (21 Mar 2021 08:29), Max: 36.5 (20 Mar 2021 17:37)  T(F): 97.5 (21 Mar 2021 08:29), Max: 97.7 (20 Mar 2021 17:37)  HR: 58 (21 Mar 2021 08:29) (58 - 58)  BP: 106/69 (21 Mar 2021 08:29) (106/69 - 106/69)  BP(mean): --  RR: --  SpO2: --
Vital Signs Last 24 Hrs  T(C): 36.3 (18 Mar 2021 08:15), Max: 36.3 (18 Mar 2021 08:15)  T(F): 97.4 (18 Mar 2021 08:15), Max: 97.4 (18 Mar 2021 08:15)  HR: 63 (18 Mar 2021 08:15) (63 - 63)  BP: 108/60 (18 Mar 2021 08:15) (108/60 - 108/60)  BP(mean): --  RR: --  SpO2: --
Vital Signs Last 24 Hrs  T(C): 36.3 (22 Mar 2021 08:23), Max: 36.8 (21 Mar 2021 17:16)  T(F): 97.4 (22 Mar 2021 08:23), Max: 98.2 (21 Mar 2021 17:16)  HR: --  BP: --  BP(mean): --  RR: --  SpO2: --
Vital Signs Last 24 Hrs  T(C): 36.4 (17 Mar 2021 08:08), Max: 37.1 (16 Mar 2021 15:47)  T(F): 97.5 (17 Mar 2021 08:08), Max: 98.8 (16 Mar 2021 15:47)  HR: 63 (17 Mar 2021 08:08) (63 - 78)  BP: 101/61 (17 Mar 2021 08:08) (101/61 - 132/84)  BP(mean): --  RR: 18 (16 Mar 2021 21:42) (16 - 18)  SpO2: 98% (16 Mar 2021 19:19) (98% - 98%)
Vital Signs Last 24 Hrs  T(C): 36.8 (23 Mar 2021 08:26), Max: 36.8 (23 Mar 2021 08:26)  T(F): 98.2 (23 Mar 2021 08:26), Max: 98.2 (23 Mar 2021 08:26)  HR: 77 (23 Mar 2021 08:26) (77 - 77)  BP: 100/68 (23 Mar 2021 08:26) (100/68 - 100/68)  BP(mean): --  RR: --  SpO2: --
Vital Signs Last 24 Hrs  T(C): 36.4 (19 Mar 2021 08:19), Max: 36.4 (19 Mar 2021 08:19)  T(F): 97.5 (19 Mar 2021 08:19), Max: 97.5 (19 Mar 2021 08:19)  HR: --  BP: --  BP(mean): --  RR: --  SpO2: --
Vital Signs Last 24 Hrs  T(C): 36.6 (20 Mar 2021 08:29), Max: 36.6 (20 Mar 2021 08:29)  T(F): 97.8 (20 Mar 2021 08:29), Max: 97.8 (20 Mar 2021 08:29)  HR: 62 (20 Mar 2021 08:29) (62 - 62)  BP: 134/60 (20 Mar 2021 08:29) (134/60 - 134/60)  BP(mean): --  RR: --  SpO2: --
Vital Signs Last 24 Hrs  T(C): 36.6 (24 Mar 2021 08:12), Max: 36.7 (23 Mar 2021 18:24)  T(F): 97.8 (24 Mar 2021 08:12), Max: 98 (23 Mar 2021 18:24)  HR: 68 (24 Mar 2021 08:12) (68 - 68)  BP: 103/62 (24 Mar 2021 08:12) (103/62 - 103/62)  BP(mean): --  RR: --  SpO2: --

## 2021-03-24 NOTE — BH INPATIENT PSYCHIATRY PROGRESS NOTE - NSBHCHARTREVIEWLAB_PSY_A_CORE FT
tox screen negative 

## 2021-03-24 NOTE — BH INPATIENT PSYCHIATRY PROGRESS NOTE - NSBHFUPINTERVALHXFT_PSY_A_CORE
Patient seen for follow up of psychosis.   patient reports sleeping a lot with change to olanzapine, less restless but still with AH
Patient seen for follow up of psychosis.   patient reports sleeping a lot with change to olanzapine, less restless and less AH
Patient seen by me at length in conference room for initial inpatient interview.  I have reviewed the admission record and reviewed the patient's physical examination, review of systems and labs. I have discussed the case with the treatment team.   29 YO [place]-speaking male domiciled in an apartment with friends, employed in a restaurant, no past med history, pphx alcohol dependence with detox history, frequently evaluated in EDs for agitation/psychosis in the context of ETOH intox, substance-induced psychosis, depression, several ED/CPEP encounters for alcohol related psychosis, recent psych admission at [facility] (for two weeks, discharged 3/10/2021) after presenting to ED with BAL in the 200s and psychotic symptoms, one brief inpatient admission to [place] [facility] 11/13-11/14/2020 for SI, no known violence hx, no known legal history, presented with worsening depression to [facility] on 3/14/2021, reports AH since recent discharge from [facility] inpatient psych hospitalization on 3/10/2021. Review of [facility] inpatient records reveal  that working diagnosis was substance induced psychosis, with ETOH intox mediating psychotic symptoms. The patient reports that has not had any ETOH   since d/c from [facility]. He reports that he has had hallucinations, at times telling him to hurt himself or other people. He was medicated at [facility], presents as voluntary transfer to [facility]. Here he denies AH, reports that he has a headache ("pain" rather than voices), did get a head CT at [facility] (was negative). He also  reports feeling anxious and restless, and does appear to have some increased motor tone.    Patient adamant about no hx of drinking for over 3 weeks but a return of AH including commands, insomnia and anxiety  
Patient seen for follow up of psychosis.   patient reports sleeping better  and less AH
Patient seen for discharge day management .   patient reports sleeping better  and denies AH
Patient seen for follow up of psychosis.   patient reports sleeping better  and less AH
Patient seen for follow up of psychosis.   patient reports sleeping better  and still having AH
Patient seen for follow up of psychosis.   patient reports sleeping better but tired  Decrease on AH

## 2021-03-24 NOTE — BH INPATIENT PSYCHIATRY PROGRESS NOTE - NSBHCHARTREVIEWINVESTIGATE_PSY_A_CORE FT
head CT negative 

## 2021-03-24 NOTE — BH INPATIENT PSYCHIATRY PROGRESS NOTE - NSICDXBHPRIMARYDX_PSY_ALL_CORE
Psychosis   F29  

## 2021-03-24 NOTE — BH INPATIENT PSYCHIATRY PROGRESS NOTE - NSBHCONTPROVIDER_PSY_ALL_CORE
Not applicable
CHIEF COMPLAINT: Epigastric pain    SUBJECTIVE:  HPI:  89 year old female with past medical history of CAD s/p stent, Atrial Fibrillation, HFpEF, TAVR, T2DM, Hyperlipidemia , Pulmonary HTN discharged this AM with the diagnosis of the Idiopathic pancreatitis comes with the CC of the abdominal pain for couple of hours.  Patient states that she went home and felt like hunger pain soon after a bannana she started severe pain similar kind of the pain which he had in the previous admission. No nausea ,vomiting, diarrhoea, fever, chills, chest pain or SOB, palpitations.    In the ED she received 1l bolus and Dilaudid felt better after the Dilaudid    h/o of the attack of the pancreatitis with discharge diagnosis secondary to the Januvia and Metolazone (23 Dec 2017 21:25)    12/24: Pt seen and examined at bedside. Pts care was also discussed with her son  Fuentes. She denies any pain currently, but reports an excruciating pain following consumption of a banana after discharge yesterday. She denies any nausea, vomiting, CP, or SOB.      122/5 : pt seen and examined at bedside. No complaints. Unable to tolerate PO diet yest clear liquids. Mainly c/o belching. No abdominal pain. placed back on NPO. No pain at this time. No chest pain or SOB     T(C): 36.4 (12-25-17 @ 11:22), Max: 37.1 (12-24-17 @ 17:13)  HR: 96 (12-25-17 @ 11:22) (76 - 96)  BP: 96/63 (12-25-17 @ 11:22) (96/63 - 127/64)  RR: 18 (12-25-17 @ 11:22) (17 - 20)  SpO2: 95% (12-25-17 @ 11:22) (92% - 98%)  Wt(kg): --    CARDIAC MARKERS ( 23 Dec 2017 17:57 )  <0.015 ng/mL / x     / x     / x     / x                                11.2   8.5   )-----------( 242      ( 25 Dec 2017 11:23 )             36.2     25 Dec 2017 11:23    134    |  104    |  9      ----------------------------<  126    3.8     |  21     |  0.69     Ca    8.4        25 Dec 2017 11:23  Phos  2.6       24 Dec 2017 08:33  Mg     1.9       24 Dec 2017 08:33    TPro  7.5    /  Alb  2.8    /  TBili  1.0    /  DBili  x      /  AST  96     /  ALT  47     /  AlkPhos  232    23 Dec 2017 17:57      CAPILLARY BLOOD GLUCOSE      POCT Blood Glucose.: 121 mg/dL (25 Dec 2017 06:02)  POCT Blood Glucose.: 119 mg/dL (24 Dec 2017 17:06)    LIVER FUNCTIONS - ( 24 Dec 2017 08:33 )  Alb: x     / Pro: x     / ALK PHOS: x     / ALT: x     / AST: x     / GGT: 128 U/L           MEDICATIONS  (STANDING):  aspirin  chewable 81 milliGRAM(s) Oral daily  dextrose 5%. 1000 milliLiter(s) (50 mL/Hr) IV Continuous <Continuous>  dextrose 50% Injectable 12.5 Gram(s) IV Push once  dextrose 50% Injectable 25 Gram(s) IV Push once  dextrose 50% Injectable 25 Gram(s) IV Push once  furosemide   Injectable 40 milliGRAM(s) IV Push daily  levothyroxine 25 MICROGram(s) Oral daily  metoprolol     tartrate 25 milliGRAM(s) Oral two times a day  ondansetron Injectable 4 milliGRAM(s) IV Push once  pantoprazole  Injectable 40 milliGRAM(s) IV Push daily  potassium chloride    Tablet ER 20 milliEquivalent(s) Oral two times a day  sildenafil (REVATIO) 20 milliGRAM(s) Oral two times a day  simvastatin 40 milliGRAM(s) Oral at bedtime  sodium chloride 0.9%. 1000 milliLiter(s) (75 mL/Hr) IV Continuous <Continuous>    MEDICATIONS  (PRN):  dextrose Gel 1 Dose(s) Oral once PRN Blood Glucose LESS THAN 70 milliGRAM(s)/deciliter  glucagon  Injectable 1 milliGRAM(s) IntraMuscular once PRN Glucose LESS THAN 70 milligrams/deciliter  HYDROmorphone  Injectable 0.5 milliGRAM(s) IV Push every 4 hours PRN Severe Pain (7 - 10)  melatonin Oral Tab/Cap - Peds 3 milliGRAM(s) Oral at bedtime PRN Insomnia  polyethylene glycol 3350 17 Gram(s) Oral daily PRN Constipation        REVIEW OF SYSTEMS:  CONSTITUTIONAL: No weakness, fevers or chills  EYES/ENT: No visual changes;  No vertigo or throat pain   NECK: No pain or stiffness  RESPIRATORY: No cough, wheezing, hemoptysis; No shortness of breath  CARDIOVASCULAR: No chest pain or palpitations  GASTROINTESTINAL: No abdominal or epigastric pain. No nausea, vomiting, or hematemesis; No diarrhea or constipation. No melena or hematochezia.  GENITOURINARY: No dysuria, frequency or hematuria  NEUROLOGICAL: No numbness or weakness  SKIN: No itching, burning, rashes, or lesions   All other review of systems is negative unless indicated above      PHYSICAL EXAM:  Constitutional: NAD, awake and alert, well-developed  HEENT: PERR, EOMI, Normal Hearing, MMM  Neck: Soft and supple, No LAD, No JVD  Respiratory: Mild crackles heard at lung bases but good air entry   Cardiovascular: S1 and S2, irregular rate and rhythm, no Murmurs, gallops or rubs  Gastrointestinal: Bowel Sounds present, soft, nontender, nondistended, no guarding, no rebound  Extremities: Slight peripheral edema  Vascular: 2+ peripheral pulses  Neurological: A/O x 3, no focal deficits  Musculoskeletal: 5/5 strength b/l upper and lower extremities  Skin: No rashes        RADIOLOGY/EKG: < from: Xray Chest 1 View AP/PA. (12.23.17 @ 18:42) >  Impression:Mild vascular congestion. Mild cardiomegaly. Aortic valve   prosthesis.    Urine cx : normal abel       DVT PPX: On Eliquis    ADVANCED DIRECTIVE:    DISPOSITION:
Not applicable

## 2021-03-24 NOTE — BH INPATIENT PSYCHIATRY PROGRESS NOTE - NSTXPSYCHOGOALOTHER_PSY_ALL_CORE
will be less psychotic and stable for discharge

## 2021-03-24 NOTE — BH INPATIENT PSYCHIATRY PROGRESS NOTE - MSE OPTIONS
Unstructured MSE

## 2021-03-24 NOTE — BH INPATIENT PSYCHIATRY PROGRESS NOTE - CURRENT MEDICATION
MEDICATIONS  (STANDING):  FLUoxetine 30 milliGRAM(s) Oral daily  folic acid 1 milliGRAM(s) Oral daily  multivitamin 1 Tablet(s) Oral daily  pantoprazole    Tablet 40 milliGRAM(s) Oral before breakfast  thiamine 100 milliGRAM(s) Oral daily    MEDICATIONS  (PRN):  acetaminophen   Tablet .. 650 milliGRAM(s) Oral every 6 hours PRN Mild Pain (1 - 3), Moderate Pain (4 - 6)  diphenhydrAMINE 50 milliGRAM(s) Oral at bedtime PRN sleep  LORazepam     Tablet 2 milliGRAM(s) Oral every 6 hours PRN agitation  LORazepam   Injectable 2 milliGRAM(s) IntraMuscular once PRN severe agitation  
MEDICATIONS  (STANDING):  multivitamin 1 Tablet(s) Oral daily  OLANZapine 10 milliGRAM(s) Oral at bedtime  pantoprazole    Tablet 40 milliGRAM(s) Oral before breakfast    MEDICATIONS  (PRN):  acetaminophen   Tablet .. 650 milliGRAM(s) Oral every 6 hours PRN Mild Pain (1 - 3), Moderate Pain (4 - 6)  diphenhydrAMINE 50 milliGRAM(s) Oral at bedtime PRN sleep  LORazepam     Tablet 2 milliGRAM(s) Oral every 6 hours PRN agitation  LORazepam   Injectable 2 milliGRAM(s) IntraMuscular once PRN severe agitation  
MEDICATIONS  (STANDING):  folic acid 1 milliGRAM(s) Oral daily  multivitamin 1 Tablet(s) Oral daily  OLANZapine 10 milliGRAM(s) Oral at bedtime  pantoprazole    Tablet 40 milliGRAM(s) Oral before breakfast  propranolol 10 milliGRAM(s) Oral two times a day  thiamine 100 milliGRAM(s) Oral daily    MEDICATIONS  (PRN):  acetaminophen   Tablet .. 650 milliGRAM(s) Oral every 6 hours PRN Mild Pain (1 - 3), Moderate Pain (4 - 6)  diphenhydrAMINE 50 milliGRAM(s) Oral at bedtime PRN sleep  LORazepam     Tablet 2 milliGRAM(s) Oral every 6 hours PRN agitation  LORazepam   Injectable 2 milliGRAM(s) IntraMuscular once PRN severe agitation  
MEDICATIONS  (STANDING):  folic acid 1 milliGRAM(s) Oral daily  multivitamin 1 Tablet(s) Oral daily  OLANZapine 5 milliGRAM(s) Oral at bedtime  pantoprazole    Tablet 40 milliGRAM(s) Oral before breakfast  propranolol 10 milliGRAM(s) Oral two times a day  thiamine 100 milliGRAM(s) Oral daily    MEDICATIONS  (PRN):  acetaminophen   Tablet .. 650 milliGRAM(s) Oral every 6 hours PRN Mild Pain (1 - 3), Moderate Pain (4 - 6)  diphenhydrAMINE 50 milliGRAM(s) Oral at bedtime PRN sleep  LORazepam     Tablet 2 milliGRAM(s) Oral every 6 hours PRN agitation  LORazepam   Injectable 2 milliGRAM(s) IntraMuscular once PRN severe agitation  
MEDICATIONS  (STANDING):  folic acid 1 milliGRAM(s) Oral daily  multivitamin 1 Tablet(s) Oral daily  OLANZapine 10 milliGRAM(s) Oral at bedtime  pantoprazole    Tablet 40 milliGRAM(s) Oral before breakfast  propranolol 10 milliGRAM(s) Oral two times a day  thiamine 100 milliGRAM(s) Oral daily    MEDICATIONS  (PRN):  acetaminophen   Tablet .. 650 milliGRAM(s) Oral every 6 hours PRN Mild Pain (1 - 3), Moderate Pain (4 - 6)  diphenhydrAMINE 50 milliGRAM(s) Oral at bedtime PRN sleep  LORazepam     Tablet 2 milliGRAM(s) Oral every 6 hours PRN agitation  LORazepam   Injectable 2 milliGRAM(s) IntraMuscular once PRN severe agitation  
MEDICATIONS  (STANDING):  multivitamin 1 Tablet(s) Oral daily  OLANZapine 10 milliGRAM(s) Oral at bedtime  pantoprazole    Tablet 40 milliGRAM(s) Oral before breakfast    MEDICATIONS  (PRN):  acetaminophen   Tablet .. 650 milliGRAM(s) Oral every 6 hours PRN Mild Pain (1 - 3), Moderate Pain (4 - 6)  diphenhydrAMINE 50 milliGRAM(s) Oral at bedtime PRN sleep

## 2021-03-24 NOTE — BH INPATIENT PSYCHIATRY PROGRESS NOTE - NSTXPROBDISORG_PSY_ALL_CORE
DISORGANIZATION OF THOUGHT/BEHAVIOR

## 2021-03-24 NOTE — BH INPATIENT PSYCHIATRY PROGRESS NOTE - NSDCCRITERIA_PSY_ALL_CORE
50 & reduction of  symptoms and be stable for discharge
50 & reduction of  symptoms and be stable for discharge
50 & reduction of  of symptoms and be stable for discharge
50 & reduction of  symptoms and be stable for discharge

## 2021-04-01 NOTE — SOCIAL WORK POST DISCHARGE FOLLOW UP NOTE - NSBHSWFOLLOWUP_PSY_ALL_CORE_FT
BAILEY advised pt did not keep f/u appt with [facility], SW spoke with pt via [place]  [id number]  pt stated he was worried he couldn't afford fee out of pocket so he didn't go, will call clinic himself once he can afford  Case closed

## 2021-05-08 ENCOUNTER — INPATIENT (INPATIENT)
Facility: HOSPITAL | Age: 31
LOS: 4 days | Discharge: HOME | DRG: 897 | End: 2021-05-13
Attending: INTERNAL MEDICINE | Admitting: INTERNAL MEDICINE
Payer: MEDICAID

## 2021-05-08 VITALS
HEIGHT: 60 IN | RESPIRATION RATE: 18 BRPM | HEART RATE: 106 BPM | SYSTOLIC BLOOD PRESSURE: 143 MMHG | TEMPERATURE: 99 F | DIASTOLIC BLOOD PRESSURE: 95 MMHG | WEIGHT: 167.99 LBS | OXYGEN SATURATION: 97 %

## 2021-05-08 DIAGNOSIS — R10.9 UNSPECIFIED ABDOMINAL PAIN: ICD-10-CM

## 2021-05-08 DIAGNOSIS — F10.239 ALCOHOL DEPENDENCE WITH WITHDRAWAL, UNSPECIFIED: ICD-10-CM

## 2021-05-08 DIAGNOSIS — F29 UNSPECIFIED PSYCHOSIS NOT DUE TO A SUBSTANCE OR KNOWN PHYSIOLOGICAL CONDITION: ICD-10-CM

## 2021-05-08 DIAGNOSIS — F20.9 SCHIZOPHRENIA, UNSPECIFIED: ICD-10-CM

## 2021-05-08 DIAGNOSIS — Z29.9 ENCOUNTER FOR PROPHYLACTIC MEASURES, UNSPECIFIED: ICD-10-CM

## 2021-05-08 DIAGNOSIS — K29.70 GASTRITIS, UNSPECIFIED, WITHOUT BLEEDING: ICD-10-CM

## 2021-05-08 DIAGNOSIS — K35.33 ACUTE APPENDICITIS WITH PERFORATION, LOCALIZED PERITONITIS, AND GANGRENE, WITH ABSCESS: Chronic | ICD-10-CM

## 2021-05-08 LAB
ALBUMIN SERPL ELPH-MCNC: 3.5 G/DL — SIGNIFICANT CHANGE UP (ref 3.5–5)
ALP SERPL-CCNC: 88 U/L — SIGNIFICANT CHANGE UP (ref 40–120)
ALT FLD-CCNC: 124 U/L DA — HIGH (ref 10–60)
ANION GAP SERPL CALC-SCNC: 7 MMOL/L — SIGNIFICANT CHANGE UP (ref 5–17)
AST SERPL-CCNC: 70 U/L — HIGH (ref 10–40)
BASOPHILS # BLD AUTO: 0.05 K/UL — SIGNIFICANT CHANGE UP (ref 0–0.2)
BASOPHILS NFR BLD AUTO: 0.8 % — SIGNIFICANT CHANGE UP (ref 0–2)
BILIRUB SERPL-MCNC: 0.4 MG/DL — SIGNIFICANT CHANGE UP (ref 0.2–1.2)
BUN SERPL-MCNC: 13 MG/DL — SIGNIFICANT CHANGE UP (ref 7–18)
CALCIUM SERPL-MCNC: 7.6 MG/DL — LOW (ref 8.4–10.5)
CHLORIDE SERPL-SCNC: 106 MMOL/L — SIGNIFICANT CHANGE UP (ref 96–108)
CO2 SERPL-SCNC: 25 MMOL/L — SIGNIFICANT CHANGE UP (ref 22–31)
CREAT SERPL-MCNC: 0.7 MG/DL — SIGNIFICANT CHANGE UP (ref 0.5–1.3)
EOSINOPHIL # BLD AUTO: 0.03 K/UL — SIGNIFICANT CHANGE UP (ref 0–0.5)
EOSINOPHIL NFR BLD AUTO: 0.5 % — SIGNIFICANT CHANGE UP (ref 0–6)
ETHANOL SERPL-MCNC: <3 MG/DL — SIGNIFICANT CHANGE UP (ref 0–10)
GLUCOSE SERPL-MCNC: 98 MG/DL — SIGNIFICANT CHANGE UP (ref 70–99)
HCT VFR BLD CALC: 41.1 % — SIGNIFICANT CHANGE UP (ref 39–50)
HGB BLD-MCNC: 13.9 G/DL — SIGNIFICANT CHANGE UP (ref 13–17)
IMM GRANULOCYTES NFR BLD AUTO: 0.3 % — SIGNIFICANT CHANGE UP (ref 0–1.5)
LIDOCAIN IGE QN: 58 U/L — LOW (ref 73–393)
LYMPHOCYTES # BLD AUTO: 1.41 K/UL — SIGNIFICANT CHANGE UP (ref 1–3.3)
LYMPHOCYTES # BLD AUTO: 23.3 % — SIGNIFICANT CHANGE UP (ref 13–44)
MCHC RBC-ENTMCNC: 27.7 PG — SIGNIFICANT CHANGE UP (ref 27–34)
MCHC RBC-ENTMCNC: 33.8 GM/DL — SIGNIFICANT CHANGE UP (ref 32–36)
MCV RBC AUTO: 81.9 FL — SIGNIFICANT CHANGE UP (ref 80–100)
MONOCYTES # BLD AUTO: 0.54 K/UL — SIGNIFICANT CHANGE UP (ref 0–0.9)
MONOCYTES NFR BLD AUTO: 8.9 % — SIGNIFICANT CHANGE UP (ref 2–14)
NEUTROPHILS # BLD AUTO: 3.99 K/UL — SIGNIFICANT CHANGE UP (ref 1.8–7.4)
NEUTROPHILS NFR BLD AUTO: 66.2 % — SIGNIFICANT CHANGE UP (ref 43–77)
NRBC # BLD: 0 /100 WBCS — SIGNIFICANT CHANGE UP (ref 0–0)
PLATELET # BLD AUTO: 344 K/UL — SIGNIFICANT CHANGE UP (ref 150–400)
POTASSIUM SERPL-MCNC: 3.7 MMOL/L — SIGNIFICANT CHANGE UP (ref 3.5–5.3)
POTASSIUM SERPL-SCNC: 3.7 MMOL/L — SIGNIFICANT CHANGE UP (ref 3.5–5.3)
PROT SERPL-MCNC: 7.6 G/DL — SIGNIFICANT CHANGE UP (ref 6–8.3)
RBC # BLD: 5.02 M/UL — SIGNIFICANT CHANGE UP (ref 4.2–5.8)
RBC # FLD: 13.4 % — SIGNIFICANT CHANGE UP (ref 10.3–14.5)
SARS-COV-2 RNA SPEC QL NAA+PROBE: SIGNIFICANT CHANGE UP
SODIUM SERPL-SCNC: 138 MMOL/L — SIGNIFICANT CHANGE UP (ref 135–145)
WBC # BLD: 6.04 K/UL — SIGNIFICANT CHANGE UP (ref 3.8–10.5)
WBC # FLD AUTO: 6.04 K/UL — SIGNIFICANT CHANGE UP (ref 3.8–10.5)

## 2021-05-08 PROCEDURE — 71045 X-RAY EXAM CHEST 1 VIEW: CPT | Mod: 26

## 2021-05-08 PROCEDURE — 99285 EMERGENCY DEPT VISIT HI MDM: CPT

## 2021-05-08 RX ORDER — ONDANSETRON 8 MG/1
4 TABLET, FILM COATED ORAL ONCE
Refills: 0 | Status: COMPLETED | OUTPATIENT
Start: 2021-05-08 | End: 2021-05-08

## 2021-05-08 RX ORDER — PANTOPRAZOLE SODIUM 20 MG/1
40 TABLET, DELAYED RELEASE ORAL ONCE
Refills: 0 | Status: DISCONTINUED | OUTPATIENT
Start: 2021-05-08 | End: 2021-05-08

## 2021-05-08 RX ORDER — ENOXAPARIN SODIUM 100 MG/ML
40 INJECTION SUBCUTANEOUS DAILY
Refills: 0 | Status: DISCONTINUED | OUTPATIENT
Start: 2021-05-08 | End: 2021-05-13

## 2021-05-08 RX ORDER — THIAMINE MONONITRATE (VIT B1) 100 MG
500 TABLET ORAL EVERY 24 HOURS
Refills: 0 | Status: COMPLETED | OUTPATIENT
Start: 2021-05-08 | End: 2021-05-10

## 2021-05-08 RX ORDER — KETOROLAC TROMETHAMINE 30 MG/ML
30 SYRINGE (ML) INJECTION ONCE
Refills: 0 | Status: DISCONTINUED | OUTPATIENT
Start: 2021-05-08 | End: 2021-05-08

## 2021-05-08 RX ORDER — PANTOPRAZOLE SODIUM 20 MG/1
40 TABLET, DELAYED RELEASE ORAL ONCE
Refills: 0 | Status: COMPLETED | OUTPATIENT
Start: 2021-05-08 | End: 2021-05-08

## 2021-05-08 RX ORDER — OLANZAPINE 15 MG/1
10 TABLET, FILM COATED ORAL DAILY
Refills: 0 | Status: DISCONTINUED | OUTPATIENT
Start: 2021-05-08 | End: 2021-05-10

## 2021-05-08 RX ORDER — FAMOTIDINE 10 MG/ML
20 INJECTION INTRAVENOUS ONCE
Refills: 0 | Status: COMPLETED | OUTPATIENT
Start: 2021-05-08 | End: 2021-05-08

## 2021-05-08 RX ORDER — SODIUM CHLORIDE 9 MG/ML
3 INJECTION INTRAMUSCULAR; INTRAVENOUS; SUBCUTANEOUS EVERY 8 HOURS
Refills: 0 | Status: DISCONTINUED | OUTPATIENT
Start: 2021-05-08 | End: 2021-05-13

## 2021-05-08 RX ORDER — PANTOPRAZOLE SODIUM 20 MG/1
40 TABLET, DELAYED RELEASE ORAL
Refills: 0 | Status: DISCONTINUED | OUTPATIENT
Start: 2021-05-08 | End: 2021-05-13

## 2021-05-08 RX ORDER — SIMETHICONE 80 MG/1
80 TABLET, CHEWABLE ORAL DAILY
Refills: 0 | Status: DISCONTINUED | OUTPATIENT
Start: 2021-05-08 | End: 2021-05-13

## 2021-05-08 RX ORDER — SODIUM CHLORIDE 9 MG/ML
1000 INJECTION INTRAMUSCULAR; INTRAVENOUS; SUBCUTANEOUS ONCE
Refills: 0 | Status: COMPLETED | OUTPATIENT
Start: 2021-05-08 | End: 2021-05-08

## 2021-05-08 RX ORDER — FOLIC ACID 0.8 MG
1 TABLET ORAL DAILY
Refills: 0 | Status: DISCONTINUED | OUTPATIENT
Start: 2021-05-08 | End: 2021-05-13

## 2021-05-08 RX ADMIN — Medication 105 MILLIGRAM(S): at 18:41

## 2021-05-08 RX ADMIN — Medication 2 MILLIGRAM(S): at 10:57

## 2021-05-08 RX ADMIN — ONDANSETRON 4 MILLIGRAM(S): 8 TABLET, FILM COATED ORAL at 08:31

## 2021-05-08 RX ADMIN — Medication 30 MILLILITER(S): at 08:31

## 2021-05-08 RX ADMIN — SODIUM CHLORIDE 1000 MILLILITER(S): 9 INJECTION INTRAMUSCULAR; INTRAVENOUS; SUBCUTANEOUS at 07:18

## 2021-05-08 RX ADMIN — Medication 50 MILLIGRAM(S): at 21:10

## 2021-05-08 RX ADMIN — PANTOPRAZOLE SODIUM 40 MILLIGRAM(S): 20 TABLET, DELAYED RELEASE ORAL at 10:00

## 2021-05-08 RX ADMIN — SODIUM CHLORIDE 3 MILLILITER(S): 9 INJECTION INTRAMUSCULAR; INTRAVENOUS; SUBCUTANEOUS at 21:09

## 2021-05-08 RX ADMIN — Medication 30 MILLIGRAM(S): at 09:39

## 2021-05-08 RX ADMIN — SODIUM CHLORIDE 3 MILLILITER(S): 9 INJECTION INTRAMUSCULAR; INTRAVENOUS; SUBCUTANEOUS at 13:32

## 2021-05-08 RX ADMIN — Medication 30 MILLIGRAM(S): at 10:00

## 2021-05-08 RX ADMIN — FAMOTIDINE 20 MILLIGRAM(S): 10 INJECTION INTRAVENOUS at 08:31

## 2021-05-08 RX ADMIN — ONDANSETRON 4 MILLIGRAM(S): 8 TABLET, FILM COATED ORAL at 10:57

## 2021-05-08 NOTE — PATIENT PROFILE ADULT - DOES PATIENT HAVE ADVANCE DIRECTIVE
Assumed nursing care of Brigette Almaraz after receiving the 1900 change of shift report . Brigette Almaraz has been quiet and isolative. He mostly stays in his room so the nursing assessment was performed in pt's room. He presented with some tangential thought process and he tended to wander off the subject or his answer to a question was not related to the question. Pt denied SI, HI, AV hallucinations (still some responding to internal stimuli) but he acknowledged anxiety and depression. Asked what stressors or reasons are contributing to the anxiety and depression and he stated, \"I'll be alright, I don't get to see my suitor much but it'll work out by July 4th and it's only July 2nd\"  Pt was reoriented as to the month/date. Pt was compliant with his HS meds and received Atarax 50 mg po for c/o anxiety at 2140 and Trazodone 50mg po for c/o insomnia.     Encouraging participation with treatment and monitoring for mood chgs, behavior and for safety No

## 2021-05-08 NOTE — H&P ADULT - PROBLEM SELECTOR PLAN 1
patient p/w abdominal pain along with nausea  s/p pepcid, maalix, protonix and zofran in ED  pain improved currently  concern for alcoholic gastritis  c/w protonix  can consider CT abdomen if symptoms persist

## 2021-05-08 NOTE — H&P ADULT - PROBLEM SELECTOR PLAN 2
pt p/w after binge drinking yesterday  will get a BAL  CIWA this am 8  will put on CIWA protocol  start librium 50mg q8h and ativan prn  start thiamine   daily folic acid and multivitamin  SW consulted

## 2021-05-08 NOTE — H&P ADULT - NSHPPHYSICALEXAM_GEN_ALL_CORE
PHYSICAL EXAM:  GENERAL: NAD, lying in bed comfortably  HEAD:  Atraumatic, Normocephalic  EYES: EOMI, PERRLA, conjunctiva and sclera clear  ENT: Moist mucous membranes  NECK: Supple, No JVD  CHEST/LUNG: Clear to auscultation bilaterally; No rales, rhonchi, wheezing, or rubs. Unlabored respirations  HEART: Regular rate and rhythm; No murmurs, rubs, or gallops  ABDOMEN: Bowel sounds present; Soft, Nontender, Nondistended. No hepatomegally  EXTREMITIES:  2+ Peripheral Pulses, brisk capillary refill. No clubbing, cyanosis, or edema  NERVOUS SYSTEM:  Alert & Oriented X3, speech clear. No deficits   MSK: FROM all 4 extremities, full and equal strength  SKIN: No rashes or lesions PHYSICAL EXAM:  GENERAL: NAD, lying in bed comfortably  HEAD:  Atraumatic, Normocephalic  EYES: EOMI, PERRLA, conjunctiva and sclera clear  ENT: Moist mucous membranes, tongue fasciculations   NECK: Supple, No JVD  CHEST/LUNG: Clear to auscultation bilaterally; No rales, rhonchi, wheezing, or rubs. Unlabored respirations  HEART: Regular rate and rhythm; No murmurs, rubs, or gallops  ABDOMEN: Bowel sounds present; Soft, Nontender, Nondistended.  EXTREMITIES:  2+ Peripheral Pulses, brisk capillary refill. No clubbing, cyanosis, or edema  NERVOUS SYSTEM:  Alert & Oriented X3, speech clear. No deficits   MSK: FROM all 4 extremities, full and equal strength  SKIN: No rashes or lesions PHYSICAL EXAM:  GENERAL: NAD, lying in bed comfortably  HEAD:  Atraumatic, Normocephalic  EYES: EOMI, PERRL, conjunctiva and sclera clear  ENT: Moist mucous membranes, tongue fasciculations   NECK: Supple, No JVD  CHEST/LUNG: Clear to auscultation bilaterally; No rales, rhonchi, wheezing, or rubs. Unlabored respirations  HEART: Regular rate and rhythm; No murmurs, rubs, or gallops  ABDOMEN: Bowel sounds present; Soft, Nontender, Nondistended.  EXTREMITIES:  2+ Peripheral Pulses, brisk capillary refill. No clubbing, cyanosis, or edema  NERVOUS SYSTEM:  Alert & Oriented X3, speech clear. No deficits   MSK: FROM all 4 extremities, full and equal strength  SKIN: No rashes or lesions

## 2021-05-08 NOTE — ED CLERICAL - CLERICAL COMMENTS
Relevant Problems   No relevant active problems       Anesthetic History     PONV          Review of Systems / Medical History  Patient summary reviewed, nursing notes reviewed and pertinent labs reviewed    Pulmonary    COPD: moderate            Comments: O2 @ 2L    Neuro/Psych   Within defined limits           Cardiovascular  Within defined limits                Exercise tolerance: <4 METS     GI/Hepatic/Renal     GERD           Endo/Other    Diabetes    Arthritis and cancer     Other Findings              Physical Exam    Airway  Mallampati: II  TM Distance: > 6 cm  Neck ROM: normal range of motion   Mouth opening: Normal     Cardiovascular  Regular rate and rhythm,  S1 and S2 normal,  no murmur, click, rub, or gallop             Dental    Dentition: Full upper dentures and Lower partial plate     Pulmonary  Breath sounds clear to auscultation               Abdominal  GI exam deferred       Other Findings            Anesthetic Plan    ASA: 3  Anesthesia type: general            Anesthetic plan and risks discussed with: Patient
As per Ms. Varner , NEG

## 2021-05-08 NOTE — H&P ADULT - HISTORY OF PRESENT ILLNESS
30M, no significant pmh, presenting with abdominal pain. patient reports he drank 20-30 beers yesterday and now has abdominal pain and has nausea. denies any chest pain, shortness of breath, vomiting, pain or swelling of his legs. reports he drinks approximately 4 times a week and sometimes feels shaky when he doesn't drink. Patient, a  29 YO Divehi-speaking male domiciled in an apartment with friends, employed in a restaurant, no past med history, pphx alcohol dependence with detox history, frequently evaluated in EDs for agitation/psychosis in the context of ETOH intox, substance-induced psychosis, depression presents to the ED with abdominal pain. Patient reports he drank 20-30 beers yesterday and now has abdomen. Reports he drinks every 15 days and sometimes feels shakiness when he does not drink. He states he takes no medications.

## 2021-05-08 NOTE — ED PROVIDER NOTE - OBJECTIVE STATEMENT
30M, no significant pmh, presenting with abdominal pain. patient reports he drank 20-30 beers yesterday and now has abdominal pain and has nausea. denies any chest pain, shortness of breath, vomiting, pain or swelling of his legs. reports he drinks approximately 4 times a week and sometimes feels shaky when he doesn't drink.

## 2021-05-08 NOTE — ED PROVIDER NOTE - CLINICAL SUMMARY MEDICAL DECISION MAKING FREE TEXT BOX
30M presenting with abdominal pain after drinking. denies any chest pain. has nausea. no tremors or tachycardia on exam so unlikely withdrawal. likely gastritis. will get labs, symptom control. will reassess.

## 2021-05-08 NOTE — H&P ADULT - ATTENDING COMMENTS
Patient seen and examined earlier today. Patient's history, vitals, labs, imaging studies reviewed. Discussed with above resident, agree with note with edits and educated on the diagnosis and management of above medical conditions. Plan of care discussed with patient, and agrees, all questions answered.   Libby Guerra MD  Time spent 75 minutes  5/8/2021

## 2021-05-08 NOTE — ED ADULT TRIAGE NOTE - CHIEF COMPLAINT QUOTE
Pt stated "I drank too much and my chest and stomach hurts" compliant of midchest and upper stomach pain. Pt stated he drank liquor.

## 2021-05-08 NOTE — H&P ADULT - ASSESSMENT
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   thiamine IVPB 500 milliGRAM(s) IV Intermittent every 24 hours    T(C): 36.9 (05-08-21 @ 16:12), Max: 37.4 (05-08-21 @ 06:27)  HR: 85 (05-08-21 @ 16:12) (76 - 106)  BP: 144/78 (05-08-21 @ 16:12) (136/79 - 149/77)  RR: 18 (05-08-21 @ 16:12) (16 - 18)  SpO2: 99% (05-08-21 @ 16:12) (96% - 100%)

## 2021-05-08 NOTE — ED PROVIDER NOTE - NSFOLLOWUPINSTRUCTIONS_ED_ALL_ED_FT
You were seen in the emergency department for abdominal pain.     Please follow-up with your primary care doctor in the next 24-48 hours.     Please drink no more than 1-2 alcoholic beverages a day.     If you have any worsening symptoms, severe chest pain, abdominal pain or shortness of breath, please return to the emergency department.

## 2021-05-08 NOTE — ED PROVIDER NOTE - WET READ LAUNCH FT
Billing Type: Third-Party Bill Expected Date Of Service: 09/24/2018 Bill For Surgical Tray: no Performing Laboratory: -83 There are no Wet Read(s) to document. There is 1 Wet Read(s) to document.

## 2021-05-09 LAB
A1C WITH ESTIMATED AVERAGE GLUCOSE RESULT: 5.7 % — HIGH (ref 4–5.6)
ALBUMIN SERPL ELPH-MCNC: 3.2 G/DL — LOW (ref 3.5–5)
ALP SERPL-CCNC: 100 U/L — SIGNIFICANT CHANGE UP (ref 40–120)
ALT FLD-CCNC: 92 U/L DA — HIGH (ref 10–60)
ANION GAP SERPL CALC-SCNC: 7 MMOL/L — SIGNIFICANT CHANGE UP (ref 5–17)
AST SERPL-CCNC: 37 U/L — SIGNIFICANT CHANGE UP (ref 10–40)
BASOPHILS # BLD AUTO: 0.04 K/UL — SIGNIFICANT CHANGE UP (ref 0–0.2)
BASOPHILS NFR BLD AUTO: 0.6 % — SIGNIFICANT CHANGE UP (ref 0–2)
BILIRUB SERPL-MCNC: 0.9 MG/DL — SIGNIFICANT CHANGE UP (ref 0.2–1.2)
BUN SERPL-MCNC: 9 MG/DL — SIGNIFICANT CHANGE UP (ref 7–18)
CALCIUM SERPL-MCNC: 7.9 MG/DL — LOW (ref 8.4–10.5)
CHLORIDE SERPL-SCNC: 103 MMOL/L — SIGNIFICANT CHANGE UP (ref 96–108)
CHOLEST SERPL-MCNC: 115 MG/DL — SIGNIFICANT CHANGE UP
CO2 SERPL-SCNC: 24 MMOL/L — SIGNIFICANT CHANGE UP (ref 22–31)
COVID-19 SPIKE DOMAIN AB INTERP: POSITIVE
COVID-19 SPIKE DOMAIN ANTIBODY RESULT: >250 U/ML — HIGH
CREAT SERPL-MCNC: 0.71 MG/DL — SIGNIFICANT CHANGE UP (ref 0.5–1.3)
EOSINOPHIL # BLD AUTO: 0.14 K/UL — SIGNIFICANT CHANGE UP (ref 0–0.5)
EOSINOPHIL NFR BLD AUTO: 1.9 % — SIGNIFICANT CHANGE UP (ref 0–6)
ESTIMATED AVERAGE GLUCOSE: 117 MG/DL — HIGH (ref 68–114)
FOLATE SERPL-MCNC: 12.6 NG/ML — SIGNIFICANT CHANGE UP
GLUCOSE SERPL-MCNC: 105 MG/DL — HIGH (ref 70–99)
HCT VFR BLD CALC: 41.4 % — SIGNIFICANT CHANGE UP (ref 39–50)
HDLC SERPL-MCNC: 40 MG/DL — LOW
HGB BLD-MCNC: 13.9 G/DL — SIGNIFICANT CHANGE UP (ref 13–17)
IMM GRANULOCYTES NFR BLD AUTO: 0.1 % — SIGNIFICANT CHANGE UP (ref 0–1.5)
LIPID PNL WITH DIRECT LDL SERPL: 28 MG/DL — SIGNIFICANT CHANGE UP
LYMPHOCYTES # BLD AUTO: 2 K/UL — SIGNIFICANT CHANGE UP (ref 1–3.3)
LYMPHOCYTES # BLD AUTO: 27.5 % — SIGNIFICANT CHANGE UP (ref 13–44)
MAGNESIUM SERPL-MCNC: 2.4 MG/DL — SIGNIFICANT CHANGE UP (ref 1.6–2.6)
MCHC RBC-ENTMCNC: 27.5 PG — SIGNIFICANT CHANGE UP (ref 27–34)
MCHC RBC-ENTMCNC: 33.6 GM/DL — SIGNIFICANT CHANGE UP (ref 32–36)
MCV RBC AUTO: 81.8 FL — SIGNIFICANT CHANGE UP (ref 80–100)
MONOCYTES # BLD AUTO: 0.6 K/UL — SIGNIFICANT CHANGE UP (ref 0–0.9)
MONOCYTES NFR BLD AUTO: 8.3 % — SIGNIFICANT CHANGE UP (ref 2–14)
NEUTROPHILS # BLD AUTO: 4.48 K/UL — SIGNIFICANT CHANGE UP (ref 1.8–7.4)
NEUTROPHILS NFR BLD AUTO: 61.6 % — SIGNIFICANT CHANGE UP (ref 43–77)
NON HDL CHOLESTEROL: 75 MG/DL — SIGNIFICANT CHANGE UP
NRBC # BLD: 0 /100 WBCS — SIGNIFICANT CHANGE UP (ref 0–0)
PHOSPHATE SERPL-MCNC: 3.2 MG/DL — SIGNIFICANT CHANGE UP (ref 2.5–4.5)
PLATELET # BLD AUTO: 319 K/UL — SIGNIFICANT CHANGE UP (ref 150–400)
POTASSIUM SERPL-MCNC: 3.2 MMOL/L — LOW (ref 3.5–5.3)
POTASSIUM SERPL-SCNC: 3.2 MMOL/L — LOW (ref 3.5–5.3)
PROT SERPL-MCNC: 7.5 G/DL — SIGNIFICANT CHANGE UP (ref 6–8.3)
RBC # BLD: 5.06 M/UL — SIGNIFICANT CHANGE UP (ref 4.2–5.8)
RBC # FLD: 13.1 % — SIGNIFICANT CHANGE UP (ref 10.3–14.5)
SARS-COV-2 IGG+IGM SERPL QL IA: >250 U/ML — HIGH
SARS-COV-2 IGG+IGM SERPL QL IA: POSITIVE
SODIUM SERPL-SCNC: 134 MMOL/L — LOW (ref 135–145)
TRIGL SERPL-MCNC: 237 MG/DL — HIGH
TSH SERPL-MCNC: 1.78 UU/ML — SIGNIFICANT CHANGE UP (ref 0.34–4.82)
VIT B12 SERPL-MCNC: 206 PG/ML — LOW (ref 232–1245)
WBC # BLD: 7.27 K/UL — SIGNIFICANT CHANGE UP (ref 3.8–10.5)
WBC # FLD AUTO: 7.27 K/UL — SIGNIFICANT CHANGE UP (ref 3.8–10.5)

## 2021-05-09 RX ORDER — POTASSIUM CHLORIDE 20 MEQ
40 PACKET (EA) ORAL ONCE
Refills: 0 | Status: COMPLETED | OUTPATIENT
Start: 2021-05-09 | End: 2021-05-09

## 2021-05-09 RX ORDER — POTASSIUM CHLORIDE 20 MEQ
10 PACKET (EA) ORAL ONCE
Refills: 0 | Status: COMPLETED | OUTPATIENT
Start: 2021-05-09 | End: 2021-05-09

## 2021-05-09 RX ADMIN — SODIUM CHLORIDE 3 MILLILITER(S): 9 INJECTION INTRAMUSCULAR; INTRAVENOUS; SUBCUTANEOUS at 05:20

## 2021-05-09 RX ADMIN — Medication 100 MILLIEQUIVALENT(S): at 12:11

## 2021-05-09 RX ADMIN — ENOXAPARIN SODIUM 40 MILLIGRAM(S): 100 INJECTION SUBCUTANEOUS at 12:11

## 2021-05-09 RX ADMIN — SODIUM CHLORIDE 3 MILLILITER(S): 9 INJECTION INTRAMUSCULAR; INTRAVENOUS; SUBCUTANEOUS at 22:40

## 2021-05-09 RX ADMIN — Medication 1 MILLIGRAM(S): at 12:11

## 2021-05-09 RX ADMIN — Medication 50 MILLIGRAM(S): at 21:10

## 2021-05-09 RX ADMIN — Medication 50 MILLIGRAM(S): at 14:38

## 2021-05-09 RX ADMIN — Medication 1 TABLET(S): at 12:11

## 2021-05-09 RX ADMIN — Medication 40 MILLIEQUIVALENT(S): at 07:01

## 2021-05-09 RX ADMIN — SODIUM CHLORIDE 3 MILLILITER(S): 9 INJECTION INTRAMUSCULAR; INTRAVENOUS; SUBCUTANEOUS at 14:46

## 2021-05-09 RX ADMIN — OLANZAPINE 10 MILLIGRAM(S): 15 TABLET, FILM COATED ORAL at 12:11

## 2021-05-09 RX ADMIN — Medication 50 MILLIGRAM(S): at 05:17

## 2021-05-09 RX ADMIN — Medication 105 MILLIGRAM(S): at 18:28

## 2021-05-09 RX ADMIN — Medication 2 MILLIGRAM(S): at 12:12

## 2021-05-09 RX ADMIN — PANTOPRAZOLE SODIUM 40 MILLIGRAM(S): 20 TABLET, DELAYED RELEASE ORAL at 05:17

## 2021-05-09 NOTE — PROGRESS NOTE ADULT - ASSESSMENT
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   thiamine IVPB 500 milliGRAM(s) IV Intermittent every 24 hours         Problem/Plan - 1:  ·  Problem: Abdominal pain.  Plan: patient p/w abdominal pain along with nausea  s/p pepcid, maalix, protonix and zofran in ED  pain improved currently  concern for alcoholic gastritis  c/w protonix  can consider CT abdomen if symptoms persist.      Problem/Plan - 2:  ·  Problem: Alcohol withdrawal.  Plan: pt p/w after binge drinking yesterday  will get a BAL  CIWA this am 8  will put on CIWA protocol  start librium 50mg q8h and ativan prn  start thiamine   daily folic acid and multivitamin  SW consulted.      Problem/Plan - 3:  ·  Problem: Psychosis.  Plan: pt has extensive psychiatric history   as per psych note from March, pt was discharged on olanzapine but pt likely not compliant  will resume olanzapine 10mg for now  PSYCH CONSULT ON MONDAY.      Problem/Plan - 4:  ·  Problem: Gastritis.  Plan: c/w protonix.      Problem/Plan - 5:  ·  Problem: Need for prophylactic measure.    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            Problem/Plan - 1:  ·  Problem: Abdominal pain.  Plan: patient p/w abdominal pain along with nausea  s/p pepcid, maalix, protonix and zofran in ED  pain improved currently  concern for alcoholic gastritis  c/w protonix  f/u CT abdomen     Problem/Plan - 2:  ·  Problem: Alcohol withdrawal.  Plan: pt p/w after binge drinking  BAL <3  CIWA 8  C/w CIWA protocol, librium 50mg q8h and ativan prn  thiamine, daily folic acid and multivitamin  SW consulted.      Problem/Plan - 3:  ·  Problem: Psychosis.  Plan: pt has extensive psychiatric history   as per psych note from March, pt was discharged on olanzapine but pt likely not compliant  will resume olanzapine 10mg for now  PSYCH CONSULT ON MONDAY.      Problem/Plan - 4:  ·  Problem: Gastritis.  Plan: c/w protonix.      Problem/Plan - 5:  ·  Problem: Need for prophylactic measure.

## 2021-05-09 NOTE — PROGRESS NOTE ADULT - SUBJECTIVE AND OBJECTIVE BOX
Patient is a 30y old  Male who presents with a chief complaint of alcohol withdrawal (08 May 2021 14:58)        MEDICATIONS  (STANDING):  chlordiazePOXIDE 50 milliGRAM(s) Oral every 8 hours  enoxaparin Injectable 40 milliGRAM(s) SubCutaneous daily  folic acid 1 milliGRAM(s) Oral daily  multivitamin 1 Tablet(s) Oral daily  OLANZapine 10 milliGRAM(s) Oral daily  pantoprazole    Tablet 40 milliGRAM(s) Oral before breakfast  sodium chloride 0.9% lock flush 3 milliLiter(s) IV Push every 8 hours  thiamine IVPB 500 milliGRAM(s) IV Intermittent every 24 hours    MEDICATIONS  (PRN):  LORazepam   Injectable 2 milliGRAM(s) IV Push every 2 hours PRN Agitation  simethicone 80 milliGRAM(s) Chew daily PRN Dyspepsia      REVIEW OF SYSTEMS:  CONSTITUTIONAL: No fever, weight loss, or fatigue  EYES: No eye pain, visual disturbances, or discharge  ENMT:  No difficulty hearing, tinnitus, vertigo; No sinus or throat pain  NECK: No pain or stiffness  RESPIRATORY: No cough, wheezing, chills or hemoptysis; No shortness of breath  CARDIOVASCULAR: No chest pain, palpitations, dizziness, or leg swelling  GASTROINTESTINAL: No abdominal or epigastric pain. No nausea, vomiting, or hematemesis; No diarrhea or constipation. No melena or hematochezia.  GENITOURINARY: No dysuria, frequency, hematuria, or incontinence  NEUROLOGICAL: No headaches, memory loss, loss of strength, numbness, or tremors  SKIN: No itching, burning, rashes, or lesions   ENDOCRINE: No heat or cold intolerance; No hair loss  MUSCULOSKELETAL: No joint pain or swelling; No muscle, back, or extremity pain  PSYCHIATRIC: No depression, anxiety, mood swings, or difficulty sleeping  HEME/LYMPH: No easy bruising, or bleeding gums  ALLERY AND IMMUNOLOGIC: No hives or eczema    PHYSICAL EXAM:    T(C): 37 (05-09-21 @ 14:10), Max: 37 (05-09-21 @ 14:10)  HR: 91 (05-09-21 @ 14:10) (74 - 91)  BP: 127/81 (05-09-21 @ 14:10) (127/81 - 138/79)  RR: 18 (05-09-21 @ 14:10) (16 - 18)  SpO2: 100% (05-09-21 @ 14:10) (97% - 100%)        GENERAL: NAD, well-groomed, well-developed  HEAD:  Atraumatic, Normocephalic  EYES: EOMI, PERRL, conjunctiva and sclera clear  ENMT: No tonsillar erythema, exudates, or enlargement; Moist mucous membranes, Good dentition, No lesions  NECK: Supple, No JVD, Normal thyroid  NERVOUS SYSTEM:  Alert & Oriented X3, Good concentration; Motor Strength 5/5 B/L upper and lower extremities; DTRs 2+ intact and symmetric  CHEST/LUNG: Clear to ascultation bilaterally; No rales, rhonchi, wheezing, or rubs  HEART: Regular rate and rhythm; No murmurs, rubs, or gallops  ABDOMEN: Soft, Nontender, Nondistended; Bowel sounds present  EXTREMITIES:  2+ Peripheral Pulses, No clubbing, cyanosis, or edema  SKIN: No rashes or lesions    LABS:                        13.9   7.27  )-----------( 319      ( 09 May 2021 06:15 )             41.4     05-09    134<L>  |  103  |  9   ----------------------------<  105<H>  3.2<L>   |  24  |  0.71    Ca    7.9<L>      09 May 2021 06:15  Phos  3.2     05-09  Mg     2.4     05-09    TPro  7.5  /  Alb  3.2<L>  /  TBili  0.9  /  DBili  x   /  AST  37  /  ALT  92<H>  /  AlkPhos  100  05-09          RADIOLOGY & ADDITIONAL TESTS:    Imaging  Reviewed:  [x] YES  [ ] NO    Consultant(s) Notes Reviewed:  [x] YES  [ ] NO    Care Discussed with Consultants/Other Providers [x] YES  [ ] NO Patient is a 30 year old  Male who presents with a chief complaint of alcohol withdrawal (08 May 2021 14:58)    Patient seen and examined earlier today, denies any new complaints.    MEDICATIONS  (STANDING):  chlordiazePOXIDE 50 milliGRAM(s) Oral every 8 hours  enoxaparin Injectable 40 milliGRAM(s) SubCutaneous daily  folic acid 1 milliGRAM(s) Oral daily  multivitamin 1 Tablet(s) Oral daily  OLANZapine 10 milliGRAM(s) Oral daily  pantoprazole    Tablet 40 milliGRAM(s) Oral before breakfast  sodium chloride 0.9% lock flush 3 milliLiter(s) IV Push every 8 hours  thiamine IVPB 500 milliGRAM(s) IV Intermittent every 24 hours    MEDICATIONS  (PRN):  LORazepam   Injectable 2 milliGRAM(s) IV Push every 2 hours PRN Agitation  simethicone 80 milliGRAM(s) Chew daily PRN Dyspepsia      REVIEW OF SYSTEMS:  CONSTITUTIONAL: No fever, weight loss, has fatigue  EYES: No eye pain, visual disturbances, or discharge  ENMT:  No difficulty hearing, tinnitus, vertigo; No sinus or throat pain  NECK: No pain or stiffness  RESPIRATORY: No cough, wheezing, chills or hemoptysis; No shortness of breath  CARDIOVASCULAR: No chest pain, palpitations, dizziness, or leg swelling  GASTROINTESTINAL: No abdominal or epigastric pain. No nausea, vomiting, or hematemesis; No diarrhea or constipation. No melena or hematochezia.  GENITOURINARY: No dysuria, frequency, hematuria, or incontinence  NEUROLOGICAL: No headaches, memory loss, loss of strength, numbness, or tremors  SKIN: No itching, burning, rashes, or lesions   ENDOCRINE: No heat or cold intolerance; No hair loss  MUSCULOSKELETAL: No joint pain or swelling; No muscle, back, or extremity pain  PSYCHIATRIC: No depression, anxiety, mood swings, or difficulty sleeping  HEME/LYMPH: No easy bruising, or bleeding gums  ALLERGY AND IMMUNOLOGIC: No hives or eczema    PHYSICAL EXAM:    T(C): 37 (05-09-21 @ 14:10), Max: 37 (05-09-21 @ 14:10)  HR: 91 (05-09-21 @ 14:10) (74 - 91)  BP: 127/81 (05-09-21 @ 14:10) (127/81 - 138/79)  RR: 18 (05-09-21 @ 14:10) (16 - 18)  SpO2: 100% (05-09-21 @ 14:10) (97% - 100%)        GENERAL: NAD, well-groomed, well-developed  HEAD:  Atraumatic, Normocephalic  EYES: EOMI, PERRL, conjunctiva and sclera clear  ENMT:  Moist mucous membranes  NECK: Supple, No JVD, Normal thyroid  NERVOUS SYSTEM:  Alert & Oriented X3, has b/l hand tremors  CHEST/LUNG: Clear to ascultation bilaterally; No rales, rhonchi, wheezing, or rubs  HEART: Regular rate and rhythm; No murmurs, rubs, or gallops  ABDOMEN: Soft, Nontender, Nondistended; Bowel sounds present  EXTREMITIES:  2+ Peripheral Pulses, No clubbing, cyanosis, or edema  SKIN: No rashes or lesions    LABS:                        13.9   7.27  )-----------( 319      ( 09 May 2021 06:15 )             41.4     05-09    134<L>  |  103  |  9   ----------------------------<  105<H>  3.2<L>   |  24  |  0.71    Ca    7.9<L>      09 May 2021 06:15  Phos  3.2     05-09  Mg     2.4     05-09    TPro  7.5  /  Alb  3.2<L>  /  TBili  0.9  /  DBili  x   /  AST  37  /  ALT  92<H>  /  AlkPhos  100  05-09          RADIOLOGY & ADDITIONAL TESTS:    Imaging  Reviewed:  [x] YES  [ ] NO    Consultant(s) Notes Reviewed:  [x] YES  [ ] NO    Care Discussed with Consultants/Other Providers [x] YES  [ ] NO

## 2021-05-10 DIAGNOSIS — Z29.9 ENCOUNTER FOR PROPHYLACTIC MEASURES, UNSPECIFIED: ICD-10-CM

## 2021-05-10 DIAGNOSIS — Z02.9 ENCOUNTER FOR ADMINISTRATIVE EXAMINATIONS, UNSPECIFIED: ICD-10-CM

## 2021-05-10 DIAGNOSIS — E53.8 DEFICIENCY OF OTHER SPECIFIED B GROUP VITAMINS: ICD-10-CM

## 2021-05-10 DIAGNOSIS — R93.5 ABNORMAL FINDINGS ON DIAGNOSTIC IMAGING OF OTHER ABDOMINAL REGIONS, INCLUDING RETROPERITONEUM: ICD-10-CM

## 2021-05-10 DIAGNOSIS — K76.0 FATTY (CHANGE OF) LIVER, NOT ELSEWHERE CLASSIFIED: ICD-10-CM

## 2021-05-10 DIAGNOSIS — F10.232 ALCOHOL DEPENDENCE WITH WITHDRAWAL WITH PERCEPTUAL DISTURBANCE: ICD-10-CM

## 2021-05-10 LAB
ALBUMIN SERPL ELPH-MCNC: 3.2 G/DL — LOW (ref 3.5–5)
ALP SERPL-CCNC: 108 U/L — SIGNIFICANT CHANGE UP (ref 40–120)
ALT FLD-CCNC: 74 U/L DA — HIGH (ref 10–60)
ANION GAP SERPL CALC-SCNC: 7 MMOL/L — SIGNIFICANT CHANGE UP (ref 5–17)
AST SERPL-CCNC: 24 U/L — SIGNIFICANT CHANGE UP (ref 10–40)
BASOPHILS # BLD AUTO: 0.03 K/UL — SIGNIFICANT CHANGE UP (ref 0–0.2)
BASOPHILS NFR BLD AUTO: 0.4 % — SIGNIFICANT CHANGE UP (ref 0–2)
BILIRUB SERPL-MCNC: 0.6 MG/DL — SIGNIFICANT CHANGE UP (ref 0.2–1.2)
BUN SERPL-MCNC: 7 MG/DL — SIGNIFICANT CHANGE UP (ref 7–18)
CALCIUM SERPL-MCNC: 8.3 MG/DL — LOW (ref 8.4–10.5)
CHLORIDE SERPL-SCNC: 105 MMOL/L — SIGNIFICANT CHANGE UP (ref 96–108)
CO2 SERPL-SCNC: 23 MMOL/L — SIGNIFICANT CHANGE UP (ref 22–31)
CREAT SERPL-MCNC: 0.72 MG/DL — SIGNIFICANT CHANGE UP (ref 0.5–1.3)
EOSINOPHIL # BLD AUTO: 0.15 K/UL — SIGNIFICANT CHANGE UP (ref 0–0.5)
EOSINOPHIL NFR BLD AUTO: 2.1 % — SIGNIFICANT CHANGE UP (ref 0–6)
ERYTHROCYTE [SEDIMENTATION RATE] IN BLOOD: 9 MM/HR — SIGNIFICANT CHANGE UP (ref 0–15)
GLUCOSE SERPL-MCNC: 88 MG/DL — SIGNIFICANT CHANGE UP (ref 70–99)
HCT VFR BLD CALC: 43.5 % — SIGNIFICANT CHANGE UP (ref 39–50)
HGB BLD-MCNC: 14.6 G/DL — SIGNIFICANT CHANGE UP (ref 13–17)
IMM GRANULOCYTES NFR BLD AUTO: 0.3 % — SIGNIFICANT CHANGE UP (ref 0–1.5)
LYMPHOCYTES # BLD AUTO: 1.98 K/UL — SIGNIFICANT CHANGE UP (ref 1–3.3)
LYMPHOCYTES # BLD AUTO: 28 % — SIGNIFICANT CHANGE UP (ref 13–44)
MAGNESIUM SERPL-MCNC: 2.3 MG/DL — SIGNIFICANT CHANGE UP (ref 1.6–2.6)
MCHC RBC-ENTMCNC: 27.4 PG — SIGNIFICANT CHANGE UP (ref 27–34)
MCHC RBC-ENTMCNC: 33.6 GM/DL — SIGNIFICANT CHANGE UP (ref 32–36)
MCV RBC AUTO: 81.8 FL — SIGNIFICANT CHANGE UP (ref 80–100)
MONOCYTES # BLD AUTO: 0.55 K/UL — SIGNIFICANT CHANGE UP (ref 0–0.9)
MONOCYTES NFR BLD AUTO: 7.8 % — SIGNIFICANT CHANGE UP (ref 2–14)
NEUTROPHILS # BLD AUTO: 4.35 K/UL — SIGNIFICANT CHANGE UP (ref 1.8–7.4)
NEUTROPHILS NFR BLD AUTO: 61.4 % — SIGNIFICANT CHANGE UP (ref 43–77)
NRBC # BLD: 0 /100 WBCS — SIGNIFICANT CHANGE UP (ref 0–0)
PHOSPHATE SERPL-MCNC: 3.2 MG/DL — SIGNIFICANT CHANGE UP (ref 2.5–4.5)
PLATELET # BLD AUTO: 318 K/UL — SIGNIFICANT CHANGE UP (ref 150–400)
POTASSIUM SERPL-MCNC: 3.7 MMOL/L — SIGNIFICANT CHANGE UP (ref 3.5–5.3)
POTASSIUM SERPL-SCNC: 3.7 MMOL/L — SIGNIFICANT CHANGE UP (ref 3.5–5.3)
PROT SERPL-MCNC: 7.8 G/DL — SIGNIFICANT CHANGE UP (ref 6–8.3)
RBC # BLD: 5.32 M/UL — SIGNIFICANT CHANGE UP (ref 4.2–5.8)
RBC # FLD: 13.2 % — SIGNIFICANT CHANGE UP (ref 10.3–14.5)
SODIUM SERPL-SCNC: 135 MMOL/L — SIGNIFICANT CHANGE UP (ref 135–145)
WBC # BLD: 7.08 K/UL — SIGNIFICANT CHANGE UP (ref 3.8–10.5)
WBC # FLD AUTO: 7.08 K/UL — SIGNIFICANT CHANGE UP (ref 3.8–10.5)

## 2021-05-10 PROCEDURE — 74177 CT ABD & PELVIS W/CONTRAST: CPT | Mod: 26

## 2021-05-10 PROCEDURE — 90792 PSYCH DIAG EVAL W/MED SRVCS: CPT

## 2021-05-10 RX ORDER — ONDANSETRON 8 MG/1
4 TABLET, FILM COATED ORAL ONCE
Refills: 0 | Status: COMPLETED | OUTPATIENT
Start: 2021-05-10 | End: 2021-05-10

## 2021-05-10 RX ORDER — OLANZAPINE 15 MG/1
10 TABLET, FILM COATED ORAL AT BEDTIME
Refills: 0 | Status: DISCONTINUED | OUTPATIENT
Start: 2021-05-10 | End: 2021-05-13

## 2021-05-10 RX ORDER — PREGABALIN 225 MG/1
1000 CAPSULE ORAL DAILY
Refills: 0 | Status: DISCONTINUED | OUTPATIENT
Start: 2021-05-10 | End: 2021-05-10

## 2021-05-10 RX ORDER — PREGABALIN 225 MG/1
1000 CAPSULE ORAL DAILY
Refills: 0 | Status: DISCONTINUED | OUTPATIENT
Start: 2021-05-10 | End: 2021-05-13

## 2021-05-10 RX ADMIN — Medication 50 MILLIGRAM(S): at 22:43

## 2021-05-10 RX ADMIN — PREGABALIN 1000 MICROGRAM(S): 225 CAPSULE ORAL at 19:00

## 2021-05-10 RX ADMIN — PANTOPRAZOLE SODIUM 40 MILLIGRAM(S): 20 TABLET, DELAYED RELEASE ORAL at 05:56

## 2021-05-10 RX ADMIN — ONDANSETRON 4 MILLIGRAM(S): 8 TABLET, FILM COATED ORAL at 12:31

## 2021-05-10 RX ADMIN — SODIUM CHLORIDE 3 MILLILITER(S): 9 INJECTION INTRAMUSCULAR; INTRAVENOUS; SUBCUTANEOUS at 22:43

## 2021-05-10 RX ADMIN — Medication 1 MILLIGRAM(S): at 12:32

## 2021-05-10 RX ADMIN — SIMETHICONE 80 MILLIGRAM(S): 80 TABLET, CHEWABLE ORAL at 12:32

## 2021-05-10 RX ADMIN — SODIUM CHLORIDE 3 MILLILITER(S): 9 INJECTION INTRAMUSCULAR; INTRAVENOUS; SUBCUTANEOUS at 05:55

## 2021-05-10 RX ADMIN — Medication 105 MILLIGRAM(S): at 18:55

## 2021-05-10 RX ADMIN — Medication 50 MILLIGRAM(S): at 14:58

## 2021-05-10 RX ADMIN — OLANZAPINE 10 MILLIGRAM(S): 15 TABLET, FILM COATED ORAL at 22:43

## 2021-05-10 RX ADMIN — Medication 1 TABLET(S): at 12:32

## 2021-05-10 RX ADMIN — ENOXAPARIN SODIUM 40 MILLIGRAM(S): 100 INJECTION SUBCUTANEOUS at 12:31

## 2021-05-10 RX ADMIN — SODIUM CHLORIDE 3 MILLILITER(S): 9 INJECTION INTRAMUSCULAR; INTRAVENOUS; SUBCUTANEOUS at 15:53

## 2021-05-10 RX ADMIN — OLANZAPINE 10 MILLIGRAM(S): 15 TABLET, FILM COATED ORAL at 12:32

## 2021-05-10 RX ADMIN — Medication 50 MILLIGRAM(S): at 05:56

## 2021-05-10 NOTE — BH CONSULTATION LIAISON ASSESSMENT NOTE - NSBHCONSULTRECOMMENDOTHER_PSY_A_CORE FT
1. Recommend c/w Zyprexa 10 mg qhs to address alcohol-induced psychosis  2. Agree with Librium 50 mg q8h standing for management of alcohol withdrawal  3. Medical management as directed by primary team  4. Recommend SW explore pt interest in IP [facility]hab [facility]  5. Psychiatry will continue to follow PRN until DC  6. Case d/w SUZI Lara of primary team    Kimberly Graham MD  Director, Consultation-Liaison Psychiatry Service  [id number]

## 2021-05-10 NOTE — BH CONSULTATION LIAISON ASSESSMENT NOTE - CURRENT MEDICATION
MEDICATIONS  (STANDING):  chlordiazePOXIDE 50 milliGRAM(s) Oral every 8 hours  cyanocobalamin 1000 MICROGram(s) Oral daily  enoxaparin Injectable 40 milliGRAM(s) SubCutaneous daily  folic acid 1 milliGRAM(s) Oral daily  multivitamin 1 Tablet(s) Oral daily  OLANZapine 10 milliGRAM(s) Oral at bedtime  pantoprazole    Tablet 40 milliGRAM(s) Oral before breakfast  sodium chloride 0.9% lock flush 3 milliLiter(s) IV Push every 8 hours  thiamine IVPB 500 milliGRAM(s) IV Intermittent every 24 hours    MEDICATIONS  (PRN):  LORazepam   Injectable 2 milliGRAM(s) IV Push every 2 hours PRN Agitation  simethicone 80 milliGRAM(s) Chew daily PRN Dyspepsia

## 2021-05-10 NOTE — BH CONSULTATION LIAISON ASSESSMENT NOTE - SUMMARY
30M born in [place], living in  for past 4 yrs and working informally in restaurants as a cook, with PHx of alcohol use DO with h/o alcohol-induced psychosis (admitted to [facility] IP psych 2/19-3/9 and 3/16-3/24, DC'd on Zyprexa 10 mg qhs) and unknown MHx, BIB self to med ED on 5/8 c/o abdominal pain s/p drinking 20-30 beers and admitted for tx of alcohol withdrawal. Psych consult was requested to r/o alcohol-induced psychosis. On exam, pt presents calm, cooperative, linear and organized, denies SI/HI/AVH and is not manifesting any s/s of alcohol withdrawal (which is being managed with Librium 50 mg q8h) or alcohol-induced psychosis. 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 We recommend c/w previously prescribed home antipsychotic (Zyprexa 10 mg qhs), which pt describes as very helpful for his alcohol-associated AH. We also recommend pt consider IP [facility] rehab @[facility], although pt may ultimately prove reluctant to engage, due to desire to return to work. Pt does not appear to present an acute risk of harm to self or others at the time of assessment, and does not appear to be in need of admission to IP psych at the time of assessment.

## 2021-05-10 NOTE — BH CONSULTATION LIAISON ASSESSMENT NOTE - DIFFERENTIAL
Alcohol use disorder, severe  Alcohol withdrawal  Alcohol-associated psychotic disorder, in remission

## 2021-05-10 NOTE — PROGRESS NOTE ADULT - PROBLEM SELECTOR PLAN 3
CT A/P with: Thickened inflamed terminal ileal and cecum suggestive of Crohn's disease. Right lower quadrant noncalcified mesenteric adenopathy. Calcified mesenteric lymph nodes are present as well indicating chronic inflammatory or infectious process.  follow up inflammatory markers ESR/CRP  GI Stool PCR  no leukocytosis, fevers, or diarrhea indicating a CD flare up  GI: Dr. Scott CT A/P with: Thickened inflamed terminal ileal and cecum suggestive of Crohn's disease. Right lower quadrant noncalcified mesenteric adenopathy. Calcified mesenteric lymph nodes are present as well indicating chronic inflammatory or infectious process.  follow up inflammatory markers ESR/CRP  GI Stool PCR  no leukocytosis, fevers, or diarrhea indicating a CD flare up  GI consulted: Dr. Scott

## 2021-05-10 NOTE — PROGRESS NOTE ADULT - ASSESSMENT
30 year old male with past medical history of EtOH abuse, substance induced psychosis, depression and several ED/CPEP encounters for alcohol related psychosis and recent psych admission to [facility] who presented to the ED with c/o nausea, abdominal pain and EtOH withdrawal. He denies suicidal or homicidal ideation but has previously endorsed auditory hallucinations for which psychiatry has been consulted to r/o alcohol induced psychotic disorder. CT A/P with findings of thickened inflamed terminal ileal and cecum suggestive of Crohn's disease - GI consulted. Patient with CIWA scores ~ 2, on Librium.

## 2021-05-10 NOTE — BH CONSULTATION LIAISON ASSESSMENT NOTE - NSICDXBHSECONDARYDX_PSY_ALL_CORE
Alcohol withdrawal   F10.239  Alcohol-induced psychotic disorder with onset during withdrawal with hallucinations   F10.232

## 2021-05-10 NOTE — BH CONSULTATION LIAISON ASSESSMENT NOTE - DESCRIPTION
B/R in [place] with both parents and 7 siblings. [place]ame to [place] around 2017. Lives with 2 friends in [place], works as a cook in restaurants. 1 brother lives in [place], rest of family is in [place]. Never , no children, no current relationships.

## 2021-05-10 NOTE — BH CONSULTATION LIAISON ASSESSMENT NOTE - HPI (INCLUDE ILLNESS QUALITY, SEVERITY, DURATION, TIMING, CONTEXT, MODIFYING FACTORS, ASSOCIATED SIGNS AND SYMPTOMS)
30M born in [place], living in  for past 4 yrs and working informally in restaurants as a cook, with PHx of alcohol use DO with h/o alcohol-induced psychosis (admitted to [facility] IP psych 2/19-3/9 and 3/16-3/24, DC'd on Zyprexa 10 mg qhs) and unknown MHx, BIB self to med ED on 5/8 c/o abdominal pain s/p drinking 20-30 beers and admitted for tx of alcohol withdrawal. Psych consult was requested to r/o alcohol-induced psychosis. Pt seen in his room for eval, found sleeping in bed but awakening easily to voice and cooperative with interview. Pt confirms why he came to the hospital, as well as recent h/o admissions to [facility] for alcohol-induced AH (pt said he overheard derogatory voices talking about him). Pt reports that the Zyprexa 10 mg qhs prescribed @[facility] made the voices go away, and he has continued to take the medication because he considers it helpful. Pt describes current mood as "good," denies current AH or withdrawal sx and denies SI/HI. MD recommends IP SCHWARTZ tx @[facility], which accepts pts regardless of insurance or immigration status and has Chinese-speaking staff. Pt responds that he "might" be interested in attending the program but adds, "I need to get back to work."

## 2021-05-10 NOTE — BH CONSULTATION LIAISON ASSESSMENT NOTE - NSSUICPROTFACT_PSY_ALL_CORE
Responsibility to children, family, or others/Identifies reasons for living/Supportive social network of family or friends/Fear of death or the actual act of killing self/Cultural, spiritual and/or moral attitudes against suicide/Engaged in work or school

## 2021-05-10 NOTE — BH CONSULTATION LIAISON ASSESSMENT NOTE - RISK ASSESSMENT
Risk factors: Alcohol use DO, h/o alcohol-associated psychosis. Protective factors: Absent current SI/SA/SIB, stable domicile with supportive friends, attachment to independence, help seeking and cooperative with care. Acute risk level appears low at the time of assessment, but chronic risk may be mildly elevated due to above risk factors.

## 2021-05-10 NOTE — PROGRESS NOTE ADULT - SUBJECTIVE AND OBJECTIVE BOX
9:25 AM    Reason for Call: Phone Call    Description: Patient would like if provider can prescribe a cough medicine. Patient stated she was up all night coughing and has had it for a week. Patient stated the cough syrup provider usually prescribes takes care of the cough. Patient would like Rx to go to [facility].    Was an appointment offered for this call? No     Preferred method for responding to this message: Telephone Call    If we cannot reach you directly, may we leave a detailed response at the number you provided? Yes    Can this message wait until your PCP/provider returns, if available today? Not applicable,     Charu Negrete       NP Note discussed with  Primary Attending    Patient is a 30y old  Male who presents with a chief complaint of alcohol withdrawal (09 May 2021 20:51)      INTERVAL HPI/OVERNIGHT EVENTS: no acute events overnight, hemodynamically stable     MEDICATIONS  (STANDING):  chlordiazePOXIDE 50 milliGRAM(s) Oral every 8 hours  cyanocobalamin 1000 MICROGram(s) Oral daily  enoxaparin Injectable 40 milliGRAM(s) SubCutaneous daily  folic acid 1 milliGRAM(s) Oral daily  multivitamin 1 Tablet(s) Oral daily  OLANZapine 10 milliGRAM(s) Oral at bedtime  pantoprazole    Tablet 40 milliGRAM(s) Oral before breakfast  sodium chloride 0.9% lock flush 3 milliLiter(s) IV Push every 8 hours  thiamine IVPB 500 milliGRAM(s) IV Intermittent every 24 hours    MEDICATIONS  (PRN):  LORazepam   Injectable 2 milliGRAM(s) IV Push every 2 hours PRN Agitation  simethicone 80 milliGRAM(s) Chew daily PRN Dyspepsia      __________________________________________________  REVIEW OF SYSTEMS:    CONSTITUTIONAL: No fever,   EYES: no acute visual disturbances  NECK: No pain or stiffness  RESPIRATORY: No cough; No shortness of breath  CARDIOVASCULAR: No chest pain, no palpitations  GASTROINTESTINAL: No pain. No nausea or vomiting; No diarrhea   NEUROLOGICAL: No headache or numbness, no tremors  MUSCULOSKELETAL: No joint pain, no muscle pain  GENITOURINARY: no dysuria, no frequency, no hesitancy  PSYCHIATRY: no depression , no anxiety  ALL OTHER  ROS negative        Vital Signs Last 24 Hrs  T(C): 36.9 (10 May 2021 14:23), Max: 36.9 (10 May 2021 14:23)  T(F): 98.4 (10 May 2021 14:23), Max: 98.4 (10 May 2021 14:23)  HR: 85 (10 May 2021 14:23) (83 - 87)  BP: 119/78 (10 May 2021 14:23) (95/64 - 119/78)  BP(mean): --  RR: 18 (10 May 2021 14:23) (16 - 18)  SpO2: 100% (10 May 2021 14:23) (98% - 100%)    ________________________________________________  PHYSICAL EXAM:  GENERAL: NAD  HEENT: Normocephalic;  conjunctivae and sclerae clear; moist mucous membranes;   NECK : supple  CHEST/LUNG: Clear to auscultation bilaterally with good air entry   HEART: S1 S2  regular; no murmurs, gallops or rubs  ABDOMEN: Soft, Nontender, Nondistended; Bowel sounds present  EXTREMITIES: no cyanosis; no edema; no calf tenderness  SKIN: warm and dry; no rash  NERVOUS SYSTEM:  Awake and alert; Oriented  to place, person and time ; no new deficits    _________________________________________________  LABS:                        14.6   7.08  )-----------( 318      ( 10 May 2021 05:57 )             43.5     05-10    135  |  105  |  7   ----------------------------<  88  3.7   |  23  |  0.72    Ca    8.3<L>      10 May 2021 05:57  Phos  3.2     05-10  Mg     2.3     05-10    TPro  7.8  /  Alb  3.2<L>  /  TBili  0.6  /  DBili  x   /  AST  24  /  ALT  74<H>  /  AlkPhos  108  05-10        CAPILLARY BLOOD GLUCOSE            RADIOLOGY & ADDITIONAL TESTS:    Imaging  Reviewed:  YES  < from: CT Abdomen and Pelvis w/ IV Cont (05.10.21 @ 10:12) >  IMPRESSION:    Thickened inflamed terminal ileal and cecum suggestive of Crohn's disease. Right lower quadrant noncalcified mesenteric adenopathy. Calcified mesenteric lymph nodes are present as well indicating chronic inflammatory or infectious process.    The appendix is not visualized and appendicitis cannot be excluded.    Small pelvic ascites.    Hepatic steatosis.    < from: Xray Chest 1 View- PORTABLE-Urgent (Xray Chest 1 View- PORTABLE-Urgent .) (05.08.21 @ 08:19) >  FINDINGS:  The lungs are clear of airspace consolidations or effusions. No pneumothorax.          Consultant(s) Notes Reviewed:   YES      Plan of care was discussed with patient and /or primary care giver; all questions and concerns were addressed  NP Note discussed with  Primary Attending    Patient is a 30 year old male who presents with a chief complaint of alcohol withdrawal (09 May 2021 20:51)      INTERVAL HPI/OVERNIGHT EVENTS: no acute events overnight, hemodynamically stable     MEDICATIONS  (STANDING):  chlordiazePOXIDE 50 milliGRAM(s) Oral every 8 hours  cyanocobalamin 1000 MICROGram(s) Oral daily  enoxaparin Injectable 40 milliGRAM(s) SubCutaneous daily  folic acid 1 milliGRAM(s) Oral daily  multivitamin 1 Tablet(s) Oral daily  OLANZapine 10 milliGRAM(s) Oral at bedtime  pantoprazole    Tablet 40 milliGRAM(s) Oral before breakfast  sodium chloride 0.9% lock flush 3 milliLiter(s) IV Push every 8 hours  thiamine IVPB 500 milliGRAM(s) IV Intermittent every 24 hours    MEDICATIONS  (PRN):  LORazepam   Injectable 2 milliGRAM(s) IV Push every 2 hours PRN Agitation  simethicone 80 milliGRAM(s) Chew daily PRN Dyspepsia      __________________________________________________  REVIEW OF SYSTEMS:    CONSTITUTIONAL: No fever,   EYES: no acute visual disturbances  NECK: No pain or stiffness  RESPIRATORY: No cough; No shortness of breath  CARDIOVASCULAR: No chest pain, no palpitations  GASTROINTESTINAL: No pain. No nausea or vomiting; No diarrhea   NEUROLOGICAL: No headache or numbness, no tremors  MUSCULOSKELETAL: No joint pain, no muscle pain  GENITOURINARY: no dysuria, no frequency, no hesitancy  PSYCHIATRY: no depression , no anxiety  ALL OTHER  ROS negative        Vital Signs Last 24 Hrs  T(C): 36.9 (10 May 2021 14:23), Max: 36.9 (10 May 2021 14:23)  T(F): 98.4 (10 May 2021 14:23), Max: 98.4 (10 May 2021 14:23)  HR: 85 (10 May 2021 14:23) (83 - 87)  BP: 119/78 (10 May 2021 14:23) (95/64 - 119/78)  RR: 18 (10 May 2021 14:23) (16 - 18)  SpO2: 100% (10 May 2021 14:23) (98% - 100%)    ________________________________________________  PHYSICAL EXAM:  GENERAL: NAD  HEENT: Normocephalic;  conjunctivae and sclerae clear; moist mucous membranes;   NECK : supple  CHEST/LUNG: Clear to auscultation bilaterally with good air entry   HEART: S1 S2  regular; no murmurs, gallops or rubs  ABDOMEN: Soft, Nontender, Nondistended; Bowel sounds present  EXTREMITIES: no cyanosis; no edema; no calf tenderness  SKIN: warm and dry; no rash  NERVOUS SYSTEM:  Awake and alert; Oriented  to place, person and time; no new deficits    _________________________________________________  LABS:                        14.6   7.08  )-----------( 318      ( 10 May 2021 05:57 )             43.5     05-10    135  |  105  |  7   ----------------------------<  88  3.7   |  23  |  0.72    Ca    8.3<L>      10 May 2021 05:57  Phos  3.2     05-10  Mg     2.3     05-10    TPro  7.8  /  Alb  3.2<L>  /  TBili  0.6  /  DBili  x   /  AST  24  /  ALT  74<H>  /  AlkPhos  108  05-10      RADIOLOGY & ADDITIONAL TESTS:    Imaging  Reviewed:  YES  < from: CT Abdomen and Pelvis w/ IV Cont (05.10.21 @ 10:12) >  IMPRESSION:    Thickened inflamed terminal ileal and cecum suggestive of Crohn's disease. Right lower quadrant noncalcified mesenteric adenopathy. Calcified mesenteric lymph nodes are present as well indicating chronic inflammatory or infectious process.    The appendix is not visualized and appendicitis cannot be excluded.    Small pelvic ascites.    Hepatic steatosis.    < from: Xray Chest 1 View- PORTABLE-Urgent (Xray Chest 1 View- PORTABLE-Urgent .) (05.08.21 @ 08:19) >  FINDINGS:  The lungs are clear of airspace consolidations or effusions. No pneumothorax.          Consultant(s) Notes Reviewed:   YES      Plan of care was discussed with patient and /or primary care giver; all questions and concerns were addressed

## 2021-05-10 NOTE — PROGRESS NOTE ADULT - PROBLEM SELECTOR PLAN 7
patient considering IP rehabilitation at [facility]: [facility] following  pending GI consultation

## 2021-05-10 NOTE — PROGRESS NOTE ADULT - PROBLEM SELECTOR PLAN 5
B12 206 ---> cyanocobalamin 1,000 units daily likely in the setting of EtOH abuse  avoid hepatotoxic agents  trend LFT's

## 2021-05-10 NOTE — PROGRESS NOTE ADULT - PROBLEM SELECTOR PLAN 4
likely EtOH induced   continue with protonix  avoid NSAID's  trend CBC   full liquid diet  GI: Tyler likely EtOH induced   continue with protonix  avoid NSAID's  trend CBC   full liquid diet  GI consulted: Tyler

## 2021-05-10 NOTE — BH CONSULTATION LIAISON ASSESSMENT NOTE - NSBHCHARTREVIEWVS_PSY_A_CORE FT
Vital Signs Last 24 Hrs  T(C): 36.8 (09 May 2021 21:44), Max: 37 (09 May 2021 14:10)  T(F): 98.3 (09 May 2021 21:44), Max: 98.6 (09 May 2021 14:10)  HR: 83 (10 May 2021 08:03) (83 - 91)  BP: 110/73 (10 May 2021 08:03) (95/64 - 127/81)  BP(mean): --  RR: 18 (10 May 2021 08:03) (16 - 18)  SpO2: 100% (10 May 2021 08:03) (98% - 100%)

## 2021-05-10 NOTE — BH CONSULTATION LIAISON ASSESSMENT NOTE - OTHER PAST PSYCHIATRIC HISTORY (INCLUDE DETAILS REGARDING ONSET, COURSE OF ILLNESS, INPATIENT/OUTPATIENT TREATMENT)
PHx: Alcohol use DO, alcohol-associated psychosis  IP: [facility] 2/19/21-3/9-21, 3/16/21-3/24/21, [facility] 11/13/20-11/14/20  OP: None currently  Rx: Zyprexa 10 mg qhs (started @[facility] during recent admission)

## 2021-05-10 NOTE — PROGRESS NOTE ADULT - PROBLEM SELECTOR PLAN 1
~CIWA 2  reports drinking 20-30 beers during binges   EtOH negative on admission   Librium 50 mg q8h  Ativan PRN  folic acid, MVM, thiamine  CIWA as per nursing protocol  SW following: patient considering [facility]

## 2021-05-11 LAB
ALBUMIN SERPL ELPH-MCNC: 3 G/DL — LOW (ref 3.5–5)
ALP SERPL-CCNC: 105 U/L — SIGNIFICANT CHANGE UP (ref 40–120)
ALT FLD-CCNC: 62 U/L DA — HIGH (ref 10–60)
ANION GAP SERPL CALC-SCNC: 6 MMOL/L — SIGNIFICANT CHANGE UP (ref 5–17)
AST SERPL-CCNC: 26 U/L — SIGNIFICANT CHANGE UP (ref 10–40)
BILIRUB SERPL-MCNC: 0.4 MG/DL — SIGNIFICANT CHANGE UP (ref 0.2–1.2)
BUN SERPL-MCNC: 7 MG/DL — SIGNIFICANT CHANGE UP (ref 7–18)
CALCIUM SERPL-MCNC: 8.6 MG/DL — SIGNIFICANT CHANGE UP (ref 8.4–10.5)
CHLORIDE SERPL-SCNC: 104 MMOL/L — SIGNIFICANT CHANGE UP (ref 96–108)
CO2 SERPL-SCNC: 27 MMOL/L — SIGNIFICANT CHANGE UP (ref 22–31)
CREAT SERPL-MCNC: 0.9 MG/DL — SIGNIFICANT CHANGE UP (ref 0.5–1.3)
CRP SERPL-MCNC: 15 MG/L — HIGH
GLUCOSE SERPL-MCNC: 87 MG/DL — SIGNIFICANT CHANGE UP (ref 70–99)
HCT VFR BLD CALC: 43 % — SIGNIFICANT CHANGE UP (ref 39–50)
HGB BLD-MCNC: 14.5 G/DL — SIGNIFICANT CHANGE UP (ref 13–17)
LIDOCAIN IGE QN: 84 U/L — SIGNIFICANT CHANGE UP (ref 73–393)
MAGNESIUM SERPL-MCNC: 2.6 MG/DL — SIGNIFICANT CHANGE UP (ref 1.6–2.6)
MCHC RBC-ENTMCNC: 28 PG — SIGNIFICANT CHANGE UP (ref 27–34)
MCHC RBC-ENTMCNC: 33.7 GM/DL — SIGNIFICANT CHANGE UP (ref 32–36)
MCV RBC AUTO: 83.2 FL — SIGNIFICANT CHANGE UP (ref 80–100)
NRBC # BLD: 0 /100 WBCS — SIGNIFICANT CHANGE UP (ref 0–0)
PLATELET # BLD AUTO: 316 K/UL — SIGNIFICANT CHANGE UP (ref 150–400)
POTASSIUM SERPL-MCNC: 3.7 MMOL/L — SIGNIFICANT CHANGE UP (ref 3.5–5.3)
POTASSIUM SERPL-SCNC: 3.7 MMOL/L — SIGNIFICANT CHANGE UP (ref 3.5–5.3)
PROT SERPL-MCNC: 7.9 G/DL — SIGNIFICANT CHANGE UP (ref 6–8.3)
RBC # BLD: 5.17 M/UL — SIGNIFICANT CHANGE UP (ref 4.2–5.8)
RBC # FLD: 13.5 % — SIGNIFICANT CHANGE UP (ref 10.3–14.5)
SODIUM SERPL-SCNC: 137 MMOL/L — SIGNIFICANT CHANGE UP (ref 135–145)
WBC # BLD: 8 K/UL — SIGNIFICANT CHANGE UP (ref 3.8–10.5)
WBC # FLD AUTO: 8 K/UL — SIGNIFICANT CHANGE UP (ref 3.8–10.5)

## 2021-05-11 PROCEDURE — 99232 SBSQ HOSP IP/OBS MODERATE 35: CPT

## 2021-05-11 RX ORDER — METRONIDAZOLE 500 MG
500 TABLET ORAL EVERY 8 HOURS
Refills: 0 | Status: DISCONTINUED | OUTPATIENT
Start: 2021-05-11 | End: 2021-05-13

## 2021-05-11 RX ORDER — CIPROFLOXACIN LACTATE 400MG/40ML
200 VIAL (ML) INTRAVENOUS EVERY 12 HOURS
Refills: 0 | Status: DISCONTINUED | OUTPATIENT
Start: 2021-05-11 | End: 2021-05-13

## 2021-05-11 RX ADMIN — Medication 1 TABLET(S): at 12:04

## 2021-05-11 RX ADMIN — Medication 100 MILLIGRAM(S): at 17:50

## 2021-05-11 RX ADMIN — PANTOPRAZOLE SODIUM 40 MILLIGRAM(S): 20 TABLET, DELAYED RELEASE ORAL at 06:29

## 2021-05-11 RX ADMIN — SODIUM CHLORIDE 3 MILLILITER(S): 9 INJECTION INTRAMUSCULAR; INTRAVENOUS; SUBCUTANEOUS at 05:58

## 2021-05-11 RX ADMIN — SIMETHICONE 80 MILLIGRAM(S): 80 TABLET, CHEWABLE ORAL at 12:04

## 2021-05-11 RX ADMIN — Medication 100 MILLIGRAM(S): at 14:06

## 2021-05-11 RX ADMIN — PREGABALIN 1000 MICROGRAM(S): 225 CAPSULE ORAL at 12:46

## 2021-05-11 RX ADMIN — Medication 50 MILLIGRAM(S): at 05:53

## 2021-05-11 RX ADMIN — Medication 1 MILLIGRAM(S): at 12:05

## 2021-05-11 RX ADMIN — Medication 100 MILLIGRAM(S): at 21:50

## 2021-05-11 RX ADMIN — ENOXAPARIN SODIUM 40 MILLIGRAM(S): 100 INJECTION SUBCUTANEOUS at 12:05

## 2021-05-11 RX ADMIN — OLANZAPINE 10 MILLIGRAM(S): 15 TABLET, FILM COATED ORAL at 21:50

## 2021-05-11 RX ADMIN — Medication 50 MILLIGRAM(S): at 17:50

## 2021-05-11 RX ADMIN — SODIUM CHLORIDE 3 MILLILITER(S): 9 INJECTION INTRAMUSCULAR; INTRAVENOUS; SUBCUTANEOUS at 14:05

## 2021-05-11 RX ADMIN — SODIUM CHLORIDE 3 MILLILITER(S): 9 INJECTION INTRAMUSCULAR; INTRAVENOUS; SUBCUTANEOUS at 21:50

## 2021-05-11 NOTE — PROGRESS NOTE ADULT - PROBLEM SELECTOR PLAN 7
patient agreeable to IP rehabilitation at [facility]: [facility] following  pending GI consultation patient agreeable to IP rehabilitation at [facility]: [facility]

## 2021-05-11 NOTE — CONSULT NOTE ADULT - SUBJECTIVE AND OBJECTIVE BOX
GI INITIAL CONSULT    HPI:  Patient is a  29 YO Kyrgyz-speaking male, did not want to communicate with this NP in the morning because wanted to sleep, when asked questions would not respond, hx obtained from previous chart note. Pt domiciled in an apartment with friends, employed in a restaurant, no past med history, pphx alcohol dependence with detox history, frequently evaluated in EDs for agitation/psychosis in the context of ETOH intox, substance-induced psychosis, depression presents to the ED with abdominal pain. Patient reports he drank 20-30 beers yesterday and now has abdomen. Reports he drinks every 15 days and sometimes feels shakiness when he does not drink. He states he takes no medications.     Meds:  MEDICATIONS  (STANDING):  chlordiazePOXIDE 50 milliGRAM(s) Oral every 8 hours  cyanocobalamin Injectable 1000 MICROGram(s) IntraMuscular daily  enoxaparin Injectable 40 milliGRAM(s) SubCutaneous daily  folic acid 1 milliGRAM(s) Oral daily  multivitamin 1 Tablet(s) Oral daily  OLANZapine 10 milliGRAM(s) Oral at bedtime  pantoprazole    Tablet 40 milliGRAM(s) Oral before breakfast  sodium chloride 0.9% lock flush 3 milliLiter(s) IV Push every 8 hours     SH: non contributory   FH: non contributory    ROS:  CONSTITUTIONAL: No fever, weight loss, or fatigue  EYES: No eye pain, visual disturbances, or discharge  ENT:  No difficulty hearing, tinnitus, vertigo; No sinus or throat pain  NECK: No pain or stiffness  RESPIRATORY: No cough, wheezing, chills or hemoptysis, shortness of Breath  CARDIOVASCULAR: No chest pain, palpitations, passing out, dizziness, or leg swelling  GASTROINTESTINAL: see HPI  GENITOURINARY: No dysuria, frequency, hematuria, or incontinence  NEUROLOGICAL: No headaches, memory loss, loss of strength, numbness, or tremors  SKIN: No itching, burning, rashes, or lesions   MUSCULOSKELETAL: No arthralgia, myalgia, or back pain.     Vitals:   Vital Signs Last 24 Hrs  T(C): 36.8 (11 May 2021 05:18), Max: 36.9 (10 May 2021 14:23)  T(F): 98.2 (11 May 2021 05:18), Max: 98.4 (10 May 2021 14:23)  HR: 86 (11 May 2021 05:18) (84 - 86)  BP: 119/75 (11 May 2021 05:18) (116/73 - 119/78)    Gen: NAD  CVS: S1/S2  Chest: CTABL  Abd: soft, non tender, non distended                          14.5   8.00  )-----------( 316      ( 11 May 2021 05:42 )             43.0       05-11    137  |  104  |  7   ----------------------------<  87  3.7   |  27  |  0.90    Ca    8.6      11 May 2021 05:42  Phos  3.2     05-10  Mg     2.6     05-11    TPro  7.9  /  Alb  3.0<L>  /  TBili  0.4  /  DBili  x   /  AST  26  /  ALT  62<H>  /  AlkPhos  105  05-11    Imaging:   EXAM:  CT ABDOMEN AND PELVIS IC                          PROCEDURE DATE:  05/10/2021      INTERPRETATION:  CLINICAL INFORMATION: 30 years  Male with abd pain.    FINDINGS:  LOWER CHEST: Small sliding hiatus hernia.    LIVER: Attic steatosis.  BILE DUCTS: Normal caliber.  GALLBLADDER: Within normal limits.  SPLEEN: Within normal limits.  PANCREAS: Within normal limits.  ADRENALS: Within normal limits.  KIDNEYS/URETERS: Within normal limits.    BLADDER: Incompletely distended  REPRODUCTIVE ORGANS: Unremarkable prostate.    BOWEL: No bowel obstruction. Thick-walled terminal ileum and cecum with adjacent mesenteric fat stranding. The appendix itself is not visualized.  PERITONEUM: Trace pelvic ascites.  VESSELS: Within normal limits.  RETROPERITONEUM/LYMPH NODES: Right lower quadrant noncalcified mesenteric adenopathy. Reference lymph nodes measuring 1.7 x 1.2 cm (2:78) and 1.6 x 1.5 cm on (2:79). Multiple calcified mesenteric lymph nodes present as well.  ABDOMINAL WALL: Within normal limits.  BONES: Within normal limits.    IMPRESSION:    Thickened inflamed terminal ileal and cecum suggestive of Crohn's disease. Right lower quadrant noncalcified mesenteric adenopathy. Calcified mesenteric lymph nodes are present as well indicating chronic inflammatory or infectious process.    The appendix is not visualized and appendicitis cannot be excluded.    Small pelvic ascites.    Hepatic steatosis.    NISSA TEMPLE MD; Attending Radiologist  This document has been electronically signed. May 10 2021 11:36AM

## 2021-05-11 NOTE — BH CONSULTATION LIAISON PROGRESS NOTE - NSBHCONSULTRECOMMENDOTHER_PSY_A_CORE FT
1. Recommend c/w Zyprexa 10 mg qhs to address alcohol-induced psychosis  --Give 30d DC Rx for above medication on DC  2. Reduce Librium to 50 mg q12h standing for management of alcohol withdrawal  --Would taper tomorrow to 50 mg qam, then DC after AM dose  3. Medical management as directed by primary team  4. Pt has declined [facility] despite MD and [facility] recommendations  5. Psychiatry will continue to follow PRN until DC  6. Case d/w SUZI Lara of primary team    Kimberly Graham MD  Director, Consultation-Liaison Psychiatry Service  [id number]       1. Recommend c/w Zyprexa 10 mg qhs to address alcohol-induced psychosis  --Give 30d DC Rx for above medication on DC  2. Reduce Librium to 50 mg q12h standing for management of alcohol withdrawal  --Would taper tomorrow to 50 mg qam, then DC after AM dose  3. Medical management as directed by primary team  4. Pt has declined [facility] despite MD and [facility] recommendations  5. Psychiatry is signing off in anticipation of DC  6. Case d/w NP Jane of primary team    Kimberly Graham MD  Director, Consultation-Liaison Psychiatry Service  [id number]

## 2021-05-11 NOTE — DISCHARGE NOTE PROVIDER - CARE PROVIDER_API CALL
Fracisco Scott  GASTROENTEROLOGY  [phone] [place] [id number]  Phone: [phone]  Fax: [phone]  Follow Up Time: 2 weeks

## 2021-05-11 NOTE — BH CONSULTATION LIAISON PROGRESS NOTE - NSBHCHARTREVIEWLAB_PSY_A_CORE FT
14.5   8.00  )-----------( 316      ( 11 May 2021 05:42 )             43.0   05-11    137  |  104  |  7   ----------------------------<  87  3.7   |  27  |  0.90    Ca    8.6      11 May 2021 05:42  Phos  3.2     05-10  Mg     2.6     05-11    TPro  7.9  /  Alb  3.0<L>  /  TBili  0.4  /  DBili  x   /  AST  26  /  ALT  62<H>  /  AlkPhos  105  05-11

## 2021-05-11 NOTE — PROGRESS NOTE ADULT - SUBJECTIVE AND OBJECTIVE BOX
NP Note discussed with  Primary Attending    Patient is a 30 year old male who presents with a chief complaint of alcohol withdrawal (09 May 2021 20:51)      INTERVAL HPI/OVERNIGHT EVENTS: no acute events overnight, hemodynamically stable     MEDICATIONS  (STANDING):  chlordiazePOXIDE 50 milliGRAM(s) Oral every 8 hours  cyanocobalamin 1000 MICROGram(s) Oral daily  enoxaparin Injectable 40 milliGRAM(s) SubCutaneous daily  folic acid 1 milliGRAM(s) Oral daily  multivitamin 1 Tablet(s) Oral daily  OLANZapine 10 milliGRAM(s) Oral at bedtime  pantoprazole    Tablet 40 milliGRAM(s) Oral before breakfast  sodium chloride 0.9% lock flush 3 milliLiter(s) IV Push every 8 hours  thiamine IVPB 500 milliGRAM(s) IV Intermittent every 24 hours    MEDICATIONS  (PRN):  LORazepam   Injectable 2 milliGRAM(s) IV Push every 2 hours PRN Agitation  simethicone 80 milliGRAM(s) Chew daily PRN Dyspepsia      __________________________________________________  REVIEW OF SYSTEMS:    CONSTITUTIONAL: No fever,   EYES: no acute visual disturbances  NECK: No pain or stiffness  RESPIRATORY: No cough; No shortness of breath  CARDIOVASCULAR: No chest pain, no palpitations  GASTROINTESTINAL: No pain. No nausea or vomiting; No diarrhea   NEUROLOGICAL: No headache or numbness, no tremors  MUSCULOSKELETAL: No joint pain, no muscle pain  GENITOURINARY: no dysuria, no frequency, no hesitancy  PSYCHIATRY: no depression , no anxiety  ALL OTHER  ROS negative        Vital Signs Last 24 Hrs  T(C): 36.9 (10 May 2021 14:23), Max: 36.9 (10 May 2021 14:23)  T(F): 98.4 (10 May 2021 14:23), Max: 98.4 (10 May 2021 14:23)  HR: 85 (10 May 2021 14:23) (83 - 87)  BP: 119/78 (10 May 2021 14:23) (95/64 - 119/78)  RR: 18 (10 May 2021 14:23) (16 - 18)  SpO2: 100% (10 May 2021 14:23) (98% - 100%)    ________________________________________________  PHYSICAL EXAM:  GENERAL: NAD  HEENT: Normocephalic;  conjunctivae and sclerae clear; moist mucous membranes;   NECK : supple  CHEST/LUNG: Clear to auscultation bilaterally with good air entry   HEART: S1 S2  regular; no murmurs, gallops or rubs  ABDOMEN: Soft, Nontender, Nondistended; Bowel sounds present  EXTREMITIES: no cyanosis; no edema; no calf tenderness  SKIN: warm and dry; no rash  NERVOUS SYSTEM:  Awake and alert; Oriented  to place, person and time; no new deficits  PSYCH: denies AVH, SI/HI  _________________________________________________  LABS:                        14.6   7.08  )-----------( 318      ( 10 May 2021 05:57 )             43.5     05-10    135  |  105  |  7   ----------------------------<  88  3.7   |  23  |  0.72    Ca    8.3<L>      10 May 2021 05:57  Phos  3.2     05-10  Mg     2.3     05-10    TPro  7.8  /  Alb  3.2<L>  /  TBili  0.6  /  DBili  x   /  AST  24  /  ALT  74<H>  /  AlkPhos  108  05-10      RADIOLOGY & ADDITIONAL TESTS:    Imaging  Reviewed:  YES  < from: CT Abdomen and Pelvis w/ IV Cont (05.10.21 @ 10:12) >  IMPRESSION:    Thickened inflamed terminal ileal and cecum suggestive of Crohn's disease. Right lower quadrant noncalcified mesenteric adenopathy. Calcified mesenteric lymph nodes are present as well indicating chronic inflammatory or infectious process.    The appendix is not visualized and appendicitis cannot be excluded.    Small pelvic ascites.    Hepatic steatosis.    < from: Xray Chest 1 View- PORTABLE-Urgent (Xray Chest 1 View- PORTABLE-Urgent .) (05.08.21 @ 08:19) >  FINDINGS:  The lungs are clear of airspace consolidations or effusions. No pneumothorax.          Consultant(s) Notes Reviewed:   YES      Plan of care was discussed with patient and /or primary care giver; all questions and concerns were addressed  NP Note discussed with  Primary Attending    Patient is a 30 year old male who presents with a chief complaint of alcohol withdrawal (09 May 2021 20:51)      INTERVAL HPI/OVERNIGHT EVENTS: no acute events overnight, hemodynamically stable     MEDICATIONS  (STANDING):  chlordiazePOXIDE 50 milliGRAM(s) Oral every 8 hours  cyanocobalamin 1000 MICROGram(s) Oral daily  enoxaparin Injectable 40 milliGRAM(s) SubCutaneous daily  folic acid 1 milliGRAM(s) Oral daily  multivitamin 1 Tablet(s) Oral daily  OLANZapine 10 milliGRAM(s) Oral at bedtime  pantoprazole    Tablet 40 milliGRAM(s) Oral before breakfast  sodium chloride 0.9% lock flush 3 milliLiter(s) IV Push every 8 hours  thiamine IVPB 500 milliGRAM(s) IV Intermittent every 24 hours    MEDICATIONS  (PRN):  LORazepam   Injectable 2 milliGRAM(s) IV Push every 2 hours PRN Agitation  simethicone 80 milliGRAM(s) Chew daily PRN Dyspepsia      __________________________________________________  REVIEW OF SYSTEMS:    CONSTITUTIONAL: No fever,   EYES: no acute visual disturbances  NECK: No pain or stiffness  RESPIRATORY: No cough; No shortness of breath  CARDIOVASCULAR: No chest pain, no palpitations  GASTROINTESTINAL: No pain. No nausea or vomiting; No diarrhea   NEUROLOGICAL: No headache or numbness, no tremors  MUSCULOSKELETAL: No joint pain, no muscle pain  GENITOURINARY: no dysuria, no frequency, no hesitancy  PSYCHIATRY: no depression , no anxiety  ALL OTHER  ROS negative        Vital Signs Last 24 Hrs  T(C): 36.9 (10 May 2021 14:23), Max: 36.9 (10 May 2021 14:23)  T(F): 98.4 (10 May 2021 14:23), Max: 98.4 (10 May 2021 14:23)  HR: 85 (10 May 2021 14:23) (83 - 87)  BP: 119/78 (10 May 2021 14:23) (95/64 - 119/78)  RR: 18 (10 May 2021 14:23) (16 - 18)  SpO2: 100% (10 May 2021 14:23) (98% - 100%)    ________________________________________________  PHYSICAL EXAM:  GENERAL: NAD  HEENT: Normocephalic;  conjunctivae and sclerae clear; moist mucous membranes;   NECK : supple  CHEST/LUNG: Clear to auscultation bilaterally with good air entry   HEART: S1 S2  regular; no murmurs, gallops or rubs  ABDOMEN: Soft, Nontender, Nondistended; Bowel sounds present  EXTREMITIES: no cyanosis; no edema; no calf tenderness  SKIN: warm and dry; no rash  NERVOUS SYSTEM:  Awake and alert; Oriented  to place, person and time; no new deficits  PSYCH: denies AVH, SI/HI  _________________________________________________  LABS:                        14.6   7.08  )-----------( 318      ( 10 May 2021 05:57 )             43.5     05-10    135  |  105  |  7   ----------------------------<  88  3.7   |  23  |  0.72    Ca    8.3<L>      10 May 2021 05:57  Phos  3.2     05-10  Mg     2.3     05-10    TPro  7.8  /  Alb  3.2<L>  /  TBili  0.6  /  DBili  x   /  AST  24  /  ALT  74<H>  /  AlkPhos  108  05-10      RADIOLOGY & ADDITIONAL TESTS:    Imaging  Reviewed:  YES  < from: CT Abdomen and Pelvis w/ IV Cont (05.10.21 @ 10:12) >  IMPRESSION:    Thickened inflamed terminal ileal and cecum suggestive of Crohn's disease. Right lower quadrant noncalcified mesenteric adenopathy. Calcified mesenteric lymph nodes are present as well indicating chronic inflammatory or infectious process.    The appendix is not visualized and appendicitis cannot be excluded.    Small pelvic ascites.    Hepatic steatosis.    < from: Xray Chest 1 View- PORTABLE-Urgent (Xray Chest 1 View- PORTABLE-Urgent .) (05.08.21 @ 08:19) >  FINDINGS:  The lungs are clear of airspace consolidations or effusions. No pneumothorax.    Consultant(s) Notes Reviewed:   YES    Plan of care was discussed with patient and /or primary care giver; all questions and concerns were addressed

## 2021-05-11 NOTE — BH CONSULTATION LIAISON PROGRESS NOTE - CURRENT MEDICATION
MEDICATIONS  (STANDING):  chlordiazePOXIDE 50 milliGRAM(s) Oral every 12 hours  ciprofloxacin   IVPB 200 milliGRAM(s) IV Intermittent every 12 hours  cyanocobalamin Injectable 1000 MICROGram(s) IntraMuscular daily  enoxaparin Injectable 40 milliGRAM(s) SubCutaneous daily  folic acid 1 milliGRAM(s) Oral daily  metroNIDAZOLE  IVPB 500 milliGRAM(s) IV Intermittent every 8 hours  multivitamin 1 Tablet(s) Oral daily  OLANZapine 10 milliGRAM(s) Oral at bedtime  pantoprazole    Tablet 40 milliGRAM(s) Oral before breakfast  sodium chloride 0.9% lock flush 3 milliLiter(s) IV Push every 8 hours    MEDICATIONS  (PRN):  simethicone 80 milliGRAM(s) Chew daily PRN Dyspepsia

## 2021-05-11 NOTE — CONSULT NOTE ADULT - ASSESSMENT
29 YO M with ETOH abuse, CT abd shows Thickened inflamed terminal ileal and cecum suggestive of Crohn's disease. Right lower quadrant noncalcified mesenteric adenopathy. Calcified mesenteric lymph nodes are present as well indicating chronic inflammatory or infectious process. this morning patient did not want to engage in conversation with this NP    #inflamed terminal ileum  - infectious etiology vs chrons   - GI PCR panel   - start on empiric IV cipro 250 mg BID, IV Flagly 500mg TID  - rest as per medical team 31 YO M with ETOH abuse, CT abd shows Thickened inflamed terminal ileal and cecum suggestive of Crohn's disease. Right lower quadrant noncalcified mesenteric adenopathy. Calcified mesenteric lymph nodes are present as well indicating chronic inflammatory or infectious process. this morning patient did not want to engage in conversation with this NP    #inflamed terminal ileum  - infectious etiology vs crohn's   - GI PCR panel   - start on empiric IV cipro 500 mg BID, IV Flagly 500mg TID  - once EtOH issues are resolved, pt will need outpt w/u with colonoscopy for further evaluation  - rest as per medical team      ATTENDING ADDENDUM:  -case d/w me. agree with above

## 2021-05-11 NOTE — BH CONSULTATION LIAISON PROGRESS NOTE - NSBHFUPINTERVALHXFT_PSY_A_CORE
Per chart, pt has not required any PRNs for alcohol withdrawal in the past 24h. Pt seen in his room for f/u with [employer] video #[id number], found lying in bed asleep but waking easily to voice. Pt described mood as "a little bit sad" but denied SI/HI/AVH including derogatory voices. MD asked about pt's interest in attending [facility], but he responded that he had decided against it, because "I have to get back to work."

## 2021-05-11 NOTE — PROGRESS NOTE ADULT - PROBLEM SELECTOR PLAN 3
CT A/P with: Thickened inflamed terminal ileal and cecum suggestive of Crohn's disease. Right lower quadrant noncalcified mesenteric adenopathy. Calcified mesenteric lymph nodes are present as well indicating chronic inflammatory or infectious process.  ESR normal, CRP 15   GI Stool PCR  no leukocytosis, fevers, or diarrhea indicative of a CD flare up  GI consulted: Dr. Scott CT A/P with: Thickened inflamed terminal ileal and cecum suggestive of Crohn's disease. Right lower quadrant noncalcified mesenteric adenopathy. Calcified mesenteric lymph nodes are present as well indicating chronic inflammatory or infectious process.  ESR normal, CRP 15   GI Stool PCR: pending  no leukocytosis, fevers, or diarrhea   Empiric IV Ciprofloxacin and Flagyl as per GI recommendations  GI consulted: Dr. Scott

## 2021-05-11 NOTE — PROGRESS NOTE ADULT - PROBLEM SELECTOR PLAN 1
~CIWA 2  reports drinking 20-30 beers during binges   EtOH negative on admission   Librium 50 mg q8h  Ativan PRN  folic acid, MVM, thiamine  CIWA as per nursing protocol  SW following: patient agreeable to [facility] ~CIWA 2  reports drinking 20-30 beers during binges   EtOH negative on admission   Librium 50 mg q12h  has not required Ativan for 48 hours   folic acid, MVM, thiamine  CIWA as per nursing protocol  SW following: patient agreeable to [facility]

## 2021-05-11 NOTE — PROGRESS NOTE ADULT - ASSESSMENT
30 year old male with past medical history of EtOH abuse, substance induced psychosis, depression and several ED/CPEP encounters for alcohol related psychosis and recent psych admission to [facility] who presented to the ED with c/o nausea, abdominal pain and EtOH withdrawal. He denies suicidal or homicidal ideation but has previously endorsed auditory hallucinations for which psychiatry has been consulted to r/o alcohol induced psychotic disorder. CT A/P with findings of thickened inflamed terminal ileal and cecum suggestive of Crohn's disease - GI consulted. Patient with CIWA scores ~ 2, on Librium.          30 year old male with past medical history of EtOH abuse, substance induced psychosis, depression and several ED/CPEP encounters for alcohol related psychosis and recent psych admission to [facility] who presented to the ED with c/o nausea, abdominal pain and EtOH withdrawal. He denies suicidal or homicidal ideation but has previously endorsed auditory hallucinations for which psychiatry has been consulted to r/o alcohol induced psychotic disorder. CT A/P with findings of thickened inflamed terminal ileal and cecum suggestive of Crohn's disease - GI consulted, recommend empiric antibiotic coverage with ciprofloxacin and flagyl. Patient with CIWA scores ~ 2, on Librium.

## 2021-05-11 NOTE — DISCHARGE NOTE PROVIDER - NSDCCPCAREPLAN_GEN_ALL_CORE_FT
PRINCIPAL DISCHARGE DIAGNOSIS  Diagnosis: Alcohol withdrawal  Assessment and Plan of Treatment: Alcohol withdrawal is a group of symptoms that occur when you drink alcohol daily and suddenly stop. It can begin within 5 hours of your last drink and get worse over 2 to 3 days. Withdrawal may also happen if you suddenly reduce the amount of alcohol that you normally drink.  When you consume heavy amounts of alcohol you can have vitamin deficiencies. Vitamin supplements may be given to treat low vitamin levels. Alcohol can make it hard for your body to absorb enough vitamins such as B1. Vitamin supplements may also be given to prevent alcohol related brain damage.  For support and more information:  Alcoholics Anonymous  Web Address: http://www.aa.org  Substance Abuse and Mental [employer] Administration  [address]  [place] [address]  Web Address: http://www.[facility].gov  You were offered to go rehabilitation, however, you declined. The best thing you can do for your health is to stop drinking. Please use the resourcese above for more support with alcohol cessation.   Please seek immediate medical attention if you have tremors, vomiting, or you feel like you are going into withdrawal.      SECONDARY DISCHARGE DIAGNOSES  Diagnosis: Colitis  Assessment and Plan of Treatment: Your CT Scan of your abdomen showed an inflamed terminal ileum and findings  of possible Crohn's Disease.  You were seen by a gastroenterologist who recommended a course of antibiotics wtih Cipro and Flagyl.   Please follow up with a gastroenterologist for further testing.   Please seek medical attention if you have diarrhea or blood in your stools.    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   The psychiatrist recommended taking Zyprexa at night before bed.   This medication worked well for your symptoms and prevented hallucinations.   Please continue to take this medication.        PRINCIPAL DISCHARGE DIAGNOSIS  Diagnosis: Alcohol withdrawal  Assessment and Plan of Treatment: Alcohol withdrawal is a group of symptoms that occur when you drink alcohol daily and suddenly stop. It can begin within 5 hours of your last drink and get worse over 2 to 3 days. Withdrawal may also happen if you suddenly reduce the amount of alcohol that you normally drink.  Take Zyprexa 10mg at bedtime for 30 days  Follow up with PCP.  When you consume heavy amounts of alcohol you can have vitamin deficiencies. Vitamin supplements may be given to treat low vitamin levels. Alcohol can make it hard for your body to absorb enough vitamins such as B1. Vitamin supplements may also be given to prevent alcohol related brain damage.  For support and more information:  Alcoholics Anonymous  Web Address: http://www.aa.org  [facility] Abuse [facility] Mental [facility] Administration  [address]  [place] [address]  Web Address: http://www.[facility].gov  You were offered to go rehabilitation, however, you declined. The best thing you can do for your health is to stop drinking. Please use the resourcese above for more support with alcohol cessation.   Please seek immediate medical attention if you have tremors, vomiting, or you feel like you are going into withdrawal.      SECONDARY DISCHARGE DIAGNOSES  Diagnosis: Colitis  Assessment and Plan of Treatment: Your CT Scan of your abdomen showed an inflamed terminal ileum and findings  of possible Crohn's Disease.  You were seen by a gastroenterologist who recommended a course of antibiotics wtih Cipro and Flagyl.   Please follow up with a gastroenterologist for further testing.   Take Cipro 500mg PO two times per day for 6 days  Take Flagyl 500mg PO three times per day for 6 days.  Please seek medical attention if you have diarrhea or blood in your stools.    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   The psychiatrist recommended taking Zyprexa at night before bed.   This medication worked well for your symptoms and prevented hallucinations.   Please continue to take this medication.        PRINCIPAL DISCHARGE DIAGNOSIS  Diagnosis: Alcohol withdrawal  Assessment and Plan of Treatment: Alcohol withdrawal is a group of symptoms that occur when you drink alcohol daily and suddenly stop. It can begin within 5 hours of your last drink and get worse over 2 to 3 days. Withdrawal may also happen if you suddenly reduce the amount of alcohol that you normally drink.  Take Zyprexa 10mg at bedtime for 30 days  Follow up with PCP.  When you consume heavy amounts of alcohol you can have vitamin deficiencies. Vitamin supplements may be given to treat low vitamin levels. Alcohol can make it hard for your body to absorb enough vitamins such as B1. Vitamin supplements may also be given to prevent alcohol related brain damage.  For support and more information:  Alcoholics Anonymous  Web Address: http://www.aa.org  [facility] Abuse [facility] Mental [facility] Administration  [address]  [place] [address]  Web Address: http://www.[facility].gov  You were offered to go rehabilitation, however, you declined. The best thing you can do for your health is to stop drinking. Please use the resourcese above for more support with alcohol cessation.   Please seek immediate medical attention if you have tremors, vomiting, or you feel like you are going into withdrawal.      SECONDARY DISCHARGE DIAGNOSES  Diagnosis: Vitamin B12 deficiency  Assessment and Plan of Treatment: given supplement    Diagnosis: Colitis  Assessment and Plan of Treatment: Your CT Scan of your abdomen showed an inflamed terminal ileum and findings  of possible Crohn's Disease.  You were seen by a gastroenterologist who recommended a course of antibiotics wtih Cipro and Flagyl.   Please follow up with a gastroenterologist for further testing.   Take Cipro 500mg PO two times per day for 6 days  Take Flagyl 500mg PO three times per day for 6 days.  Please seek medical attention if you have diarrhea or blood in your stools.    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   The psychiatrist recommended taking Zyprexa at night before bed.   This medication worked well for your symptoms and prevented hallucinations.   Please continue to take this medication.

## 2021-05-11 NOTE — PROGRESS NOTE ADULT - PROBLEM SELECTOR PLAN 4
likely EtOH induced   continue with protonix  avoid NSAID's  trend CBC   full liquid diet  GI consulted: Tyler

## 2021-05-11 NOTE — BH CONSULTATION LIAISON PROGRESS NOTE - NSBHCHARTREVIEWVS_PSY_A_CORE FT
Vital Signs Last 24 Hrs  T(C): 36.8 (11 May 2021 13:43), Max: 36.8 (11 May 2021 05:18)  T(F): 98.3 (11 May 2021 13:43), Max: 98.3 (11 May 2021 13:43)  HR: 98 (11 May 2021 13:43) (84 - 98)  BP: 116/78 (11 May 2021 13:43) (116/73 - 119/75)  BP(mean): --  RR: 18 (11 May 2021 13:43) (16 - 18)  SpO2: 99% (11 May 2021 13:43) (98% - 99%)

## 2021-05-11 NOTE — DISCHARGE NOTE PROVIDER - NSDCMRMEDTOKEN_GEN_ALL_CORE_FT
B-12 1000 mcg oral tablet: 1 tab(s) orally once a day   Cipro 500 mg oral tablet: 1 tab(s) orally 2 times a day   Flagyl 500 mg oral tablet: 1 tab(s) orally 3 times a day   folic acid 1 mg oral tablet: 1 tab(s) orally once a day  OLANZapine 10 mg oral tablet: 1 tab(s) orally once a day (at bedtime) MDD:10mg  pantoprazole 40 mg oral delayed release tablet: 1 tab(s) orally once a day (before a meal)   B-12 1000 mcg oral tablet: 1 tab(s) orally once a day   Cipro 500 mg oral tablet: 1 tab(s) orally 2 times a day   Flagyl 500 mg oral tablet: 1 tab(s) orally 3 times a day   folic acid 1 mg oral tablet: 1 tab(s) orally once a day  OLANZapine 10 mg oral tablet: 1 tab(s) orally once a day (at bedtime) MDD:10mg  OLANZapine 10 mg oral tablet: 1 tab(s) orally once a day (at bedtime) MDD:10mg  pantoprazole 40 mg oral delayed release tablet: 1 tab(s) orally once a day (before a meal)

## 2021-05-11 NOTE — BH CONSULTATION LIAISON PROGRESS NOTE - NSBHASSESSMENTFT_PSY_ALL_CORE
30M born in [place], living in  for past 4 yrs and working informally in restaurants as a cook, with PHx of alcohol use DO with h/o alcohol-induced psychosis (admitted to [facility] IP psych 2/19-3/9 and 3/16-3/24, DC'd on Zyprexa 10 mg qhs) and unknown MHx, BIB self to med ED on 5/8 c/o abdominal pain s/p drinking 20-30 beers and admitted for tx of alcohol withdrawal. Psych consult was requested to r/o alcohol-induced psychosis. On exam, pt presents calm, cooperative, linear and organized, denies SI/HI/AVH and is not manifesting any s/s of alcohol withdrawal (which is being managed with Librium 50 mg q12h) or alcohol-induced psychosis. 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 We recommend c/w previously prescribed home antipsychotic (Zyprexa 10 mg qhs), which pt describes as very helpful for his alcohol-associated AH. We also recommend pt consider IP [facility] rehab @[facility], but pt is now declining to engage, citing desire to return to work. Pt does not appear to present an acute risk of harm to self or others at the time of assessment, and does not appear to be in need of admission to IP psych at the time of assessment.

## 2021-05-12 LAB
ALBUMIN SERPL ELPH-MCNC: 2.8 G/DL — LOW (ref 3.5–5)
ALP SERPL-CCNC: 95 U/L — SIGNIFICANT CHANGE UP (ref 40–120)
ALT FLD-CCNC: 52 U/L DA — SIGNIFICANT CHANGE UP (ref 10–60)
ANION GAP SERPL CALC-SCNC: 8 MMOL/L — SIGNIFICANT CHANGE UP (ref 5–17)
AST SERPL-CCNC: 25 U/L — SIGNIFICANT CHANGE UP (ref 10–40)
BILIRUB SERPL-MCNC: 0.3 MG/DL — SIGNIFICANT CHANGE UP (ref 0.2–1.2)
BUN SERPL-MCNC: 9 MG/DL — SIGNIFICANT CHANGE UP (ref 7–18)
CALCIUM SERPL-MCNC: 8.3 MG/DL — LOW (ref 8.4–10.5)
CHLORIDE SERPL-SCNC: 107 MMOL/L — SIGNIFICANT CHANGE UP (ref 96–108)
CO2 SERPL-SCNC: 23 MMOL/L — SIGNIFICANT CHANGE UP (ref 22–31)
CREAT SERPL-MCNC: 0.64 MG/DL — SIGNIFICANT CHANGE UP (ref 0.5–1.3)
GLUCOSE SERPL-MCNC: 89 MG/DL — SIGNIFICANT CHANGE UP (ref 70–99)
HCT VFR BLD CALC: 42.4 % — SIGNIFICANT CHANGE UP (ref 39–50)
HGB BLD-MCNC: 14.2 G/DL — SIGNIFICANT CHANGE UP (ref 13–17)
MCHC RBC-ENTMCNC: 27.8 PG — SIGNIFICANT CHANGE UP (ref 27–34)
MCHC RBC-ENTMCNC: 33.5 GM/DL — SIGNIFICANT CHANGE UP (ref 32–36)
MCV RBC AUTO: 83.1 FL — SIGNIFICANT CHANGE UP (ref 80–100)
NRBC # BLD: 0 /100 WBCS — SIGNIFICANT CHANGE UP (ref 0–0)
PLATELET # BLD AUTO: 294 K/UL — SIGNIFICANT CHANGE UP (ref 150–400)
POTASSIUM SERPL-MCNC: 3.7 MMOL/L — SIGNIFICANT CHANGE UP (ref 3.5–5.3)
POTASSIUM SERPL-SCNC: 3.7 MMOL/L — SIGNIFICANT CHANGE UP (ref 3.5–5.3)
PROT SERPL-MCNC: 7.8 G/DL — SIGNIFICANT CHANGE UP (ref 6–8.3)
RBC # BLD: 5.1 M/UL — SIGNIFICANT CHANGE UP (ref 4.2–5.8)
RBC # FLD: 13.7 % — SIGNIFICANT CHANGE UP (ref 10.3–14.5)
SODIUM SERPL-SCNC: 138 MMOL/L — SIGNIFICANT CHANGE UP (ref 135–145)
WBC # BLD: 5.24 K/UL — SIGNIFICANT CHANGE UP (ref 3.8–10.5)
WBC # FLD AUTO: 5.24 K/UL — SIGNIFICANT CHANGE UP (ref 3.8–10.5)

## 2021-05-12 RX ORDER — FOLIC ACID 0.8 MG
1 TABLET ORAL
Qty: 0 | Refills: 0 | DISCHARGE
Start: 2021-05-12

## 2021-05-12 RX ORDER — METRONIDAZOLE 500 MG
1 TABLET ORAL
Qty: 18 | Refills: 0
Start: 2021-05-12 | End: 2021-05-17

## 2021-05-12 RX ORDER — PANTOPRAZOLE SODIUM 20 MG/1
1 TABLET, DELAYED RELEASE ORAL
Qty: 0 | Refills: 0 | DISCHARGE
Start: 2021-05-12

## 2021-05-12 RX ORDER — OLANZAPINE 15 MG/1
1 TABLET, FILM COATED ORAL
Qty: 0 | Refills: 0 | DISCHARGE
Start: 2021-05-12

## 2021-05-12 RX ORDER — FOLIC ACID 0.8 MG
1 TABLET ORAL
Qty: 30 | Refills: 0
Start: 2021-05-12 | End: 2021-06-10

## 2021-05-12 RX ORDER — MOXIFLOXACIN HYDROCHLORIDE TABLETS, 400 MG 400 MG/1
1 TABLET, FILM COATED ORAL
Qty: 12 | Refills: 0
Start: 2021-05-12 | End: 2021-05-17

## 2021-05-12 RX ORDER — METRONIDAZOLE 500 MG
100 TABLET ORAL
Qty: 0 | Refills: 0 | DISCHARGE
Start: 2021-05-12

## 2021-05-12 RX ORDER — CIPROFLOXACIN LACTATE 400MG/40ML
0 VIAL (ML) INTRAVENOUS
Qty: 0 | Refills: 0 | DISCHARGE
Start: 2021-05-12

## 2021-05-12 RX ORDER — PANTOPRAZOLE SODIUM 20 MG/1
1 TABLET, DELAYED RELEASE ORAL
Qty: 30 | Refills: 0
Start: 2021-05-12 | End: 2021-06-10

## 2021-05-12 RX ORDER — PREGABALIN 225 MG/1
1 CAPSULE ORAL
Qty: 7 | Refills: 0
Start: 2021-05-12 | End: 2021-05-18

## 2021-05-12 RX ORDER — OLANZAPINE 15 MG/1
1 TABLET, FILM COATED ORAL
Qty: 30 | Refills: 0
Start: 2021-05-12 | End: 2021-06-10

## 2021-05-12 RX ADMIN — SODIUM CHLORIDE 3 MILLILITER(S): 9 INJECTION INTRAMUSCULAR; INTRAVENOUS; SUBCUTANEOUS at 21:12

## 2021-05-12 RX ADMIN — Medication 1 TABLET(S): at 12:25

## 2021-05-12 RX ADMIN — Medication 100 MILLIGRAM(S): at 06:45

## 2021-05-12 RX ADMIN — SODIUM CHLORIDE 3 MILLILITER(S): 9 INJECTION INTRAMUSCULAR; INTRAVENOUS; SUBCUTANEOUS at 13:43

## 2021-05-12 RX ADMIN — Medication 100 MILLIGRAM(S): at 06:15

## 2021-05-12 RX ADMIN — ENOXAPARIN SODIUM 40 MILLIGRAM(S): 100 INJECTION SUBCUTANEOUS at 12:24

## 2021-05-12 RX ADMIN — PANTOPRAZOLE SODIUM 40 MILLIGRAM(S): 20 TABLET, DELAYED RELEASE ORAL at 06:45

## 2021-05-12 RX ADMIN — SODIUM CHLORIDE 3 MILLILITER(S): 9 INJECTION INTRAMUSCULAR; INTRAVENOUS; SUBCUTANEOUS at 06:17

## 2021-05-12 RX ADMIN — Medication 100 MILLIGRAM(S): at 21:19

## 2021-05-12 RX ADMIN — Medication 100 MILLIGRAM(S): at 18:51

## 2021-05-12 RX ADMIN — OLANZAPINE 10 MILLIGRAM(S): 15 TABLET, FILM COATED ORAL at 21:19

## 2021-05-12 RX ADMIN — Medication 100 MILLIGRAM(S): at 14:04

## 2021-05-12 RX ADMIN — Medication 1 MILLIGRAM(S): at 12:25

## 2021-05-12 RX ADMIN — Medication 50 MILLIGRAM(S): at 06:45

## 2021-05-12 RX ADMIN — PREGABALIN 1000 MICROGRAM(S): 225 CAPSULE ORAL at 12:24

## 2021-05-12 NOTE — PROGRESS NOTE ADULT - ASSESSMENT
Patient is 30 year old male with past medical history of EtOH abuse, substance induced psychosis, depression and several ED/CPEP encounters for alcohol related psychosis and recent psych admission to [facility] who presented to the ED with c/o nausea, abdominal pain and EtOH withdrawal. He denies suicidal or homicidal ideation but has previously endorsed auditory hallucinations for which psychiatry has been consulted to r/o alcohol induced psychotic disorder. CT A/P with findings of thickened inflamed terminal ileal and cecum suggestive of Crohn's disease - GI consulted, recommend empiric antibiotic coverage with ciprofloxacin and flagyl. Patient with CIWA scores ~ 2, on Librium. Behavioral health dr. Graham consulted, for management of alcohol withdrawal. Patient started on Zyprexa 10mg bedtime, and was taper off librium.    Patient seen and examined at bedside, denies CP, SOB, mild epigastric pain. Patient medications were sent to [facility], SW consulted for medications approval . Planned discharge for tomorrow.         Patient is 30 year old male with past medical history of EtOH abuse, substance induced psychosis, depression and several ED/CPEP encounters for alcohol related psychosis and recent psych admission to [facility] who presented to the ED with c/o nausea, abdominal pain and EtOH withdrawal. He denies suicidal or homicidal ideation but has previously endorsed auditory hallucinations for which psychiatry has been consulted to r/o alcohol induced psychotic disorder. CT A/P with findings of thickened inflamed terminal ileal and cecum suggestive of Crohn's disease - GI consulted, recommend empiric antibiotic coverage with ciprofloxacin and flagyl. Patient with CIWA scores ~ 1, on Librium. Behavioral health dr. Graham consulted, for management of alcohol withdrawal. Patient started on Zyprexa 10mg bedtime, and was tapered off librium.    Patient seen and examined at bedside, denies CP, SOB, mild epigastric pain. Patient medications were sent to [facility] pharmacy, SW consulted for medications approval . Planned discharge for tomorrow.

## 2021-05-12 NOTE — PHARMACOTHERAPY INTERVENTION NOTE - COMMENTS
[facility] medication discharge counseling was performed using  line # [id number] Tim.  Education material and instructions about pt's medication regimen were also provided. Patient verbalized understanding.   No additional interventions at this time.

## 2021-05-12 NOTE — PROGRESS NOTE ADULT - PROBLEM SELECTOR PLAN 6
B12 206 ---> cyanocobalamin 1,000 units daily

## 2021-05-12 NOTE — PROGRESS NOTE ADULT - PROBLEM SELECTOR PLAN 1
~CIWA 2  reports drinking 20-30 beers during binges   EtOH negative on admission   d/c Librium  cw Zyprexa 10mg at bedtime as per dr. Graham   has not required Ativan for 48 hours   folic acid, MVM, thiamine  CIWA as per nursing protocol  SW following: patient decline [facility] and wants to be dc home ~CIWA 1  reports drinking 20-30 beers during binges   EtOH negative on admission   d/c Librium  cw Zyprexa 10mg at bedtime as per dr. Graham   has not required Ativan for 48 hours   folic acid, MVM, thiamine  CIWA as per nursing protocol  SW following: patient decline [facility] and wants to be dc home

## 2021-05-12 NOTE — PROGRESS NOTE ADULT - PROBLEM SELECTOR PLAN 7
patient decline to IP rehabilitation at [facility]: SW following  dc home with supply meds from [facility]

## 2021-05-12 NOTE — PROGRESS NOTE ADULT - PROBLEM SELECTOR PLAN 2
alcohol induced psychotic disorder: in remission  denies SI/H or AVH at present  in control behaviorally, euthymic, linear   2 previous IP psych admissions for alcohol-induced psychosis  inpatient psych admission not indicated at this time  continue with Zyprexa 10 mg QHS as patient states this has been helpful   Psych: Dr. Graham
alcohol induced psychotic disorder: in remission  denies SI/H or AVH at present  in control behaviorally, euthymic, linear   2 previous IP psych admissions for alcohol-induced psychosis  inpatient psych admission not indicated at this time  continue with Zyprexa 10 mg QHS as patient states this has been helpful   Psych: Dr. Graham
alcohol induced psychotic disorder: in remission  denies SI/H or AVH at present  in control behaviorally, euthymic, linear   2 previous IP psych admissions for alcohol-induced psychosis  inpatient psych admission not indicated at this time  continue with Zyprexa 10 mg QHS as patient states this has been helpful   Zyprexa sent to [facility] pharmacy SW is following  Psych: Dr. Graham

## 2021-05-12 NOTE — PROGRESS NOTE ADULT - ATTENDING COMMENTS
Patient seen and examined. Patient's history, vitals, labs, imaging studies reviewed. Discussed with NP, agree with note with edits. Plan of care discussed with patient, and agrees, all questions answered.   Libby Guerra MD

## 2021-05-12 NOTE — PROGRESS NOTE ADULT - PROBLEM SELECTOR PLAN 4
likely EtOH induced   continue with protonix  avoid NSAID's  trend CBC   full liquid diet  GI consulted: Tyler  recommendation for outpatient colonoscopy

## 2021-05-12 NOTE — PROGRESS NOTE ADULT - PROBLEM SELECTOR PLAN 3
CT A/P with: Thickened inflamed terminal ileal and cecum suggestive of Crohn's disease. Right lower quadrant noncalcified mesenteric adenopathy. Calcified mesenteric lymph nodes are present as well indicating chronic inflammatory or infectious process.  ESR normal, CRP 15   GI Stool PCR: pending  no leukocytosis, fevers, or diarrhea   c/w Ciprofloxacin and Flagyl as per GI recommendations  Abx sent to [facility] SW is following  GI consulted: Dr. Scott

## 2021-05-12 NOTE — PROGRESS NOTE ADULT - SUBJECTIVE AND OBJECTIVE BOX
NP Note discussed with Primary Attending      Patient is a 30y old  Male who presents with a chief complaint of alcohol withdrawal (11 May 2021 16:20)      INTERVAL HPI/OVERNIGHT EVENTS: no new complaints    MEDICATIONS  (STANDING):  ciprofloxacin   IVPB 200 milliGRAM(s) IV Intermittent every 12 hours  cyanocobalamin Injectable 1000 MICROGram(s) IntraMuscular daily  enoxaparin Injectable 40 milliGRAM(s) SubCutaneous daily  folic acid 1 milliGRAM(s) Oral daily  metroNIDAZOLE  IVPB 500 milliGRAM(s) IV Intermittent every 8 hours  multivitamin 1 Tablet(s) Oral daily  OLANZapine 10 milliGRAM(s) Oral at bedtime  pantoprazole    Tablet 40 milliGRAM(s) Oral before breakfast  sodium chloride 0.9% lock flush 3 milliLiter(s) IV Push every 8 hours    MEDICATIONS  (PRN):  simethicone 80 milliGRAM(s) Chew daily PRN Dyspepsia      __________________________________________________  REVIEW OF SYSTEMS:    CONSTITUTIONAL: No fever,   EYES: no acute visual disturbances  NECK: No pain or stiffness  RESPIRATORY: No cough; No shortness of breath  CARDIOVASCULAR: No chest pain, no palpitations  GASTROINTESTINAL: No pain. No nausea or vomiting; No diarrhea   NEUROLOGICAL: No headache or numbness, no tremors  MUSCULOSKELETAL: mild tremors, No joint pain, no muscle pain  GENITOURINARY: no dysuria, no frequency, no hesitancy  PSYCHIATRY: no depression , no anxiety  ALL OTHER  ROS negative        Vital Signs Last 24 Hrs  T(C): 36.6 (12 May 2021 05:12), Max: 36.9 (11 May 2021 21:24)  T(F): 97.9 (12 May 2021 05:12), Max: 98.5 (11 May 2021 21:24)  HR: 75 (12 May 2021 05:12) (75 - 98)  BP: 117/65 (12 May 2021 05:12) (103/68 - 117/65)  BP(mean): --  RR: 18 (12 May 2021 05:12) (18 - 18)  SpO2: 99% (12 May 2021 05:12) (98% - 99%)    ________________________________________________  PHYSICAL EXAM:  GENERAL: NAD  HEENT: Normocephalic;  conjunctivae and sclerae clear; moist mucous membranes;   NECK : supple  CHEST/LUNG: Clear to auscultation bilaterally with good air entry   HEART: S1 S2  regular; no murmurs, gallops or rubs  ABDOMEN: Soft, Nontender, Nondistended; Bowel sounds present  EXTREMITIES: no cyanosis; no edema; no calf tenderness  SKIN: warm and dry; no rash  NERVOUS SYSTEM:  Awake and alert; Oriented  to place, person and time ; no new deficits    _________________________________________________  LABS:                        14.2   5.24  )-----------( 294      ( 12 May 2021 07:04 )             42.4     05-12    138  |  107  |  9   ----------------------------<  89  3.7   |  23  |  0.64    Ca    8.3<L>      12 May 2021 07:04  Mg     2.6     05-11    TPro  7.8  /  Alb  2.8<L>  /  TBili  0.3  /  DBili  x   /  AST  25  /  ALT  52  /  AlkPhos  95  05-12        CAPILLARY BLOOD GLUCOSE            RADIOLOGY & ADDITIONAL TESTS:      EXAM:  CT ABDOMEN AND PELVIS IC                            PROCEDURE DATE:  05/10/2021          INTERPRETATION:  CLINICAL INFORMATION: 30 years  Male with abd pain.    COMPARISON: None.    CONTRAST/COMPLICATIONS:  IV Contrast: Omnipaque 350  90 cc administered   10 cc discarded  Oral Contrast: NONE  Complications: None reported at time of study completion    PROCEDURE:  CT of the Abdomen and Pelvis was performed.  Sagittal and coronal reformats were performed.    FINDINGS:  LOWER CHEST: Small sliding hiatus hernia.    LIVER: Attic steatosis.  BILE DUCTS: Normal caliber.  GALLBLADDER: Within normal limits.  SPLEEN: Within normal limits.  PANCREAS: Within normal limits.  ADRENALS: Within normal limits.  KIDNEYS/URETERS: Within normal limits.    BLADDER: Incompletely distended  REPRODUCTIVE ORGANS: Unremarkable prostate.    BOWEL: No bowel obstruction. Thick-walled terminal ileum and cecum with adjacent mesenteric fat stranding. The appendix itself is not visualized.  PERITONEUM: Trace pelvic ascites.  VESSELS: Within normal limits.  RETROPERITONEUM/LYMPH NODES: Right lower quadrant noncalcified mesenteric adenopathy. Reference lymph nodes measuring 1.7 x 1.2 cm (2:78) and 1.6 x 1.5 cm on (2:79). Multiple calcified mesenteric lymph nodes present as well.  ABDOMINAL WALL: Within normal limits.  BONES: Within normal limits.    IMPRESSION:    Thickened inflamed terminal ileal and cecum suggestive of Crohn's disease. Right lower quadrant noncalcified mesenteric adenopathy. Calcified mesenteric lymph nodes are present as well indicating chronic inflammatory or infectious process.    The appendix is not visualized and appendicitis cannot be excluded.    Small pelvic ascites.    Hepatic steatosis.              NISSA TEMPLE MD; Attending Radiologist  This document has been electronically signed. May 10 2021 11:36AM      EXAM:  XR CHEST PORTABLE URGENT 1V                            PROCEDURE DATE:  05/08/2021          INTERPRETATION:  Portable chest radiograph    CLINICAL INFORMATION: Chest pain    TECHNIQUE:  Portable  AP view of the chest was obtained.    COMPARISON: 4/13/2020 chest available for review.    FINDINGS:  The lungs are clear of airspace consolidations or effusions. No pneumothorax.    The heart and mediastinum are within normal limits.    Visualized osseous structures are intact.        IMPRESSION:   No evidence of active chest disease.    Imaging  Reviewed:  YES/NO    Consultant(s) Notes Reviewed:   YES/ No      Plan of care was discussed with patient and /or primary care giver; all questions and concerns were addressed  NP Note discussed with Primary Attending      Patient is a 30 year old  Male who presents with a chief complaint of alcohol withdrawal (11 May 2021 16:20)      INTERVAL HPI/OVERNIGHT EVENTS: no new complaints    MEDICATIONS  (STANDING):  ciprofloxacin   IVPB 200 milliGRAM(s) IV Intermittent every 12 hours  cyanocobalamin Injectable 1000 MICROGram(s) IntraMuscular daily  enoxaparin Injectable 40 milliGRAM(s) SubCutaneous daily  folic acid 1 milliGRAM(s) Oral daily  metroNIDAZOLE  IVPB 500 milliGRAM(s) IV Intermittent every 8 hours  multivitamin 1 Tablet(s) Oral daily  OLANZapine 10 milliGRAM(s) Oral at bedtime  pantoprazole    Tablet 40 milliGRAM(s) Oral before breakfast  sodium chloride 0.9% lock flush 3 milliLiter(s) IV Push every 8 hours    MEDICATIONS  (PRN):  simethicone 80 milliGRAM(s) Chew daily PRN Dyspepsia      __________________________________________________  REVIEW OF SYSTEMS:    CONSTITUTIONAL: No fever,   EYES: no acute visual disturbances  NECK: No pain or stiffness  RESPIRATORY: No cough; No shortness of breath  CARDIOVASCULAR: No chest pain, no palpitations  GASTROINTESTINAL: No pain. No nausea or vomiting; No diarrhea   NEUROLOGICAL: No headache or numbness, no tremors  MUSCULOSKELETAL: mild tremors, No joint pain, no muscle pain  GENITOURINARY: no dysuria, no frequency, no hesitancy  PSYCHIATRY: no depression , no anxiety  ALL OTHER  ROS negative        Vital Signs Last 24 Hrs  T(C): 36.6 (12 May 2021 05:12), Max: 36.9 (11 May 2021 21:24)  T(F): 97.9 (12 May 2021 05:12), Max: 98.5 (11 May 2021 21:24)  HR: 75 (12 May 2021 05:12) (75 - 98)  BP: 117/65 (12 May 2021 05:12) (103/68 - 117/65)  RR: 18 (12 May 2021 05:12) (18 - 18)  SpO2: 99% (12 May 2021 05:12) (98% - 99%)    ________________________________________________  PHYSICAL EXAM:  GENERAL: NAD  HEENT: Normocephalic;  conjunctivae and sclerae clear; moist mucous membranes;   NECK : supple  CHEST/LUNG: Clear to auscultation bilaterally with good air entry   HEART: S1 S2  regular; no murmurs, gallops or rubs  ABDOMEN: Soft, Nontender, Nondistended; Bowel sounds present  EXTREMITIES: no cyanosis; no edema; no calf tenderness  SKIN: warm and dry; no rash  NERVOUS SYSTEM:  Awake and alert; Oriented  to place, person and time ; no new deficits    _________________________________________________  LABS:                        14.2   5.24  )-----------( 294      ( 12 May 2021 07:04 )             42.4     05-12    138  |  107  |  9   ----------------------------<  89  3.7   |  23  |  0.64    Ca    8.3<L>      12 May 2021 07:04  Mg     2.6     05-11    TPro  7.8  /  Alb  2.8<L>  /  TBili  0.3  /  DBili  x   /  AST  25  /  ALT  52  /  AlkPhos  95  05-12      RADIOLOGY & ADDITIONAL TESTS:      EXAM:  CT ABDOMEN AND PELVIS IC                            PROCEDURE DATE:  05/10/2021          INTERPRETATION:  CLINICAL INFORMATION: 30 years  Male with abd pain.    COMPARISON: None.    CONTRAST/COMPLICATIONS:  IV Contrast: Omnipaque 350  90 cc administered   10 cc discarded  Oral Contrast: NONE  Complications: None reported at time of study completion    PROCEDURE:  CT of the Abdomen and Pelvis was performed.  Sagittal and coronal reformats were performed.    FINDINGS:  LOWER CHEST: Small sliding hiatus hernia.    LIVER: Attic steatosis.  BILE DUCTS: Normal caliber.  GALLBLADDER: Within normal limits.  SPLEEN: Within normal limits.  PANCREAS: Within normal limits.  ADRENALS: Within normal limits.  KIDNEYS/URETERS: Within normal limits.    BLADDER: Incompletely distended  REPRODUCTIVE ORGANS: Unremarkable prostate.    BOWEL: No bowel obstruction. Thick-walled terminal ileum and cecum with adjacent mesenteric fat stranding. The appendix itself is not visualized.  PERITONEUM: Trace pelvic ascites.  VESSELS: Within normal limits.  RETROPERITONEUM/LYMPH NODES: Right lower quadrant noncalcified mesenteric adenopathy. Reference lymph nodes measuring 1.7 x 1.2 cm (2:78) and 1.6 x 1.5 cm on (2:79). Multiple calcified mesenteric lymph nodes present as well.  ABDOMINAL WALL: Within normal limits.  BONES: Within normal limits.    IMPRESSION:    Thickened inflamed terminal ileal and cecum suggestive of Crohn's disease. Right lower quadrant noncalcified mesenteric adenopathy. Calcified mesenteric lymph nodes are present as well indicating chronic inflammatory or infectious process.    The appendix is not visualized and appendicitis cannot be excluded.    Small pelvic ascites.    Hepatic steatosis.        NISSA TEMPLE MD; Attending Radiologist  This document has been electronically signed. May 10 2021 11:36AM      EXAM:  XR CHEST PORTABLE URGENT 1V                            PROCEDURE DATE:  05/08/2021          INTERPRETATION:  Portable chest radiograph    CLINICAL INFORMATION: Chest pain    TECHNIQUE:  Portable  AP view of the chest was obtained.    COMPARISON: 4/13/2020 chest available for review.    FINDINGS:  The lungs are clear of airspace consolidations or effusions. No pneumothorax.    The heart and mediastinum are within normal limits.    Visualized osseous structures are intact.        IMPRESSION:   No evidence of active chest disease.    Imaging  Reviewed:  YES    Consultant(s) Notes Reviewed:   YES      Plan of care was discussed with patient and /or primary care giver; all questions and concerns were addressed

## 2021-05-12 NOTE — PROGRESS NOTE ADULT - PROBLEM SELECTOR PROBLEM 2
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

## 2021-05-13 VITALS
DIASTOLIC BLOOD PRESSURE: 74 MMHG | SYSTOLIC BLOOD PRESSURE: 122 MMHG | HEART RATE: 87 BPM | OXYGEN SATURATION: 98 % | RESPIRATION RATE: 18 BRPM | TEMPERATURE: 98 F

## 2021-05-13 LAB
ANION GAP SERPL CALC-SCNC: 5 MMOL/L — SIGNIFICANT CHANGE UP (ref 5–17)
BUN SERPL-MCNC: 10 MG/DL — SIGNIFICANT CHANGE UP (ref 7–18)
CALCIUM SERPL-MCNC: 8.3 MG/DL — LOW (ref 8.4–10.5)
CHLORIDE SERPL-SCNC: 107 MMOL/L — SIGNIFICANT CHANGE UP (ref 96–108)
CO2 SERPL-SCNC: 28 MMOL/L — SIGNIFICANT CHANGE UP (ref 22–31)
CREAT SERPL-MCNC: 0.81 MG/DL — SIGNIFICANT CHANGE UP (ref 0.5–1.3)
GLUCOSE SERPL-MCNC: 90 MG/DL — SIGNIFICANT CHANGE UP (ref 70–99)
HCT VFR BLD CALC: 42 % — SIGNIFICANT CHANGE UP (ref 39–50)
HGB BLD-MCNC: 13.8 G/DL — SIGNIFICANT CHANGE UP (ref 13–17)
MCHC RBC-ENTMCNC: 27.9 PG — SIGNIFICANT CHANGE UP (ref 27–34)
MCHC RBC-ENTMCNC: 32.9 GM/DL — SIGNIFICANT CHANGE UP (ref 32–36)
MCV RBC AUTO: 84.8 FL — SIGNIFICANT CHANGE UP (ref 80–100)
NRBC # BLD: 0 /100 WBCS — SIGNIFICANT CHANGE UP (ref 0–0)
PLATELET # BLD AUTO: 291 K/UL — SIGNIFICANT CHANGE UP (ref 150–400)
POTASSIUM SERPL-MCNC: 3.8 MMOL/L — SIGNIFICANT CHANGE UP (ref 3.5–5.3)
POTASSIUM SERPL-SCNC: 3.8 MMOL/L — SIGNIFICANT CHANGE UP (ref 3.5–5.3)
RBC # BLD: 4.95 M/UL — SIGNIFICANT CHANGE UP (ref 4.2–5.8)
RBC # FLD: 13.7 % — SIGNIFICANT CHANGE UP (ref 10.3–14.5)
SODIUM SERPL-SCNC: 140 MMOL/L — SIGNIFICANT CHANGE UP (ref 135–145)
WBC # BLD: 6.77 K/UL — SIGNIFICANT CHANGE UP (ref 3.8–10.5)
WBC # FLD AUTO: 6.77 K/UL — SIGNIFICANT CHANGE UP (ref 3.8–10.5)

## 2021-05-13 PROCEDURE — 83735 ASSAY OF MAGNESIUM: CPT

## 2021-05-13 PROCEDURE — 96374 THER/PROPH/DIAG INJ IV PUSH: CPT

## 2021-05-13 PROCEDURE — 80053 COMPREHEN METABOLIC PANEL: CPT

## 2021-05-13 PROCEDURE — 84443 ASSAY THYROID STIM HORMONE: CPT

## 2021-05-13 PROCEDURE — 82607 VITAMIN B-12: CPT

## 2021-05-13 PROCEDURE — 80307 DRUG TEST PRSMV CHEM ANLYZR: CPT

## 2021-05-13 PROCEDURE — 99285 EMERGENCY DEPT VISIT HI MDM: CPT | Mod: 25

## 2021-05-13 PROCEDURE — 96376 TX/PRO/DX INJ SAME DRUG ADON: CPT

## 2021-05-13 PROCEDURE — 85027 COMPLETE CBC AUTOMATED: CPT

## 2021-05-13 PROCEDURE — 71045 X-RAY EXAM CHEST 1 VIEW: CPT

## 2021-05-13 PROCEDURE — 74177 CT ABD & PELVIS W/CONTRAST: CPT

## 2021-05-13 PROCEDURE — 86769 SARS-COV-2 COVID-19 ANTIBODY: CPT

## 2021-05-13 PROCEDURE — 85652 RBC SED RATE AUTOMATED: CPT

## 2021-05-13 PROCEDURE — 36415 COLL VENOUS BLD VENIPUNCTURE: CPT

## 2021-05-13 PROCEDURE — 83690 ASSAY OF LIPASE: CPT

## 2021-05-13 PROCEDURE — 85025 COMPLETE CBC W/AUTO DIFF WBC: CPT

## 2021-05-13 PROCEDURE — 80048 BASIC METABOLIC PNL TOTAL CA: CPT

## 2021-05-13 PROCEDURE — 87635 SARS-COV-2 COVID-19 AMP PRB: CPT

## 2021-05-13 PROCEDURE — 80061 LIPID PANEL: CPT

## 2021-05-13 PROCEDURE — 96375 TX/PRO/DX INJ NEW DRUG ADDON: CPT

## 2021-05-13 PROCEDURE — 93005 ELECTROCARDIOGRAM TRACING: CPT

## 2021-05-13 PROCEDURE — 84100 ASSAY OF PHOSPHORUS: CPT

## 2021-05-13 PROCEDURE — 82746 ASSAY OF FOLIC ACID SERUM: CPT

## 2021-05-13 PROCEDURE — 83036 HEMOGLOBIN GLYCOSYLATED A1C: CPT

## 2021-05-13 PROCEDURE — 86140 C-REACTIVE PROTEIN: CPT

## 2021-05-13 RX ADMIN — PANTOPRAZOLE SODIUM 40 MILLIGRAM(S): 20 TABLET, DELAYED RELEASE ORAL at 05:27

## 2021-05-13 RX ADMIN — Medication 100 MILLIGRAM(S): at 05:27

## 2021-05-13 RX ADMIN — SODIUM CHLORIDE 3 MILLILITER(S): 9 INJECTION INTRAMUSCULAR; INTRAVENOUS; SUBCUTANEOUS at 05:26

## 2021-05-13 RX ADMIN — Medication 1 TABLET(S): at 11:36

## 2021-05-13 RX ADMIN — PREGABALIN 1000 MICROGRAM(S): 225 CAPSULE ORAL at 11:36

## 2021-05-13 RX ADMIN — Medication 1 MILLIGRAM(S): at 11:36

## 2021-05-13 NOTE — DISCHARGE NOTE NURSING/CASE MANAGEMENT/SOCIAL WORK - PATIENT PORTAL LINK FT
You can access the FollowMyHealth Patient Portal offered by [facility] by registering at the following website: [email or web address]. By joining [employer]’s FollowMyHealth portal, you will also be able to view your health information using other applications (apps) compatible with our system.

## 2021-06-19 ENCOUNTER — EMERGENCY (EMERGENCY)
Facility: HOSPITAL | Age: 31
LOS: 1 days | Discharge: HOME | End: 2021-06-19
Attending: EMERGENCY MEDICINE
Payer: MEDICAID

## 2021-06-19 VITALS
TEMPERATURE: 98 F | HEART RATE: 92 BPM | OXYGEN SATURATION: 98 % | RESPIRATION RATE: 18 BRPM | WEIGHT: 176.37 LBS | DIASTOLIC BLOOD PRESSURE: 99 MMHG | SYSTOLIC BLOOD PRESSURE: 144 MMHG | HEIGHT: 60 IN

## 2021-06-19 DIAGNOSIS — K35.33 ACUTE APPENDICITIS WITH PERFORATION, LOCALIZED PERITONITIS, AND GANGRENE, WITH ABSCESS: Chronic | ICD-10-CM

## 2021-06-19 LAB
ALBUMIN SERPL ELPH-MCNC: 3.4 G/DL — LOW (ref 3.5–5)
ALP SERPL-CCNC: 91 U/L — SIGNIFICANT CHANGE UP (ref 40–120)
ALT FLD-CCNC: 63 U/L DA — HIGH (ref 10–60)
ANION GAP SERPL CALC-SCNC: 9 MMOL/L — SIGNIFICANT CHANGE UP (ref 5–17)
AST SERPL-CCNC: 65 U/L — HIGH (ref 10–40)
BASOPHILS # BLD AUTO: 0.03 K/UL — SIGNIFICANT CHANGE UP (ref 0–0.2)
BASOPHILS NFR BLD AUTO: 0.5 % — SIGNIFICANT CHANGE UP (ref 0–2)
BILIRUB SERPL-MCNC: 1.1 MG/DL — SIGNIFICANT CHANGE UP (ref 0.2–1.2)
BUN SERPL-MCNC: 11 MG/DL — SIGNIFICANT CHANGE UP (ref 7–18)
CALCIUM SERPL-MCNC: 7.8 MG/DL — LOW (ref 8.4–10.5)
CHLORIDE SERPL-SCNC: 100 MMOL/L — SIGNIFICANT CHANGE UP (ref 96–108)
CO2 SERPL-SCNC: 25 MMOL/L — SIGNIFICANT CHANGE UP (ref 22–31)
CREAT SERPL-MCNC: 0.84 MG/DL — SIGNIFICANT CHANGE UP (ref 0.5–1.3)
EOSINOPHIL # BLD AUTO: 0.02 K/UL — SIGNIFICANT CHANGE UP (ref 0–0.5)
EOSINOPHIL NFR BLD AUTO: 0.3 % — SIGNIFICANT CHANGE UP (ref 0–6)
ETHANOL SERPL-MCNC: 8 MG/DL — SIGNIFICANT CHANGE UP (ref 0–10)
GLUCOSE SERPL-MCNC: 104 MG/DL — HIGH (ref 70–99)
HCT VFR BLD CALC: 45.3 % — SIGNIFICANT CHANGE UP (ref 39–50)
HGB BLD-MCNC: 15.2 G/DL — SIGNIFICANT CHANGE UP (ref 13–17)
IMM GRANULOCYTES NFR BLD AUTO: 0.2 % — SIGNIFICANT CHANGE UP (ref 0–1.5)
LIDOCAIN IGE QN: 65 U/L — LOW (ref 73–393)
LYMPHOCYTES # BLD AUTO: 1.42 K/UL — SIGNIFICANT CHANGE UP (ref 1–3.3)
LYMPHOCYTES # BLD AUTO: 23.4 % — SIGNIFICANT CHANGE UP (ref 13–44)
MAGNESIUM SERPL-MCNC: 2.3 MG/DL — SIGNIFICANT CHANGE UP (ref 1.6–2.6)
MCHC RBC-ENTMCNC: 27.2 PG — SIGNIFICANT CHANGE UP (ref 27–34)
MCHC RBC-ENTMCNC: 33.6 GM/DL — SIGNIFICANT CHANGE UP (ref 32–36)
MCV RBC AUTO: 81 FL — SIGNIFICANT CHANGE UP (ref 80–100)
MONOCYTES # BLD AUTO: 0.46 K/UL — SIGNIFICANT CHANGE UP (ref 0–0.9)
MONOCYTES NFR BLD AUTO: 7.6 % — SIGNIFICANT CHANGE UP (ref 2–14)
NEUTROPHILS # BLD AUTO: 4.12 K/UL — SIGNIFICANT CHANGE UP (ref 1.8–7.4)
NEUTROPHILS NFR BLD AUTO: 68 % — SIGNIFICANT CHANGE UP (ref 43–77)
NRBC # BLD: 0 /100 WBCS — SIGNIFICANT CHANGE UP (ref 0–0)
PLATELET # BLD AUTO: 288 K/UL — SIGNIFICANT CHANGE UP (ref 150–400)
POTASSIUM SERPL-MCNC: 3.6 MMOL/L — SIGNIFICANT CHANGE UP (ref 3.5–5.3)
POTASSIUM SERPL-SCNC: 3.6 MMOL/L — SIGNIFICANT CHANGE UP (ref 3.5–5.3)
PROT SERPL-MCNC: 7.9 G/DL — SIGNIFICANT CHANGE UP (ref 6–8.3)
RBC # BLD: 5.59 M/UL — SIGNIFICANT CHANGE UP (ref 4.2–5.8)
RBC # FLD: 13.6 % — SIGNIFICANT CHANGE UP (ref 10.3–14.5)
SODIUM SERPL-SCNC: 134 MMOL/L — LOW (ref 135–145)
WBC # BLD: 6.06 K/UL — SIGNIFICANT CHANGE UP (ref 3.8–10.5)
WBC # FLD AUTO: 6.06 K/UL — SIGNIFICANT CHANGE UP (ref 3.8–10.5)

## 2021-06-19 PROCEDURE — 99285 EMERGENCY DEPT VISIT HI MDM: CPT

## 2021-06-19 RX ORDER — SODIUM CHLORIDE 9 MG/ML
1000 INJECTION INTRAMUSCULAR; INTRAVENOUS; SUBCUTANEOUS ONCE
Refills: 0 | Status: COMPLETED | OUTPATIENT
Start: 2021-06-19 | End: 2021-06-19

## 2021-06-19 RX ADMIN — SODIUM CHLORIDE 1000 MILLILITER(S): 9 INJECTION INTRAMUSCULAR; INTRAVENOUS; SUBCUTANEOUS at 20:18

## 2021-06-19 RX ADMIN — SODIUM CHLORIDE 1000 MILLILITER(S): 9 INJECTION INTRAMUSCULAR; INTRAVENOUS; SUBCUTANEOUS at 21:11

## 2021-06-19 NOTE — ED PROVIDER NOTE - NSFOLLOWUPINSTRUCTIONS_ED_ALL_ED_FT
Abuso de alcohol    LO QUE NECESITA SABER:    El abuso de alcohol significa que usted debbie más de los límites diarios o semanales recomendados. Usted puede estar bebiendo alcohol regularmente o bebiendo grandes cantidades en un corto período (atracones de bebida). Usted sigue tomando, aun cuando esto le cause problemas legales, laborales o con eveline relaciones.    INSTRUCCIONES SOBRE EL NICOL HOSPITALARIA:    Llame al número de emergencias local (911 en los [place]) en cualquiera de los siguientes casos:  •Tiene dolor en el pecho o dificultad para respirar de forma repentina.      •Usted quiere lastimarse o lastimar a otros.      •Usted sufre nikos convulsión o tiene temblores o estremecimientos.      Llame a bennett médico si:  •Usted tiene alucinaciones (ve o escucha cosas que no son reales).      •Usted no puede dejar de vomitar o vomita ye.      •Usted necesita ayuda para dejar de mirza alcohol.      •Usted tiene preguntas o inquietudes acerca de bennett condición o cuidado.      Medicamentos:  •Suplementos vitamínicospara tratar los niveles bajo de vitamina. El alcohol puede impedir la capacidad de bennett cuerpo para absorber suficiente vitaminas christophe la vitamina B1. Los suplementos vitamínicos también administrarse para prevenir los daños al cerebro relacionados con el consumo de alcohol.      •[place] eveline medicamentos christophe se le haya indicado.Consulte con bennett médico si usted benjamín que bennett medicamento no le está ayudando o si presenta efectos secundarios. Infórmele si es alérgico a cualquier medicamento. Mantenga nikos lista actualizada de los medicamentos, las vitaminas y los productos herbales que rolo. Incluya los siguientes datos de los medicamentos: cantidad, frecuencia y motivo de administración. Traiga con usted la lista o los envases de las píldoras a eveline citas de seguimiento. Lleve la lista de los medicamentos con usted en [place] de nikos emergencia.      Límites recomendados de alcohol:  •Los hombres de 21 a 64 añosdeberían limitar el consumo de alcohol a 2 tragos al día. No tome más de 4 bebidas por día o más de 14 en nikos semana.      •Todos los hombres y las mujeres de 65 años o másdeberían limitar el consumo de alcohol a 1 trago por día. No tome más de 3 bebidas por día o más de 7 en nikos semana. Ninguna cantidad de alcohol se considera adecuada elisa el embarazo.      Problemas de papi que puede causar el abuso del alcohol:  •Cáncer en hígado, páncreas, estómago, colon, riñón o mamas      •Derrame cerebral o ataque cardíaco      •Enfermedad hepática, renal o pulmonar      •Desmayos, pérdida de memoria, daño cerebral o demencia      •Diabetes, problemas del sistema inmunológico o deficiencia de tiamina (vitamina B1)      •Problemas para usted y bennett bebé si debbie elisa el embarazo      Maneje el consumo de alcohol:  •Disminuya la cantidad que debbie.[place] puede ayudar a prevenir problemas de papi, christophe daño cerebral, cardíaco y hepático, presión arterial nicol, diabetes y cáncer. Si usted no puede dejar de beber por completo, los proveedores de atención médica pueden ayudarlo a establecer metas para disminuir la cantidad que usted debbie.      •Planifique el uso semanal de alcohol.Es menos probable que danilo más de lo recomendado si lo planifica con anticipación.      •Cuando danilo alcohol, acompáñelo con comida.La comida evitará que el alcohol entre en bennett sistema demasiado rápido. Coma antes de mirza bennett primera bebida alcohólica.      •Calcule con cuidado el tiempo de eveline bebidas.No tome más de nikos bebida por hora. [place] líquido, christophe agua, café o un refresco, entre las bebidas alcohólicas.      •No maneje si ha tomado alcohol.Asegúrese que alguien que no haya estado bebiendo lo pueda llevar a casa.      •No danilo alcohol si está tomando stephanie medicamento.El alcohol es peligroso cuando se combina con ciertos medicamentos, christophe acetaminofeno o un medicamento para la presión arterial. Hable con bennett médico acerca de todos los medicamentos que está tomando.      Acuda a eveline consultas de control con bennett médico según le indicaron.[place] eveline preguntas para que se acuerde de hacerlas elisa eveline visitas.    Para apoyo y más información:  •Alcoholics Anonymous  Web Address: http://www.aa.org      •Substance Abuse and Mental Health Services Administration  [address]  [place] [address]  Web Address: http://www.samhsa.gov

## 2021-06-19 NOTE — ED ADULT NURSE NOTE - IS THE PATIENT ABLE TO BE SCREENED?
Patient scheduled for fasting lab appointment on 3/4/2021 ordered by ? (Ordering provider wasn't entered). Fasting Orders are missing. Please enter or extend lab orders prior to the appointment noted above.      Thank you Yes

## 2021-06-19 NOTE — ED PROVIDER NOTE - PATIENT PORTAL LINK FT
You can access the FollowMyHealth Patient Portal offered by [facility] by registering at the following website: [email or web address]. By joining [employer]’s FollowMyHealth portal, you will also be able to view your health information using other applications (apps) compatible with our system.

## 2021-06-19 NOTE — ED PROVIDER NOTE - OBJECTIVE STATEMENT
31 y/o M patient with a surgical history of appendectomy presents to the ED c/o not feeling well. Notes nausea, vomiting, diarrhea, chest pain, and abdominal pain. Endorses extra heavy drinking for the last five days. Denies any trauma, or any other complaints.

## 2021-06-19 NOTE — ED PROVIDER NOTE - SKIN, MLM
Alert-The patient is alert, awake and responds to voice. The patient is oriented to time, place, and person. The triage nurse is able to obtain subjective information.
Skin normal color for race, warm, dry and intact. No evidence of rash.

## 2021-06-19 NOTE — ED PROVIDER NOTE - CLINICAL SUMMARY MEDICAL DECISION MAKING FREE TEXT BOX
Patient with chest pain, abdominal pain, nausea, vomiting, diarrhea after alcohol binge. Will do labs and reassess.

## 2021-06-19 NOTE — ED ADULT NURSE NOTE - ED STAT RN HANDOFF DETAILS
pt.remained stable. denies pain.left  in the ed in stable condition with steady gait.not in distress

## 2021-06-19 NOTE — ED PROVIDER NOTE - PROGRESS NOTE DETAILS
Patient sleeping in NAD all night. on reeval, ambulatory with steady gait, vitals normal. mild tongue fasiculations noted. PO librium given prior to discharge

## 2021-06-19 NOTE — ED ADULT NURSE NOTE - CHIEF COMPLAINT QUOTE
"I drink too much beer , alcohol , vodka like 15- 20 bottles per day for the last 5 days due to problem and unable to sleep , now I have headache and chest pain" denies suicidal thought, last drink this morning

## 2021-06-19 NOTE — ED ADULT TRIAGE NOTE - CHIEF COMPLAINT QUOTE
"I drink too much beer , alcohol , vodka like 15- 20 bottles per day for the last 5 days due to problem and unable to sleep , not I have headache and chest pain" denies suicidal thought, last drink this morning "I drink too much beer , alcohol , vodka like 15- 20 bottles per day for the last 5 days due to problem and unable to sleep , now I have headache and chest pain" denies suicidal thought, last drink this morning

## 2021-06-19 NOTE — ED ADULT NURSE NOTE - NSIMPLEMENTINTERV_GEN_ALL_ED
Implemented All Fall Risk Interventions:  [place] to call system. Call bell, personal items and telephone within reach. Instruct patient to call for assistance. Room bathroom lighting operational. Non-slip footwear when patient is off stretcher. Physically safe environment: no spills, clutter or unnecessary equipment. Stretcher in lowest position, wheels locked, appropriate side rails in place. Provide visual cue, wrist band, yellow gown, etc. Monitor gait and stability. Monitor for mental status changes and reorient to person, place, and time. Review medications for side effects contributing to fall risk. Reinforce activity limits and safety measures with patient and family.

## 2021-06-20 VITALS
TEMPERATURE: 98 F | HEART RATE: 74 BPM | SYSTOLIC BLOOD PRESSURE: 151 MMHG | DIASTOLIC BLOOD PRESSURE: 92 MMHG | RESPIRATION RATE: 18 BRPM | OXYGEN SATURATION: 99 %

## 2021-06-20 PROCEDURE — 80307 DRUG TEST PRSMV CHEM ANLYZR: CPT

## 2021-06-20 PROCEDURE — 84484 ASSAY OF TROPONIN QUANT: CPT

## 2021-06-20 PROCEDURE — 36415 COLL VENOUS BLD VENIPUNCTURE: CPT

## 2021-06-20 PROCEDURE — 83735 ASSAY OF MAGNESIUM: CPT

## 2021-06-20 PROCEDURE — 85025 COMPLETE CBC W/AUTO DIFF WBC: CPT

## 2021-06-20 PROCEDURE — 82550 ASSAY OF CK (CPK): CPT

## 2021-06-20 PROCEDURE — 96360 HYDRATION IV INFUSION INIT: CPT

## 2021-06-20 PROCEDURE — 82962 GLUCOSE BLOOD TEST: CPT

## 2021-06-20 PROCEDURE — 80053 COMPREHEN METABOLIC PANEL: CPT

## 2021-06-20 PROCEDURE — 93005 ELECTROCARDIOGRAM TRACING: CPT

## 2021-06-20 PROCEDURE — 83690 ASSAY OF LIPASE: CPT

## 2021-06-20 PROCEDURE — 99285 EMERGENCY DEPT VISIT HI MDM: CPT | Mod: 25

## 2021-06-20 RX ORDER — ONDANSETRON 8 MG/1
8 TABLET, FILM COATED ORAL ONCE
Refills: 0 | Status: COMPLETED | OUTPATIENT
Start: 2021-06-20 | End: 2021-06-20

## 2021-06-20 RX ADMIN — ONDANSETRON 8 MILLIGRAM(S): 8 TABLET, FILM COATED ORAL at 06:17

## 2021-06-20 RX ADMIN — Medication 25 MILLIGRAM(S): at 06:17

## 2021-07-06 NOTE — ED ADULT NURSE NOTE - TEMPLATE
Spoke with Marlyn in Dr. Collazo's office regarding patient needing to have a 2 site ultrasound biopsy done and breast MRI. Informed staff that case was reviewed with the radiologist and will hold off on doing MRI, related to the patient would need to have dialysis within 24 hours of the MRI imaging. Patient will have biopsy done at [facility] since it is closer to home [facility]  nurse navigator  has left a message for patient to call and set up biopsy appointment.   
General

## 2021-07-10 ENCOUNTER — INPATIENT (INPATIENT)
Facility: HOSPITAL | Age: 31
LOS: 4 days | Discharge: HOME | DRG: 897 | End: 2021-07-15
Attending: INTERNAL MEDICINE | Admitting: INTERNAL MEDICINE
Payer: MEDICAID

## 2021-07-10 VITALS
SYSTOLIC BLOOD PRESSURE: 160 MMHG | OXYGEN SATURATION: 97 % | DIASTOLIC BLOOD PRESSURE: 107 MMHG | HEART RATE: 91 BPM | WEIGHT: 175.93 LBS | TEMPERATURE: 98 F | HEIGHT: 60 IN | RESPIRATION RATE: 18 BRPM

## 2021-07-10 DIAGNOSIS — F31.9 BIPOLAR DISORDER, UNSPECIFIED: ICD-10-CM

## 2021-07-10 DIAGNOSIS — F10.232 ALCOHOL DEPENDENCE WITH WITHDRAWAL WITH PERCEPTUAL DISTURBANCE: ICD-10-CM

## 2021-07-10 DIAGNOSIS — K35.80 UNSPECIFIED ACUTE APPENDICITIS: Chronic | ICD-10-CM

## 2021-07-10 DIAGNOSIS — R10.9 UNSPECIFIED ABDOMINAL PAIN: ICD-10-CM

## 2021-07-10 DIAGNOSIS — F20.9 SCHIZOPHRENIA, UNSPECIFIED: ICD-10-CM

## 2021-07-10 DIAGNOSIS — Z87.19 PERSONAL HISTORY OF OTHER DISEASES OF THE DIGESTIVE SYSTEM: Chronic | ICD-10-CM

## 2021-07-10 DIAGNOSIS — K35.33 ACUTE APPENDICITIS WITH PERFORATION, LOCALIZED PERITONITIS, AND GANGRENE, WITH ABSCESS: Chronic | ICD-10-CM

## 2021-07-10 DIAGNOSIS — Z29.9 ENCOUNTER FOR PROPHYLACTIC MEASURES, UNSPECIFIED: ICD-10-CM

## 2021-07-10 LAB
ALBUMIN SERPL ELPH-MCNC: 2.9 G/DL — LOW (ref 3.5–5)
ALBUMIN SERPL ELPH-MCNC: 2.9 G/DL — LOW (ref 3.5–5)
ALP SERPL-CCNC: 110 U/L — SIGNIFICANT CHANGE UP (ref 40–120)
ALP SERPL-CCNC: 90 U/L — SIGNIFICANT CHANGE UP (ref 40–120)
ALT FLD-CCNC: 237 U/L DA — HIGH (ref 10–60)
ALT FLD-CCNC: 243 U/L DA — HIGH (ref 10–60)
AMPHET UR-MCNC: NEGATIVE — SIGNIFICANT CHANGE UP
ANION GAP SERPL CALC-SCNC: 13 MMOL/L — SIGNIFICANT CHANGE UP (ref 5–17)
ANION GAP SERPL CALC-SCNC: 17 MMOL/L — SIGNIFICANT CHANGE UP (ref 5–17)
APAP SERPL-MCNC: 3 UG/ML — LOW (ref 10–30)
APPEARANCE UR: CLEAR — SIGNIFICANT CHANGE UP
AST SERPL-CCNC: 189 U/L — HIGH (ref 10–40)
AST SERPL-CCNC: 212 U/L — HIGH (ref 10–40)
BACTERIA # UR AUTO: ABNORMAL /HPF
BARBITURATES UR SCN-MCNC: NEGATIVE — SIGNIFICANT CHANGE UP
BASOPHILS # BLD AUTO: 0.03 K/UL — SIGNIFICANT CHANGE UP (ref 0–0.2)
BASOPHILS NFR BLD AUTO: 0.5 % — SIGNIFICANT CHANGE UP (ref 0–2)
BENZODIAZ UR-MCNC: NEGATIVE — SIGNIFICANT CHANGE UP
BILIRUB DIRECT SERPL-MCNC: 0.4 MG/DL — HIGH (ref 0–0.2)
BILIRUB INDIRECT FLD-MCNC: 0.8 MG/DL — SIGNIFICANT CHANGE UP (ref 0.2–1)
BILIRUB SERPL-MCNC: 0.9 MG/DL — SIGNIFICANT CHANGE UP (ref 0.2–1.2)
BILIRUB SERPL-MCNC: 1.2 MG/DL — SIGNIFICANT CHANGE UP (ref 0.2–1.2)
BILIRUB UR-MCNC: NEGATIVE — SIGNIFICANT CHANGE UP
BUN SERPL-MCNC: 11 MG/DL — SIGNIFICANT CHANGE UP (ref 7–18)
BUN SERPL-MCNC: 9 MG/DL — SIGNIFICANT CHANGE UP (ref 7–18)
CALCIUM SERPL-MCNC: 7.7 MG/DL — LOW (ref 8.4–10.5)
CALCIUM SERPL-MCNC: 7.8 MG/DL — LOW (ref 8.4–10.5)
CHLORIDE SERPL-SCNC: 102 MMOL/L — SIGNIFICANT CHANGE UP (ref 96–108)
CHLORIDE SERPL-SCNC: 99 MMOL/L — SIGNIFICANT CHANGE UP (ref 96–108)
CK MB BLD-MCNC: 0.4 % — SIGNIFICANT CHANGE UP (ref 0–3.5)
CK MB CFR SERPL CALC: 1.3 NG/ML — SIGNIFICANT CHANGE UP (ref 0–3.6)
CK SERPL-CCNC: 303 U/L — HIGH (ref 35–232)
CO2 SERPL-SCNC: 17 MMOL/L — LOW (ref 22–31)
CO2 SERPL-SCNC: 21 MMOL/L — LOW (ref 22–31)
COCAINE METAB.OTHER UR-MCNC: NEGATIVE — SIGNIFICANT CHANGE UP
COLOR SPEC: YELLOW — SIGNIFICANT CHANGE UP
CREAT SERPL-MCNC: 0.78 MG/DL — SIGNIFICANT CHANGE UP (ref 0.5–1.3)
CREAT SERPL-MCNC: 0.84 MG/DL — SIGNIFICANT CHANGE UP (ref 0.5–1.3)
DIFF PNL FLD: ABNORMAL
EOSINOPHIL # BLD AUTO: 0.01 K/UL — SIGNIFICANT CHANGE UP (ref 0–0.5)
EOSINOPHIL NFR BLD AUTO: 0.2 % — SIGNIFICANT CHANGE UP (ref 0–6)
EPI CELLS # UR: ABNORMAL /HPF
ETHANOL SERPL-MCNC: <3 MG/DL — SIGNIFICANT CHANGE UP (ref 0–10)
GLUCOSE SERPL-MCNC: 106 MG/DL — HIGH (ref 70–99)
GLUCOSE SERPL-MCNC: 90 MG/DL — SIGNIFICANT CHANGE UP (ref 70–99)
GLUCOSE UR QL: NEGATIVE — SIGNIFICANT CHANGE UP
HCT VFR BLD CALC: 43.7 % — SIGNIFICANT CHANGE UP (ref 39–50)
HGB BLD-MCNC: 14.6 G/DL — SIGNIFICANT CHANGE UP (ref 13–17)
IMM GRANULOCYTES NFR BLD AUTO: 0.3 % — SIGNIFICANT CHANGE UP (ref 0–1.5)
KETONES UR-MCNC: NEGATIVE — SIGNIFICANT CHANGE UP
LEUKOCYTE ESTERASE UR-ACNC: NEGATIVE — SIGNIFICANT CHANGE UP
LYMPHOCYTES # BLD AUTO: 1.32 K/UL — SIGNIFICANT CHANGE UP (ref 1–3.3)
LYMPHOCYTES # BLD AUTO: 21.5 % — SIGNIFICANT CHANGE UP (ref 13–44)
MCHC RBC-ENTMCNC: 27.6 PG — SIGNIFICANT CHANGE UP (ref 27–34)
MCHC RBC-ENTMCNC: 33.4 GM/DL — SIGNIFICANT CHANGE UP (ref 32–36)
MCV RBC AUTO: 82.6 FL — SIGNIFICANT CHANGE UP (ref 80–100)
METHADONE UR-MCNC: NEGATIVE — SIGNIFICANT CHANGE UP
MONOCYTES # BLD AUTO: 0.51 K/UL — SIGNIFICANT CHANGE UP (ref 0–0.9)
MONOCYTES NFR BLD AUTO: 8.3 % — SIGNIFICANT CHANGE UP (ref 2–14)
NEUTROPHILS # BLD AUTO: 4.25 K/UL — SIGNIFICANT CHANGE UP (ref 1.8–7.4)
NEUTROPHILS NFR BLD AUTO: 69.2 % — SIGNIFICANT CHANGE UP (ref 43–77)
NITRITE UR-MCNC: NEGATIVE — SIGNIFICANT CHANGE UP
NRBC # BLD: 0 /100 WBCS — SIGNIFICANT CHANGE UP (ref 0–0)
OPIATES UR-MCNC: NEGATIVE — SIGNIFICANT CHANGE UP
PCP SPEC-MCNC: SIGNIFICANT CHANGE UP
PCP UR-MCNC: NEGATIVE — SIGNIFICANT CHANGE UP
PH UR: 7 — SIGNIFICANT CHANGE UP (ref 5–8)
PLATELET # BLD AUTO: 234 K/UL — SIGNIFICANT CHANGE UP (ref 150–400)
POTASSIUM SERPL-MCNC: 3.5 MMOL/L — SIGNIFICANT CHANGE UP (ref 3.5–5.3)
POTASSIUM SERPL-MCNC: 3.9 MMOL/L — SIGNIFICANT CHANGE UP (ref 3.5–5.3)
POTASSIUM SERPL-SCNC: 3.5 MMOL/L — SIGNIFICANT CHANGE UP (ref 3.5–5.3)
POTASSIUM SERPL-SCNC: 3.9 MMOL/L — SIGNIFICANT CHANGE UP (ref 3.5–5.3)
PROT SERPL-MCNC: 7.6 G/DL — SIGNIFICANT CHANGE UP (ref 6–8.3)
PROT SERPL-MCNC: 7.7 G/DL — SIGNIFICANT CHANGE UP (ref 6–8.3)
PROT UR-MCNC: NEGATIVE — SIGNIFICANT CHANGE UP
RBC # BLD: 5.29 M/UL — SIGNIFICANT CHANGE UP (ref 4.2–5.8)
RBC # FLD: 14.2 % — SIGNIFICANT CHANGE UP (ref 10.3–14.5)
RBC CASTS # UR COMP ASSIST: ABNORMAL /HPF (ref 0–2)
SALICYLATES SERPL-MCNC: <1.7 MG/DL — LOW (ref 2.8–20)
SARS-COV-2 RNA SPEC QL NAA+PROBE: SIGNIFICANT CHANGE UP
SODIUM SERPL-SCNC: 133 MMOL/L — LOW (ref 135–145)
SODIUM SERPL-SCNC: 136 MMOL/L — SIGNIFICANT CHANGE UP (ref 135–145)
SP GR SPEC: 1.01 — SIGNIFICANT CHANGE UP (ref 1.01–1.02)
THC UR QL: NEGATIVE — SIGNIFICANT CHANGE UP
TROPONIN I SERPL-MCNC: <0.015 NG/ML — SIGNIFICANT CHANGE UP (ref 0–0.04)
UROBILINOGEN FLD QL: NEGATIVE — SIGNIFICANT CHANGE UP
WBC # BLD: 6.14 K/UL — SIGNIFICANT CHANGE UP (ref 3.8–10.5)
WBC # FLD AUTO: 6.14 K/UL — SIGNIFICANT CHANGE UP (ref 3.8–10.5)
WBC UR QL: SIGNIFICANT CHANGE UP /HPF (ref 0–5)

## 2021-07-10 PROCEDURE — 90792 PSYCH DIAG EVAL W/MED SRVCS: CPT | Mod: 95

## 2021-07-10 PROCEDURE — 99284 EMERGENCY DEPT VISIT MOD MDM: CPT

## 2021-07-10 RX ORDER — SODIUM CHLORIDE 9 MG/ML
1000 INJECTION, SOLUTION INTRAVENOUS
Refills: 0 | Status: DISCONTINUED | OUTPATIENT
Start: 2021-07-10 | End: 2021-07-15

## 2021-07-10 RX ORDER — PANTOPRAZOLE SODIUM 20 MG/1
40 TABLET, DELAYED RELEASE ORAL ONCE
Refills: 0 | Status: DISCONTINUED | OUTPATIENT
Start: 2021-07-10 | End: 2021-07-15

## 2021-07-10 RX ORDER — OLANZAPINE 15 MG/1
10 TABLET, FILM COATED ORAL DAILY
Refills: 0 | Status: DISCONTINUED | OUTPATIENT
Start: 2021-07-10 | End: 2021-07-11

## 2021-07-10 RX ORDER — PANTOPRAZOLE SODIUM 20 MG/1
40 TABLET, DELAYED RELEASE ORAL
Refills: 0 | Status: DISCONTINUED | OUTPATIENT
Start: 2021-07-10 | End: 2021-07-15

## 2021-07-10 RX ORDER — ENOXAPARIN SODIUM 100 MG/ML
40 INJECTION SUBCUTANEOUS DAILY
Refills: 0 | Status: DISCONTINUED | OUTPATIENT
Start: 2021-07-10 | End: 2021-07-15

## 2021-07-10 RX ORDER — THIAMINE MONONITRATE (VIT B1) 100 MG
500 TABLET ORAL THREE TIMES A DAY
Refills: 0 | Status: DISCONTINUED | OUTPATIENT
Start: 2021-07-10 | End: 2021-07-12

## 2021-07-10 RX ORDER — ACETAMINOPHEN 500 MG
650 TABLET ORAL ONCE
Refills: 0 | Status: COMPLETED | OUTPATIENT
Start: 2021-07-10 | End: 2021-07-10

## 2021-07-10 RX ORDER — SODIUM CHLORIDE 9 MG/ML
3 INJECTION INTRAMUSCULAR; INTRAVENOUS; SUBCUTANEOUS EVERY 8 HOURS
Refills: 0 | Status: DISCONTINUED | OUTPATIENT
Start: 2021-07-10 | End: 2021-07-15

## 2021-07-10 RX ORDER — FOLIC ACID 0.8 MG
1 TABLET ORAL DAILY
Refills: 0 | Status: DISCONTINUED | OUTPATIENT
Start: 2021-07-10 | End: 2021-07-15

## 2021-07-10 RX ADMIN — Medication 105 MILLIGRAM(S): at 14:35

## 2021-07-10 RX ADMIN — Medication 650 MILLIGRAM(S): at 09:25

## 2021-07-10 RX ADMIN — SODIUM CHLORIDE 75 MILLILITER(S): 9 INJECTION, SOLUTION INTRAVENOUS at 23:07

## 2021-07-10 RX ADMIN — SODIUM CHLORIDE 3 MILLILITER(S): 9 INJECTION INTRAMUSCULAR; INTRAVENOUS; SUBCUTANEOUS at 22:57

## 2021-07-10 RX ADMIN — Medication 650 MILLIGRAM(S): at 08:55

## 2021-07-10 RX ADMIN — Medication 2 MILLIGRAM(S): at 14:35

## 2021-07-10 RX ADMIN — Medication 25 MILLIGRAM(S): at 12:09

## 2021-07-10 RX ADMIN — Medication 105 MILLIGRAM(S): at 23:07

## 2021-07-10 RX ADMIN — SODIUM CHLORIDE 3 MILLILITER(S): 9 INJECTION INTRAMUSCULAR; INTRAVENOUS; SUBCUTANEOUS at 13:22

## 2021-07-10 NOTE — ED BEHAVIORAL HEALTH NOTE - BEHAVIORAL HEALTH NOTE
PRE-HOSPITAL COURSE  ===================  SOURCE:  Second-hand information via EMR documentation   DETAILS: Patient self-presented to the ED no additional pre-hospital course available    ===================  ED COURSE:   SOURCE:  Second-hand information via EMR documentation   ARRIVAL:  Patient self-presented to the ED with no noted incidents.  BELONGINGS:  Clothing   BEHAVIOR: Complied with triage protocols –provided blood/urine, changed into a hospital gown, and allowed staff to wand/collect belongings without incident. Patient endorsed AH, HI, and SI  in the context of alcohol withdrawal for the past 3 days with voices telling him to kill himself and others. He presents with good hygiene, fair eye contact, slowed speech, linear thought process, and is A&OX3. Patient has been calm in the ED with no aggression or behavioral issues reported.   TREATMENT: Patient was given Ativan 2mg IV and Librium 25mg for alcohol withdrawal. No restraints, security interventions or manual holds required.   VISITORS: Patient is unaccompanied by family or social supports.   ========================  COLLATERAL ATTEMPT  ========================  No Collateral available at this time     COVID Exposure Screen- collateral (i.e. third-party, chart review, belongings, etc; include EMS and ED staff)  1.	*Has the patient had a COVID-19 test in the last 90 days?  ( X ) Yes   (  ) No   (  ) Unknown- Reason: _____  IF YES PROCEED TO QUESTION #2. IF NO OR UNKNOWN, PLEASE SKIP TO QUESTION #3.  2.	Date of test(s) and result(s): _5/8/21 Neg_______  3.	*Has the patient tested positive for COVID-19 antibodies? ( X ) Yes   (  ) No   (  ) Unknown- Reason: _____  IF YES PROCEED TO QUESTION #4. IF NO or UNKNOWN, PLEASE SKIP TO QUESTION #5.  4.	Date of positive antibody test: __5/9/21______  5.	*Has the patient received 2 doses of the COVID-19 vaccine? (  ) Yes   (  ) No   (X  ) Unknown- Reason: _____  IF YES PROCEED TO QUESTION #6. IF NO or UNKNOWN, PLEASE SKIP TO QUESTION #7.  6.	 Date of second dose: ________  7.	*In the past 10 days, has the patient been around anyone with a positive COVID-19 test?* (  ) Yes   ( X ) No   (  ) Unknown- Reason: __  IF YES PROCEED TO QUESTION #8. IF NO or UNKNOWN, PLEASE SKIP TO QUESTION #13.  8.	Was the patient within 6 feet of them for at least 15 minutes? (  ) Yes   (  ) No   (  ) Unknown- Reason: _____  9.	Did the patient provide care for them? (  ) Yes   (  ) No   (  ) Unknown- Reason: ______  10.	Did the patient have direct physical contact with them (touched, hugged, or kissed them)? (  ) Yes   (  ) No    (  ) Unknown- Reason: __  11.	Did the patient share eating or drinking utensils with them? (  ) Yes   (  ) No    (  ) Unknown- Reason: ____  12.	Did they sneeze, cough, or somehow get respiratory droplets on the patient? (  ) Yes   (  ) No    (  ) Unknown- Reason: ______  13.	*Has the patient been out of [place] within the past 10 days?* (  ) Yes   (  X) No   (  ) Unknown- Reason: _____  IF YES PLEASE ANSWER THE FOLLOWING QUESTIONS:  14.	Which state/country did they go to? ______  15.	Were they there over 24 hours? (  ) Yes   (  ) No    (  ) Unknown- Reason: ______  16.	Date of return to [facility]: ______

## 2021-07-10 NOTE — ED ADULT NURSE NOTE - NS ED NOTE ABUSE SUSPICION NEGLECT YN
[facility]IC Progress Note    Date: 2017    Name: Ming Vela Person    MRN: [id number]         : 1947      Mr. Geneva Ross arrived to [facility] at [id number] for peripheral lab draw for Dr. Dairel Romero. Mr. Sellers's vitals were reviewed. Visit Vitals    /81 (BP 1 Location: Right arm, BP Patient Position: Sitting)    Pulse 89    Temp 97.6 °F (36.4 °C)    Resp 17    SpO2 100%     Recent Results (from the past 12 hour(s))   CBC WITH 3 PART DIFF    Collection Time: 10/17/17  2:35 PM   Result Value Ref Range    WBC 8.6 4.5 - 13.0 K/uL    RBC 4.57 4.10 - 5.10 M/uL    HGB 12.8 12.0 - 16.0 g/dL    HCT 39.9 36 - 48 %    MCV 87.3 78 - 102 FL    MCH 28.0 25.0 - 35.0 PG    MCHC 32.1 31 - 37 g/dL    RDW 14.6 (H) 11.5 - 14.5 %    NEUTROPHILS 76 (H) 40 - 70 %    MIXED CELLS 5 0.1 - 17 %    LYMPHOCYTES 19 14 - 44 %    ABS. NEUTROPHILS 6.6 1.8 - 9.5 K/UL    ABS. MIXED CELLS 0.4 0.0 - 2.3 K/uL    ABS. LYMPHOCYTES 1.6 1.1 - 5.9 K/UL    DF AUTOMATED           Blood drawn for labs via Right antecubital venipuncture x1 attempt using 23g butterfly collection set. Specimen sent to lab for cbc w/diff and cmp. Gauze and coban applied to site. Mr. Geneva Ross tolerated well without complaints. Mr. Geneva Ross was discharged from [place] [address] in stable condition at 1440.     Oksana Hicks RN  2017 No

## 2021-07-10 NOTE — ED BEHAVIORAL HEALTH ASSESSMENT NOTE - HPI (INCLUDE ILLNESS QUALITY, SEVERITY, DURATION, TIMING, CONTEXT, MODIFYING FACTORS, ASSOCIATED SIGNS AND SYMPTOMS)
This is a 29 YO Frisian-speaking male domiciled in an apartment with friends, employed in a restaurant, no past med history, pphx alcohol use disorder, alcohol-induced psychosis, depression, several ED/CPEP encounters for alcohol related psychosis and withdrawal with psychosis, recent medical admission in May 2021 for Alcohol withdrawal with psychosis, most recent psych admission at [facility] (for two weeks, discharged 3/10/2021), one brief inpatient admission to [facility] 11/13-11/14/2020 for SI, no known violence hx, no known legal history, presenting with CAH in context of 4 day EtOH binge    Interviewed with .    Last alcohol drink was 7/9/21 1pm. Denies other substance use. UTOX today is negative. Alcohol is negative.   On interview patient reports doing well since last medical admission in May, 2021, abstinent from EtoH and reports compliance with zyprexa 10mg QHS.  past 4 days patient became stressed from the news of his mother (in [place]) falling ill and proceeded on a heavy EtOH binge x 4days.  Reports distress and Chief complaint from CAH to self harm (vague and unwilling to specify beyond "to do bad things to myself"), last heard last evening.  Patient denies desire to self harm and wishes for medical admission to assist with withdrawal, agrees to similar plan of last medical admission in May, 2021.  Patient reports his mother doing well now and wishes to return to work after admission.    ROS: +CAH, +SI with no intent or planning.      COVID exposure screening:   --pt has been tested for covid: 3/14/21 NEGATIVE, 2/24/21 NEGATIVE, 2/18/21 NEGATIVE. July 10th, 2021 negative  --COVID antibodies? YES May 9th 2021  --COVID vaccines? no  --pt denies travel outside of [facility] in past ten days.  --pt denies exposure to anyone who tested +covid in past 10 days.

## 2021-07-10 NOTE — H&P ADULT - ASSESSMENT
Patient is a 30 y.o. male (lives with his friends at home, walks independently) who has a PMH of bipolar disorder, schizophrenia(diagnosed 6 months ago), alcohol abuse, substance-induced psychosis, appendix drainage/surgery?, came into the ED for alcohol abuse. Librium, ativan given in ED. Admitted for alcohol withdrawal.

## 2021-07-10 NOTE — H&P ADULT - PROBLEM SELECTOR PLAN 1
- alcohol induced psychosis vs schizophrenic psychosis  - blood alcohol <3  - Creatine Kinase 300  - UA negative  - UTox negative  - Cardiac enzyme negative  - trend CK - alcohol induced psychosis vs schizophrenic psychosis  - blood alcohol <3  - Creatine Kinase 300  - UA negative  - UTox negative  - Ativan 2mg IV and Librium 25mg given in ED.  - Cardiac enzyme negative  - CIWA = 2 currently  - put on PRN Ativan 2mg q4  for CIWA>7, PRN ativan 2mg q4 for agitation  - put on Thiamine, folic acid, multivitamins  - trend CK  - consult psych? - alcohol induced psychosis vs schizophrenic psychosis  - blood alcohol <3  - Creatine Kinase 300  - UA negative  - UTox negative  - Ativan 2mg IV and Librium 25mg given in ED.  - Cardiac enzyme negative  - EKG normal  - CIWA = 2 currently  - put on PRN Ativan 2mg q4  for CIWA>7, PRN ativan 2mg q4 for agitation  - put on Thiamine, folic acid, multivitamins  - trend CK  - consult psych? - alcohol induced psychosis vs schizophrenic psychosis  - blood alcohol <3  - Creatine Kinase 300  - UA negative  - UTox negative  - Ativan 2mg IV and Librium 25mg given in ED.  - Cardiac enzyme negative  - EKG normal  - CIWA = 2 currently  - put on PRN Ativan 2mg q4  for CIWA>7, PRN ativan 2mg q4 for agitation  - put on Thiamine, folic acid, multivitamins  - trend CK  - put on 100ml/hr NS for 10 hrs

## 2021-07-10 NOTE — ED PROVIDER NOTE - OBJECTIVE STATEMENT
31 yo M h/o bipolar, schizophrenia, etoh abuse and substance-induced psychosis p/w auditory hallucinations telling him to hurt himself and others after drinking etoh for 4x days. Pt denies any attempts to kill self or others. Pt states that his last drink was last night approximately 10 hours ago and states that he doesn't believe that he is withdrawing from etoh. Pt denies any trauma, drugs, fever or infectious symptoms, pain anywhere, other recent illness or hospitalizations. 31 yo M h/o bipolar, schizophrenia, etoh abuse and substance-induced psychosis p/w auditory hallucinations telling him to hurt himself and others after drinking etoh for 4x days. Pt denies any attempts to kill self or others. Pt states that his last drink was last night approximately 10 hours ago and states that he doesn't believe that he is withdrawing from etoh and denies h/o etoh w/d. Pt denies any trauma, drugs, fever or infectious symptoms, pain anywhere, other recent illness or hospitalizations.

## 2021-07-10 NOTE — ED BEHAVIORAL HEALTH ASSESSMENT NOTE - DESCRIPTION
denies see BH note. see hpi see BH note.    COVID Exposure Screen- Patient     1.        *Have you had a COVID-19 test in the last 21 days?  (  ) Yes   (x ) No   (  ) Unknown- Reason: ______  IF YES PROCEED TO QUESTION #2. IF NO OR UNKNOWN THEN PLEASE SKIP TO QUESTION #3.  2.        Date of test: ________  3.        3. Do you know the result? (  ) Negative   (  ) Positive   (x  ) No result available  4.        *In the past 10 days, have you been around anyone with a positive COVID-19 test?*  (  ) Yes   (x  ) No   (  ) Unknown- Reason (e.g. patient uncertain, sedated, refusing to answer, etc.):  ______  IF YES PROCEED TO QUESTION #5. IF NO or UNKNOWN, PLEASE SKIP TO QUESTION #10  5.        Were you within 6 feet of them for at least 15 minutes? (  ) Yes   (  ) No   (  ) Unknown- Reason: _____  6.        Have you provided care for them? (  ) Yes   (  ) No   (  ) Unknown- Reason: ______  7.        Have you had direct physical contact with them (touched, hugged, or kissed them)? (  ) Yes   (  ) No    (  ) Unknown- Reason: ___  8.        Have you shared eating or drinking utensils with them? (  ) Yes   (  ) No    (  ) Unknown- Reason: ____  9.        Have they sneezed, coughed, or somehow got respiratory droplets on you? (  ) Yes   (  ) No    (  ) Unknown- Reason: ______  10.     *Have you been out of [place] within the past 10 days?*  (  ) Yes   (x  ) No   (  ) Unknown- Reason (e.g. patient uncertain, sedated, refusing to answer, etc.): _______  IF YES PLEASE ANSWER THE FOLLOWING QUESTIONS:  11.     Which state/country have you been to? ______  12.     Were you there over 24 hours? (  ) Yes   (  ) No    (  ) Unknown- Reason: ______  13.     Date of return to [facility]: ______

## 2021-07-10 NOTE — ED PROVIDER NOTE - PROGRESS NOTE DETAILS
alicia: pt signed out to me by overnight attending.   pt w pmhx bipolar, schizoaffective, etoh abuse p/w hallucinations urging self-harm  on my initial exam, comfortable however during ED course, became more tremulous w tongue fasciculations  telepsych evaluated patient and determined this is 2/2 alcohol withdrawal as pt is now on psych meds and states he is compliant. discussed inpatient psych with telepsych attending at length, but they determined that medical admission more beneficial for patine to address etoh withdrawal which is more likely cause for acute hallucinations.   last etoh beverage 1pm yesterday  librium, ativan given in ED. patient admitted for alcohol withdrawal.

## 2021-07-10 NOTE — ED BEHAVIORAL HEALTH ASSESSMENT NOTE - SUMMARY
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  Patient presentation less severe than last psychiatric admission in March 2021. The patient's current episode of CAH occurred in context of cessation of alcohol use.  Denies AH while on zyprexa in the past 2 months.  Therefore current episode of psychosis likely due to alcohol withdrawal and would benefit from medical stabilization.

## 2021-07-10 NOTE — ED ADULT TRIAGE NOTE - CHIEF COMPLAINT QUOTE
c/o hearing voices x 3 days telling him to hurt others and killing himself by a knife, been drinking alcohol x 4 days staight with h/o bipolar and schizophrenia

## 2021-07-10 NOTE — ED BEHAVIORAL HEALTH ASSESSMENT NOTE - RISK ASSESSMENT
moderate risk due to active EtOH withdrawal, with psychotic CAH symptoms during withdrawal. Moderate Acute Suicide Risk

## 2021-07-10 NOTE — H&P ADULT - ATTENDING COMMENTS
PATIENT WAS SEEN AND EXAMINED , CASE D/W ER MD AND RMD  -  ALCOHOL ABUSE AND ALCOHOL WITH DRAWL  -  ABDOMINAL COLIC, ACUTE GASTRITIS - CONTINUE PROTONIX   -  COUNSELLED TO QUIT ALCOHOL   - COUNSELLED TO F/UP WITH PSYCHIATRIST   - GI AND DVT PROPHYLAXIS  - DR. BROOKS BROOKE Patient is a 30 y.o. male (lives with his friends at home, walks independently) who has a PMH of bipolar disorder, schizophrenia(diagnosed 6 months ago), alcohol abuse, substance-induced psychosis, appendix drainage/surgery?, came into the ED after drinking for 4 days because there was auditory hallucinations telling him to hurt himself and others. Pt denies any attempts to kill himself or others. Pt states that his last drink was 1pm yesterday where he drank 20 beers and liquors. He reports that he had bilateral hand tremors from alcohol abuse. Currently, patient has bilateral hand tremors, headache 6/10, and mid-abdominal pain that prevents him from eating. Patient was at another hospital for alcohol abuse 1~2 weeks ago. Patient states that he was at an ED for alcohol abuse about 3 months ago. Pt denies any trauma, bleeding, cigarette use, drug use, fever or infectious symptoms.     Patient's 1 month supply of Olanzapine was picked up in May 12, 2021. Theoretically, the patient has not taken Olanzapine for ~1 month. Patient admits that he is not on any medications right now.    Ativan 2mg IV and Librium 25mg given in ED.  Currently CIWA2 (mild headache, mild hand tremor)    # ALCOHOL ABUSE AND WITHDRAWAL - PLACED ON CIWA PROTOCOL W/ ATIVAN, PRN ATIVAN, MVI, THIAMINE, FOLIC ACID; COUNSELLING SUPPORT OFFERED REGARDING CESSATION. SW CONSULT TO F/U ON REHABILITATION SERVICES  # HX OF ?BIPOLAR DX W/ SCHIZOPHRENIA - HAS NOT BEEN TAKING PREVIOUSLY PRESCRIBED RX OF ZYPREXA - RESUME ZYPREXA, PSYCHIATRY CONSULT  - PATIENT W/ SI/HI - FURTHER EVALUATION BY PSYCH  - ON ONE-TO-ONE SUPERVISION  # ABDOMINAL COLIC, ACUTE GASTRITIS - CONTINUE PROTONIX   # GI AND DVT PROPHYLAXIS    KELLY BROOKE MD COVERING CHRISTIANO BROOKE MD

## 2021-07-10 NOTE — H&P ADULT - HISTORY OF PRESENT ILLNESS
Interpretor called. ID [id number]    Patient is a 30 y.o. male (lives with his friends at home, walks independently) who has a PMH of bipolar disorder, schizophrenia(diagnosed 6 months ago), alcohol abuse, substance-induced psychosis, appendix drainage/surgery?, came into the ED after drinking for 4 days because there was auditory hallucinations telling him to hurt himself and others. Pt denies any attempts to kill himself or others. Pt states that his last drink was 1pm yesterday where he drank 20 beers and liquors. He reports that he had bilateral hand tremors from alcohol abuse. Currently, patient has bilateral hand tremors, headache 6/10, and mid-abdominal pain that prevents him from eating. Patient was at another hospital for alcohol abuse 1~2 weeks ago. Patient states that he was at an ED for alcohol abuse about 3 months ago. Pt denies any trauma, bleeding, cigarette use, drug use, fever or infectious symptoms.     *Patient says he is compliant on his medication but his 1 month supply of Olanzapine was picked up in May 12, 2021. Theoretically, the patient has not taken Olanzapine for ~1 month.    Librium, ativan given in ED.  Currently CIWA2 (mild headache, mild hand tremor) Interpretor called. ID [id number]    Patient is a 30 y.o. male (lives with his friends at home, walks independently) who has a PMH of bipolar disorder, schizophrenia(diagnosed 6 months ago), alcohol abuse, substance-induced psychosis, appendix drainage/surgery?, came into the ED after drinking for 4 days because there was auditory hallucinations telling him to hurt himself and others. Pt denies any attempts to kill himself or others. Pt states that his last drink was 1pm yesterday where he drank 20 beers and liquors. He reports that he had bilateral hand tremors from alcohol abuse. Currently, patient has bilateral hand tremors, headache 6/10, and mid-abdominal pain that prevents him from eating. Patient was at another hospital for alcohol abuse 1~2 weeks ago. Patient states that he was at an ED for alcohol abuse about 3 months ago. Pt denies any trauma, bleeding, cigarette use, drug use, fever or infectious symptoms.     Patient's 1 month supply of Olanzapine was picked up in May 12, 2021. Theoretically, the patient has not taken Olanzapine for ~1 month. Patient admits that he is not on any medications right now.    Ativan 2mg IV and Librium 25mg given in ED.  Currently CIWA2 (mild headache, mild hand tremor) Interpretor called. ID [id number]    Patient is a 30 y.o. male (lives with his friends at home, walks independently) who has a PMH of bipolar disorder, schizophrenia(diagnosed 6 months ago), alcohol abuse, substance-induced psychosis, appendix drainage/surgery?, came into the ED after drinking for 4 days because there was auditory hallucinations telling him to hurt himself and others. Pt denies any attempts to kill himself or others. Pt states that his last drink was 1pm yesterday where he drank 20 beers and liquors. He reports that he had bilateral hand tremors from alcohol abuse. Currently, patient has bilateral hand tremors, headache 6/10, and mid-abdominal pain that prevents him from eating. Patient was at another hospital for alcohol abuse 1~2 weeks ago. Patient states that he was at an ED for alcohol abuse about 3 months ago. Pt denies any trauma, bleeding, cigarette use, drug use, fever or infectious symptoms.     Patient's 1 month supply of Olanzapine was picked up in May 12, 2021. Theoretically, the patient has not taken Olanzapine for ~1 month. Patient admits that he is not on any medications right now.    Ativan 2mg IV and Librium 25mg given in ED.  Currently CIWA2 (mild headache, mild hand tremor)

## 2021-07-10 NOTE — ED BEHAVIORAL HEALTH ASSESSMENT NOTE - NSSUICPROTFACT_PSY_ALL_CORE
Responsibility to children, family, or others/Identifies reasons for living/Supportive social network of family or friends/None known

## 2021-07-10 NOTE — ED BEHAVIORAL HEALTH ASSESSMENT NOTE - OTHER PAST PSYCHIATRIC HISTORY (INCLUDE DETAILS REGARDING ONSET, COURSE OF ILLNESS, INPATIENT/OUTPATIENT TREATMENT)
Most recent psychiatric hospitalization at [facility] discharged on 3/10/2021; one brief inpatient admission to [place] 11/13-11/14/2020 for SI in past.  Medical hospitalization for EtOH withdrawal in May 2021.

## 2021-07-10 NOTE — H&P ADULT - PROBLEM SELECTOR PLAN 2
- mid and RLQ abdominal pain tenderness  - LFT trending up AST/ALT = 212/243  - has hx of hepatic steatosis, gastritis (from visit May 2021)  - CT abd (May 2021) = Thickened inflamed terminal ileal and cecum suggestive of Crohn's disease. Right lower quadrant noncalcified mesenteric adenopathy. Calcified mesenteric lymph nodes are present as well indicating chronic inflammatory or infectious process.  = Pt was put on Cipro Flagyl on discharge, and was recommended to follow up with outpatient colonoscopy.     - f/u abdominal US - mid and RLQ abdominal pain tenderness  - LFT trending up AST/ALT = 212/243  - has hx of hepatic steatosis, gastritis (from visit May 2021)  - CT abd (May 2021) = Thickened inflamed terminal ileal and cecum suggestive of Crohn's disease. Right lower quadrant noncalcified mesenteric adenopathy. Calcified mesenteric lymph nodes are present as well indicating chronic inflammatory or infectious process.  = Pt was put on Cipro Flagyl on discharge, and was recommended to follow up with outpatient colonoscopy.  - Total bilirubin 1.2, direct bilirubin 0.4  - f/u abdominal US?  - consult GI? - mid and RLQ abdominal pain tenderness  - LFT trending up AST/ALT = 212/243,  - has hx of hepatic steatosis, gastritis (from visit May 2021)  - CT abd (May 2021) = Thickened inflamed terminal ileal and cecum suggestive of Crohn's disease. Right lower quadrant noncalcified mesenteric adenopathy. Calcified mesenteric lymph nodes are present as well indicating chronic inflammatory or infectious process.  = Pt was put on Cipro Flagyl on discharge, and was recommended to follow up with outpatient colonoscopy.  - Total bilirubin 1.2, direct bilirubin 0.4  - f/u abdominal US  - f/u hepatitis panel  - consult GI??

## 2021-07-10 NOTE — ED PROVIDER NOTE - CLINICAL SUMMARY MEDICAL DECISION MAKING FREE TEXT BOX
Pt p/w SI and HI after a 4 day binge of etoh. Exam and hx making etoh wihtdrawal/DT less likely. Labs unremarkable. C/s psych and they will see pt. S/o to incoming doc pending psych eval. Pt stable.

## 2021-07-10 NOTE — ED BEHAVIORAL HEALTH ASSESSMENT NOTE - DETAILS
"A bunch of bad things" discharged on 3/10/2021 from [facility] inpatient will notify CL team denies hx of suicide attempts or self harming N/A at this time pt agrees with plan and wishes for assistance in his EtOH withdrawal

## 2021-07-10 NOTE — H&P ADULT - NSHPSOCIALHISTORY_GEN_ALL_CORE
Patient lives at home with his friends, walks alone. Patient used to be a light drinker with a history of 10 years of drinking. However, ever since schizophrenic symptoms started, the patient had been drinking a lot. Denies cigarette use and drug use.

## 2021-07-10 NOTE — ED PROVIDER NOTE - PHYSICAL EXAMINATION
Vital Signs Reviewed  GEN: Comfortable, Conversant in clear voice, NAD, AAOx3  HEENT: No tongue fasciculations, NCAT, No cspine TTP, MMM, Neck Supple  RESP: CTAB, No rales/rhonchi/wheezing  CV: RRR, S1S2, No murmurs  ABD: No TTP, Soft, ND, No masses, No CVA Tenderness  Extrem/Skin: No tremors, Equal pulses bilat, No cyanosis/edema/rashes  Neuro: No focal deficits

## 2021-07-10 NOTE — ED ADULT NURSE NOTE - OBJECTIVE STATEMENT
pt presents to ED with complaints of hearing voices x 3 days telling him to kill himself by a knife. Pt is A& o x 3. Admit drinking alcohol x 4 days with at least 20 bottles of beer as per pt. h/o bipolar and schizophrenia.

## 2021-07-10 NOTE — H&P ADULT - NSHPLABSRESULTS_GEN_ALL_CORE
< from: CT Abdomen and Pelvis w/ IV Cont (05.10.21 @ 10:12) >    IMPRESSION:    Thickened inflamed terminal ileal and cecum suggestive of Crohn's disease. Right lower quadrant noncalcified mesenteric adenopathy. Calcified mesenteric lymph nodes are present as well indicating chronic inflammatory or infectious process.    The appendix is not visualized and appendicitis cannot be excluded.    Small pelvic ascites.    Hepatic steatosis.    < end of copied text >

## 2021-07-10 NOTE — H&P ADULT - PROBLEM SELECTOR PLAN 3
diagnosed 6 months ago  Home med Olanzapine 10mg x1 diagnosed 6 months ago  Put on home med, which is Olanzapine 10mg x1  constant observation for now  Tried consulting psych but am hearing that the attending has to see the pt and call for consult

## 2021-07-11 LAB
ALBUMIN SERPL ELPH-MCNC: 2.7 G/DL — LOW (ref 3.5–5)
ALP SERPL-CCNC: 109 U/L — SIGNIFICANT CHANGE UP (ref 40–120)
ALT FLD-CCNC: 206 U/L DA — HIGH (ref 10–60)
ANION GAP SERPL CALC-SCNC: 10 MMOL/L — SIGNIFICANT CHANGE UP (ref 5–17)
AST SERPL-CCNC: 180 U/L — HIGH (ref 10–40)
BASOPHILS # BLD AUTO: 0.03 K/UL — SIGNIFICANT CHANGE UP (ref 0–0.2)
BASOPHILS NFR BLD AUTO: 0.6 % — SIGNIFICANT CHANGE UP (ref 0–2)
BILIRUB SERPL-MCNC: 1.4 MG/DL — HIGH (ref 0.2–1.2)
BUN SERPL-MCNC: 7 MG/DL — SIGNIFICANT CHANGE UP (ref 7–18)
CALCIUM SERPL-MCNC: 8.1 MG/DL — LOW (ref 8.4–10.5)
CHLORIDE SERPL-SCNC: 102 MMOL/L — SIGNIFICANT CHANGE UP (ref 96–108)
CO2 SERPL-SCNC: 22 MMOL/L — SIGNIFICANT CHANGE UP (ref 22–31)
COVID-19 SPIKE DOMAIN AB INTERP: POSITIVE
COVID-19 SPIKE DOMAIN ANTIBODY RESULT: >250 U/ML — HIGH
CREAT SERPL-MCNC: 0.71 MG/DL — SIGNIFICANT CHANGE UP (ref 0.5–1.3)
EOSINOPHIL # BLD AUTO: 0.03 K/UL — SIGNIFICANT CHANGE UP (ref 0–0.5)
EOSINOPHIL NFR BLD AUTO: 0.6 % — SIGNIFICANT CHANGE UP (ref 0–6)
GLUCOSE SERPL-MCNC: 109 MG/DL — HIGH (ref 70–99)
HCT VFR BLD CALC: 43.8 % — SIGNIFICANT CHANGE UP (ref 39–50)
HGB BLD-MCNC: 14.8 G/DL — SIGNIFICANT CHANGE UP (ref 13–17)
IMM GRANULOCYTES NFR BLD AUTO: 0.2 % — SIGNIFICANT CHANGE UP (ref 0–1.5)
LYMPHOCYTES # BLD AUTO: 1.59 K/UL — SIGNIFICANT CHANGE UP (ref 1–3.3)
LYMPHOCYTES # BLD AUTO: 29.4 % — SIGNIFICANT CHANGE UP (ref 13–44)
MAGNESIUM SERPL-MCNC: 2.3 MG/DL — SIGNIFICANT CHANGE UP (ref 1.6–2.6)
MCHC RBC-ENTMCNC: 27.9 PG — SIGNIFICANT CHANGE UP (ref 27–34)
MCHC RBC-ENTMCNC: 33.8 GM/DL — SIGNIFICANT CHANGE UP (ref 32–36)
MCV RBC AUTO: 82.5 FL — SIGNIFICANT CHANGE UP (ref 80–100)
MONOCYTES # BLD AUTO: 0.44 K/UL — SIGNIFICANT CHANGE UP (ref 0–0.9)
MONOCYTES NFR BLD AUTO: 8.1 % — SIGNIFICANT CHANGE UP (ref 2–14)
NEUTROPHILS # BLD AUTO: 3.31 K/UL — SIGNIFICANT CHANGE UP (ref 1.8–7.4)
NEUTROPHILS NFR BLD AUTO: 61.1 % — SIGNIFICANT CHANGE UP (ref 43–77)
NRBC # BLD: 0 /100 WBCS — SIGNIFICANT CHANGE UP (ref 0–0)
PHOSPHATE SERPL-MCNC: 2.3 MG/DL — LOW (ref 2.5–4.5)
PLATELET # BLD AUTO: 230 K/UL — SIGNIFICANT CHANGE UP (ref 150–400)
POTASSIUM SERPL-MCNC: 3.2 MMOL/L — LOW (ref 3.5–5.3)
POTASSIUM SERPL-SCNC: 3.2 MMOL/L — LOW (ref 3.5–5.3)
PROT SERPL-MCNC: 7.3 G/DL — SIGNIFICANT CHANGE UP (ref 6–8.3)
RBC # BLD: 5.31 M/UL — SIGNIFICANT CHANGE UP (ref 4.2–5.8)
RBC # FLD: 13.9 % — SIGNIFICANT CHANGE UP (ref 10.3–14.5)
SARS-COV-2 IGG+IGM SERPL QL IA: >250 U/ML — HIGH
SARS-COV-2 IGG+IGM SERPL QL IA: POSITIVE
SODIUM SERPL-SCNC: 134 MMOL/L — LOW (ref 135–145)
WBC # BLD: 5.41 K/UL — SIGNIFICANT CHANGE UP (ref 3.8–10.5)
WBC # FLD AUTO: 5.41 K/UL — SIGNIFICANT CHANGE UP (ref 3.8–10.5)

## 2021-07-11 RX ORDER — OLANZAPINE 15 MG/1
10 TABLET, FILM COATED ORAL AT BEDTIME
Refills: 0 | Status: DISCONTINUED | OUTPATIENT
Start: 2021-07-11 | End: 2021-07-15

## 2021-07-11 RX ORDER — SERTRALINE 25 MG/1
50 TABLET, FILM COATED ORAL DAILY
Refills: 0 | Status: DISCONTINUED | OUTPATIENT
Start: 2021-07-11 | End: 2021-07-15

## 2021-07-11 RX ORDER — POTASSIUM CHLORIDE 20 MEQ
40 PACKET (EA) ORAL ONCE
Refills: 0 | Status: COMPLETED | OUTPATIENT
Start: 2021-07-11 | End: 2021-07-11

## 2021-07-11 RX ORDER — ONDANSETRON 8 MG/1
4 TABLET, FILM COATED ORAL ONCE
Refills: 0 | Status: COMPLETED | OUTPATIENT
Start: 2021-07-11 | End: 2021-07-11

## 2021-07-11 RX ADMIN — SODIUM CHLORIDE 75 MILLILITER(S): 9 INJECTION, SOLUTION INTRAVENOUS at 11:43

## 2021-07-11 RX ADMIN — PANTOPRAZOLE SODIUM 40 MILLIGRAM(S): 20 TABLET, DELAYED RELEASE ORAL at 05:57

## 2021-07-11 RX ADMIN — Medication 1 MILLIGRAM(S): at 11:35

## 2021-07-11 RX ADMIN — SODIUM CHLORIDE 3 MILLILITER(S): 9 INJECTION INTRAMUSCULAR; INTRAVENOUS; SUBCUTANEOUS at 13:17

## 2021-07-11 RX ADMIN — Medication 2 MILLIGRAM(S): at 12:43

## 2021-07-11 RX ADMIN — ENOXAPARIN SODIUM 40 MILLIGRAM(S): 100 INJECTION SUBCUTANEOUS at 11:35

## 2021-07-11 RX ADMIN — Medication 1 TABLET(S): at 11:35

## 2021-07-11 RX ADMIN — Medication 105 MILLIGRAM(S): at 21:24

## 2021-07-11 RX ADMIN — OLANZAPINE 10 MILLIGRAM(S): 15 TABLET, FILM COATED ORAL at 11:35

## 2021-07-11 RX ADMIN — Medication 105 MILLIGRAM(S): at 05:57

## 2021-07-11 RX ADMIN — OLANZAPINE 10 MILLIGRAM(S): 15 TABLET, FILM COATED ORAL at 21:21

## 2021-07-11 RX ADMIN — SERTRALINE 50 MILLIGRAM(S): 25 TABLET, FILM COATED ORAL at 15:37

## 2021-07-11 RX ADMIN — Medication 40 MILLIEQUIVALENT(S): at 11:35

## 2021-07-11 RX ADMIN — SODIUM CHLORIDE 3 MILLILITER(S): 9 INJECTION INTRAMUSCULAR; INTRAVENOUS; SUBCUTANEOUS at 05:54

## 2021-07-11 RX ADMIN — ONDANSETRON 4 MILLIGRAM(S): 8 TABLET, FILM COATED ORAL at 11:34

## 2021-07-11 RX ADMIN — SODIUM CHLORIDE 3 MILLILITER(S): 9 INJECTION INTRAMUSCULAR; INTRAVENOUS; SUBCUTANEOUS at 21:21

## 2021-07-11 RX ADMIN — Medication 2 MILLIGRAM(S): at 21:24

## 2021-07-11 RX ADMIN — Medication 105 MILLIGRAM(S): at 13:17

## 2021-07-11 NOTE — ED BEHAVIORAL HEALTH NOTE - BEHAVIORAL HEALTH NOTE
========================  COLLATERAL  ========================  NAME: Chuck  NUMBER: [phone]  RELATIONSHIP: Partner  RELIABILITY: Fair    HPI    Per Chuck, his partner of 1 year, the patient became intoxicated two nights ago at a house party and after continuously not feeling well he went to the emergency room and was admitted medically.  Collateral denied alcohol abuse and reported it is only socially, sych as with the party they attended two nights ago. He also denied the pt. had any psych history and reported the pt. has no history of SI and he has no safety concerns. He reported the pt. “is happy, they love each other, and they communicate well.” He denied the pt. has outpatient services, denied that the pt. has any access to firearms in their home, and that he wanted to know when the pt. would be back home to have dinner ready.

## 2021-07-11 NOTE — BH CONSULTATION LIAISON ASSESSMENT NOTE - OTHER PAST PSYCHIATRIC HISTORY (INCLUDE DETAILS REGARDING ONSET, COURSE OF ILLNESS, INPATIENT/OUTPATIENT TREATMENT)
Most recent psychiatric hospitalization at [facility] discharged on 3/10/2021; one brief inpatient admission to [place] 11/13-11/14/2020 for SI in past.  Medical hospitalization for EtOH withdrawal in May 2021.

## 2021-07-11 NOTE — BH CONSULTATION LIAISON ASSESSMENT NOTE - SUMMARY
29yo domiciled, employed,   M w/ hx of alcohol use, depressive and psychotic symptoms/disorders w/ no past Sa/SIB, past psych hospitalizations who presented w/ purported SI, AH with alcohol relapse in context of suspected alcohol withdrawal. Patient reports improvement in his mood and resolution of his AH/CAH. Pt acknowledged that his AH/CAH tend to be derogatory, telling him to hurt himself; he reports that he has been experiencing some depressive mood w/ symptoms relating to sense of loneliness, insomnia. Patient denies any SI/intent or plan; speaks of desire to live which makes his AH/CAH distressing. His presentation appears to be related to mood symptoms relating to loneliness, social stressors and mother's health news; his purported AH/CAH appears to be more of mood congruent negative intrusive thoughts about himself. It's possible that his mood and AH are modulated by his alcohol use. It's unlikely that his symptoms are due to a primary psychotic disorder such as schizophrenia as he has no signs of other psychotic symptoms such as disorganized speech/beh, no delusions, no signs of internal preoccupation/responding to int stimuli and no neg psychotic symptoms. Patient speaks of near future oriented goals inc returning to work. Patient's partner providers corroborating collateral detailing no acute psych safety concerns about him returning home.  Patient reports multiple protective factors inc different coping strategies. Despite his chronic and demo risk factors relating to his marital status, his alcohol use, his mood and psychotic symptoms, he has multiple protective factors inc lack of past SA/SIB, lack of current SI/intent or plan, social support, employed, domiciled, sense of responsibility to his family and future oriented goals. Thus there is no acute indication of imminent psych risk of harm to self or others to warrant or meet criteria for involuntary psych admission. Patient reports preference for outpatient mental health services. Patient also declines inpatient substance treatment referral but is amenable for outpatient resources.

## 2021-07-11 NOTE — BH CONSULTATION LIAISON ASSESSMENT NOTE - CURRENT MEDICATION
MEDICATIONS  (STANDING):  dextrose 5% + sodium chloride 0.45%. 1000 milliLiter(s) (75 mL/Hr) IV Continuous <Continuous>  enoxaparin Injectable 40 milliGRAM(s) SubCutaneous daily  folic acid 1 milliGRAM(s) Oral daily  LORazepam     Tablet 2 milliGRAM(s) Oral every 6 hours  multivitamin 1 Tablet(s) Oral daily  OLANZapine 10 milliGRAM(s) Oral daily  pantoprazole    Tablet 40 milliGRAM(s) Oral before breakfast  pantoprazole    Tablet 40 milliGRAM(s) Oral once  sodium chloride 0.9% lock flush 3 milliLiter(s) IV Push every 8 hours  thiamine IVPB 500 milliGRAM(s) IV Intermittent three times a day    MEDICATIONS  (PRN):  LORazepam   Injectable 2 milliGRAM(s) IV Push every 4 hours PRN CIWA-Ar score increase by 2 points and a total score of 7 or less  LORazepam   Injectable 2 milliGRAM(s) IV Push every 2 hours PRN Agitation

## 2021-07-11 NOTE — BH CONSULTATION LIAISON ASSESSMENT NOTE - NSBHCHARTREVIEWVS_PSY_A_CORE FT
Vital Signs Last 24 Hrs  T(C): 36.9 (11 Jul 2021 05:37), Max: 36.9 (10 Jul 2021 16:20)  T(F): 98.4 (11 Jul 2021 05:37), Max: 98.4 (10 Jul 2021 16:20)  HR: 94 (11 Jul 2021 05:37) (60 - 94)  BP: 127/77 (11 Jul 2021 05:37) (120/71 - 146/91)  BP(mean): --  RR: 18 (11 Jul 2021 05:37) (18 - 18)  SpO2: 99% (11 Jul 2021 05:37) (98% - 99%)

## 2021-07-11 NOTE — BH CONSULTATION LIAISON ASSESSMENT NOTE - NSBHCONSULTSUBSTANCEALCOHOL_PSY_A_CORE FT
CIWA - continue w/ librium taper, thiamine 100mg po qdaily, folic acid 1mg po qdaily; prn ativan based on JAMILWA

## 2021-07-11 NOTE — BH CONSULTATION LIAISON ASSESSMENT NOTE - NSBHCONSULTRECOMMENDOTHER_PSY_A_CORE FT
-no psych indication for 1:1 - observation as per primary team  -add zoloft 50mg po qdaily -1st dose now  -continue zyprexa 10mg po HS   -outpatient psych and substance services: faxed over to [facility]  -please give to pt  -BH safety plan created (please see separate note) - print it out to pt  -If patient changes his mind about inpatient substance rehab/treatment, ask SW to help w/ referral or transfer

## 2021-07-11 NOTE — BH CONSULTATION LIAISON ASSESSMENT NOTE - HPI (INCLUDE ILLNESS QUALITY, SEVERITY, DURATION, TIMING, CONTEXT, MODIFYING FACTORS, ASSOCIATED SIGNS AND SYMPTOMS)
29yo domiciled, employed,   M w/ hx of alcohol dependence, hx of depressive and psychotic symptoms/disorders w/ no past SA/SIB, past psych hosp (last time in Mar 2021 at [facility]) who presented w/ reported AH/CAH/SI in context of alcohol withdrawal after relapsing and recent negative health news about his mother. Patient was seen and evaluated in ED by telepsych and was asked to be followed.      Pt was seen and evaluated via telemonitor. Patient reported feeling "a lot better" - spoke of resolution of his AH/CAH - reports that he has been experiencing depressive mood in past week w/ issues w/ sleep, energy, loneliness. Patient denied HI/intent or plan. Patient acknowledged relapsing on alcohol. He spoke of experiencing "voices telling him bad things and to just kill [him]self." Patient reported that this scared him and made him seek to come to hospital. Patient denied manic symptoms inc FOI, dec need for sleep. Patient denied AH/VH/HI/intent or plan.      Pt reported that he was amenable to continue taking zyprexa; was amenable to start antidepressant. Patient reported that he was not interested in inpatient/residential substance treatment or inpatient psych. He was amenable to outpatient mental health and substance resources.       Collateral: see [facility] note for collateral from Lois

## 2021-07-11 NOTE — PROGRESS NOTE ADULT - SUBJECTIVE AND OBJECTIVE BOX
Patient is a 30y old  Male who presents with a chief complaint of Alcohol withdrawal (10 Jul 2021 14:37)    PATIENT IS SEEN AND EXAMINED IN MEDICAL FLOOR.  RODDYT [    ]    SHABBIR [   ]      GT [   ]    ALLERGIES:  No Known Allergies      Daily     Daily     VITALS:    Vital Signs Last 24 Hrs  T(C): 36.9 (11 Jul 2021 05:37), Max: 36.9 (10 Jul 2021 16:20)  T(F): 98.4 (11 Jul 2021 05:37), Max: 98.4 (10 Jul 2021 16:20)  HR: 94 (11 Jul 2021 05:37) (60 - 94)  BP: 127/77 (11 Jul 2021 05:37) (120/71 - 146/91)  BP(mean): --  RR: 18 (11 Jul 2021 05:37) (18 - 18)  SpO2: 99% (11 Jul 2021 05:37) (98% - 99%)    LABS:    CBC Full  -  ( 11 Jul 2021 06:28 )  WBC Count : 5.41 K/uL  RBC Count : 5.31 M/uL  Hemoglobin : 14.8 g/dL  Hematocrit : 43.8 %  Platelet Count - Automated : 230 K/uL  Mean Cell Volume : 82.5 fl  Mean Cell Hemoglobin : 27.9 pg  Mean Cell Hemoglobin Concentration : 33.8 gm/dL  Auto Neutrophil # : 3.31 K/uL  Auto Lymphocyte # : 1.59 K/uL  Auto Monocyte # : 0.44 K/uL  Auto Eosinophil # : 0.03 K/uL  Auto Basophil # : 0.03 K/uL  Auto Neutrophil % : 61.1 %  Auto Lymphocyte % : 29.4 %  Auto Monocyte % : 8.1 %  Auto Eosinophil % : 0.6 %  Auto Basophil % : 0.6 %      07-11    134<L>  |  102  |  7   ----------------------------<  109<H>  3.2<L>   |  22  |  0.71    Ca    8.1<L>      11 Jul 2021 06:28  Phos  2.3     07-11  Mg     2.3     07-11    TPro  7.3  /  Alb  2.7<L>  /  TBili  1.4<H>  /  DBili  x   /  AST  180<H>  /  ALT  206<H>  /  AlkPhos  109  07-11    CAPILLARY BLOOD GLUCOSE        CARDIAC MARKERS ( 10 Jul 2021 14:01 )  <0.015 ng/mL / x     / 303 U/L / x     / 1.3 ng/mL      LIVER FUNCTIONS - ( 11 Jul 2021 06:28 )  Alb: 2.7 g/dL / Pro: 7.3 g/dL / ALK PHOS: 109 U/L / ALT: 206 U/L DA / AST: 180 U/L / GGT: x           Creatinine Trend: 0.71<--, 0.78<--, 0.84<--, 0.84<--  I&O's Summary              MEDICATIONS:    MEDICATIONS  (STANDING):  dextrose 5% + sodium chloride 0.45%. 1000 milliLiter(s) (75 mL/Hr) IV Continuous <Continuous>  enoxaparin Injectable 40 milliGRAM(s) SubCutaneous daily  folic acid 1 milliGRAM(s) Oral daily  multivitamin 1 Tablet(s) Oral daily  OLANZapine 10 milliGRAM(s) Oral daily  pantoprazole    Tablet 40 milliGRAM(s) Oral before breakfast  pantoprazole    Tablet 40 milliGRAM(s) Oral once  sodium chloride 0.9% lock flush 3 milliLiter(s) IV Push every 8 hours  thiamine IVPB 500 milliGRAM(s) IV Intermittent three times a day      MEDICATIONS  (PRN):  LORazepam   Injectable 2 milliGRAM(s) IV Push every 4 hours PRN CIWA-Ar score increase by 2 points and a total score of 7 or less  LORazepam   Injectable 2 milliGRAM(s) IV Push every 2 hours PRN Agitation      REVIEW OF SYSTEMS:                           ALL ROS DONE [ X   ]    CONSTITUTIONAL:  LETHARGIC [   ], FEVER [   ], UNRESPONSIVE [   ]  CVS:  CP  [   ], SOB, [   ], PALPITATIONS [   ], DIZZYNESS [   ]  RS: COUGH [   ], SPUTUM [   ]  GI: ABDOMINAL PAIN [   ], NAUSEA [   ], VOMITINGS [   ], DIARRHEA [   ], CONSTIPATION [   ]  :  DYSURIA [   ], NOCTURIA [   ], INCREASED FREQUENCY [   ], DRIBLING [   ],  SKELETAL: PAINFUL JOINTS [   ], SWOLLEN JOINTS [   ], NECK ACHE [   ], LOW BACK ACHE [   ],  SKIN : ULCERS [   ], RASH [   ], ITCHING [   ]  CNS: HEAD ACHE [   ], DOUBLE VISION [   ], BLURRED VISION [   ], AMS / CONFUSION [   ], SEIZURES [   ], WEAKNESS [   ],TINGLING / NUMBNESS [   ]    PHYSICAL EXAMINATION:  GENERAL APPEARANCE: NO DISTRESS  HEENT:  NO PALLOR, NO  JVD,  NO   NODES, NECK SUPPLE  CVS: S1 +, S2 +,   RS: AEEB,  OCCASIONAL  RALES +,   NO RONCHI  ABD: SOFT, NT, NO, BS +  EXT: NO PE  SKIN: WARM,   SKELETAL:  ROM ACCEPTABLE  CNS:  AAO X    ,   DEFICITS    RADIOLOGY :      ASSESSMENT :     Alcohol dependence with withdrawal with perceptual disturbance    No pertinent past medical history    Acute appendicitis, unspecified acute appendicitis type    Gastritis    Schizophrenia    Bipolar disorder    Alcoholism    No significant past surgical history    Appendix abscess    Acute appendicitis, unspecified acute appendicitis type    H/O appendicitis        PLAN:  HPI:  Interpretor called. ID [id number]    Patient is a 30 y.o. male (lives with his friends at home, walks independently) who has a PMH of bipolar disorder, schizophrenia(diagnosed 6 months ago), alcohol abuse, substance-induced psychosis, appendix drainage/surgery?, came into the ED after drinking for 4 days because there was auditory hallucinations telling him to hurt himself and others. Pt denies any attempts to kill himself or others. Pt states that his last drink was 1pm yesterday where he drank 20 beers and liquors. He reports that he had bilateral hand tremors from alcohol abuse. Currently, patient has bilateral hand tremors, headache 6/10, and mid-abdominal pain that prevents him from eating. Patient was at another hospital for alcohol abuse 1~2 weeks ago. Patient states that he was at an ED for alcohol abuse about 3 months ago. Pt denies any trauma, bleeding, cigarette use, drug use, fever or infectious symptoms.     Patient's 1 month supply of Olanzapine was picked up in May 12, 2021. Theoretically, the patient has not taken Olanzapine for ~1 month. Patient admits that he is not on any medications right now.    Ativan 2mg IV and Librium 25mg given in ED.  Currently CIWA2 (mild headache, mild hand tremor) (10 Jul 2021 14:37)      # ALCOHOL ABUSE AND WITHDRAWAL - PLACED ON CIWA PROTOCOL W/ ATIVAN, PRN ATIVAN, MVI, THIAMINE, FOLIC ACID; COUNSELLING SUPPORT OFFERED REGARDING CESSATION. SW CONSULT TO F/U ON REHABILITATION SERVICES  # HX OF ?BIPOLAR DX W/ SCHIZOPHRENIA - HAS NOT BEEN TAKING PREVIOUSLY PRESCRIBED RX OF ZYPREXA - RESUME ZYPREXA, PSYCHIATRY CONSULT  - PATIENT W/ SI/HI - FURTHER EVALUATION BY PSYCH  - ON ONE-TO-ONE SUPERVISION  # ABDOMINAL COLIC, ACUTE GASTRITIS - CONTINUE PROTONIX   # GI AND DVT PROPHYLAXIS    KELLY BROOKE MD COVERING CHRISTIANO BROOKE MD.      Patient is a 30y old  Male who presents with a chief complaint of Alcohol withdrawal (10 Jul 2021 14:37)    PATIENT IS SEEN AND EXAMINED IN MEDICAL FLOOR.    ALLERGIES:  No Known Allergies    VITALS:    Vital Signs Last 24 Hrs  T(C): 36.9 (11 Jul 2021 05:37), Max: 36.9 (10 Jul 2021 16:20)  T(F): 98.4 (11 Jul 2021 05:37), Max: 98.4 (10 Jul 2021 16:20)  HR: 94 (11 Jul 2021 05:37) (60 - 94)  BP: 127/77 (11 Jul 2021 05:37) (120/71 - 146/91)  BP(mean): --  RR: 18 (11 Jul 2021 05:37) (18 - 18)  SpO2: 99% (11 Jul 2021 05:37) (98% - 99%)    LABS:    CBC Full  -  ( 11 Jul 2021 06:28 )  WBC Count : 5.41 K/uL  RBC Count : 5.31 M/uL  Hemoglobin : 14.8 g/dL  Hematocrit : 43.8 %  Platelet Count - Automated : 230 K/uL  Mean Cell Volume : 82.5 fl  Mean Cell Hemoglobin : 27.9 pg  Mean Cell Hemoglobin Concentration : 33.8 gm/dL  Auto Neutrophil # : 3.31 K/uL  Auto Lymphocyte # : 1.59 K/uL  Auto Monocyte # : 0.44 K/uL  Auto Eosinophil # : 0.03 K/uL  Auto Basophil # : 0.03 K/uL  Auto Neutrophil % : 61.1 %  Auto Lymphocyte % : 29.4 %  Auto Monocyte % : 8.1 %  Auto Eosinophil % : 0.6 %  Auto Basophil % : 0.6 %      07-11    134<L>  |  102  |  7   ----------------------------<  109<H>  3.2<L>   |  22  |  0.71    Ca    8.1<L>      11 Jul 2021 06:28  Phos  2.3     07-11  Mg     2.3     07-11    TPro  7.3  /  Alb  2.7<L>  /  TBili  1.4<H>  /  DBili  x   /  AST  180<H>  /  ALT  206<H>  /  AlkPhos  109  07-11    CAPILLARY BLOOD GLUCOSE        CARDIAC MARKERS ( 10 Jul 2021 14:01 )  <0.015 ng/mL / x     / 303 U/L / x     / 1.3 ng/mL      LIVER FUNCTIONS - ( 11 Jul 2021 06:28 )  Alb: 2.7 g/dL / Pro: 7.3 g/dL / ALK PHOS: 109 U/L / ALT: 206 U/L DA / AST: 180 U/L / GGT: x           Creatinine Trend: 0.71<--, 0.78<--, 0.84<--, 0.84<--  I&O's Summary    MEDICATIONS:    MEDICATIONS  (STANDING):  dextrose 5% + sodium chloride 0.45%. 1000 milliLiter(s) (75 mL/Hr) IV Continuous <Continuous>  enoxaparin Injectable 40 milliGRAM(s) SubCutaneous daily  folic acid 1 milliGRAM(s) Oral daily  multivitamin 1 Tablet(s) Oral daily  OLANZapine 10 milliGRAM(s) Oral daily  pantoprazole    Tablet 40 milliGRAM(s) Oral before breakfast  pantoprazole    Tablet 40 milliGRAM(s) Oral once  sodium chloride 0.9% lock flush 3 milliLiter(s) IV Push every 8 hours  thiamine IVPB 500 milliGRAM(s) IV Intermittent three times a day      MEDICATIONS  (PRN):  LORazepam   Injectable 2 milliGRAM(s) IV Push every 4 hours PRN CIWA-Ar score increase by 2 points and a total score of 7 or less  LORazepam   Injectable 2 milliGRAM(s) IV Push every 2 hours PRN Agitation      REVIEW OF SYSTEMS:                           ALL ROS DONE [ X   ]    CONSTITUTIONAL:  LETHARGIC [   ], FEVER [   ], UNRESPONSIVE [   ]  CVS:  CP  [   ], SOB, [   ], PALPITATIONS [   ], DIZZYNESS [   ]  RS: COUGH [   ], SPUTUM [   ]  GI: ABDOMINAL PAIN [   ], NAUSEA [   ], VOMITINGS [   ], DIARRHEA [   ], CONSTIPATION [   ]  :  DYSURIA [   ], NOCTURIA [   ], INCREASED FREQUENCY [   ], DRIBLING [   ],  SKELETAL: PAINFUL JOINTS [   ], SWOLLEN JOINTS [   ], NECK ACHE [   ], LOW BACK ACHE [   ],  SKIN : ULCERS [   ], RASH [   ], ITCHING [   ]  CNS: HEAD ACHE [   ], DOUBLE VISION [   ], BLURRED VISION [   ], AMS / CONFUSION [   ], SEIZURES [   ], WEAKNESS [   ],TINGLING / NUMBNESS [   ]    PHYSICAL EXAMINATION:  GENERAL APPEARANCE: NO DISTRESS  HEENT:  NO PALLOR, NO  JVD,  NO   NODES, NECK SUPPLE  CVS: S1 +, S2 +,   RS: AEEB,  OCCASIONAL  RALES +,   NO RONCHI  ABD: SOFT, NT, NO, BS +  EXT: NO PE  SKIN: WARM,   SKELETAL:  ROM ACCEPTABLE  CNS:  AAO X  3 , no  DEFICITS    RADIOLOGY :    none    ASSESSMENT :     Alcohol dependence with withdrawal with perceptual disturbance    No pertinent past medical history    Acute appendicitis, unspecified acute appendicitis type    Gastritis    Schizophrenia    Bipolar disorder    Alcoholism    No significant past surgical history    Appendix abscess    Acute appendicitis, unspecified acute appendicitis type    H/O appendicitis        PLAN:  HPI:  Interpretor called. ID [id number]    Patient is a 30 y.o. male (lives with his friends at home, walks independently) who has a PMH of bipolar disorder, schizophrenia(diagnosed 6 months ago), alcohol abuse, substance-induced psychosis, appendix drainage/surgery?, came into the ED after drinking for 4 days because there was auditory hallucinations telling him to hurt himself and others. Pt denies any attempts to kill himself or others. Pt states that his last drink was 1pm yesterday where he drank 20 beers and liquors. He reports that he had bilateral hand tremors from alcohol abuse. Currently, patient has bilateral hand tremors, headache 6/10, and mid-abdominal pain that prevents him from eating. Patient was at another hospital for alcohol abuse 1~2 weeks ago. Patient states that he was at an ED for alcohol abuse about 3 months ago. Pt denies any trauma, bleeding, cigarette use, drug use, fever or infectious symptoms.     Patient's 1 month supply of Olanzapine was picked up in May 12, 2021. Theoretically, the patient has not taken Olanzapine for ~1 month. Patient admits that he is not on any medications right now.    Ativan 2mg IV and Librium 25mg given in ED.  Currently CIWA2 (mild headache, mild hand tremor) (10 Jul 2021 14:37)      # ALCOHOL ABUSE AND WITHDRAWAL - PLACED ON CIWA PROTOCOL W/ ATIVAN, PRN ATIVAN, MVI, THIAMINE, FOLIC ACID; COUNSELLING SUPPORT OFFERED REGARDING CESSATION. SW CONSULT TO F/U ON REHABILITATION SERVICES  # HX OF ?BIPOLAR DX W/ SCHIZOPHRENIA - HAS NOT BEEN TAKING PREVIOUSLY PRESCRIBED RX OF ZYPREXA - RESUME ZYPREXA, PSYCHIATRY CONSULT - REPORTS IMPROVEMENT IN MENTAL STATUS  - PATIENT W/ SI/HI - FURTHER EVALUATION BY PSYCH  - ON ONE-TO-ONE SUPERVISION  # ABDOMINAL COLIC, ACUTE GASTRITIS - CONTINUE PROTONIX   # GI AND DVT PROPHYLAXIS    KELLY BROOKE MD COVERING CHRISTIANO BROOKE MD.

## 2021-07-12 DIAGNOSIS — Z02.9 ENCOUNTER FOR ADMINISTRATIVE EXAMINATIONS, UNSPECIFIED: ICD-10-CM

## 2021-07-12 DIAGNOSIS — Z71.89 OTHER SPECIFIED COUNSELING: ICD-10-CM

## 2021-07-12 LAB
ALBUMIN SERPL ELPH-MCNC: 2.6 G/DL — LOW (ref 3.5–5)
ALP SERPL-CCNC: 92 U/L — SIGNIFICANT CHANGE UP (ref 40–120)
ALT FLD-CCNC: 170 U/L DA — HIGH (ref 10–60)
ANION GAP SERPL CALC-SCNC: 9 MMOL/L — SIGNIFICANT CHANGE UP (ref 5–17)
AST SERPL-CCNC: 127 U/L — HIGH (ref 10–40)
BILIRUB SERPL-MCNC: 0.7 MG/DL — SIGNIFICANT CHANGE UP (ref 0.2–1.2)
BUN SERPL-MCNC: 5 MG/DL — LOW (ref 7–18)
CALCIUM SERPL-MCNC: 8.2 MG/DL — LOW (ref 8.4–10.5)
CHLORIDE SERPL-SCNC: 105 MMOL/L — SIGNIFICANT CHANGE UP (ref 96–108)
CO2 SERPL-SCNC: 23 MMOL/L — SIGNIFICANT CHANGE UP (ref 22–31)
CREAT SERPL-MCNC: 0.77 MG/DL — SIGNIFICANT CHANGE UP (ref 0.5–1.3)
GLUCOSE SERPL-MCNC: 89 MG/DL — SIGNIFICANT CHANGE UP (ref 70–99)
HCT VFR BLD CALC: 42 % — SIGNIFICANT CHANGE UP (ref 39–50)
HGB BLD-MCNC: 14.2 G/DL — SIGNIFICANT CHANGE UP (ref 13–17)
MCHC RBC-ENTMCNC: 28.2 PG — SIGNIFICANT CHANGE UP (ref 27–34)
MCHC RBC-ENTMCNC: 33.8 GM/DL — SIGNIFICANT CHANGE UP (ref 32–36)
MCV RBC AUTO: 83.3 FL — SIGNIFICANT CHANGE UP (ref 80–100)
NRBC # BLD: 0 /100 WBCS — SIGNIFICANT CHANGE UP (ref 0–0)
PLATELET # BLD AUTO: 228 K/UL — SIGNIFICANT CHANGE UP (ref 150–400)
POTASSIUM SERPL-MCNC: 3.5 MMOL/L — SIGNIFICANT CHANGE UP (ref 3.5–5.3)
POTASSIUM SERPL-SCNC: 3.5 MMOL/L — SIGNIFICANT CHANGE UP (ref 3.5–5.3)
PROT SERPL-MCNC: 7.2 G/DL — SIGNIFICANT CHANGE UP (ref 6–8.3)
RBC # BLD: 5.04 M/UL — SIGNIFICANT CHANGE UP (ref 4.2–5.8)
RBC # FLD: 14.2 % — SIGNIFICANT CHANGE UP (ref 10.3–14.5)
SODIUM SERPL-SCNC: 137 MMOL/L — SIGNIFICANT CHANGE UP (ref 135–145)
WBC # BLD: 7.05 K/UL — SIGNIFICANT CHANGE UP (ref 3.8–10.5)
WBC # FLD AUTO: 7.05 K/UL — SIGNIFICANT CHANGE UP (ref 3.8–10.5)

## 2021-07-12 RX ORDER — THIAMINE MONONITRATE (VIT B1) 100 MG
500 TABLET ORAL DAILY
Refills: 0 | Status: COMPLETED | OUTPATIENT
Start: 2021-07-12 | End: 2021-07-15

## 2021-07-12 RX ADMIN — OLANZAPINE 10 MILLIGRAM(S): 15 TABLET, FILM COATED ORAL at 21:23

## 2021-07-12 RX ADMIN — PANTOPRAZOLE SODIUM 40 MILLIGRAM(S): 20 TABLET, DELAYED RELEASE ORAL at 05:41

## 2021-07-12 RX ADMIN — Medication 1 MILLIGRAM(S): at 12:03

## 2021-07-12 RX ADMIN — Medication 105 MILLIGRAM(S): at 05:43

## 2021-07-12 RX ADMIN — SERTRALINE 50 MILLIGRAM(S): 25 TABLET, FILM COATED ORAL at 14:09

## 2021-07-12 RX ADMIN — SODIUM CHLORIDE 3 MILLILITER(S): 9 INJECTION INTRAMUSCULAR; INTRAVENOUS; SUBCUTANEOUS at 05:38

## 2021-07-12 RX ADMIN — Medication 50 MILLIGRAM(S): at 17:53

## 2021-07-12 RX ADMIN — Medication 1 TABLET(S): at 12:03

## 2021-07-12 RX ADMIN — SODIUM CHLORIDE 3 MILLILITER(S): 9 INJECTION INTRAMUSCULAR; INTRAVENOUS; SUBCUTANEOUS at 14:10

## 2021-07-12 RX ADMIN — SODIUM CHLORIDE 75 MILLILITER(S): 9 INJECTION, SOLUTION INTRAVENOUS at 21:27

## 2021-07-12 RX ADMIN — Medication 2 MILLIGRAM(S): at 05:41

## 2021-07-12 RX ADMIN — ENOXAPARIN SODIUM 40 MILLIGRAM(S): 100 INJECTION SUBCUTANEOUS at 12:03

## 2021-07-12 NOTE — PROGRESS NOTE ADULT - PROBLEM SELECTOR PLAN 2
- mid and RLQ abdominal pain tenderness  - LFT trending up AST/ALT = 212/243,  - has hx of hepatic steatosis, gastritis (from visit May 2021)  - CT abd (May 2021) = Thickened inflamed terminal ileal and cecum suggestive of Crohn's disease. Right lower quadrant noncalcified mesenteric adenopathy. Calcified mesenteric lymph nodes are present as well indicating chronic inflammatory or infectious process.  = Pt was put on Cipro Flagyl on discharge, and was recommended to follow up with outpatient colonoscopy.  - Total bilirubin 1.2, direct bilirubin 0.4  - f/u abdominal US  - f/u hepatitis panel  - consult GI?? - mid and RLQ abdominal pain tenderness  - LFT trending up AST/ALT = 212/243,  - has hx of hepatic steatosis, gastritis (from visit May 2021)  - CT abd (May 2021) = Thickened inflamed terminal ileal and cecum suggestive of Crohn's disease. Right lower quadrant noncalcified mesenteric adenopathy. Calcified mesenteric lymph nodes are present as well indicating chronic inflammatory or infectious process.  = Pt was put on Cipro Flagyl on discharge, and was recommended to follow up with outpatient colonoscopy.  - Total bilirubin 1.2, direct bilirubin 0.4  - f/u abdominal US  - f/u hepatitis panel  - GI consult for possible colonoscopy, monitor behavior for now.

## 2021-07-12 NOTE — PROGRESS NOTE ADULT - PROBLEM SELECTOR PLAN 4
on Home med Olanzapine 10mg x1 on Home med Olanzapine 10mg QD, not compliant  c/w home med  Psych consulted on Home med Olanzapine 10mg QD, not compliant at home  c/w home med  Psych consulted-dr. Graham

## 2021-07-12 NOTE — PROGRESS NOTE ADULT - ASSESSMENT
Patient is a 30 y.o. male (lives with his friends at home, walks independently) who has a PMH of bipolar disorder, schizophrenia(diagnosed 6 months ago), alcohol abuse, substance-induced psychosis, appendix drainage/surgery?, came into the ED for alcohol abuse. Librium, ativan given in ED. Admitted for alcohol withdrawal. Patient is a 30 y.o. male (lives with his friends at home, walks independently) who has a PMH of bipolar disorder, schizophrenia(diagnosed 6 months ago), alcohol abuse, substance-induced psychosis, appendix drainage/surgery?, came into the ED for alcohol abuse. Librium, ativan given in ED. Admitted for alcohol withdrawal. PSych following. on CIWA. Ativan standing dose reduced to BID.  Patient is a 30 y.o. male (lives with his friends at home, walks independently) who has a PMH of bipolar disorder, schizophrenia(diagnosed 6 months ago), alcohol abuse, substance-induced psychosis, appendix drainage/surgery?, came into the ED for alcohol abuse. Librium, ativan given in ED. Admitted for alcohol withdrawal. PSych following. on CIWA. currently on Ativan standing dose with prn. d/c'd Ativan standing dose and started librium taper per psych. dr. Graham. discontinued 1:1 observation.

## 2021-07-12 NOTE — PROGRESS NOTE ADULT - SUBJECTIVE AND OBJECTIVE BOX
NP Note discussed with  Primary Attending    INTERVAL HPI/OVERNIGHT EVENTS: no new complaints    MEDICATIONS  (STANDING):  dextrose 5% + sodium chloride 0.45%. 1000 milliLiter(s) (75 mL/Hr) IV Continuous <Continuous>  enoxaparin Injectable 40 milliGRAM(s) SubCutaneous daily  folic acid 1 milliGRAM(s) Oral daily  LORazepam     Tablet 2 milliGRAM(s) Oral every 12 hours  multivitamin 1 Tablet(s) Oral daily  OLANZapine 10 milliGRAM(s) Oral at bedtime  pantoprazole    Tablet 40 milliGRAM(s) Oral before breakfast  pantoprazole    Tablet 40 milliGRAM(s) Oral once  sertraline 50 milliGRAM(s) Oral daily  sodium chloride 0.9% lock flush 3 milliLiter(s) IV Push every 8 hours  thiamine IVPB 500 milliGRAM(s) IV Intermittent three times a day    MEDICATIONS  (PRN):  LORazepam   Injectable 2 milliGRAM(s) IV Push every 4 hours PRN CIWA-Ar score increase by 2 points and a total score of 7 or less  LORazepam   Injectable 2 milliGRAM(s) IV Push every 2 hours PRN Agitation      __________________________________________________  REVIEW OF SYSTEMS:    CONSTITUTIONAL: No fever,   EYES: no acute visual disturbances  NECK: No pain or stiffness  RESPIRATORY: No cough; No shortness of breath  CARDIOVASCULAR: No chest pain, no palpitations  GASTROINTESTINAL: No pain. No nausea or vomiting; No diarrhea   NEUROLOGICAL: No headache or numbness, no tremors  MUSCULOSKELETAL: No joint pain, no muscle pain  GENITOURINARY: no dysuria, no frequency, no hesitancy  PSYCHIATRY: no depression , no anxiety  ALL OTHER  ROS negative        Vital Signs Last 24 Hrs  T(C): 36.8 (12 Jul 2021 04:56), Max: 36.9 (11 Jul 2021 15:09)  T(F): 98.2 (12 Jul 2021 04:56), Max: 98.5 (11 Jul 2021 21:10)  HR: 65 (12 Jul 2021 04:56) (65 - 85)  BP: 145/79 (12 Jul 2021 04:56) (133/83 - 150/81)  BP(mean): --  RR: 18 (12 Jul 2021 04:56) (17 - 18)  SpO2: 96% (12 Jul 2021 04:56) (96% - 98%)    ________________________________________________  PHYSICAL EXAM:  GENERAL: NAD  HEENT: Normocephalic;  conjunctivae and sclerae clear; moist mucous membranes;   NECK : supple  CHEST/LUNG: Clear to auscultation bilaterally with good air entry   HEART: S1 S2  regular; no murmurs, gallops or rubs  ABDOMEN: Soft, Nontender, Nondistended; Bowel sounds present  EXTREMITIES: no cyanosis; no edema; no calf tenderness  SKIN: warm and dry; no rash  NERVOUS SYSTEM:  Awake and alert; Oriented  to place, person and time ; no new deficits    _________________________________________________  LABS:                        14.2   7.05  )-----------( 228      ( 12 Jul 2021 07:52 )             42.0     07-12    137  |  105  |  5<L>  ----------------------------<  89  3.5   |  23  |  0.77    Ca    8.2<L>      12 Jul 2021 07:52  Phos  2.3     07-11  Mg     2.3     07-11    TPro  7.2  /  Alb  2.6<L>  /  TBili  0.7  /  DBili  x   /  AST  127<H>  /  ALT  170<H>  /  AlkPhos  92  07-12        CAPILLARY BLOOD GLUCOSE            RADIOLOGY & ADDITIONAL TESTS:    Imaging  Reviewed:  YES    Consultant(s) Notes Reviewed:   YES      Plan of care was discussed with patient and /or primary care giver; all questions and concerns were addressed  NP Note discussed with  Primary Attending    INTERVAL HPI/OVERNIGHT EVENTS: Pt seen at bedside, drowsy on exam, unable to assess fully. Pt denies pain on exam.     MEDICATIONS  (STANDING):  dextrose 5% + sodium chloride 0.45%. 1000 milliLiter(s) (75 mL/Hr) IV Continuous <Continuous>  enoxaparin Injectable 40 milliGRAM(s) SubCutaneous daily  folic acid 1 milliGRAM(s) Oral daily  LORazepam     Tablet 2 milliGRAM(s) Oral every 12 hours  multivitamin 1 Tablet(s) Oral daily  OLANZapine 10 milliGRAM(s) Oral at bedtime  pantoprazole    Tablet 40 milliGRAM(s) Oral before breakfast  pantoprazole    Tablet 40 milliGRAM(s) Oral once  sertraline 50 milliGRAM(s) Oral daily  sodium chloride 0.9% lock flush 3 milliLiter(s) IV Push every 8 hours  thiamine IVPB 500 milliGRAM(s) IV Intermittent three times a day    MEDICATIONS  (PRN):  LORazepam   Injectable 2 milliGRAM(s) IV Push every 4 hours PRN CIWA-Ar score increase by 2 points and a total score of 7 or less  LORazepam   Injectable 2 milliGRAM(s) IV Push every 2 hours PRN Agitation      __________________________________________________  REVIEW OF SYSTEMS:  limited ROS due to drowsiness, denies n/v, or pain.     Vital Signs Last 24 Hrs  T(C): 36.8 (12 Jul 2021 04:56), Max: 36.9 (11 Jul 2021 15:09)  T(F): 98.2 (12 Jul 2021 04:56), Max: 98.5 (11 Jul 2021 21:10)  HR: 65 (12 Jul 2021 04:56) (65 - 85)  BP: 145/79 (12 Jul 2021 04:56) (133/83 - 150/81)  BP(mean): --  RR: 18 (12 Jul 2021 04:56) (17 - 18)  SpO2: 96% (12 Jul 2021 04:56) (96% - 98%)    ________________________________________________  PHYSICAL EXAM:  GENERAL: NAD  HEENT: Normocephalic;  conjunctivae and sclerae clear; moist mucous membranes;   NECK : supple  CHEST/LUNG: Clear to auscultation bilaterally with good air entry   HEART: S1 S2  regular; no murmurs, gallops or rubs  ABDOMEN: Soft, Nontender, Nondistended; Bowel sounds present  EXTREMITIES: no cyanosis; no edema; no calf tenderness  SKIN: warm and dry; no rash  NERVOUS SYSTEM:  Awake and alert; Oriented  to self, confused.     _________________________________________________  LABS:                        14.2   7.05  )-----------( 228      ( 12 Jul 2021 07:52 )             42.0     07-12    137  |  105  |  5<L>  ----------------------------<  89  3.5   |  23  |  0.77    Ca    8.2<L>      12 Jul 2021 07:52  Phos  2.3     07-11  Mg     2.3     07-11    TPro  7.2  /  Alb  2.6<L>  /  TBili  0.7  /  DBili  x   /  AST  127<H>  /  ALT  170<H>  /  AlkPhos  92  07-12        CAPILLARY BLOOD GLUCOSE            RADIOLOGY & ADDITIONAL TESTS:    Imaging  Reviewed:  YES    Consultant(s) Notes Reviewed:   YES      Plan of care was discussed with patient and /or primary care giver; all questions and concerns were addressed

## 2021-07-12 NOTE — DISCHARGE NOTE PROVIDER - CARE PROVIDER_API CALL
Johan Wisdom)  Internal Medicine  [address] [place] [address]  Phone: [phone]  Fax: [phone]  Follow Up Time: 1 week

## 2021-07-12 NOTE — SBIRT NOTE ADULT - NSSBIRTUNABLESTOP_GEN_A_CORE
"Patient refusing NG tube. Patient have convulsion like episodes every 5-10 minutes. Patient responds to noxious stimuli, no post ictal period or incontinent episode noted. Patient verbal post convulsive episodes. Seizure and aspiration precautions in place. Patient states \"these episodes happens every few months when I have severe abdominal pain.\" [facility] paged and updated. Will continue to monitor patient, sat at patient bedside for hours to ensure safety of patient per policy. [facility] updated and aware of situation, no orders given for patient.   " Never

## 2021-07-12 NOTE — DISCHARGE NOTE PROVIDER - NSDCMRMEDTOKEN_GEN_ALL_CORE_FT
folic acid 1 mg oral tablet: 1 tab(s) orally once a day  Multiple Vitamins oral tablet: 1 tab(s) orally once a day  OLANZapine 10 mg oral tablet: 1 tab(s) orally once a day (at bedtime)  pantoprazole 40 mg oral delayed release tablet: 1 tab(s) orally once a day (before a meal)  sertraline 50 mg oral tablet: 1 tab(s) orally once a day  thiamine 100 mg oral tablet: 5 tab(s) orally once a day

## 2021-07-12 NOTE — PROGRESS NOTE ADULT - SUBJECTIVE AND OBJECTIVE BOX
Patient is a 30y old  Male who presents with a chief complaint of Alcohol withdrawal (12 Jul 2021 11:11)    PATIENT IS SEEN AND EXAMINED IN MEDICAL FLOOR.  NGT [    ]    SHABBIR [   ]      GT [   ]    ALLERGIES:  No Known Allergies      Daily     Daily     VITALS:    Vital Signs Last 24 Hrs  T(C): 36.9 (12 Jul 2021 13:28), Max: 36.9 (11 Jul 2021 21:10)  T(F): 98.4 (12 Jul 2021 13:28), Max: 98.5 (11 Jul 2021 21:10)  HR: 68 (12 Jul 2021 13:28) (65 - 74)  BP: 131/79 (12 Jul 2021 13:28) (131/79 - 145/79)  BP(mean): --  RR: 17 (12 Jul 2021 13:28) (17 - 18)  SpO2: 98% (12 Jul 2021 13:28) (96% - 98%)    LABS:    CBC Full  -  ( 12 Jul 2021 07:52 )  WBC Count : 7.05 K/uL  RBC Count : 5.04 M/uL  Hemoglobin : 14.2 g/dL  Hematocrit : 42.0 %  Platelet Count - Automated : 228 K/uL  Mean Cell Volume : 83.3 fl  Mean Cell Hemoglobin : 28.2 pg  Mean Cell Hemoglobin Concentration : 33.8 gm/dL  Auto Neutrophil # : x  Auto Lymphocyte # : x  Auto Monocyte # : x  Auto Eosinophil # : x  Auto Basophil # : x  Auto Neutrophil % : x  Auto Lymphocyte % : x  Auto Monocyte % : x  Auto Eosinophil % : x  Auto Basophil % : x      07-12    137  |  105  |  5<L>  ----------------------------<  89  3.5   |  23  |  0.77    Ca    8.2<L>      12 Jul 2021 07:52  Phos  2.3     07-11  Mg     2.3     07-11    TPro  7.2  /  Alb  2.6<L>  /  TBili  0.7  /  DBili  x   /  AST  127<H>  /  ALT  170<H>  /  AlkPhos  92  07-12    CAPILLARY BLOOD GLUCOSE            LIVER FUNCTIONS - ( 12 Jul 2021 07:52 )  Alb: 2.6 g/dL / Pro: 7.2 g/dL / ALK PHOS: 92 U/L / ALT: 170 U/L DA / AST: 127 U/L / GGT: x           Creatinine Trend: 0.77<--, 0.71<--, 0.78<--, 0.84<--, 0.84<--  I&O's Summary              MEDICATIONS:    MEDICATIONS  (STANDING):  chlordiazePOXIDE 50 milliGRAM(s) Oral every 12 hours  dextrose 5% + sodium chloride 0.45%. 1000 milliLiter(s) (75 mL/Hr) IV Continuous <Continuous>  enoxaparin Injectable 40 milliGRAM(s) SubCutaneous daily  folic acid 1 milliGRAM(s) Oral daily  multivitamin 1 Tablet(s) Oral daily  OLANZapine 10 milliGRAM(s) Oral at bedtime  pantoprazole    Tablet 40 milliGRAM(s) Oral before breakfast  pantoprazole    Tablet 40 milliGRAM(s) Oral once  sertraline 50 milliGRAM(s) Oral daily  sodium chloride 0.9% lock flush 3 milliLiter(s) IV Push every 8 hours  thiamine 500 milliGRAM(s) Oral daily      MEDICATIONS  (PRN):  LORazepam   Injectable 2 milliGRAM(s) IV Push every 4 hours PRN CIWA-Ar score increase by 2 points and a total score of 7 or less  LORazepam   Injectable 2 milliGRAM(s) IV Push every 2 hours PRN Agitation      REVIEW OF SYSTEMS:                           ALL ROS DONE [ X   ]    CONSTITUTIONAL:  LETHARGIC [   ], FEVER [   ], UNRESPONSIVE [   ]  CVS:  CP  [   ], SOB, [   ], PALPITATIONS [   ], DIZZYNESS [   ]  RS: COUGH [   ], SPUTUM [   ]  GI: ABDOMINAL PAIN [   ], NAUSEA [   ], VOMITINGS [   ], DIARRHEA [   ], CONSTIPATION [   ]  :  DYSURIA [   ], NOCTURIA [   ], INCREASED FREQUENCY [   ], DRIBLING [   ],  SKELETAL: PAINFUL JOINTS [   ], SWOLLEN JOINTS [   ], NECK ACHE [   ], LOW BACK ACHE [   ],  SKIN : ULCERS [   ], RASH [   ], ITCHING [   ]  CNS: HEAD ACHE [   ], DOUBLE VISION [   ], BLURRED VISION [   ], AMS / CONFUSION [   ], SEIZURES [   ], WEAKNESS [   ],TINGLING / NUMBNESS [   ]    PHYSICAL EXAMINATION:  GENERAL APPEARANCE: NO DISTRESS  HEENT:  NO PALLOR, NO  JVD,  NO   NODES, NECK SUPPLE  CVS: S1 +, S2 +,   RS: AEEB,  OCCASIONAL  RALES +,   NO RONCHI  ABD: SOFT, NT, NO, BS +  EXT: NO PE  SKIN: WARM,   SKELETAL:  ROM ACCEPTABLE  CNS:  AAO X  3 , no  DEFICITS    RADIOLOGY :    none    ASSESSMENT :     Alcohol dependence with withdrawal with perceptual disturbance    No pertinent past medical history    Acute appendicitis, unspecified acute appendicitis type    Gastritis    Schizophrenia    Bipolar disorder    Alcoholism    No significant past surgical history    Appendix abscess    Acute appendicitis, unspecified acute appendicitis type    H/O appendicitis        PLAN:  HPI:  Interpretor called. ID [id number]    Patient is a 30 y.o. male (lives with his friends at home, walks independently) who has a PMH of bipolar disorder, schizophrenia(diagnosed 6 months ago), alcohol abuse, substance-induced psychosis, appendix drainage/surgery?, came into the ED after drinking for 4 days because there was auditory hallucinations telling him to hurt himself and others. Pt denies any attempts to kill himself or others. Pt states that his last drink was 1pm yesterday where he drank 20 beers and liquors. He reports that he had bilateral hand tremors from alcohol abuse. Currently, patient has bilateral hand tremors, headache 6/10, and mid-abdominal pain that prevents him from eating. Patient was at another hospital for alcohol abuse 1~2 weeks ago. Patient states that he was at an ED for alcohol abuse about 3 months ago. Pt denies any trauma, bleeding, cigarette use, drug use, fever or infectious symptoms.     Patient's 1 month supply of Olanzapine was picked up in May 12, 2021. Theoretically, the patient has not taken Olanzapine for ~1 month. Patient admits that he is not on any medications right now.    Ativan 2mg IV and Librium 25mg given in ED.  Currently CIWA2 (mild headache, mild hand tremor) (10 Jul 2021 14:37)      # ALCOHOL ABUSE AND WITHDRAWAL - PLACED ON CIWA PROTOCOL W/ ATIVAN [REDUCE DOSE], PRN ATIVAN, MVI, THIAMINE, FOLIC ACID; COUNSELLING SUPPORT OFFERED REGARDING CESSATION. SW CONSULT TO F/U ON REHABILITATION SERVICES  # HX OF ?BIPOLAR DX W/ SCHIZOPHRENIA - HAS NOT BEEN TAKING PREVIOUSLY PRESCRIBED RX OF ZYPREXA - RESUME ZYPREXA, PSYCHIATRY CONSULT - REPORTS IMPROVEMENT IN MENTAL STATUS  - PATIENT W/ SI/HI - FURTHER EVALUATION BY PSYCH  - ON ONE-TO-ONE SUPERVISION  # ABDOMINAL COLIC, ACUTE GASTRITIS - CONTINUE PROTONIX   # GI AND DVT PROPHYLAXIS    KELLY YETURU MD COVERING CHRISTIANO BROOKE MD.

## 2021-07-12 NOTE — DISCHARGE NOTE PROVIDER - HOSPITAL COURSE
Patient is a 30 y.o. male (lives with his friends at home, walks independently) who has a PMH of bipolar disorder, schizophrenia(diagnosed 6 months ago), alcohol abuse, substance-induced psychosis, appendix drainage/surgery?, came into the ED for alcohol abuse. Librium, ativan given in ED. Admitted for alcohol withdrawal. PSych following. on CIWA. currently on Ativan standing dose with prn. d/c'd Ativan standing dose and started librium taper per psych. dr. Graham. discontinued 1:1 observation.        incomplete 7/12     Patient is a 30 y.o. male (lives with his friends at home, walks independently) who has a PMH of bipolar disorder, schizophrenia(diagnosed 6 months ago), alcohol abuse, substance-induced psychosis, appendix drainage/surgery? came into the ED for alcohol abuse. Librium and ativan given in ED. Admitted for alcohol withdrawal. PSych following. on CIWA activated. Ativan standing dose changed to librium taper as per psych dr. Graham. Pt with transaminitis likely from ETOH and also librium use, however LFT improving.   Patient is clinically improving and decision made for discharge home. VIVO meds sent.   Please note this is a brief summary and refer to medical records/daily progress notes for completed hospital course. Discussed plan with attending physician. Patient is a 30 y.o. male (lives with his friends at home, walks independently) who has a PMH of bipolar disorder, schizophrenia(diagnosed 6 months ago), alcohol abuse, substance-induced psychosis, appendix drainage/surgery, admitted to medicine for alcohol abuse. Librium and ativan given in ED. Admitted for alcohol withdrawal. PSych following. on CIWA activated. Ativan standing dose changed to librium taper as per psych dr. Graham. Pt with transaminitis likely from ETOH and also librium use, however LFT improving.   Patient is clinically improving and decision made for discharge home. VIVO meds sent.   Please note this is a brief summary and refer to medical records/daily progress notes for completed hospital course. Discussed plan with attending physician.

## 2021-07-12 NOTE — DISCHARGE NOTE PROVIDER - NSDCCPCAREPLAN_GEN_ALL_CORE_FT
PRINCIPAL DISCHARGE DIAGNOSIS  Diagnosis: Alcohol withdrawal hallucinosis  Assessment and Plan of Treatment: Alcohol withdrawal is a group of symptoms that occur when you drink alcohol daily and suddenly stop. It can begin within 5 hours of your last drink and get worse over 2 to 3 days. Withdrawal may also happen if you suddenly reduce the amount of alcohol that you normally drink.  Take Zyprexa 10mg at bedtime for 30 days  Follow up with PCP.  When you consume heavy amounts of alcohol you can have vitamin deficiencies. Vitamin supplements may be given to treat low vitamin levels. Alcohol can make it hard for your body to absorb enough vitamins such as B1. Vitamin supplements may also be given to prevent alcohol related brain damage.  For support and more information:  Alcoholics Anonymous  Web Address: http://www.aa.org  Substance Abuse and Mental Health Services Administration  [address]  [place] [address]  Web Address: http://www.[email or web address].gov  You were offered to go rehabilitation, however, you declined. The best thing you can do for your health is to stop drinking. Please use the resourcese above for more support with alcohol cessation.   Please seek immediate medical attention if you have tremors, vomiting, or you feel like you are going into withdrawal.        SECONDARY DISCHARGE DIAGNOSES  Diagnosis: Schizophrenia  Assessment and Plan of Treatment: You were seen by psychiatry while you were in the hospital.   The psychiatrist recommended taking Zyprexa at night before bed.   This medication worked well for your symptoms and prevented hallucinations.   Please continue to take this medication.      Diagnosis: Abdominal pain  Assessment and Plan of Treatment: Abdominal pain     PRINCIPAL DISCHARGE DIAGNOSIS  Diagnosis: Alcohol withdrawal hallucinosis  Assessment and Plan of Treatment: Alcohol withdrawal is a group of symptoms that occur when you drink alcohol daily and suddenly stop. It can begin within 5 hours of your last drink and get worse over 2 to 3 days. Withdrawal may also happen if you suddenly reduce the amount of alcohol that you normally drink.  Take Zyprexa 10mg at bedtime for 30 days  Follow up with PCP.  When you consume heavy amounts of alcohol you can have vitamin deficiencies. Vitamin supplements may be given to treat low vitamin levels. Alcohol can make it hard for your body to absorb enough vitamins such as B1. Vitamin supplements may also be given to prevent alcohol related brain damage.  For support and more information:  Alcoholics Anonymous  Web Address: http://www.aa.org  Substance Abuse and Mental Health Services Administration  [address]  [place] [address]  Web Address: http://www.[facility].gov  You were offered to go rehabilitation, however, you declined. The best thing you can do for your health is to stop drinking. Please use the resourcese above for more support with alcohol cessation.   Please seek immediate medical attention if you have tremors, vomiting, or you feel like you are going into withdrawal.        SECONDARY DISCHARGE DIAGNOSES  Diagnosis: Schizophrenia  Assessment and Plan of Treatment: You were seen by psychiatry while you were in the hospital.   The psychiatrist recommended taking Zyprexa at night before bed.   This medication worked well for your symptoms and prevented hallucinations.   Please continue to take this medication.      Diagnosis: Transaminitis  Assessment and Plan of Treatment: Your liver enzymes are elevated due to alcohol use. Follow up with your doctor or clinic at [address] [place] [address] to monitor liver enzyme. Avoid alcohol consumption, discuss with medical doctor before taking new medications.     PRINCIPAL DISCHARGE DIAGNOSIS  Diagnosis: Alcohol withdrawal hallucinosis  Assessment and Plan of Treatment: Alcohol withdrawal is a group of symptoms that occur when you drink alcohol daily and suddenly stop. It can begin within 5 hours of your last drink and get worse over 2 to 3 days. Withdrawal may also happen if you suddenly reduce the amount of alcohol that you normally drink.  Take Zyprexa 10mg at bedtime for 30 days  Follow up with PCP.  When you consume heavy amounts of alcohol you can have vitamin deficiencies. Vitamin supplements may be given to treat low vitamin levels. Alcohol can make it hard for your body to absorb enough vitamins such as B1. Vitamin supplements may also be given to prevent alcohol related brain damage.  For support and more information:  Alcoholics Anonymous  Web Address: http://www.aa.org  Substance Abuse and Mental Health Services Administration  [address]  [place] [address]  Web Address: http://www.[email or web address]  You were offered to go rehabilitation, however, you declined. The best thing you can do for your health is to stop drinking. Please use the resourcese above for more support with alcohol cessation.   Please seek immediate medical attention if you have tremors, vomiting, or you feel like you are going into withdrawal.        SECONDARY DISCHARGE DIAGNOSES  Diagnosis: Schizophrenia  Assessment and Plan of Treatment: You were seen by psychiatry while you were in the hospital.   The psychiatrist recommended taking Zyprexa at night before bed.   This medication worked well for your symptoms and prevented hallucinations.   Please continue to take this medication.      Diagnosis: Transaminitis  Assessment and Plan of Treatment: Your liver enzymes are elevated due to alcohol use. Follow up with your doctor or clinic at [address] [place] [address] to monitor liver enzyme. Avoid alcohol consumption, discuss with medical doctor before taking new medications.  Follow up with hepatologist dr. Wisdom as optpatient within one week.

## 2021-07-12 NOTE — DISCHARGE NOTE PROVIDER - NSFOLLOWUPCLINICS_GEN_ALL_ED_FT
[facility]  Internal Medicine  [address] [place] [address]  Phone: [phone]  Fax: [phone]  Follow Up Time: 1 week

## 2021-07-12 NOTE — SBIRT NOTE ADULT - NSSBIRTALCPASSREFTXDET_GEN_A_CORE
Patient only has emergency Medicaid. Patient was educated on the benefits of inpatient treatment. Patient is unsure if he would like to go to inpatient substance abuse treatment once he is medically cleared for discharge. SW will provide patient referral options for uninsured individuals.

## 2021-07-12 NOTE — PROGRESS NOTE ADULT - PROBLEM SELECTOR PLAN 1
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  - Cardiac enzyme negative  - EKG normal  - CIWA = 2 currently  - put on PRN Ativan 2mg q4  for CIWA>7, PRN ativan 2mg q4 for agitation  -decreased Ativan 2mg q12h today.   - put on Thiamine, folic acid, multivitamins  - trend CK  - put on 100ml/hr NS for 10 hrs  - Psych consulted - alcohol induced psychosis vs schizophrenic psychosis  - blood alcohol <3  - Creatine Kinase 300  - UA negative  - UTox negative  - s/p Ativan 2mg IV and Librium 25mg given in ED.  - Cardiac enzyme negative  - EKG normal  - CIWA = 2 currently  - put on PRN Ativan 2mg q4  for CIWA>7, PRN ativan 2mg q4 for agitation  -decreased Ativan 2mg q12h today from TID.   - c/w Thiamine, folic acid, multivitamins  - trend CK  - c/w IVF  - Psych consulted - alcohol induced psychosis vs schizophrenic psychosis  - blood alcohol <3  - Creatine Kinase 300  - UA negative  - UTox negative  - s/p Ativan 2mg IV and Librium 25mg given in ED.  - Cardiac enzyme negative  - EKG normal  - CIWA = 2 currently  - put on PRN Ativan 2mg q4  for CIWA>7, PRN ativan 2mg q4 for agitation  - d/c Ativan 2mg standing dose, start with librium taper. discussed with dr. Graham   - c/w Thiamine, folic acid, multivitamins  - trend CK  - c/w IVF  - Psych dr. Graham consulted

## 2021-07-13 LAB
ANION GAP SERPL CALC-SCNC: 7 MMOL/L — SIGNIFICANT CHANGE UP (ref 5–17)
BUN SERPL-MCNC: 5 MG/DL — LOW (ref 7–18)
CALCIUM SERPL-MCNC: 8.3 MG/DL — LOW (ref 8.4–10.5)
CHLORIDE SERPL-SCNC: 106 MMOL/L — SIGNIFICANT CHANGE UP (ref 96–108)
CO2 SERPL-SCNC: 27 MMOL/L — SIGNIFICANT CHANGE UP (ref 22–31)
CREAT SERPL-MCNC: 0.72 MG/DL — SIGNIFICANT CHANGE UP (ref 0.5–1.3)
GLUCOSE SERPL-MCNC: 93 MG/DL — SIGNIFICANT CHANGE UP (ref 70–99)
HCT VFR BLD CALC: 42.3 % — SIGNIFICANT CHANGE UP (ref 39–50)
HGB BLD-MCNC: 13.9 G/DL — SIGNIFICANT CHANGE UP (ref 13–17)
MCHC RBC-ENTMCNC: 28 PG — SIGNIFICANT CHANGE UP (ref 27–34)
MCHC RBC-ENTMCNC: 32.9 GM/DL — SIGNIFICANT CHANGE UP (ref 32–36)
MCV RBC AUTO: 85.1 FL — SIGNIFICANT CHANGE UP (ref 80–100)
NRBC # BLD: 0 /100 WBCS — SIGNIFICANT CHANGE UP (ref 0–0)
PLATELET # BLD AUTO: 248 K/UL — SIGNIFICANT CHANGE UP (ref 150–400)
POTASSIUM SERPL-MCNC: 3.2 MMOL/L — LOW (ref 3.5–5.3)
POTASSIUM SERPL-SCNC: 3.2 MMOL/L — LOW (ref 3.5–5.3)
RBC # BLD: 4.97 M/UL — SIGNIFICANT CHANGE UP (ref 4.2–5.8)
RBC # FLD: 14.5 % — SIGNIFICANT CHANGE UP (ref 10.3–14.5)
SODIUM SERPL-SCNC: 140 MMOL/L — SIGNIFICANT CHANGE UP (ref 135–145)
WBC # BLD: 7.74 K/UL — SIGNIFICANT CHANGE UP (ref 3.8–10.5)
WBC # FLD AUTO: 7.74 K/UL — SIGNIFICANT CHANGE UP (ref 3.8–10.5)

## 2021-07-13 RX ORDER — POTASSIUM CHLORIDE 20 MEQ
40 PACKET (EA) ORAL EVERY 4 HOURS
Refills: 0 | Status: COMPLETED | OUTPATIENT
Start: 2021-07-13 | End: 2021-07-13

## 2021-07-13 RX ADMIN — SODIUM CHLORIDE 3 MILLILITER(S): 9 INJECTION INTRAMUSCULAR; INTRAVENOUS; SUBCUTANEOUS at 13:48

## 2021-07-13 RX ADMIN — Medication 40 MILLIEQUIVALENT(S): at 12:01

## 2021-07-13 RX ADMIN — Medication 1 TABLET(S): at 12:01

## 2021-07-13 RX ADMIN — Medication 40 MILLIEQUIVALENT(S): at 17:42

## 2021-07-13 RX ADMIN — Medication 25 MILLIGRAM(S): at 17:42

## 2021-07-13 RX ADMIN — SERTRALINE 50 MILLIGRAM(S): 25 TABLET, FILM COATED ORAL at 12:01

## 2021-07-13 RX ADMIN — Medication 25 MILLIGRAM(S): at 05:23

## 2021-07-13 RX ADMIN — SODIUM CHLORIDE 3 MILLILITER(S): 9 INJECTION INTRAMUSCULAR; INTRAVENOUS; SUBCUTANEOUS at 21:33

## 2021-07-13 RX ADMIN — ENOXAPARIN SODIUM 40 MILLIGRAM(S): 100 INJECTION SUBCUTANEOUS at 12:02

## 2021-07-13 RX ADMIN — PANTOPRAZOLE SODIUM 40 MILLIGRAM(S): 20 TABLET, DELAYED RELEASE ORAL at 06:48

## 2021-07-13 RX ADMIN — Medication 1 MILLIGRAM(S): at 12:01

## 2021-07-13 RX ADMIN — Medication 50 MILLIGRAM(S): at 05:23

## 2021-07-13 RX ADMIN — Medication 500 MILLIGRAM(S): at 12:01

## 2021-07-13 RX ADMIN — OLANZAPINE 10 MILLIGRAM(S): 15 TABLET, FILM COATED ORAL at 21:32

## 2021-07-13 NOTE — PROGRESS NOTE ADULT - SUBJECTIVE AND OBJECTIVE BOX
NP Note discussed with  Primary Attending    INTERVAL HPI/OVERNIGHT EVENTS: no new complaints    MEDICATIONS  (STANDING):  chlordiazePOXIDE 25 milliGRAM(s) Oral every 12 hours  dextrose 5% + sodium chloride 0.45%. 1000 milliLiter(s) (75 mL/Hr) IV Continuous <Continuous>  enoxaparin Injectable 40 milliGRAM(s) SubCutaneous daily  folic acid 1 milliGRAM(s) Oral daily  multivitamin 1 Tablet(s) Oral daily  OLANZapine 10 milliGRAM(s) Oral at bedtime  pantoprazole    Tablet 40 milliGRAM(s) Oral before breakfast  pantoprazole    Tablet 40 milliGRAM(s) Oral once  potassium chloride    Tablet ER 40 milliEquivalent(s) Oral every 4 hours  sertraline 50 milliGRAM(s) Oral daily  sodium chloride 0.9% lock flush 3 milliLiter(s) IV Push every 8 hours  thiamine 500 milliGRAM(s) Oral daily    MEDICATIONS  (PRN):  LORazepam   Injectable 2 milliGRAM(s) IV Push every 4 hours PRN CIWA-Ar score increase by 2 points and a total score of 7 or less  LORazepam   Injectable 2 milliGRAM(s) IV Push every 2 hours PRN Agitation      __________________________________________________  REVIEW OF SYSTEMS:    CONSTITUTIONAL: No fever,   EYES: no acute visual disturbances  NECK: No pain or stiffness  RESPIRATORY: No cough; No shortness of breath  CARDIOVASCULAR: No chest pain, no palpitations  GASTROINTESTINAL: No pain. No nausea or vomiting; No diarrhea   NEUROLOGICAL: No headache or numbness, no tremors  MUSCULOSKELETAL: No joint pain, no muscle pain  GENITOURINARY: no dysuria, no frequency, no hesitancy  PSYCHIATRY: no depression , no anxiety  ALL OTHER  ROS negative        Vital Signs Last 24 Hrs  T(C): 36.8 (13 Jul 2021 14:00), Max: 36.8 (13 Jul 2021 05:20)  T(F): 98.2 (13 Jul 2021 14:00), Max: 98.3 (13 Jul 2021 05:20)  HR: 54 (13 Jul 2021 14:00) (53 - 78)  BP: 122/72 (13 Jul 2021 14:00) (113/63 - 137/85)  BP(mean): --  RR: 18 (13 Jul 2021 14:00) (17 - 18)  SpO2: 96% (13 Jul 2021 14:00) (96% - 100%)    ________________________________________________  PHYSICAL EXAM:  GENERAL: NAD  HEENT: Normocephalic;  conjunctivae and sclerae clear; moist mucous membranes;   NECK : supple  CHEST/LUNG: Clear to auscultation bilaterally with good air entry   HEART: S1 S2  regular; no murmurs, gallops or rubs  ABDOMEN: Soft, Nontender, Nondistended; Bowel sounds present  EXTREMITIES: no cyanosis; no edema; no calf tenderness  SKIN: warm and dry; no rash  NERVOUS SYSTEM:  Awake and alert; Oriented  to place, person and time ; no new deficits    _________________________________________________  LABS:                        13.9   7.74  )-----------( 248      ( 13 Jul 2021 07:12 )             42.3     07-13    140  |  106  |  5<L>  ----------------------------<  93  3.2<L>   |  27  |  0.72    Ca    8.3<L>      13 Jul 2021 07:12    TPro  7.2  /  Alb  2.6<L>  /  TBili  0.7  /  DBili  x   /  AST  127<H>  /  ALT  170<H>  /  AlkPhos  92  07-12        CAPILLARY BLOOD GLUCOSE            RADIOLOGY & ADDITIONAL TESTS:    Imaging  Reviewed:  YES    Consultant(s) Notes Reviewed:   YES      Plan of care was discussed with patient and /or primary care giver; all questions and concerns were addressed

## 2021-07-13 NOTE — PROGRESS NOTE ADULT - PROBLEM SELECTOR PLAN 2
- mid and RLQ abdominal pain tenderness  - LFT trending up AST/ALT = 212/243, now trending down  - has hx of hepatic steatosis, gastritis (from visit May 2021)  - CT abd (May 2021) = Thickened inflamed terminal ileal and cecum suggestive of Crohn's disease. Right lower quadrant noncalcified mesenteric adenopathy. Calcified mesenteric lymph nodes are present as well indicating chronic inflammatory or infectious process.  = Pt was put on Cipro Flagyl on discharge, and was recommended to follow up with outpatient colonoscopy.  - Total bilirubin 1.2, direct bilirubin 0.4  - GI consult for possible colonoscopy, monitor behavior for now.

## 2021-07-13 NOTE — PROGRESS NOTE ADULT - PROBLEM SELECTOR PLAN 1
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  - Cardiac enzyme negative  - EKG normal  - CIWA = 2 currently  - put on PRN Ativan 2mg q4  for CIWA>7, PRN ativan 2mg q4 for agitation  - d/c Ativan 2mg standing dose per dr. Graham,  -started with librium taper. discussed with dr. Graham, monitor LFT, improving   - c/w Thiamine, folic acid, multivitamins  - trend CK  - Psych dr. Graham consulted

## 2021-07-13 NOTE — PROGRESS NOTE ADULT - ASSESSMENT
Patient is a 30 y.o. male (lives with his friends at home, walks independently) who has a PMH of bipolar disorder, schizophrenia(diagnosed 6 months ago), alcohol abuse, substance-induced psychosis, appendix drainage/surgery?, came into the ED for alcohol abuse. Librium, ativan given in ED. Admitted for alcohol withdrawal. PSych following. on CIWA. currently on Ativan standing dose with prn. d/c'd Ativan standing dose and started librium taper per psych. dr. Graham. transaminitis improving.

## 2021-07-13 NOTE — PROGRESS NOTE ADULT - PROBLEM SELECTOR PLAN 7
d/c home when optimized  on librium taper  psych consulted d/c home when optimized  on librium taper  psych consulted  will need VIVO meds before d/c

## 2021-07-13 NOTE — PROGRESS NOTE ADULT - PROBLEM SELECTOR PLAN 6
Full code  Family not available this time. pt unable to evaluate capacity due to withdrawal state Full code  pt is more alert and comprehensive  spoke with domestic partner mr. Vickey Tovar ([phone]) and discussed plan of care.

## 2021-07-13 NOTE — PROGRESS NOTE ADULT - SUBJECTIVE AND OBJECTIVE BOX
`Patient is a 30y old  Male who presents with a chief complaint of Alcohol withdrawal (13 Jul 2021 16:39)    PATIENT IS SEEN AND EXAMINED IN MEDICAL FLOOR.  NGT [    ]    SHABBIR [   ]      GT [   ]    ALLERGIES:  No Known Allergies      Daily     Daily     VITALS:    Vital Signs Last 24 Hrs  T(C): 36.8 (13 Jul 2021 14:00), Max: 36.8 (13 Jul 2021 05:20)  T(F): 98.2 (13 Jul 2021 14:00), Max: 98.3 (13 Jul 2021 05:20)  HR: 54 (13 Jul 2021 14:00) (53 - 78)  BP: 122/72 (13 Jul 2021 14:00) (113/63 - 137/85)  BP(mean): --  RR: 18 (13 Jul 2021 14:00) (17 - 18)  SpO2: 96% (13 Jul 2021 14:00) (96% - 100%)    LABS:    CBC Full  -  ( 13 Jul 2021 07:12 )  WBC Count : 7.74 K/uL  RBC Count : 4.97 M/uL  Hemoglobin : 13.9 g/dL  Hematocrit : 42.3 %  Platelet Count - Automated : 248 K/uL  Mean Cell Volume : 85.1 fl  Mean Cell Hemoglobin : 28.0 pg  Mean Cell Hemoglobin Concentration : 32.9 gm/dL  Auto Neutrophil # : x  Auto Lymphocyte # : x  Auto Monocyte # : x  Auto Eosinophil # : x  Auto Basophil # : x  Auto Neutrophil % : x  Auto Lymphocyte % : x  Auto Monocyte % : x  Auto Eosinophil % : x  Auto Basophil % : x      07-13    140  |  106  |  5<L>  ----------------------------<  93  3.2<L>   |  27  |  0.72    Ca    8.3<L>      13 Jul 2021 07:12    TPro  7.2  /  Alb  2.6<L>  /  TBili  0.7  /  DBili  x   /  AST  127<H>  /  ALT  170<H>  /  AlkPhos  92  07-12    CAPILLARY BLOOD GLUCOSE            LIVER FUNCTIONS - ( 12 Jul 2021 07:52 )  Alb: 2.6 g/dL / Pro: 7.2 g/dL / ALK PHOS: 92 U/L / ALT: 170 U/L DA / AST: 127 U/L / GGT: x           Creatinine Trend: 0.72<--, 0.77<--, 0.71<--, 0.78<--, 0.84<--, 0.84<--  I&O's Summary              MEDICATIONS:    MEDICATIONS  (STANDING):  chlordiazePOXIDE 25 milliGRAM(s) Oral every 12 hours  dextrose 5% + sodium chloride 0.45%. 1000 milliLiter(s) (75 mL/Hr) IV Continuous <Continuous>  enoxaparin Injectable 40 milliGRAM(s) SubCutaneous daily  folic acid 1 milliGRAM(s) Oral daily  multivitamin 1 Tablet(s) Oral daily  OLANZapine 10 milliGRAM(s) Oral at bedtime  pantoprazole    Tablet 40 milliGRAM(s) Oral before breakfast  pantoprazole    Tablet 40 milliGRAM(s) Oral once  sertraline 50 milliGRAM(s) Oral daily  sodium chloride 0.9% lock flush 3 milliLiter(s) IV Push every 8 hours  thiamine 500 milliGRAM(s) Oral daily      MEDICATIONS  (PRN):  LORazepam   Injectable 2 milliGRAM(s) IV Push every 4 hours PRN CIWA-Ar score increase by 2 points and a total score of 7 or less  LORazepam   Injectable 2 milliGRAM(s) IV Push every 2 hours PRN Agitation      REVIEW OF SYSTEMS:                           ALL ROS DONE [ X   ]    CONSTITUTIONAL:  LETHARGIC [   ], FEVER [   ], UNRESPONSIVE [   ]  CVS:  CP  [   ], SOB, [   ], PALPITATIONS [   ], DIZZYNESS [   ]  RS: COUGH [   ], SPUTUM [   ]  GI: ABDOMINAL PAIN [   ], NAUSEA [   ], VOMITINGS [   ], DIARRHEA [   ], CONSTIPATION [   ]  :  DYSURIA [   ], NOCTURIA [   ], INCREASED FREQUENCY [   ], DRIBLING [   ],  SKELETAL: PAINFUL JOINTS [   ], SWOLLEN JOINTS [   ], NECK ACHE [   ], LOW BACK ACHE [   ],  SKIN : ULCERS [   ], RASH [   ], ITCHING [   ]  CNS: HEAD ACHE [   ], DOUBLE VISION [   ], BLURRED VISION [   ], AMS / CONFUSION [   ], SEIZURES [   ], WEAKNESS [   ],TINGLING / NUMBNESS [   ]    PHYSICAL EXAMINATION:  GENERAL APPEARANCE: NO DISTRESS  HEENT:  NO PALLOR, NO  JVD,  NO   NODES, NECK SUPPLE  CVS: S1 +, S2 +,   RS: AEEB,  OCCASIONAL  RALES +,   NO RONCHI  ABD: SOFT, NT, NO, BS +  EXT: NO PE  SKIN: WARM,   SKELETAL:  ROM ACCEPTABLE  CNS:  AAO X  3 , no  DEFICITS    RADIOLOGY :    none    ASSESSMENT :     Alcohol dependence with withdrawal with perceptual disturbance    No pertinent past medical history    Acute appendicitis, unspecified acute appendicitis type    Gastritis    Schizophrenia    Bipolar disorder    Alcoholism    No significant past surgical history    Appendix abscess    Acute appendicitis, unspecified acute appendicitis type    H/O appendicitis        PLAN:  HPI:  Interpretor called. ID [id number]    Patient is a 30 y.o. male (lives with his friends at home, walks independently) who has a PMH of bipolar disorder, schizophrenia(diagnosed 6 months ago), alcohol abuse, substance-induced psychosis, appendix drainage/surgery?, came into the ED after drinking for 4 days because there was auditory hallucinations telling him to hurt himself and others. Pt denies any attempts to kill himself or others. Pt states that his last drink was 1pm yesterday where he drank 20 beers and liquors. He reports that he had bilateral hand tremors from alcohol abuse. Currently, patient has bilateral hand tremors, headache 6/10, and mid-abdominal pain that prevents him from eating. Patient was at another hospital for alcohol abuse 1~2 weeks ago. Patient states that he was at an ED for alcohol abuse about 3 months ago. Pt denies any trauma, bleeding, cigarette use, drug use, fever or infectious symptoms.     Patient's 1 month supply of Olanzapine was picked up in May 12, 2021. Theoretically, the patient has not taken Olanzapine for ~1 month. Patient admits that he is not on any medications right now.    Ativan 2mg IV and Librium 25mg given in ED.  Currently CIWA2 (mild headache, mild hand tremor) (10 Jul 2021 14:37)      # ALCOHOL ABUSE AND WITHDRAWAL - PLACED ON CIWA PROTOCOL W/ LIBRIUM PER PSYCHIATRY, PRN ATIVAN, MVI, THIAMINE, FOLIC ACID; COUNSELLING SUPPORT OFFERED REGARDING CESSATION. SW CONSULT TO F/U ON REHABILITATION SERVICES  # HX OF ?BIPOLAR DX W/ SCHIZOPHRENIA - HAS NOT BEEN TAKING PREVIOUSLY PRESCRIBED RX OF ZYPREXA - RESUME ZYPREXA, PSYCHIATRY CONSULT - REPORTS IMPROVEMENT IN MENTAL STATUS  - PATIENT W/ SI/HI - FURTHER EVALUATION BY PSYCH  - ON ONE-TO-ONE SUPERVISION  # ABDOMINAL COLIC, ACUTE GASTRITIS - CONTINUE PROTONIX   # TRANSAMINITIS IMPROVING - HX OF FATTY LIVER DISEASE  # GI AND DVT PROPHYLAXIS    KELLY BROOKE MD COVERING CHRISTIANO BROOKE MD.      Patient is a 30y old  Male who presents with a chief complaint of Alcohol withdrawal (13 Jul 2021 16:39)    PATIENT IS SEEN AND EXAMINED IN MEDICAL FLOOR.    ALLERGIES:  No Known Allergies    VITALS:    Vital Signs Last 24 Hrs  T(C): 36.8 (13 Jul 2021 14:00), Max: 36.8 (13 Jul 2021 05:20)  T(F): 98.2 (13 Jul 2021 14:00), Max: 98.3 (13 Jul 2021 05:20)  HR: 54 (13 Jul 2021 14:00) (53 - 78)  BP: 122/72 (13 Jul 2021 14:00) (113/63 - 137/85)  BP(mean): --  RR: 18 (13 Jul 2021 14:00) (17 - 18)  SpO2: 96% (13 Jul 2021 14:00) (96% - 100%)    LABS:    CBC Full  -  ( 13 Jul 2021 07:12 )  WBC Count : 7.74 K/uL  RBC Count : 4.97 M/uL  Hemoglobin : 13.9 g/dL  Hematocrit : 42.3 %  Platelet Count - Automated : 248 K/uL  Mean Cell Volume : 85.1 fl  Mean Cell Hemoglobin : 28.0 pg  Mean Cell Hemoglobin Concentration : 32.9 gm/dL  Auto Neutrophil # : x  Auto Lymphocyte # : x  Auto Monocyte # : x  Auto Eosinophil # : x  Auto Basophil # : x  Auto Neutrophil % : x  Auto Lymphocyte % : x  Auto Monocyte % : x  Auto Eosinophil % : x  Auto Basophil % : x      07-13    140  |  106  |  5<L>  ----------------------------<  93  3.2<L>   |  27  |  0.72    Ca    8.3<L>      13 Jul 2021 07:12    TPro  7.2  /  Alb  2.6<L>  /  TBili  0.7  /  DBili  x   /  AST  127<H>  /  ALT  170<H>  /  AlkPhos  92  07-12    CAPILLARY BLOOD GLUCOSE      LIVER FUNCTIONS - ( 12 Jul 2021 07:52 )  Alb: 2.6 g/dL / Pro: 7.2 g/dL / ALK PHOS: 92 U/L / ALT: 170 U/L DA / AST: 127 U/L / GGT: x           Creatinine Trend: 0.72<--, 0.77<--, 0.71<--, 0.78<--, 0.84<--, 0.84<--  I&O's Summary    MEDICATIONS:    MEDICATIONS  (STANDING):  chlordiazePOXIDE 25 milliGRAM(s) Oral every 12 hours  dextrose 5% + sodium chloride 0.45%. 1000 milliLiter(s) (75 mL/Hr) IV Continuous <Continuous>  enoxaparin Injectable 40 milliGRAM(s) SubCutaneous daily  folic acid 1 milliGRAM(s) Oral daily  multivitamin 1 Tablet(s) Oral daily  OLANZapine 10 milliGRAM(s) Oral at bedtime  pantoprazole    Tablet 40 milliGRAM(s) Oral before breakfast  pantoprazole    Tablet 40 milliGRAM(s) Oral once  sertraline 50 milliGRAM(s) Oral daily  sodium chloride 0.9% lock flush 3 milliLiter(s) IV Push every 8 hours  thiamine 500 milliGRAM(s) Oral daily      MEDICATIONS  (PRN):  LORazepam   Injectable 2 milliGRAM(s) IV Push every 4 hours PRN CIWA-Ar score increase by 2 points and a total score of 7 or less  LORazepam   Injectable 2 milliGRAM(s) IV Push every 2 hours PRN Agitation      REVIEW OF SYSTEMS:                           ALL ROS DONE [ X   ]    CONSTITUTIONAL:  LETHARGIC [   ], FEVER [   ], UNRESPONSIVE [   ]  CVS:  CP  [   ], SOB, [   ], PALPITATIONS [   ], DIZZYNESS [   ]  RS: COUGH [   ], SPUTUM [   ]  GI: ABDOMINAL PAIN [   ], NAUSEA [   ], VOMITINGS [   ], DIARRHEA [   ], CONSTIPATION [   ]  :  DYSURIA [   ], NOCTURIA [   ], INCREASED FREQUENCY [   ], DRIBLING [   ],  SKELETAL: PAINFUL JOINTS [   ], SWOLLEN JOINTS [   ], NECK ACHE [   ], LOW BACK ACHE [   ],  SKIN : ULCERS [   ], RASH [   ], ITCHING [   ]  CNS: HEAD ACHE [   ], DOUBLE VISION [   ], BLURRED VISION [   ], AMS / CONFUSION [   ], SEIZURES [   ], WEAKNESS [   ],TINGLING / NUMBNESS [   ]    PHYSICAL EXAMINATION:  GENERAL APPEARANCE: NO DISTRESS  HEENT:  NO PALLOR, NO  JVD,  NO   NODES, NECK SUPPLE  CVS: S1 +, S2 +,   RS: AEEB,  OCCASIONAL  RALES +,   NO RONCHI  ABD: SOFT, NT, NO, BS +  EXT: NO PE  SKIN: WARM,   SKELETAL:  ROM ACCEPTABLE  CNS:  AAO X  3 , no  DEFICITS    RADIOLOGY :    none    ASSESSMENT :     Alcohol dependence with withdrawal with perceptual disturbance    No pertinent past medical history    Acute appendicitis, unspecified acute appendicitis type    Gastritis    Schizophrenia    Bipolar disorder    Alcoholism    No significant past surgical history    Appendix abscess    Acute appendicitis, unspecified acute appendicitis type    H/O appendicitis        PLAN:  HPI:  Interpretor called. ID [id number]    Patient is a 30 y.o. male (lives with his friends at home, walks independently) who has a PMH of bipolar disorder, schizophrenia(diagnosed 6 months ago), alcohol abuse, substance-induced psychosis, appendix drainage/surgery?, came into the ED after drinking for 4 days because there was auditory hallucinations telling him to hurt himself and others. Pt denies any attempts to kill himself or others. Pt states that his last drink was 1pm yesterday where he drank 20 beers and liquors. He reports that he had bilateral hand tremors from alcohol abuse. Currently, patient has bilateral hand tremors, headache 6/10, and mid-abdominal pain that prevents him from eating. Patient was at another hospital for alcohol abuse 1~2 weeks ago. Patient states that he was at an ED for alcohol abuse about 3 months ago. Pt denies any trauma, bleeding, cigarette use, drug use, fever or infectious symptoms.     Patient's 1 month supply of Olanzapine was picked up in May 12, 2021. Theoretically, the patient has not taken Olanzapine for ~1 month. Patient admits that he is not on any medications right now.    Ativan 2mg IV and Librium 25mg given in ED.  Currently CIWA2 (mild headache, mild hand tremor) (10 Jul 2021 14:37)      # ALCOHOL ABUSE AND WITHDRAWAL - PLACED ON CIWA PROTOCOL W/ LIBRIUM PER PSYCHIATRY, PRN ATIVAN, MVI, THIAMINE, FOLIC ACID; COUNSELLING SUPPORT OFFERED REGARDING CESSATION. SW CONSULT TO F/U ON REHABILITATION SERVICES  # HX OF ?BIPOLAR DX W/ SCHIZOPHRENIA - HAS NOT BEEN TAKING PREVIOUSLY PRESCRIBED RX OF ZYPREXA - RESUME ZYPREXA, PSYCHIATRY CONSULT - REPORTS IMPROVEMENT IN MENTAL STATUS  - PATIENT W/ SI/HI - FURTHER EVALUATION BY PSYCH  - ON ONE-TO-ONE SUPERVISION  # ABDOMINAL COLIC, ACUTE GASTRITIS - CONTINUE PROTONIX   # TRANSAMINITIS IMPROVING - HX OF FATTY LIVER DISEASE  # HYPOKALEMIA - REPLETING WITH SUPPLEMENT  # GI AND DVT PROPHYLAXIS    KELLY YETURU MD COVERING CHRISTIANO BROOKE MD.

## 2021-07-14 LAB
ALBUMIN SERPL ELPH-MCNC: 2.6 G/DL — LOW (ref 3.5–5)
ALP SERPL-CCNC: 102 U/L — SIGNIFICANT CHANGE UP (ref 40–120)
ALT FLD-CCNC: 225 U/L DA — HIGH (ref 10–60)
ANION GAP SERPL CALC-SCNC: 8 MMOL/L — SIGNIFICANT CHANGE UP (ref 5–17)
AST SERPL-CCNC: 157 U/L — HIGH (ref 10–40)
BILIRUB SERPL-MCNC: 0.3 MG/DL — SIGNIFICANT CHANGE UP (ref 0.2–1.2)
BUN SERPL-MCNC: 9 MG/DL — SIGNIFICANT CHANGE UP (ref 7–18)
CALCIUM SERPL-MCNC: 8.5 MG/DL — SIGNIFICANT CHANGE UP (ref 8.4–10.5)
CHLORIDE SERPL-SCNC: 106 MMOL/L — SIGNIFICANT CHANGE UP (ref 96–108)
CO2 SERPL-SCNC: 26 MMOL/L — SIGNIFICANT CHANGE UP (ref 22–31)
CREAT SERPL-MCNC: 0.74 MG/DL — SIGNIFICANT CHANGE UP (ref 0.5–1.3)
GLUCOSE SERPL-MCNC: 91 MG/DL — SIGNIFICANT CHANGE UP (ref 70–99)
HCT VFR BLD CALC: 42 % — SIGNIFICANT CHANGE UP (ref 39–50)
HGB BLD-MCNC: 13.9 G/DL — SIGNIFICANT CHANGE UP (ref 13–17)
MCHC RBC-ENTMCNC: 28.3 PG — SIGNIFICANT CHANGE UP (ref 27–34)
MCHC RBC-ENTMCNC: 33.1 GM/DL — SIGNIFICANT CHANGE UP (ref 32–36)
MCV RBC AUTO: 85.5 FL — SIGNIFICANT CHANGE UP (ref 80–100)
NRBC # BLD: 0 /100 WBCS — SIGNIFICANT CHANGE UP (ref 0–0)
PLATELET # BLD AUTO: 252 K/UL — SIGNIFICANT CHANGE UP (ref 150–400)
POTASSIUM SERPL-MCNC: 3.9 MMOL/L — SIGNIFICANT CHANGE UP (ref 3.5–5.3)
POTASSIUM SERPL-SCNC: 3.9 MMOL/L — SIGNIFICANT CHANGE UP (ref 3.5–5.3)
PROT SERPL-MCNC: 7.2 G/DL — SIGNIFICANT CHANGE UP (ref 6–8.3)
RBC # BLD: 4.91 M/UL — SIGNIFICANT CHANGE UP (ref 4.2–5.8)
RBC # FLD: 14.8 % — HIGH (ref 10.3–14.5)
SODIUM SERPL-SCNC: 140 MMOL/L — SIGNIFICANT CHANGE UP (ref 135–145)
WBC # BLD: 7.51 K/UL — SIGNIFICANT CHANGE UP (ref 3.8–10.5)
WBC # FLD AUTO: 7.51 K/UL — SIGNIFICANT CHANGE UP (ref 3.8–10.5)

## 2021-07-14 RX ORDER — THIAMINE MONONITRATE (VIT B1) 100 MG
5 TABLET ORAL
Qty: 70 | Refills: 0
Start: 2021-07-14 | End: 2021-07-27

## 2021-07-14 RX ORDER — PANTOPRAZOLE SODIUM 20 MG/1
1 TABLET, DELAYED RELEASE ORAL
Qty: 14 | Refills: 0
Start: 2021-07-14 | End: 2021-07-27

## 2021-07-14 RX ORDER — FOLIC ACID 0.8 MG
1 TABLET ORAL
Qty: 30 | Refills: 0
Start: 2021-07-14 | End: 2021-08-12

## 2021-07-14 RX ORDER — OLANZAPINE 15 MG/1
1 TABLET, FILM COATED ORAL
Qty: 30 | Refills: 0
Start: 2021-07-14 | End: 2021-08-12

## 2021-07-14 RX ORDER — SERTRALINE 25 MG/1
1 TABLET, FILM COATED ORAL
Qty: 30 | Refills: 0
Start: 2021-07-14 | End: 2021-08-12

## 2021-07-14 RX ADMIN — Medication 25 MILLIGRAM(S): at 06:20

## 2021-07-14 RX ADMIN — Medication 25 MILLIGRAM(S): at 17:53

## 2021-07-14 RX ADMIN — PANTOPRAZOLE SODIUM 40 MILLIGRAM(S): 20 TABLET, DELAYED RELEASE ORAL at 06:21

## 2021-07-14 RX ADMIN — SODIUM CHLORIDE 3 MILLILITER(S): 9 INJECTION INTRAMUSCULAR; INTRAVENOUS; SUBCUTANEOUS at 14:00

## 2021-07-14 RX ADMIN — Medication 1 TABLET(S): at 12:13

## 2021-07-14 RX ADMIN — SERTRALINE 50 MILLIGRAM(S): 25 TABLET, FILM COATED ORAL at 12:13

## 2021-07-14 RX ADMIN — SODIUM CHLORIDE 3 MILLILITER(S): 9 INJECTION INTRAMUSCULAR; INTRAVENOUS; SUBCUTANEOUS at 21:35

## 2021-07-14 RX ADMIN — SODIUM CHLORIDE 3 MILLILITER(S): 9 INJECTION INTRAMUSCULAR; INTRAVENOUS; SUBCUTANEOUS at 06:21

## 2021-07-14 RX ADMIN — Medication 500 MILLIGRAM(S): at 12:13

## 2021-07-14 RX ADMIN — OLANZAPINE 10 MILLIGRAM(S): 15 TABLET, FILM COATED ORAL at 21:35

## 2021-07-14 RX ADMIN — ENOXAPARIN SODIUM 40 MILLIGRAM(S): 100 INJECTION SUBCUTANEOUS at 12:12

## 2021-07-14 RX ADMIN — Medication 1 MILLIGRAM(S): at 12:13

## 2021-07-14 NOTE — PROGRESS NOTE ADULT - PROBLEM SELECTOR PLAN 3
diagnosed 6 months ago  Put on home med, which is Olanzapine 10mg x1  constant observation for now  Tried consulting psych but am hearing that the attending has to see the pt and call for consult  Psych dr. Graham consulted
diagnosed 6 months ago  Put on home med, which is Olanzapine 10mg x1  constant observation for now  Tried consulting psych but am hearing that the attending has to see the pt and call for consult
diagnosed 6 months ago  Put on home med, which is Olanzapine 10mg x1  constant observation for now  Tried consulting psych but am hearing that the attending has to see the pt and call for consult  Psych dr. Graham consulted

## 2021-07-14 NOTE — PROGRESS NOTE ADULT - PROBLEM SELECTOR PROBLEM 6
Goals of care, counseling/discussion
289.9

## 2021-07-14 NOTE — PROGRESS NOTE ADULT - PROBLEM SELECTOR PLAN 6
Full code  pt is more alert and comprehensive  spoke with domestic partner mr. Vickey Tovar ([phone]) and discussed plan of care.

## 2021-07-14 NOTE — PROGRESS NOTE ADULT - SUBJECTIVE AND OBJECTIVE BOX
NP Note discussed with  Primary Attending    INTERVAL HPI/OVERNIGHT EVENTS: no new complaints    MEDICATIONS  (STANDING):  chlordiazePOXIDE 25 milliGRAM(s) Oral every 12 hours  dextrose 5% + sodium chloride 0.45%. 1000 milliLiter(s) (75 mL/Hr) IV Continuous <Continuous>  enoxaparin Injectable 40 milliGRAM(s) SubCutaneous daily  folic acid 1 milliGRAM(s) Oral daily  multivitamin 1 Tablet(s) Oral daily  OLANZapine 10 milliGRAM(s) Oral at bedtime  pantoprazole    Tablet 40 milliGRAM(s) Oral before breakfast  pantoprazole    Tablet 40 milliGRAM(s) Oral once  sertraline 50 milliGRAM(s) Oral daily  sodium chloride 0.9% lock flush 3 milliLiter(s) IV Push every 8 hours  thiamine 500 milliGRAM(s) Oral daily    MEDICATIONS  (PRN):  LORazepam   Injectable 2 milliGRAM(s) IV Push every 4 hours PRN CIWA-Ar score increase by 2 points and a total score of 7 or less  LORazepam   Injectable 2 milliGRAM(s) IV Push every 2 hours PRN Agitation      __________________________________________________  REVIEW OF SYSTEMS:    CONSTITUTIONAL: No fever,   EYES: no acute visual disturbances  NECK: No pain or stiffness  RESPIRATORY: No cough; No shortness of breath  CARDIOVASCULAR: No chest pain, no palpitations  GASTROINTESTINAL: No pain. No nausea or vomiting; No diarrhea   NEUROLOGICAL: No headache or numbness, no tremors  MUSCULOSKELETAL: No joint pain, no muscle pain  GENITOURINARY: no dysuria, no frequency, no hesitancy  PSYCHIATRY: no depression , no anxiety  ALL OTHER  ROS negative        Vital Signs Last 24 Hrs  T(C): 36.9 (14 Jul 2021 13:47), Max: 37.1 (13 Jul 2021 21:58)  T(F): 98.5 (14 Jul 2021 13:47), Max: 98.8 (13 Jul 2021 21:58)  HR: 73 (14 Jul 2021 13:47) (55 - 73)  BP: 113/61 (14 Jul 2021 13:47) (109/69 - 125/73)  BP(mean): --  RR: 18 (14 Jul 2021 13:47) (17 - 18)  SpO2: 97% (14 Jul 2021 13:47) (97% - 98%)    ________________________________________________  PHYSICAL EXAM:  GENERAL: NAD  HEENT: Normocephalic;  conjunctivae and sclerae clear; moist mucous membranes;   NECK : supple  CHEST/LUNG: Clear to auscultation bilaterally with good air entry   HEART: S1 S2  regular; no murmurs, gallops or rubs  ABDOMEN: Soft, Nontender, Nondistended; Bowel sounds present  EXTREMITIES: no cyanosis; no edema; no calf tenderness  SKIN: warm and dry; no rash  NERVOUS SYSTEM:  Awake and alert; Oriented  to place, person and time ; no new deficits    _________________________________________________  LABS:                        13.9   7.51  )-----------( 252      ( 14 Jul 2021 11:09 )             42.0     07-14    140  |  106  |  9   ----------------------------<  91  3.9   |  26  |  0.74    Ca    8.5      14 Jul 2021 11:09    TPro  7.2  /  Alb  2.6<L>  /  TBili  0.3  /  DBili  x   /  AST  157<H>  /  ALT  225<H>  /  AlkPhos  102  07-14        CAPILLARY BLOOD GLUCOSE            RADIOLOGY & ADDITIONAL TESTS:    Imaging  Reviewed:  YES    Consultant(s) Notes Reviewed:   YES      Plan of care was discussed with patient and /or primary care giver; all questions and concerns were addressed

## 2021-07-14 NOTE — PROGRESS NOTE ADULT - ASSESSMENT
Patient is a 30 y.o. male (lives with his friends at home, walks independently) who has a PMH of bipolar disorder, schizophrenia(diagnosed 6 months ago), alcohol abuse, substance-induced psychosis, appendix drainage/surgery?, came into the ED for alcohol abuse. Librium, ativan given in ED. Admitted for alcohol withdrawal. PSych following. on CIWA. currently on Ativan standing dose with prn. d/c'd Ativan standing dose and started librium taper per psych. dr. Graham. transaminitis improving. VIVO meds ordered.

## 2021-07-14 NOTE — PHARMACOTHERAPY INTERVENTION NOTE - COMMENTS
; Nishant ([id number]). VIVO medications were delivered to the patient, along with the [employer] CareNotes for the discharge medications. Counseling points were also provided to the patient. Patient did not have any further questions related to the discharge medications at the end of the session.    MAYNOR Layton is aware

## 2021-07-14 NOTE — PROGRESS NOTE ADULT - SUBJECTIVE AND OBJECTIVE BOX
`Patient is a 30y old  Male who presents with a chief complaint of Alcohol withdrawal (13 Jul 2021 18:49)    PATIENT IS SEEN AND EXAMINED IN MEDICAL FLOOR.  RODDYT [    ]    SHABBIR [   ]      GT [   ]    ALLERGIES:  No Known Allergies      Daily     Daily     VITALS:    Vital Signs Last 24 Hrs  T(C): 36.4 (14 Jul 2021 05:19), Max: 37.1 (13 Jul 2021 21:58)  T(F): 97.6 (14 Jul 2021 05:19), Max: 98.8 (13 Jul 2021 21:58)  HR: 55 (14 Jul 2021 05:19) (54 - 71)  BP: 109/69 (14 Jul 2021 05:19) (109/69 - 125/73)  BP(mean): --  RR: 17 (14 Jul 2021 05:19) (17 - 18)  SpO2: 98% (14 Jul 2021 05:19) (96% - 98%)    LABS:    CBC Full  -  ( 13 Jul 2021 07:12 )  WBC Count : 7.74 K/uL  RBC Count : 4.97 M/uL  Hemoglobin : 13.9 g/dL  Hematocrit : 42.3 %  Platelet Count - Automated : 248 K/uL  Mean Cell Volume : 85.1 fl  Mean Cell Hemoglobin : 28.0 pg  Mean Cell Hemoglobin Concentration : 32.9 gm/dL  Auto Neutrophil # : x  Auto Lymphocyte # : x  Auto Monocyte # : x  Auto Eosinophil # : x  Auto Basophil # : x  Auto Neutrophil % : x  Auto Lymphocyte % : x  Auto Monocyte % : x  Auto Eosinophil % : x  Auto Basophil % : x      07-13    140  |  106  |  5<L>  ----------------------------<  93  3.2<L>   |  27  |  0.72    Ca    8.3<L>      13 Jul 2021 07:12      CAPILLARY BLOOD GLUCOSE              Creatinine Trend: 0.72<--, 0.77<--, 0.71<--, 0.78<--, 0.84<--, 0.84<--  I&O's Summary              MEDICATIONS:    MEDICATIONS  (STANDING):  chlordiazePOXIDE 25 milliGRAM(s) Oral every 12 hours  dextrose 5% + sodium chloride 0.45%. 1000 milliLiter(s) (75 mL/Hr) IV Continuous <Continuous>  enoxaparin Injectable 40 milliGRAM(s) SubCutaneous daily  folic acid 1 milliGRAM(s) Oral daily  multivitamin 1 Tablet(s) Oral daily  OLANZapine 10 milliGRAM(s) Oral at bedtime  pantoprazole    Tablet 40 milliGRAM(s) Oral before breakfast  pantoprazole    Tablet 40 milliGRAM(s) Oral once  sertraline 50 milliGRAM(s) Oral daily  sodium chloride 0.9% lock flush 3 milliLiter(s) IV Push every 8 hours  thiamine 500 milliGRAM(s) Oral daily      MEDICATIONS  (PRN):  LORazepam   Injectable 2 milliGRAM(s) IV Push every 4 hours PRN CIWA-Ar score increase by 2 points and a total score of 7 or less  LORazepam   Injectable 2 milliGRAM(s) IV Push every 2 hours PRN Agitation      REVIEW OF SYSTEMS:                           ALL ROS DONE [ X   ]    CONSTITUTIONAL:  LETHARGIC [   ], FEVER [   ], UNRESPONSIVE [   ]  CVS:  CP  [   ], SOB, [   ], PALPITATIONS [   ], DIZZYNESS [   ]  RS: COUGH [   ], SPUTUM [   ]  GI: ABDOMINAL PAIN [   ], NAUSEA [   ], VOMITINGS [   ], DIARRHEA [   ], CONSTIPATION [   ]  :  DYSURIA [   ], NOCTURIA [   ], INCREASED FREQUENCY [   ], DRIBLING [   ],  SKELETAL: PAINFUL JOINTS [   ], SWOLLEN JOINTS [   ], NECK ACHE [   ], LOW BACK ACHE [   ],  SKIN : ULCERS [   ], RASH [   ], ITCHING [   ]  CNS: HEAD ACHE [   ], DOUBLE VISION [   ], BLURRED VISION [   ], AMS / CONFUSION [   ], SEIZURES [   ], WEAKNESS [   ],TINGLING / NUMBNESS [   ]    PHYSICAL EXAMINATION:  GENERAL APPEARANCE: NO DISTRESS  HEENT:  NO PALLOR, NO  JVD,  NO   NODES, NECK SUPPLE  CVS: S1 +, S2 +,   RS: AEEB,  OCCASIONAL  RALES +,   NO RONCHI  ABD: SOFT, NT, NO, BS +  EXT: NO PE  SKIN: WARM,   SKELETAL:  ROM ACCEPTABLE  CNS:  AAO X  3 , no  DEFICITS    RADIOLOGY :    none    ASSESSMENT :     Alcohol dependence with withdrawal with perceptual disturbance    No pertinent past medical history    Acute appendicitis, unspecified acute appendicitis type    Gastritis    Schizophrenia    Bipolar disorder    Alcoholism    No significant past surgical history    Appendix abscess    Acute appendicitis, unspecified acute appendicitis type    H/O appendicitis        PLAN:  HPI:  Interpretor called. ID [id number]    Patient is a 30 y.o. male (lives with his friends at home, walks independently) who has a PMH of bipolar disorder, schizophrenia(diagnosed 6 months ago), alcohol abuse, substance-induced psychosis, appendix drainage/surgery?, came into the ED after drinking for 4 days because there was auditory hallucinations telling him to hurt himself and others. Pt denies any attempts to kill himself or others. Pt states that his last drink was 1pm yesterday where he drank 20 beers and liquors. He reports that he had bilateral hand tremors from alcohol abuse. Currently, patient has bilateral hand tremors, headache 6/10, and mid-abdominal pain that prevents him from eating. Patient was at another hospital for alcohol abuse 1~2 weeks ago. Patient states that he was at an ED for alcohol abuse about 3 months ago. Pt denies any trauma, bleeding, cigarette use, drug use, fever or infectious symptoms.     Patient's 1 month supply of Olanzapine was picked up in May 12, 2021. Theoretically, the patient has not taken Olanzapine for ~1 month. Patient admits that he is not on any medications right now.    Ativan 2mg IV and Librium 25mg given in ED.  Currently CIWA2 (mild headache, mild hand tremor) (10 Jul 2021 14:37)      # ALCOHOL ABUSE AND WITHDRAWAL - PLACED ON CIWA PROTOCOL W/ LIBRIUM PER PSYCHIATRY, PRN ATIVAN, MVI, THIAMINE, FOLIC ACID; COUNSELLING SUPPORT OFFERED REGARDING CESSATION. SW CONSULT TO F/U ON REHABILITATION SERVICES  # HX OF ?BIPOLAR DX W/ SCHIZOPHRENIA - HAS NOT BEEN TAKING PREVIOUSLY PRESCRIBED RX OF ZYPREXA - RESUME ZYPREXA, PSYCHIATRY CONSULT - REPORTS IMPROVEMENT IN MENTAL STATUS  - PATIENT W/ SI/HI - FURTHER EVALUATION BY PSYCH  - ON ONE-TO-ONE SUPERVISION  # ABDOMINAL COLIC, ACUTE GASTRITIS - CONTINUE PROTONIX   # TRANSAMINITIS IMPROVING - HX OF FATTY LIVER DISEASE  # HYPOKALEMIA - REPLETING WITH SUPPLEMENT  # GI AND DVT PROPHYLAXIS    KELLY BROOKE MD COVERING CHRISTIANO BROOKE MD.      `Patient is a 30y old  Male who presents with a chief complaint of Alcohol withdrawal (13 Jul 2021 18:49)    PATIENT IS SEEN AND EXAMINED IN MEDICAL FLOOR.    ALLERGIES:  No Known Allergies      VITALS:    Vital Signs Last 24 Hrs  T(C): 36.4 (14 Jul 2021 05:19), Max: 37.1 (13 Jul 2021 21:58)  T(F): 97.6 (14 Jul 2021 05:19), Max: 98.8 (13 Jul 2021 21:58)  HR: 55 (14 Jul 2021 05:19) (54 - 71)  BP: 109/69 (14 Jul 2021 05:19) (109/69 - 125/73)  BP(mean): --  RR: 17 (14 Jul 2021 05:19) (17 - 18)  SpO2: 98% (14 Jul 2021 05:19) (96% - 98%)    LABS:    CBC Full  -  ( 13 Jul 2021 07:12 )  WBC Count : 7.74 K/uL  RBC Count : 4.97 M/uL  Hemoglobin : 13.9 g/dL  Hematocrit : 42.3 %  Platelet Count - Automated : 248 K/uL  Mean Cell Volume : 85.1 fl  Mean Cell Hemoglobin : 28.0 pg  Mean Cell Hemoglobin Concentration : 32.9 gm/dL  Auto Neutrophil # : x  Auto Lymphocyte # : x  Auto Monocyte # : x  Auto Eosinophil # : x  Auto Basophil # : x  Auto Neutrophil % : x  Auto Lymphocyte % : x  Auto Monocyte % : x  Auto Eosinophil % : x  Auto Basophil % : x      07-13    140  |  106  |  5<L>  ----------------------------<  93  3.2<L>   |  27  |  0.72    Ca    8.3<L>      13 Jul 2021 07:12      CAPILLARY BLOOD GLUCOSE              Creatinine Trend: 0.72<--, 0.77<--, 0.71<--, 0.78<--, 0.84<--, 0.84<--  I&O's Summary              MEDICATIONS:    MEDICATIONS  (STANDING):  chlordiazePOXIDE 25 milliGRAM(s) Oral every 12 hours  dextrose 5% + sodium chloride 0.45%. 1000 milliLiter(s) (75 mL/Hr) IV Continuous <Continuous>  enoxaparin Injectable 40 milliGRAM(s) SubCutaneous daily  folic acid 1 milliGRAM(s) Oral daily  multivitamin 1 Tablet(s) Oral daily  OLANZapine 10 milliGRAM(s) Oral at bedtime  pantoprazole    Tablet 40 milliGRAM(s) Oral before breakfast  pantoprazole    Tablet 40 milliGRAM(s) Oral once  sertraline 50 milliGRAM(s) Oral daily  sodium chloride 0.9% lock flush 3 milliLiter(s) IV Push every 8 hours  thiamine 500 milliGRAM(s) Oral daily      MEDICATIONS  (PRN):  LORazepam   Injectable 2 milliGRAM(s) IV Push every 4 hours PRN CIWA-Ar score increase by 2 points and a total score of 7 or less  LORazepam   Injectable 2 milliGRAM(s) IV Push every 2 hours PRN Agitation      REVIEW OF SYSTEMS:                           ALL ROS DONE [ X   ]    CONSTITUTIONAL:  LETHARGIC [   ], FEVER [   ], UNRESPONSIVE [   ]  CVS:  CP  [   ], SOB, [   ], PALPITATIONS [   ], DIZZYNESS [   ]  RS: COUGH [   ], SPUTUM [   ]  GI: ABDOMINAL PAIN [   ], NAUSEA [   ], VOMITINGS [   ], DIARRHEA [   ], CONSTIPATION [   ]  :  DYSURIA [   ], NOCTURIA [   ], INCREASED FREQUENCY [   ], DRIBLING [   ],  SKELETAL: PAINFUL JOINTS [   ], SWOLLEN JOINTS [   ], NECK ACHE [   ], LOW BACK ACHE [   ],  SKIN : ULCERS [   ], RASH [   ], ITCHING [   ]  CNS: HEAD ACHE [   ], DOUBLE VISION [   ], BLURRED VISION [   ], AMS / CONFUSION [   ], SEIZURES [   ], WEAKNESS [   ],TINGLING / NUMBNESS [   ]    PHYSICAL EXAMINATION:  GENERAL APPEARANCE: NO DISTRESS  HEENT:  NO PALLOR, NO  JVD,  NO   NODES, NECK SUPPLE  CVS: S1 +, S2 +,   RS: AEEB,  OCCASIONAL  RALES +,   NO RONCHI  ABD: SOFT, NT, NO, BS +  EXT: NO PE  SKIN: WARM,   SKELETAL:  ROM ACCEPTABLE  CNS:  AAO X  3 , no  DEFICITS    RADIOLOGY :    none    ASSESSMENT :     Alcohol dependence with withdrawal with perceptual disturbance    No pertinent past medical history    Acute appendicitis, unspecified acute appendicitis type    Gastritis    Schizophrenia    Bipolar disorder    Alcoholism    No significant past surgical history    Appendix abscess    Acute appendicitis, unspecified acute appendicitis type    H/O appendicitis        PLAN:  HPI:  Interpretor called. ID [id number]    Patient is a 30 y.o. male (lives with his friends at home, walks independently) who has a PMH of bipolar disorder, schizophrenia(diagnosed 6 months ago), alcohol abuse, substance-induced psychosis, appendix drainage/surgery?, came into the ED after drinking for 4 days because there was auditory hallucinations telling him to hurt himself and others. Pt denies any attempts to kill himself or others. Pt states that his last drink was 1pm yesterday where he drank 20 beers and liquors. He reports that he had bilateral hand tremors from alcohol abuse. Currently, patient has bilateral hand tremors, headache 6/10, and mid-abdominal pain that prevents him from eating. Patient was at another hospital for alcohol abuse 1~2 weeks ago. Patient states that he was at an ED for alcohol abuse about 3 months ago. Pt denies any trauma, bleeding, cigarette use, drug use, fever or infectious symptoms.     Patient's 1 month supply of Olanzapine was picked up in May 12, 2021. Theoretically, the patient has not taken Olanzapine for ~1 month. Patient admits that he is not on any medications right now.    Ativan 2mg IV and Librium 25mg given in ED.  Currently CIWA2 (mild headache, mild hand tremor) (10 Jul 2021 14:37)      # ALCOHOL ABUSE AND WITHDRAWAL - PLACED ON CIWA PROTOCOL W/ LIBRIUM PER PSYCHIATRY [COMPLETE COURSE TODAY], PRN ATIVAN, MVI, THIAMINE, FOLIC ACID; COUNSELLING SUPPORT OFFERED REGARDING CESSATION. SW CONSULT TO F/U ON REHABILITATION SERVICES  # HX OF ?BIPOLAR DX W/ SCHIZOPHRENIA - HAS NOT BEEN TAKING PREVIOUSLY PRESCRIBED RX OF ZYPREXA - RESUME ZYPREXA, PSYCHIATRY CONSULT - REPORTS IMPROVEMENT IN MENTAL STATUS  - PATIENT W/ SI/HI - FURTHER EVALUATION BY PSYCH - CLEARED BY PSYCHIATRY - NO NEED FOR INPATIENT ADMISSION  - ON ONE-TO-ONE SUPERVISION  # ABDOMINAL COLIC, ACUTE GASTRITIS - CONTINUE PROTONIX   # TRANSAMINITIS IMPROVING - HX OF FATTY LIVER DISEASE  # HYPOKALEMIA - REPLETING WITH SUPPLEMENT  # VIVO MEDICATIONS ORDERED  # GI AND DVT PROPHYLAXIS    KELLY BROOKE MD COVERING CHRISTIANO BROOKE MD.

## 2021-07-15 VITALS
WEIGHT: 177.47 LBS | TEMPERATURE: 99 F | SYSTOLIC BLOOD PRESSURE: 125 MMHG | RESPIRATION RATE: 18 BRPM | OXYGEN SATURATION: 98 % | DIASTOLIC BLOOD PRESSURE: 78 MMHG | HEART RATE: 67 BPM

## 2021-07-15 PROCEDURE — 84484 ASSAY OF TROPONIN QUANT: CPT

## 2021-07-15 PROCEDURE — 93005 ELECTROCARDIOGRAM TRACING: CPT

## 2021-07-15 PROCEDURE — 80076 HEPATIC FUNCTION PANEL: CPT

## 2021-07-15 PROCEDURE — 80307 DRUG TEST PRSMV CHEM ANLYZR: CPT

## 2021-07-15 PROCEDURE — 82550 ASSAY OF CK (CPK): CPT

## 2021-07-15 PROCEDURE — 82553 CREATINE MB FRACTION: CPT

## 2021-07-15 PROCEDURE — 83735 ASSAY OF MAGNESIUM: CPT

## 2021-07-15 PROCEDURE — 87635 SARS-COV-2 COVID-19 AMP PRB: CPT

## 2021-07-15 PROCEDURE — 86769 SARS-COV-2 COVID-19 ANTIBODY: CPT

## 2021-07-15 PROCEDURE — 84100 ASSAY OF PHOSPHORUS: CPT

## 2021-07-15 PROCEDURE — 81001 URINALYSIS AUTO W/SCOPE: CPT

## 2021-07-15 PROCEDURE — 85027 COMPLETE CBC AUTOMATED: CPT

## 2021-07-15 PROCEDURE — 80053 COMPREHEN METABOLIC PANEL: CPT

## 2021-07-15 PROCEDURE — 85025 COMPLETE CBC W/AUTO DIFF WBC: CPT

## 2021-07-15 PROCEDURE — 80048 BASIC METABOLIC PNL TOTAL CA: CPT

## 2021-07-15 PROCEDURE — 36415 COLL VENOUS BLD VENIPUNCTURE: CPT

## 2021-07-15 PROCEDURE — 99285 EMERGENCY DEPT VISIT HI MDM: CPT | Mod: 25

## 2021-07-15 RX ADMIN — Medication 25 MILLIGRAM(S): at 11:21

## 2021-07-15 RX ADMIN — SODIUM CHLORIDE 3 MILLILITER(S): 9 INJECTION INTRAMUSCULAR; INTRAVENOUS; SUBCUTANEOUS at 11:51

## 2021-07-15 RX ADMIN — Medication 1 TABLET(S): at 11:21

## 2021-07-15 RX ADMIN — Medication 500 MILLIGRAM(S): at 11:21

## 2021-07-15 RX ADMIN — SODIUM CHLORIDE 3 MILLILITER(S): 9 INJECTION INTRAMUSCULAR; INTRAVENOUS; SUBCUTANEOUS at 06:02

## 2021-07-15 RX ADMIN — Medication 1 MILLIGRAM(S): at 11:21

## 2021-07-15 RX ADMIN — PANTOPRAZOLE SODIUM 40 MILLIGRAM(S): 20 TABLET, DELAYED RELEASE ORAL at 06:03

## 2021-07-15 RX ADMIN — ENOXAPARIN SODIUM 40 MILLIGRAM(S): 100 INJECTION SUBCUTANEOUS at 11:21

## 2021-07-15 RX ADMIN — SERTRALINE 50 MILLIGRAM(S): 25 TABLET, FILM COATED ORAL at 11:21

## 2021-07-15 NOTE — DISCHARGE NOTE NURSING/CASE MANAGEMENT/SOCIAL WORK - PATIENT PORTAL LINK FT
You can access the FollowMyHealth Patient Portal offered by [facility] by registering at the following website: [email or web address]. By joining [employer]’s FollowMyHealth portal, you will also be able to view your health information using other applications (apps) compatible with our system.

## 2021-07-28 NOTE — PROGRESS NOTE BEHAVIORAL HEALTH - NSBHCONSADMITOBJ_PSY_A_CORE
Spoke with Chon, patients son (SANTOS on file). Patient is unable to get out of bed due to weakness. Patient is dry heaving and dizzy. Blood sugar was checked while speaking to writer- reading of 253. Encouraged Chon to call the ambulance now. Chon agrees.    no

## 2021-07-30 NOTE — PROGRESS NOTE ADULT - PROBLEM SELECTOR PROBLEM 2
[Pathological] : TNM Stage: p
[II] : II
[TTNM] : 2
[NTNM] : 0
Abdominal pain
[MTNM] : 0
[de-identified] : 4000 cgy
[de-identified] : 6000 cgy
[de-identified] : tongue /head and neck
Abdominal pain
Abdominal pain

## 2021-08-11 NOTE — PROGRESS NOTE ADULT - PROBLEM SELECTOR PLAN 4
Pt called back - he takes 100 mg twice daily (updated RX) and then the 300 mg q HS.     Ema BEY RN      on Home med Olanzapine 10mg QD, not compliant at home  c/w home med  Psych consulted-dr. Graham

## 2021-08-17 NOTE — BH CONSULTATION LIAISON PROGRESS NOTE - NSBHMSEGROOM_PSY_A_CORE
Fair Cheiloplasty (Complex) Text: A decision was made to reconstruct the defect with a  cheiloplasty.  The defect was undermined extensively.  Additional obicularis oris muscle was excised with a 15 blade scalpel.  The defect was converted into a full thickness wedge to facilite a better cosmetic result.  Small vessels were then tied off with 5-0 monocyrl. The obicularis oris, superficial fascia, adipose and dermis were then reapproximated.  After the deeper layers were approximated the epidermis was reapproximated with particular care given to realign the vermilion border.

## 2021-09-18 ENCOUNTER — EMERGENCY (EMERGENCY)
Facility: HOSPITAL | Age: 31
LOS: 1 days | Discharge: HOME | End: 2021-09-18
Attending: STUDENT IN AN ORGANIZED HEALTH CARE EDUCATION/TRAINING PROGRAM
Payer: MEDICAID

## 2021-09-18 VITALS
DIASTOLIC BLOOD PRESSURE: 94 MMHG | RESPIRATION RATE: 18 BRPM | TEMPERATURE: 99 F | SYSTOLIC BLOOD PRESSURE: 149 MMHG | HEART RATE: 80 BPM | OXYGEN SATURATION: 99 %

## 2021-09-18 VITALS
TEMPERATURE: 99 F | SYSTOLIC BLOOD PRESSURE: 151 MMHG | DIASTOLIC BLOOD PRESSURE: 102 MMHG | RESPIRATION RATE: 18 BRPM | OXYGEN SATURATION: 98 % | WEIGHT: 181.22 LBS | HEIGHT: 60 IN | HEART RATE: 97 BPM

## 2021-09-18 DIAGNOSIS — F10.29 ALCOHOL DEPENDENCE WITH UNSPECIFIED ALCOHOL-INDUCED DISORDER: ICD-10-CM

## 2021-09-18 DIAGNOSIS — Z87.19 PERSONAL HISTORY OF OTHER DISEASES OF THE DIGESTIVE SYSTEM: Chronic | ICD-10-CM

## 2021-09-18 LAB
AMPHET UR-MCNC: NEGATIVE — SIGNIFICANT CHANGE UP
ANION GAP SERPL CALC-SCNC: 9 MMOL/L — SIGNIFICANT CHANGE UP (ref 5–17)
APAP SERPL-MCNC: <2 UG/ML — LOW (ref 10–30)
APPEARANCE UR: CLEAR — SIGNIFICANT CHANGE UP
BACTERIA # UR AUTO: ABNORMAL /HPF
BARBITURATES UR SCN-MCNC: NEGATIVE — SIGNIFICANT CHANGE UP
BASOPHILS # BLD AUTO: 0.03 K/UL — SIGNIFICANT CHANGE UP (ref 0–0.2)
BASOPHILS NFR BLD AUTO: 0.6 % — SIGNIFICANT CHANGE UP (ref 0–2)
BENZODIAZ UR-MCNC: NEGATIVE — SIGNIFICANT CHANGE UP
BILIRUB UR-MCNC: NEGATIVE — SIGNIFICANT CHANGE UP
BUN SERPL-MCNC: 12 MG/DL — SIGNIFICANT CHANGE UP (ref 7–18)
CALCIUM SERPL-MCNC: 7.9 MG/DL — LOW (ref 8.4–10.5)
CHLORIDE SERPL-SCNC: 100 MMOL/L — SIGNIFICANT CHANGE UP (ref 96–108)
CO2 SERPL-SCNC: 27 MMOL/L — SIGNIFICANT CHANGE UP (ref 22–31)
COCAINE METAB.OTHER UR-MCNC: NEGATIVE — SIGNIFICANT CHANGE UP
COLOR SPEC: YELLOW — SIGNIFICANT CHANGE UP
CREAT SERPL-MCNC: 0.76 MG/DL — SIGNIFICANT CHANGE UP (ref 0.5–1.3)
DIFF PNL FLD: ABNORMAL
EOSINOPHIL # BLD AUTO: 0.01 K/UL — SIGNIFICANT CHANGE UP (ref 0–0.5)
EOSINOPHIL NFR BLD AUTO: 0.2 % — SIGNIFICANT CHANGE UP (ref 0–6)
EPI CELLS # UR: NEGATIVE /HPF — SIGNIFICANT CHANGE UP
ETHANOL SERPL-MCNC: <3 MG/DL — SIGNIFICANT CHANGE UP (ref 0–10)
GLUCOSE SERPL-MCNC: 106 MG/DL — HIGH (ref 70–99)
GLUCOSE UR QL: NEGATIVE — SIGNIFICANT CHANGE UP
HCT VFR BLD CALC: 41.9 % — SIGNIFICANT CHANGE UP (ref 39–50)
HGB BLD-MCNC: 14.1 G/DL — SIGNIFICANT CHANGE UP (ref 13–17)
IMM GRANULOCYTES NFR BLD AUTO: 0.2 % — SIGNIFICANT CHANGE UP (ref 0–1.5)
KETONES UR-MCNC: NEGATIVE — SIGNIFICANT CHANGE UP
LEUKOCYTE ESTERASE UR-ACNC: NEGATIVE — SIGNIFICANT CHANGE UP
LYMPHOCYTES # BLD AUTO: 1.31 K/UL — SIGNIFICANT CHANGE UP (ref 1–3.3)
LYMPHOCYTES # BLD AUTO: 25.9 % — SIGNIFICANT CHANGE UP (ref 13–44)
MCHC RBC-ENTMCNC: 28.1 PG — SIGNIFICANT CHANGE UP (ref 27–34)
MCHC RBC-ENTMCNC: 33.7 GM/DL — SIGNIFICANT CHANGE UP (ref 32–36)
MCV RBC AUTO: 83.5 FL — SIGNIFICANT CHANGE UP (ref 80–100)
METHADONE UR-MCNC: NEGATIVE — SIGNIFICANT CHANGE UP
MONOCYTES # BLD AUTO: 0.44 K/UL — SIGNIFICANT CHANGE UP (ref 0–0.9)
MONOCYTES NFR BLD AUTO: 8.7 % — SIGNIFICANT CHANGE UP (ref 2–14)
NEUTROPHILS # BLD AUTO: 3.26 K/UL — SIGNIFICANT CHANGE UP (ref 1.8–7.4)
NEUTROPHILS NFR BLD AUTO: 64.4 % — SIGNIFICANT CHANGE UP (ref 43–77)
NITRITE UR-MCNC: NEGATIVE — SIGNIFICANT CHANGE UP
NRBC # BLD: 0 /100 WBCS — SIGNIFICANT CHANGE UP (ref 0–0)
OPIATES UR-MCNC: NEGATIVE — SIGNIFICANT CHANGE UP
PCP SPEC-MCNC: SIGNIFICANT CHANGE UP
PCP UR-MCNC: NEGATIVE — SIGNIFICANT CHANGE UP
PH UR: 7 — SIGNIFICANT CHANGE UP (ref 5–8)
PLATELET # BLD AUTO: 273 K/UL — SIGNIFICANT CHANGE UP (ref 150–400)
POTASSIUM SERPL-MCNC: 3.2 MMOL/L — LOW (ref 3.5–5.3)
POTASSIUM SERPL-SCNC: 3.2 MMOL/L — LOW (ref 3.5–5.3)
PROT UR-MCNC: NEGATIVE — SIGNIFICANT CHANGE UP
RBC # BLD: 5.02 M/UL — SIGNIFICANT CHANGE UP (ref 4.2–5.8)
RBC # FLD: 13.2 % — SIGNIFICANT CHANGE UP (ref 10.3–14.5)
RBC CASTS # UR COMP ASSIST: SIGNIFICANT CHANGE UP /HPF (ref 0–2)
SALICYLATES SERPL-MCNC: <1.7 MG/DL — LOW (ref 2.8–20)
SARS-COV-2 RNA SPEC QL NAA+PROBE: SIGNIFICANT CHANGE UP
SODIUM SERPL-SCNC: 136 MMOL/L — SIGNIFICANT CHANGE UP (ref 135–145)
SP GR SPEC: 1 — LOW (ref 1.01–1.02)
THC UR QL: NEGATIVE — SIGNIFICANT CHANGE UP
UROBILINOGEN FLD QL: NEGATIVE — SIGNIFICANT CHANGE UP
WBC # BLD: 5.06 K/UL — SIGNIFICANT CHANGE UP (ref 3.8–10.5)
WBC # FLD AUTO: 5.06 K/UL — SIGNIFICANT CHANGE UP (ref 3.8–10.5)
WBC UR QL: SIGNIFICANT CHANGE UP /HPF (ref 0–5)

## 2021-09-18 PROCEDURE — 80307 DRUG TEST PRSMV CHEM ANLYZR: CPT

## 2021-09-18 PROCEDURE — 99285 EMERGENCY DEPT VISIT HI MDM: CPT

## 2021-09-18 PROCEDURE — 87635 SARS-COV-2 COVID-19 AMP PRB: CPT

## 2021-09-18 PROCEDURE — 85025 COMPLETE CBC W/AUTO DIFF WBC: CPT

## 2021-09-18 PROCEDURE — 80048 BASIC METABOLIC PNL TOTAL CA: CPT

## 2021-09-18 PROCEDURE — 36415 COLL VENOUS BLD VENIPUNCTURE: CPT

## 2021-09-18 PROCEDURE — 90792 PSYCH DIAG EVAL W/MED SRVCS: CPT | Mod: 95

## 2021-09-18 PROCEDURE — 93005 ELECTROCARDIOGRAM TRACING: CPT

## 2021-09-18 PROCEDURE — 81001 URINALYSIS AUTO W/SCOPE: CPT

## 2021-09-18 PROCEDURE — 99284 EMERGENCY DEPT VISIT MOD MDM: CPT

## 2021-09-18 RX ORDER — POTASSIUM CHLORIDE 20 MEQ
40 PACKET (EA) ORAL ONCE
Refills: 0 | Status: COMPLETED | OUTPATIENT
Start: 2021-09-18 | End: 2021-09-18

## 2021-09-18 RX ADMIN — Medication 1 MILLIGRAM(S): at 14:11

## 2021-09-18 RX ADMIN — Medication 40 MILLIEQUIVALENT(S): at 09:17

## 2021-09-18 NOTE — ED PROVIDER NOTE - PHYSICAL EXAMINATION
Vital Signs Reviewed  GEN: Comfortable, NAD, AAOx3  HEENT: NCAT, MMM, Neck Supple  RESP: CTAB, No rales/rhonchi/wheezing  CV: RRR, S1S2, No murmurs  ABD: No TTP, Soft, ND, No masses, No CVA Tenderness  Extrem/Skin: Equal pulses bilat, No cyanosis/edema/rashes  Neuro: No tremors, No focal deficits

## 2021-09-18 NOTE — ED BEHAVIORAL HEALTH ASSESSMENT NOTE - MEDICAL RECORD REVIEWED
Pt presents to ED with c/o altered mental status, reports of coffee ground emesis. Pt has been tachycardic, report from [facility] states that pt was hypotensive at the [facility] and required a liter bolus. The nursing home started one IV line and [facility] started another with another bolus of fluid. Yes

## 2021-09-18 NOTE — ED PROVIDER NOTE - PATIENT PORTAL LINK FT
You can access the FollowMyHealth Patient Portal offered by [facility] by registering at the following website: [email or web address]. By joining [employer]’s FollowMyHealth portal, you will also be able to view your health information using other applications (apps) compatible with our system.

## 2021-09-18 NOTE — ED BEHAVIORAL HEALTH ASSESSMENT NOTE - DETAILS
both [place] see HPI MD made aware inc to involve SW regarding referrals for detox [facility] inpatient detox see additional note -please print copy for pt

## 2021-09-18 NOTE — ED PROVIDER NOTE - NSFOLLOWUPINSTRUCTIONS_ED_ALL_ED_FT
La depresión    LO QUE NECESITA SABER:    La depresión es un trastorno médico que causa sentimientos de tristeza o desesperanza que no desaparecen. La depresión puede causar que usted pierda interés en las cosas que antes disfrutaba. Estos sentimientos pueden llegar a interferir con bennett adilene diaria.    INSTRUCCIONES SOBRE EL NICOL HOSPITALARIA:    Llame al número de emergencias local (911 en [place]) si:  •Usted piensa en lastimarse o lastimar a alguien más.      •Usted ha hecho algo a propósito para hacerse daño.      Llame a bennett terapeuta o médico si:  •Jocelyn síntomas no mejoran.      •No puede asistir a bennett próxima bhargavi.      •Usted tiene síntomas nuevos.      •Usted tiene preguntas o inquietudes acerca de bennett condición o cuidado.      Los siguientes recursos están disponibles en cualquier momento para ayudarlo, si es necesario:  •National Suicide Prevention Lifeline (línea de prevención del suicidio): 1-539.998.2166 (2-727-241-TALK)      •Teléfono directo para hablar de suicidio: 1-739.718.2144 (4-327-YRUKAPK)      •Para obtener nikos lista de números internacionales: https://save.org/find-help/international-resources/      Medicamentos:  •Antidepresivospueden darse para mejorar o balancear bennett estado de ánimo. Es posible que usted necesite mirza radha medicamento por varias semanas antes de empezar a sentirse mejor.      •[place] jocelyn medicamentos christophe se le haya indicado.Consulte con bennett médico si usted benjamín que bennett medicamento no le está ayudando o si presenta efectos secundarios. Infórmele si es alérgico a algún medicamento. Mantenga nikos lista actualizada de los medicamentos, las vitaminas y los productos herbales que rolo. Incluya los siguientes datos de los medicamentos: cantidad, frecuencia y motivo de administración. Traiga con usted la lista o los envases de las píldoras a jocelyn citas de seguimiento. Lleve la lista de los medicamentos con usted en ashley de nikos emergencia.      La terapiase utiliza a menudo junto con medicamentos para aliviar la depresión. La terapia es un lugar para hablar sobre jocelyn sentimientos y todo lo que puede estar causando la depresión. La terapia se puede realizar hermelinda o en leyda. También podría realizarse con jocelyn familiares o con bennett aiyana.    Cuidados personales:  •Realice actividad física regularmente.Trate de mantenerse activo por 30 minutos, de 3 a 5 días a la semana. La actividad física puede ayudar a aliviar la depresión. Colabore con bennett médico para crear un plan de ejercicios que usted disfrute. Puede ayudarlo si le pide a alguien que se mantenga activo con usted.      •Establezca un horario regular para dormir.Nikos rutina puede ayudarlo a relajarse antes de irse a dormir. Escuche música, conner o ray yoga. Trate de irse a dormir y despertarse al mismo tiempo todos los días. El sueño es importante para la skylar emocional.      •Consuma alimentos saludables y variados.Los alimentos saludables incluyen frutas, verduras, panes integrales, productos lácteos descremados, scot magras, pescado y fríjoles. Un plan alimenticio saludable es bajo en grasas, sal y azúcar adicional.      •No tome alcohol ni use drogas.El alcohol y las drogas pueden empeorar la depresión. Hable con bennett terapeuta o médico si usted necesita ayuda para dejar de fumar.      Acuda a jocelyn consultas de control con bennett médico según le indicaron.Bnenett médico vigilará bennett progreso en las citas de seguimiento. También monitoreará bennett medicamento si usted está tomando antidepresivos. Bennett médico le preguntará si el medicamento le está ayudando. Dígale sobre cualquier efecto secundario o problemas que usted tenga con bennett medicamento. Podría ser necesario cambiar el tipo o cantidad de medicamento. Anote jocelyn preguntas para que se acuerde de hacerlas elisa jocelyn visitas.       © [employer] 2021           back to top                          © [employer] 2021        Información sobre el consumo de alcohol compulsivo, en adultos    Binge-Drinking Information, Adult      Consumo de alcohol compulsivo significa beber nikos gran cantidad de alcohol en un corto periodo de tiempo. Prentice generalmente es alrededor de 5 medidas para los hombres o 4 medidas para las mujeres, en nikos ocasión.    Las personas que consumen alcohol compulsivamente no siempre tienen un problema de alcoholismo. Sin embargo, el consumo de alcohol compulsivo puede aumentar el riesgo de volverse dependiente del alcohol (tener un trastorno por consumo de alcohol). Además de los problemas de skylar, christophe enfermedad cardíaca, enfermedad hepática o cáncer, el consumo de alcohol compulsivo también puede ocasionar problemas legales, económicos e interpersonales. Jocelyn amigos y familiares pueden notar signos del consumo de alcohol compulsivo o de un trastorno por consumo de alcohol antes de que usted los note.      ¿Qué cambios en el estilo de adilene se pueden realizar?                  •Evite beber en exceso. Si va a beber, danilo lentamente, establézcase un límite y siga el plan. Intente limitar el consumo de alcohol a 1 medida por día si es susi y no está embarazada, y a 2 medidas por día si es hombre. Nikos medida equivale a 12 oz (355 ml) de cerveza, 5 oz (148 ml) de vino o 1½ oz (44 ml) de bebidas alcohólicas de nicol graduación.      •Aliente a las otras personas que lo rodean a no consumir alcohol compulsivamente.      •Sea consciente de las situaciones y las personas que desencadenan bennett consumo excesivo de alcohol, y evítelos o encuentre maneras diferentes de lidiar con ellos. Busque pasatiempos que pueda hacer en vez de beber alcohol, por ejemplo, hacer ejercicio o actividades al aire mara.    • No danilo si:  •Está embarazada o intentando quedar embarazada.      •Planea conducir.      •Planea usar maquinaria pesada, christophe nikos cortadora de césped o nikos herramienta eléctrica.      •Tiene nikos enfermedad que puede empeorar con el consumo de alcohol.      •Rolo medicamentos que el consumo de alcohol puede afectar, incluidos los analgésicos recetados.      •Se está recuperando de un trastorno por consumo de alcohol.      •Desarrolle habilidades para controlar jocelyn estados de ánimo y emociones, de modo que no deba beber para lidiar con ellos. Prentice puede incluir el uso de técnicas de reducción del estrés, christophe:  •Respiración profunda.      •Yoga o meditación.      •Hacer ejercicio o practicar deportes.      •Llevar un registro del estrés.      •Escuchar música.          ¿Por qué son importantes estos cambios?  El consumo de alcohol compulsivo puede ponerlo en riesgo de sufrir problemas graves de skylar, entre ellos:  •Lesiones accidentales, por ejemplo, intoxicación por alcohol o caídas.      •Violencia, christophe abuso sexual.      •ETS (enfermedades de transmisión sexual).      •Desarrollo de un trastorno por consumo de alcohol.      •Afecciones de skylar mental, christophe ansiedad o depresión.      •Problemas de memoria o de aprendizaje.      •Enfermedad hepática.      •Presión arterial nicol.      •Enfermedades cardíacas.      •Accidente cerebrovascular.      •Cáncer de hígado, colon, esófago, mama o boca.    Si usted consume alcohol compulsivamente elisa el embarazo, el bebé también puede estar en riesgo de tener problemas de skylar, entre ellos:  •Aborto espontáneo.      •Muerte fetal.      •Trastornos del espectro alcohólico fetal (TEAF).      •Síndrome de muerte súbita del lactante (SMSL).        ¿Qué puede suceder si no se hacen cambios?  Además de los problemas de skylar, el consumo de alcohol compulsivo también puede aumentar el riesgo de:  •Accidentes automovilísticos.      •Problemas con las relaciones personales y otras situaciones sociales.      •Problemas legales o económicos.      •Embarazos no deseados.        ¿Cuáles son los beneficios de controlar mi consumo de alcohol?  Controlar el consumo de alcohol o dejar de beber puede hacer que se sienta mejor y:  •Ayudarlo a controlar bennett peso.      •Hacer que usted sea más propenso a ponerse y permanecer en buena forma física.      •Mejorar cómo el cuerpo procesa las vitaminas y minerales.      •Mejorar bennett skylar al bajar bennett presión arterial, el colesterol, los triglicéridos, y el nivel de azúcar en la ye (glucosa).      •Ayudarlo a pensar con mayor claridad y a mirza mejores decisiones.        Qué medidas puedo mirza para [prevenir/reducir mi riesgo de/reducir el riesgo de mi hijo de] [X]      Dónde encontrar apoyo:  Puede obtener apoyo para dejar de consumir alcohol compulsivamente de:  •Bennett médico. Radha puede recomendarle asesoramiento psicológico si debbie demasiado.      •El Servicio Nacional de Derivación para el Tratamiento del Alcoholismo o la Farmacodependencia: 8-340-093-HELP ([phone])      •Centro de recursos para alcohólicos de Alcohólicos Anónimos: 7-572-389-5684        Dónde encontrar más información:  Puede obtener más información sobre el consumo de alcohol compulsivo en los siguientes sitios:  •Administración de Servicios por Abuso de Sustancias y Skylar Mental (Substance Abuse and Mental Health Services Administration): www.samhsa.gov      •Centro de recursos para alcohólicos (Alcoholics Resource Center): www.alcoholicsanonyHaskell [facility].com        Comuníquese con un médico si:    •No puede controlar cuánto rolo, o benjamín que bennett consumo está fuera de bennett control.      •Tiene problemas físicos inesperados que le causan angustia, christophe nikos lesión accidental.      •Necesita ayuda para lidiar con las consecuencias de bennett consumo de alcohol.        Solicite ayuda de inmediato si:    •Tiene pensamientos acerca de lastimarse a usted mismo o a otras personas.    Si alguna vez siente que puede lastimarse a usted mismo o a otras personas, o tiene pensamientos de poner fin a bennett adilene, busque ayuda de inmediato. Puede dirigirse al servicio de emergencias más cercano o comunicarse con:   • El servicio de emergencias de bennett localidad (911 en [place]       • Nikos línea de asistencia al suicida y atención en crisis, christophe la Línea Nacional de Prevención del Suicidio (National Suicide Prevention Lifeline), al 1-819.754.3461. Está disponible las 24 horas del día.         Resumen    •Consumo de alcohol compulsivo significa beber nikos gran cantidad de alcohol en un corto periodo de tiempo. Prentice generalmente es alrededor de 5 medidas para los hombres o 4 medidas para las mujeres, en nikos ocasión.      •El consumo de alcohol compulsivo puede provocar graves problemas de skylar, christophe enfermedad cardíaca, enfermedad hepática o cáncer.      •El consumo de alcohol compulsivo aumenta el riesgo de desarrollar nikos adicción al alcohol (un trastorno por consumo de alcohol) y problemas sociales y en las relaciones.      •Hable con bennett médico sobre jocelyn hábitos en cuanto al consumo de alcohol. Radha puede recomendarle asesoramiento psicológico si debbie demasiado.      Esta información no tiene christophe fin reemplazar el consejo del médico. Asegúrese de hacerle al médico cualquier pregunta que tenga.      Document Revised: 03/14/2019 Document Reviewed: 12/10/2018    Elsevier Patient Education © 2021 [employer]

## 2021-09-18 NOTE — ED PROVIDER NOTE - OBJECTIVE STATEMENT
32 yo M h/o schizophrenia (prev Rx olanzapine), substance induced psychosis p/w "voices telling me to hurt myself with a knife" since waking up this morning. Pt states that yesterday he was drinking a lot of etoh but doesn't feel intoxicated and denies any drugs. Pt denies any recent suicide attempts or a plan, and denies any recent fever or infectious symptoms/ N/V/D, syncope, trauma, other recent illness or hospitalizations.

## 2021-09-18 NOTE — ED BEHAVIORAL HEALTH ASSESSMENT NOTE - DESCRIPTION
no beh issues; denying SI    COVID exposure screen: pt  denied travel out of [facility] in past 10 days  denied contact w/ individuals w/ covid in past 10 days  reported 2nd vaccine dose last mo  denied recent pos covid; pos ab in july denied domiciled, employed, single

## 2021-09-18 NOTE — ED BEHAVIORAL HEALTH ASSESSMENT NOTE - SUMMARY
32yo domiciled, single, employed  M w/ hx of alcohol use, hx of mood and psychosis symptoms in context of either alcohol intoxication or in the aftermath of alcohol binge w/ no past Sa/SIB who once again self presents to ED w/ complaints of non specific physical pain/discomfort and reports perceptual disturbances in context of reported binge drinking this past week. He is noted to be in mild physical distress but without signs of jackelyn or psychosis (not responding to int stimuli, not internally preoccupied, no delusions, no disorganized speech/beh, no neg psychotic symptoms). Clarification of his reported CAH are rather his own inner voice or those of his family/loved ones with derogatory comments/telling to die/hurt himself rather that comes in context of alcohol induced depressive mood. He remains consistent in his denial of actual SI/intent or plan; he is future oriented, well focused on his physical need, and future oriented goals/plans. Patient's friend/partner provides corroborating collateral with no acute psych safety concerns inc suicidality/homicidality. Despite his chronic and demo risk factors of self harm stemming from his substance use, limited social support, he has numerous protective factors inc being employed, domiciled, w/ no past SA/SIB, no current SI/intent or plan with future oriented goals/plans. Thus he does not warrant or meet criteria for involuntary psych admission. Patient declines offer for voluntary psych admission and declines further psych intervention inc resumption of past reported psych meds. Patient is more focused on his physical well being and about receiving detox. Pt will benefit from further substance treatment - reports currently interested in inpatient detox. Patient will be psych cleared for d/c and dispo planning inc to inpatient detox; he's also cleared for d/c back to community if he changes his mind about inpatient detox.

## 2021-09-18 NOTE — ED BEHAVIORAL HEALTH ASSESSMENT NOTE - HPI (INCLUDE ILLNESS QUALITY, SEVERITY, DURATION, TIMING, CONTEXT, MODIFYING FACTORS, ASSOCIATED SIGNS AND SYMPTOMS)
32yo reportedly domiciled, employed, single  M w/ hx of alcohol use, alcohol induced mood and psychosis w/ no past SA/SIB, with hx of numerous ED visits w/ reported perceptual disturbance, mood symptoms inc suicidality either in context of intoxication or in the aftermath of bingeing for days. Pt presented w/ reports of AH telling him to die/hurt himself without actual SI after reportedly bingeing on alcohol for a week.         Pt was seen and evaluated via telemonitor.  # [id number] was used. Patient spoke of how in the past week he had been "drinking everyday"; he spoke of feeling "bad" in the past 3 days because of this; spoke of issues w/ sleep last night when he started to have hear voices telling him "bad things" - when asked to clarify he reported that they were his own voice and voices of people in his life saying he "doesn't deserve to live or should not live." Patient reported that he did not actually have any desire to kill or hurt himself. Patient reported that he had not been experiencing issues w/ anhedonia, hopelessness, helplessness. Pt denied anxiety or panic attacks. He denied VH/HI/intent or plan. Pt was more focused on  "headache" and "chest pain" right now; he was requesting to get a medication for his pain. Patient was offered inpatient psychiatric hospitalization but he stated: "I don't need that." Patient was offered to restart on his past psych meds but refused. Patient was offered rehab/detox inc inpatient services; he reported that he was interested in this inc inpatient detox. Patient spoke of additional future plans inc reporting desire to return to work and spoke of protective factors inc his family, his friend/partner.       Collateral: see additional note from [facility] for collateral from Lois Tovar

## 2021-09-18 NOTE — ED BEHAVIORAL HEALTH NOTE - BEHAVIORAL HEALTH NOTE
===================  PRE-HOSPITAL COURSE  ===================  SOURCE:  MAYNOR Mcguire and secondhand ED documentation.  DETAILS:  Per chart, patient walked into ED after drinking 15 beers and hearing voices telling him to hurt himself with a knife. Per RN, patient did not act upon the thoughts of hurting himself with a knife and pt denies intent of wanting to do so.      ============  ED COURSE  ============  SOURCE:  MAYNOR Mcguire and secondhand ED documentation.  ARRIVAL:  Per chart and RN, patient arrived as a walk in. Per RN, there were no issues of agitation upon arrival, patient's BAL <3 upon arrival despite coming in for alcohol intox.   BELONGINGS:  Per RN, patient arrived with clothing. All belongings were provided to hospital security, and patient is currently in a gown with a 1:1 staff member.  BEHAVIOR: RN described patient to be calm and cooperative, presenting with linear  thought process and thought content WNL, is AAOx3, presenting with stable mood and appropriate affect, remains in good behavioral and impulse control, is not currently physically violent/aggressive. RN stated that the patient initially reported that he was hearing command AH telling him to hurt himself prior to coming into the ED, however patient is currently denying SI/HI/A/VH. RN stated that there are no current lacerations/marks on their body. RN stated that the patient appears to be well-groomed, maintains proper hygiene, and reports good ADLs.  TREATMENT:  None  VISITORS:  None    COVID Exposure Screen- collateral (i.e. third-party, chart review, belongings, etc; include EMS and ED staff)  1.        *Has the patient had a COVID-19 test in the last 90 days?  (  ) Yes   (  ) No   ( x ) Unknown- Reason: Unable to assess.  IF YES PROCEED TO QUESTION #2. IF NO OR UNKNOWN, PLEASE SKIP TO QUESTION #3.  2.        Date of test(s) and result(s): ________  3.        *Has the patient tested positive for COVID-19 antibodies? (  ) Yes   ( ) No   ( x ) Unknown- Reason: Unable to assess.  IF YES PROCEED TO QUESTION #4. IF NO or UNKNOWN, PLEASE SKIP TO QUESTION #5.  4.        Date of positive antibody test: ________  5.        *Has the patient received 2 doses of the COVID-19 vaccine? (  ) Yes   (  ) No   ( x ) Unknown- Reason: Unable to assess.  IF YES PROCEED TO QUESTION #6. IF NO or UNKNOWN, PLEASE SKIP TO QUESTION #7.  6.         Date of second dose: ________  7.        *In the past 10 days, has the patient been around anyone with a positive COVID-19 test?* (  ) Yes   (  ) No   ( x ) Unknown- Reason: Unable to assess.  IF YES PROCEED TO QUESTION #8. IF NO or UNKNOWN, PLEASE SKIP TO QUESTION #13.  8.        Was the patient within 6 feet of them for at least 15 minutes? (  ) Yes   (  ) No   (  ) Unknown- Reason: _____  9.        Did the patient provide care for them? (  ) Yes   (  ) No   (  ) Unknown- Reason: ______  10.     Did the patient have direct physical contact with them (touched, hugged, or kissed them)? (  ) Yes   (  ) No	(  ) Unknown- Reason: __  11.     Did the patient share eating or drinking utensils with them? (  ) Yes   (  ) No	(  ) Unknown- Reason: ____  12.     Did they sneeze, cough, or somehow get respiratory droplets on the patient? (  ) Yes   (  ) No	(  ) Unknown- Reason: ______  13.     *Has the patient been out of [place] within the past 10 days?* (  ) Yes   (  ) No   ( x ) Unknown- Reason: Unable to assess.  IF YES PLEASE ANSWER THE FOLLOWING QUESTIONS:  14.     Which state/country did they go to? ______  15.     Were they there over 24 hours? (  ) Yes   (  ) No	(  ) Unknown- Reason: ______  16.     Date of return to [facility]: ______ ===================  PRE-HOSPITAL COURSE  ===================  SOURCE:  MAYNOR Mcguire and secondhand ED documentation.  DETAILS:  Per chart, patient walked into ED after drinking 15 beers and hearing voices telling him to hurt himself with a knife. Per RN, patient did not act upon the thoughts of hurting himself with a knife and pt denies intent of wanting to do so.      ============  ED COURSE  ============  SOURCE:  MAYNOR Mcguire and secondhand ED documentation.  ARRIVAL:  Per chart and RN, patient arrived as a walk in. Per RN, there were no issues of agitation upon arrival, patient's BAL <3 upon arrival despite coming in for alcohol intox.   BELONGINGS:  Per RN, patient arrived with clothing. All belongings were provided to hospital security, and patient is currently in a gown with a 1:1 staff member.  BEHAVIOR: RN described patient to be calm and cooperative, presenting with linear  thought process and thought content WNL, is AAOx3, presenting with stable mood and appropriate affect, remains in good behavioral and impulse control, is not currently physically violent/aggressive. RN stated that the patient initially reported that he was hearing command AH telling him to hurt himself prior to coming into the ED, however patient is currently denying SI/HI/A/VH. RN stated that there are no current lacerations/marks on their body. RN stated that the patient appears to be well-groomed, maintains proper hygiene, and reports good ADLs.  TREATMENT:  None  VISITORS:  None    Patient gives permission to obtain collateral from Lois Tovar [phone]:  ( x ) Yes  (  )  No  Rationale for overriding objection            (  ) Lack of capacity. Details: ________            (  ) Assessing risk of danger to self/others. Details: ________    Rationale for selecting specific collateral source            (  ) Potential to impact risk of danger to self/others and no alternative equivalent. Details: _____    [facility] spoke with patient's partner Lois Tovar [phone] to obtain third-party collateral. Patient is a 31M domiciled with his partner Lois Tovar whom he is in a committed non-marital relationship with, has two kids that live in [place] from a previous relationship, works full-time as a cook, non-caregiver. Per ED, patient walked into ED this morning after drinking approx. 15 beers and was experiencing CAH telling him to take a knife and hurt himself. Collateral was unaware that patient came into the ED until [facility] called. Collateral denied knowledge of patient having any acute psychiatric changes such as feelings of depression/anxiety, denied that the patient has had changes in sleep/appetite/energy, reports patient continues to go to work on a daily basis, and is not concerned that patient has been drinking more alcohol or drugs than usual. Lois stated that the patient drinks roughly 2-3 beers at a time when he does drink. [facility] states that at baseline, patient remains calm and linear, and remains in good behavioral control. [facility] denied patient has a hx/o and current SI/HI/A/VH and has never self-harmed in the past. [facility] reported that the patient does have hx/o surgery on a hernia approx. 1 yr ago, and denied knowledge of other hospitalizations (though it was noted in pt's chart that patient received consult-liasion psychiatry consult in July 2021 at [facility] and [facility] provided collateral at the time). [facility] denied safety concerns of patient returning home.     COVID Exposure Screen- collateral (i.e. third-party, chart review, belongings, etc; include EMS and ED staff)  1.        *Has the patient had a COVID-19 test in the last 90 days?  (  ) Yes   ( x ) No   (  ) Unknown- Reason:   IF YES PROCEED TO QUESTION #2. IF NO OR UNKNOWN, PLEASE SKIP TO QUESTION #3.  2.        Date of test(s) and result(s): ________  3.        *Has the patient tested positive for COVID-19 antibodies? (  ) Yes   (x ) No   ( x ) Unknown- Reason:   IF YES PROCEED TO QUESTION #4. IF NO or UNKNOWN, PLEASE SKIP TO QUESTION #5.  4.        Date of positive antibody test: ________  5.        *Has the patient received 2 doses of the COVID-19 vaccine? ( x ) Yes   (  ) No   (  ) Unknown- Reason:   IF YES PROCEED TO QUESTION #6. IF NO or UNKNOWN, PLEASE SKIP TO QUESTION #7.  6.         Date of second dose: August 2021  7.        *In the past 10 days, has the patient been around anyone with a positive COVID-19 test?* (  ) Yes   ( x ) No   ( x) Unknown- Reason:   IF YES PROCEED TO QUESTION #8. IF NO or UNKNOWN, PLEASE SKIP TO QUESTION #13.  8.        Was the patient within 6 feet of them for at least 15 minutes? (  ) Yes   (  ) No   (  ) Unknown- Reason: _____  9.        Did the patient provide care for them? (  ) Yes   (  ) No   (  ) Unknown- Reason: ______  10.     Did the patient have direct physical contact with them (touched, hugged, or kissed them)? (  ) Yes   (  ) No	(  ) Unknown- Reason: __  11.     Did the patient share eating or drinking utensils with them? (  ) Yes   (  ) No	(  ) Unknown- Reason: ____  12.     Did they sneeze, cough, or somehow get respiratory droplets on the patient? (  ) Yes   (  ) No	(  ) Unknown- Reason: ______  13.     *Has the patient been out of [place] within the past 10 days?* (  ) Yes   ( x ) No   (  ) Unknown- Reason:   IF YES PLEASE ANSWER THE FOLLOWING QUESTIONS:  14.     Which state/country did they go to? ______  15.     Were they there over 24 hours? (  ) Yes   (  ) No	(  ) Unknown- Reason: ______  16.     Date of return to [facility]: ______ ===================  PRE-HOSPITAL COURSE  ===================  SOURCE:  MAYNOR Mcguire and secondhand ED documentation.  DETAILS:  Per chart, patient walked into ED after drinking 15 beers and hearing voices telling him to hurt himself with a knife. Per RN, patient did not act upon the thoughts of hurting himself with a knife and pt denies intent of wanting to do so.      ============  ED COURSE  ============  SOURCE:  MAYNOR Mcguire and secondhand ED documentation.  ARRIVAL:  Per chart and RN, patient arrived as a walk in. Per RN, there were no issues of agitation upon arrival, patient's BAL <3 upon arrival despite coming in for alcohol intox.   BELONGINGS:  Per RN, patient arrived with clothing. All belongings were provided to hospital security, and patient is currently in a gown with a 1:1 staff member.  BEHAVIOR: RN described patient to be calm and cooperative, presenting with linear  thought process and thought content WNL, is AAOx3, presenting with stable mood and appropriate affect, remains in good behavioral and impulse control, is not currently physically violent/aggressive. RN stated that the patient initially reported that he was hearing command AH telling him to hurt himself prior to coming into the ED, however patient is currently denying SI/HI/A/VH. RN stated that there are no current lacerations/marks on their body. RN stated that the patient appears to be well-groomed, maintains proper hygiene, and reports good ADLs.  TREATMENT:  None  VISITORS:  None    Patient gives permission to obtain collateral from Lois Tovar [phone]:  ( x ) Yes  (  )  No  Rationale for overriding objection            (  ) Lack of capacity. Details: ________            (  ) Assessing risk of danger to self/others. Details: ________    Rationale for selecting specific collateral source            (  ) Potential to impact risk of danger to self/others and no alternative equivalent. Details: _____    [place]  [employer] utilized for collateral call ([place] ID #: [id number])    [facility] spoke with patient's partner Lois Tovar [phone] to obtain third-party collateral. Patient is a 31M domiciled with his partner Lois Tovar whom he is in a committed non-marital relationship with, has two kids that live in [place] from a previous relationship, works full-time as a cook, non-caregiver. Per ED, patient walked into ED this morning after drinking approx. 15 beers and was experiencing CAH telling him to take a knife and hurt himself. Collateral was unaware that patient came into the ED until [facility] called. Collateral denied knowledge of patient having any acute psychiatric changes such as feelings of depression/anxiety, denied that the patient has had changes in sleep/appetite/energy, reports patient continues to go to work on a daily basis, and is not concerned that patient has been drinking more alcohol or drugs than usual. [facility] stated that the patient drinks roughly 2-3 beers at a time when he does drink. [facility] states that at baseline, patient remains calm and linear, and remains in good behavioral control. [facility] denied patient has a hx/o and current SI/HI/A/VH and has never self-harmed in the past. [facility] reported that the patient does have hx/o surgery on a hernia approx. 1 yr ago, and denied knowledge of other hospitalizations (though it was noted in pt's chart that patient received consult-liasion psychiatry consult in July 2021 at [facility] and [facility] provided collateral at the time). [facility] denied safety concerns of patient returning home.     COVID Exposure Screen- collateral (i.e. third-party, chart review, belongings, etc; include EMS and ED staff)  1.        *Has the patient had a COVID-19 test in the last 90 days?  (  ) Yes   ( x ) No   (  ) Unknown- Reason:   IF YES PROCEED TO QUESTION #2. IF NO OR UNKNOWN, PLEASE SKIP TO QUESTION #3.  2.        Date of test(s) and result(s): ________  3.        *Has the patient tested positive for COVID-19 antibodies? (  ) Yes   (x ) No   ( x ) Unknown- Reason:   IF YES PROCEED TO QUESTION #4. IF NO or UNKNOWN, PLEASE SKIP TO QUESTION #5.  4.        Date of positive antibody test: ________  5.        *Has the patient received 2 doses of the COVID-19 vaccine? ( x ) Yes   (  ) No   (  ) Unknown- Reason:   IF YES PROCEED TO QUESTION #6. IF NO or UNKNOWN, PLEASE SKIP TO QUESTION #7.  6.         Date of second dose: August 2021  7.        *In the past 10 days, has the patient been around anyone with a positive COVID-19 test?* (  ) Yes   ( x ) No   ( x) Unknown- Reason:   IF YES PROCEED TO QUESTION #8. IF NO or UNKNOWN, PLEASE SKIP TO QUESTION #13.  8.        Was the patient within 6 feet of them for at least 15 minutes? (  ) Yes   (  ) No   (  ) Unknown- Reason: _____  9.        Did the patient provide care for them? (  ) Yes   (  ) No   (  ) Unknown- Reason: ______  10.     Did the patient have direct physical contact with them (touched, hugged, or kissed them)? (  ) Yes   (  ) No	(  ) Unknown- Reason: __  11.     Did the patient share eating or drinking utensils with them? (  ) Yes   (  ) No	(  ) Unknown- Reason: ____  12.     Did they sneeze, cough, or somehow get respiratory droplets on the patient? (  ) Yes   (  ) No	(  ) Unknown- Reason: ______  13.     *Has the patient been out of [place] within the past 10 days?* (  ) Yes   ( x ) No   (  ) Unknown- Reason:   IF YES PLEASE ANSWER THE FOLLOWING QUESTIONS:  14.     Which state/country did they go to? ______  15.     Were they there over 24 hours? (  ) Yes   (  ) No	(  ) Unknown- Reason: ______  16.     Date of return to [facility]: ______

## 2021-09-18 NOTE — ED ADULT NURSE NOTE - NSIMPLEMENTINTERV_GEN_ALL_ED
Implemented All Fall Risk Interventions:  [place] to call system. Call bell, personal items and telephone within reach. Instruct patient to call for assistance. Room bathroom lighting operational. Non-slip footwear when patient is off stretcher. Physically safe environment: no spills, clutter or unnecessary equipment. Stretcher in lowest position, wheels locked, appropriate side rails in place. Provide visual cue, wrist band, yellow gown, etc. Monitor gait and stability. Monitor for mental status changes and reorient to person, place, and time. Review medications for side effects contributing to fall risk. Reinforce activity limits and safety measures with patient and family.

## 2021-09-18 NOTE — ED PROVIDER NOTE - CLINICAL SUMMARY MEDICAL DECISION MAKING FREE TEXT BOX
Pt p/w auditory hallucinations and SI with no plan/attempt. Labs unremarkable with no etoh. Utox pending. Called telepsych, they were too busy for call and will call back. Plan to s/o to incoming doc pending psych eval. Pt stable.

## 2021-09-18 NOTE — ED BEHAVIORAL HEALTH ASSESSMENT NOTE - OTHER PAST PSYCHIATRIC HISTORY (INCLUDE DETAILS REGARDING ONSET, COURSE OF ILLNESS, INPATIENT/OUTPATIENT TREATMENT)
hx of alcohol use, alcohol induced mood and psychosis, no past SA/SIB, hx of psych admissions inc last known in Mar 2021

## 2021-09-18 NOTE — ED ADULT TRIAGE NOTE - CHIEF COMPLAINT QUOTE
I woke up this morning hearing voices to use a knife and hurt myself.  I am not planning to hurt myself.  I drink  15 beers every 8 days.  I drank 15 beers yesterday

## 2021-09-18 NOTE — ED BEHAVIORAL HEALTH ASSESSMENT NOTE - NS ED BHA REVIEW OF ED CHART AVAILABLE INVESTIGATIONS REVIEWED
Caller would like to speak with a RN. Writer advised caller of callback within 24-72 hours.    Patient Name: Katie Maguire  Caller Name: Julianne  Name of Facility: [facility]  Callback Number: [phone]  Best Availability: M-F, 9:00 - 4:30  Can A Detailed Message Be left? yes  Fax Number: [phone]  Additional Info: Caller states they received orders for a RN for medication management, a personal care worker and a home health aide.  She received no other information.  She is asking that a current medication list, diagnoses list and last few office notes be faxed to number above.   Please return her call.  Did you confirm the message with the caller?: yes    Thank you,  Krystal Mccray    
[place] calling states forms are incomplete, please call [facility] to complete [phone]. Please call [place] when completed. [phone]  
Office notes diagnosis list and medication list faxed to [facility] at [phone] Pat Whitaker.  
Please see message below, Julianne calling from [facility] caller stated she did received office notes but need medication list and diagnosis fax  # [phone]   
Yes

## 2021-09-18 NOTE — ED ADULT NURSE REASSESSMENT NOTE - NS ED NURSE REASSESS COMMENT FT1
Belongings obtained from security and given to patient, able to ambulate without assist, AXOX3, discharge instructions given to patient via , patient verbalize understanding, no complaints at the moment

## 2021-09-18 NOTE — ED BEHAVIORAL HEALTH ASSESSMENT NOTE - DEPENDENTS
Pt became hypotensive after fosphenytoin initiated.  1L NS bolus started at that time.  Shortly afterward, became acutely bradycardic seemingly unprovoked.  Epi bolus given.  -- Start dopamine  -- Complete infection work up (additional BC and Sputum cx)  -- Repeat lactate due at 16:00  -- dopamine started for BP/HR   -- Continue fluid resuscitation as needed    Claudia Ashby     Addendum  16:20    Spoke to pt's sister who is his closest relative.  Jair is currently living with her son(his nephew) and has a history of neck cancer s/p some surgery, alcohol consumption and heavy smoker.  He does not take any medications that she is aware of, nor gets any regular medical attention.   She was updated on imaging, mechanical ventilation and AMS.  We discussed code status and she states he would want to be a DNR.  Code status to be changed now.   Yes

## 2021-09-18 NOTE — ED BEHAVIORAL HEALTH ASSESSMENT NOTE - SAFETY PLAN ADDT'L DETAILS
Safety plan discussed with.../Education provided regarding environmental safety / lethal means restriction/Provision of National Suicide Prevention Lifeline 3-137-286-EYWK (4806)

## 2021-09-18 NOTE — ED PROVIDER NOTE - PROGRESS NOTE DETAILS
Patient is resting comfortably, NAD. Spoke to Dr. Keith from telepsych, who will evaluate patient. SARITHA: Patient signed out to me by Dr. Urbano. Pending telepsych eval. Patient is resting comfortably, NAD. Called by telepsych psychiatrist. Waiting to hear back from collateral. Patient told psychiatrist he felt "uncomfortable," perhaps mild alcohol withdrawal, so psychiatrist recommended ativan or librium. Patient fell asleep, so did not received ativan. Called back by psychiatrist - spoke to collateral, no need for psychiatric admission at this time. Patient requested information about detox facilities. Will contact [facility]. Telepsych will also fax over resources. Spoke to Brennon from social work. He will call patient later on patient's phone to help him arrange outpatient detox.

## 2021-09-18 NOTE — ED ADULT NURSE NOTE - OBJECTIVE STATEMENT
came to ED came to ED due to  suicidal thought , woke up hearing voices to hurt himself, pt cooperative calm , placed on 1:1 observation.

## 2021-09-19 PROBLEM — F10.20 ALCOHOL DEPENDENCE, UNCOMPLICATED: Chronic | Status: ACTIVE | Noted: 2021-07-10

## 2021-09-19 PROBLEM — F32.9 MAJOR DEPRESSIVE DISORDER, SINGLE EPISODE, UNSPECIFIED: Chronic | Status: ACTIVE | Noted: 2021-07-11

## 2021-09-28 ENCOUNTER — EMERGENCY (EMERGENCY)
Facility: HOSPITAL | Age: 31
LOS: 1 days | Discharge: HOME | End: 2021-09-28
Attending: EMERGENCY MEDICINE
Payer: MEDICAID

## 2021-09-28 VITALS
HEART RATE: 79 BPM | SYSTOLIC BLOOD PRESSURE: 146 MMHG | RESPIRATION RATE: 18 BRPM | TEMPERATURE: 98 F | DIASTOLIC BLOOD PRESSURE: 91 MMHG | OXYGEN SATURATION: 98 %

## 2021-09-28 VITALS
TEMPERATURE: 99 F | HEART RATE: 110 BPM | HEIGHT: 60 IN | SYSTOLIC BLOOD PRESSURE: 140 MMHG | OXYGEN SATURATION: 98 % | DIASTOLIC BLOOD PRESSURE: 100 MMHG | WEIGHT: 176.37 LBS | RESPIRATION RATE: 19 BRPM

## 2021-09-28 DIAGNOSIS — Z87.19 PERSONAL HISTORY OF OTHER DISEASES OF THE DIGESTIVE SYSTEM: Chronic | ICD-10-CM

## 2021-09-28 LAB
ALBUMIN SERPL ELPH-MCNC: 3.2 G/DL — LOW (ref 3.5–5)
ALP SERPL-CCNC: 105 U/L — SIGNIFICANT CHANGE UP (ref 40–120)
ALT FLD-CCNC: 138 U/L DA — HIGH (ref 10–60)
ANION GAP SERPL CALC-SCNC: 12 MMOL/L — SIGNIFICANT CHANGE UP (ref 5–17)
APAP SERPL-MCNC: <2 UG/ML — LOW (ref 10–30)
AST SERPL-CCNC: 170 U/L — HIGH (ref 10–40)
BASOPHILS # BLD AUTO: 0.04 K/UL — SIGNIFICANT CHANGE UP (ref 0–0.2)
BASOPHILS NFR BLD AUTO: 0.6 % — SIGNIFICANT CHANGE UP (ref 0–2)
BILIRUB SERPL-MCNC: 0.6 MG/DL — SIGNIFICANT CHANGE UP (ref 0.2–1.2)
BUN SERPL-MCNC: 10 MG/DL — SIGNIFICANT CHANGE UP (ref 7–18)
CALCIUM SERPL-MCNC: 8.6 MG/DL — SIGNIFICANT CHANGE UP (ref 8.4–10.5)
CHLORIDE SERPL-SCNC: 98 MMOL/L — SIGNIFICANT CHANGE UP (ref 96–108)
CO2 SERPL-SCNC: 25 MMOL/L — SIGNIFICANT CHANGE UP (ref 22–31)
CREAT SERPL-MCNC: 0.75 MG/DL — SIGNIFICANT CHANGE UP (ref 0.5–1.3)
EOSINOPHIL # BLD AUTO: 0.01 K/UL — SIGNIFICANT CHANGE UP (ref 0–0.5)
EOSINOPHIL NFR BLD AUTO: 0.1 % — SIGNIFICANT CHANGE UP (ref 0–6)
ETHANOL SERPL-MCNC: 79 MG/DL — HIGH (ref 0–10)
GLUCOSE SERPL-MCNC: 105 MG/DL — HIGH (ref 70–99)
HCT VFR BLD CALC: 43.3 % — SIGNIFICANT CHANGE UP (ref 39–50)
HGB BLD-MCNC: 14.9 G/DL — SIGNIFICANT CHANGE UP (ref 13–17)
IMM GRANULOCYTES NFR BLD AUTO: 0.1 % — SIGNIFICANT CHANGE UP (ref 0–1.5)
LYMPHOCYTES # BLD AUTO: 1.23 K/UL — SIGNIFICANT CHANGE UP (ref 1–3.3)
LYMPHOCYTES # BLD AUTO: 18.1 % — SIGNIFICANT CHANGE UP (ref 13–44)
MCHC RBC-ENTMCNC: 28.8 PG — SIGNIFICANT CHANGE UP (ref 27–34)
MCHC RBC-ENTMCNC: 34.4 GM/DL — SIGNIFICANT CHANGE UP (ref 32–36)
MCV RBC AUTO: 83.8 FL — SIGNIFICANT CHANGE UP (ref 80–100)
MONOCYTES # BLD AUTO: 0.47 K/UL — SIGNIFICANT CHANGE UP (ref 0–0.9)
MONOCYTES NFR BLD AUTO: 6.9 % — SIGNIFICANT CHANGE UP (ref 2–14)
NEUTROPHILS # BLD AUTO: 5.02 K/UL — SIGNIFICANT CHANGE UP (ref 1.8–7.4)
NEUTROPHILS NFR BLD AUTO: 74.2 % — SIGNIFICANT CHANGE UP (ref 43–77)
NRBC # BLD: 0 /100 WBCS — SIGNIFICANT CHANGE UP (ref 0–0)
PLATELET # BLD AUTO: 338 K/UL — SIGNIFICANT CHANGE UP (ref 150–400)
POTASSIUM SERPL-MCNC: 3.4 MMOL/L — LOW (ref 3.5–5.3)
POTASSIUM SERPL-SCNC: 3.4 MMOL/L — LOW (ref 3.5–5.3)
PROT SERPL-MCNC: 8.2 G/DL — SIGNIFICANT CHANGE UP (ref 6–8.3)
RBC # BLD: 5.17 M/UL — SIGNIFICANT CHANGE UP (ref 4.2–5.8)
RBC # FLD: 13.2 % — SIGNIFICANT CHANGE UP (ref 10.3–14.5)
SALICYLATES SERPL-MCNC: <1.7 MG/DL — LOW (ref 2.8–20)
SARS-COV-2 RNA SPEC QL NAA+PROBE: SIGNIFICANT CHANGE UP
SODIUM SERPL-SCNC: 135 MMOL/L — SIGNIFICANT CHANGE UP (ref 135–145)
TROPONIN I SERPL-MCNC: <0.015 NG/ML — SIGNIFICANT CHANGE UP (ref 0–0.04)
WBC # BLD: 6.78 K/UL — SIGNIFICANT CHANGE UP (ref 3.8–10.5)
WBC # FLD AUTO: 6.78 K/UL — SIGNIFICANT CHANGE UP (ref 3.8–10.5)

## 2021-09-28 PROCEDURE — 80307 DRUG TEST PRSMV CHEM ANLYZR: CPT

## 2021-09-28 PROCEDURE — 93010 ELECTROCARDIOGRAM REPORT: CPT | Mod: 77

## 2021-09-28 PROCEDURE — 96374 THER/PROPH/DIAG INJ IV PUSH: CPT

## 2021-09-28 PROCEDURE — 36415 COLL VENOUS BLD VENIPUNCTURE: CPT

## 2021-09-28 PROCEDURE — 83690 ASSAY OF LIPASE: CPT

## 2021-09-28 PROCEDURE — 80053 COMPREHEN METABOLIC PANEL: CPT

## 2021-09-28 PROCEDURE — 90792 PSYCH DIAG EVAL W/MED SRVCS: CPT | Mod: 95

## 2021-09-28 PROCEDURE — 85025 COMPLETE CBC W/AUTO DIFF WBC: CPT

## 2021-09-28 PROCEDURE — 99283 EMERGENCY DEPT VISIT LOW MDM: CPT

## 2021-09-28 PROCEDURE — 84484 ASSAY OF TROPONIN QUANT: CPT

## 2021-09-28 PROCEDURE — 99285 EMERGENCY DEPT VISIT HI MDM: CPT | Mod: 25

## 2021-09-28 PROCEDURE — 93005 ELECTROCARDIOGRAM TRACING: CPT

## 2021-09-28 PROCEDURE — 71045 X-RAY EXAM CHEST 1 VIEW: CPT

## 2021-09-28 PROCEDURE — 71045 X-RAY EXAM CHEST 1 VIEW: CPT | Mod: 26

## 2021-09-28 PROCEDURE — 87635 SARS-COV-2 COVID-19 AMP PRB: CPT

## 2021-09-28 RX ORDER — ONDANSETRON 8 MG/1
4 TABLET, FILM COATED ORAL ONCE
Refills: 0 | Status: COMPLETED | OUTPATIENT
Start: 2021-09-28 | End: 2021-09-28

## 2021-09-28 RX ADMIN — Medication 30 MILLILITER(S): at 14:53

## 2021-09-28 RX ADMIN — ONDANSETRON 4 MILLIGRAM(S): 8 TABLET, FILM COATED ORAL at 14:53

## 2021-09-28 NOTE — ED ADULT NURSE NOTE - NSIMPLEMENTINTERV_GEN_ALL_ED
Implemented All Fall Risk Interventions:  [place] to call system. Call bell, personal items and telephone within reach. Instruct patient to call for assistance. Room bathroom lighting operational. Non-slip footwear when patient is off stretcher. Physically safe environment: no spills, clutter or unnecessary equipment. Stretcher in lowest position, wheels locked, appropriate side rails in place. Provide visual cue, wrist band, yellow gown, etc. Monitor gait and stability. Monitor for mental status changes and reorient to person, place, and time. Review medications for side effects contributing to fall risk. Reinforce activity limits and safety measures with patient and family.

## 2021-09-28 NOTE — ED BEHAVIORAL HEALTH NOTE - BEHAVIORAL HEALTH NOTE
===================  PRE-HOSPITAL COURSE  ===================  SOURCE:  MAYNOR Pham and secondhand ED documentation.  DETAILS:  Per chart, patient was BIB self due to pt increasing alcohol consumption and hearing CAH telling him to hurt himself.      ============  ED COURSE  ============  SOURCE:  MAYNOR Pham and secondhand ED documentation.  ARRIVAL:  Per chart and RN, patient arrived as a walk in. Per RN, patient was calm upon arrival, BAL = 79 at 8:13 upon arrival.  BELONGINGS:  Per RN, patient arrived with clothing. All belongings were provided to hospital security, and patient is currently in a gown with a 1:1 staff member.  BEHAVIOR: RN described patient to be currently sitting in his hospital stretcher, calm and cooperative, presenting with linear thought process and thought content WNL, is AAOx3, presenting with depressed mood and congruent affect, remains in good behavioral and impulse control, is not currently physically violent/aggressive. RN stated that the patient expressed SI (no plan/intent), experiences voices that pt denies are command, denies HI/VH. RN stated that there are no visible lacerations/marks on their body. RN stated that the patient appears to be well-groomed, maintains proper hygiene. RN reports that patient has been having difficulties sleeping in the ED.  TREATMENT:  Per chart pt required Zofran 4MG IM, and Maalox 30ML PO for alcohol intox.   VISITORS:  None       COVID Exposure Screen- collateral (i.e. third-party, chart review, belongings, etc; include EMS and ED staff)  1.        *Has the patient had a COVID-19 test in the last 90 days?  (  ) Yes   (  ) No   (x  ) Unknown- Reason: Unable to assess.  IF YES PROCEED TO QUESTION #2. IF NO OR UNKNOWN, PLEASE SKIP TO QUESTION #3.  2.        Date of test(s) and result(s): ________  3.        *Has the patient tested positive for COVID-19 antibodies? (  ) Yes   ( ) No   (x  ) Unknown- Reason: Unable to assess.  IF YES PROCEED TO QUESTION #4. IF NO or UNKNOWN, PLEASE SKIP TO QUESTION #5.  4.        Date of positive antibody test: ________  5.        *Has the patient received 2 doses of the COVID-19 vaccine? (  ) Yes   (  ) No   (x  ) Unknown- Reason: Unable to assess.  IF YES PROCEED TO QUESTION #6. IF NO or UNKNOWN, PLEASE SKIP TO QUESTION #7.  6.         Date of second dose: ________  7.        *In the past 10 days, has the patient been around anyone with a positive COVID-19 test?* (  ) Yes   (  ) No   ( x ) Unknown- Reason: Unable to assess.  IF YES PROCEED TO QUESTION #8. IF NO or UNKNOWN, PLEASE SKIP TO QUESTION #13.  8.        Was the patient within 6 feet of them for at least 15 minutes? (  ) Yes   (  ) No   (  ) Unknown- Reason: _____  9.        Did the patient provide care for them? (  ) Yes   (  ) No   (  ) Unknown- Reason: ______  10.     Did the patient have direct physical contact with them (touched, hugged, or kissed them)? (  ) Yes   (  ) No	(  ) Unknown- Reason: __  11.     Did the patient share eating or drinking utensils with them? (  ) Yes   (  ) No	(  ) Unknown- Reason: ____  12.     Did they sneeze, cough, or somehow get respiratory droplets on the patient? (  ) Yes   (  ) No	(  ) Unknown- Reason: ______  13.     *Has the patient been out of [place] within the past 10 days?* (  ) Yes   (  ) No   ( x ) Unknown- Reason: Unable to assess.  IF YES PLEASE ANSWER THE FOLLOWING QUESTIONS:  14.     Which state/country did they go to? ______  15.     Were they there over 24 hours? (  ) Yes   (  ) No	(  ) Unknown- Reason: ______  16.     Date of return to [facility]: ______

## 2021-09-28 NOTE — ED BEHAVIORAL HEALTH ASSESSMENT NOTE - RISK ASSESSMENT
Low Acute Suicide Risk risk factors include ongoing substance use, prior withdrawal, lack of outpatient treatment, prior suicide ideation, prior hospitalizations. protective factors include stable housing, employment, lack of prior suicide attempts, lack of access to firearms, denial of suicide ideation, future oriented wanting help for physical symptoms.

## 2021-09-28 NOTE — ED PROVIDER NOTE - PROGRESS NOTE DETAILS
(Adan) - LFTs elevated; etoh 79  Pt clinically sober. Guarded and provides limited history, but states he is suicidal and hearing voices. Will not elaborate further at this time. Psych consult placed. [place]:  Pt received in signout from Dr. Adan pending telepsych--telepsych says that Pt is cleared from their standpoint with no SI or hallucinations at this time.  He told them he has chest pain though.  I check repeat EKG as well as CXR and troponin and they were normal.  Not suspecting serious pathology.  Advised strict return precautions and PMD f/u.  Will give him the resources relayed to us by telepsych.

## 2021-09-28 NOTE — ED ADULT NURSE REASSESSMENT NOTE - NS ED NURSE REASSESS COMMENT FT1
received pt aaox3 not in distress,with saline lock intact,no redness,no swelling noted ,waiitng for dispo.

## 2021-09-28 NOTE — ED PROVIDER NOTE - PATIENT PORTAL LINK FT
You can access the FollowMyHealth Patient Portal offered by [facility] by registering at the following website: [email or web address]. By joining [facility]’s FollowMyHealth portal, you will also be able to view your health information using other applications (apps) compatible with our system.

## 2021-09-28 NOTE — ED PROVIDER NOTE - OBJECTIVE STATEMENT
Pt admits to drinking vodka prior to arrival. On evaluation is tearful and  states is hearing voices. Pt is not forthcoming what voices are telling him.  Pt is oriented to himself.

## 2021-09-28 NOTE — ED BEHAVIORAL HEALTH ASSESSMENT NOTE - SAFETY PLAN ADDT'L DETAILS
Safety plan discussed with.../Education provided regarding environmental safety / lethal means restriction/Provision of National Suicide Prevention Lifeline 4-499-308-HRSR (8161)

## 2021-09-28 NOTE — ED BEHAVIORAL HEALTH ASSESSMENT NOTE - CURRENT INTENT:
Pre-procedure call completed with pt care RN, reviewed details of procedure, nothing to eat or drink after 0000, modifications to medication regimen, instructions to locate department, and need for ride home post procedure.    Patient / patient representative verbalized understanding of all pre-procedure instructions and given contact information for department in case they have questions.     IR COVID Screening Questionnaire:     Name:Sheila Cline    Age:  35 year old    MRN: [id number]    Does patient or patients household members have any of the following:    Temperature: Fever >100.4°F or >38.0°C? No    Respiratory symptoms: New or worsening cough, shortness of breath, or sore   throat? No If yes, what symptoms is the patient having?     GI symptoms: New onset of nausea, vomiting or diarrhea? No If yes, what symptoms is the patient having?    Miscellaneous: New onset of loss of taste or smell, chills, repeated shaking with chills, muscle pain, headache? No If yes, what symptoms is the patient having?    Has the patient or a household member tested positive for COVID-19 in the last 14 days?No If yes, who tested positive?      If Yes to any of the above, the COVID screening is positive.  Proceduralist/APC notified of positive screening for next steps.   
None known

## 2021-09-28 NOTE — ED BEHAVIORAL HEALTH ASSESSMENT NOTE - HPI (INCLUDE ILLNESS QUALITY, SEVERITY, DURATION, TIMING, CONTEXT, MODIFYING FACTORS, ASSOCIATED SIGNS AND SYMPTOMS)
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 He first stated that he came in because he had been hearing voices at night that prevent him from sleeping. He denied hearing any currently, states that the voices are that of other people but he does not recognize them, denies that they are command in nature, just that they talk or call his name. He then clarifies that he only hears them when he is drinking alcohol, and he states that for the last 5 days he has been drinking up to 20 beers and a bottle of vodka per day, and that prior to that it was less but he did not clarify. He states that after last ED visit he had wanted to go to detox but states that he never received any calls. He denies any suicide ideation or HI, stating that he has never tried to kill himself in the past, only really thought about it around a year ago, denies access to firearms. He states that generally this week his mood has been good, hes able to enjoy himself though reports fatigue from not sleeping and heavy drinking. Denies other drug use. Later he stated that the reason he was in the ED was because his chest was hurting and he was concerned about it, stating that he thinks he may have hit it. He stated that he did not need psychiatric hospitalization and did not want referrals for a therapist or a psychiatrist, but was open to alcohol use resources.

## 2021-09-28 NOTE — ED BEHAVIORAL HEALTH ASSESSMENT NOTE - DETAILS
see  safety plan note patient is referent see HPI chest pain (this writer informed medical ED attending) per chart

## 2021-09-28 NOTE — ED PROVIDER NOTE - NSFOLLOWUPINSTRUCTIONS_ED_ALL_ED_FT
ABUSE OF ALCOHOL - General Information           Abuso de alcohol    LO QUE NECESITA SABER:    ¿Qué es el abuso de alcohol?El abuso de alcohol significa que usted debbie más de los límites diarios o semanales recomendados. Usted puede estar bebiendo alcohol regularmente o bebiendo grandes cantidades en un corto período (atracones de bebida). Usted sigue tomando, aun cuando esto le cause problemas legales, laborales o con eveline relaciones.    ¿Qué necesito saber acerca de los límites recomendados de alcohol?  •Los hombres de 21 a 64 añosdeberían limitar el consumo de alcohol a 2 tragos al día. No tome más de 4 bebidas por día o más de 14 en nikos semana.      •Todos los hombres y las mujeres de 65 años o másdeberían limitar el consumo de alcohol a 1 trago por día. No tome más de 3 bebidas por día o más de 7 en nikos semana. Ninguna cantidad de alcohol se considera adecuada kevin el embarazo.      ¿Cuáles son los signos y síntomas del abuso de alcohol?  •Pérdida de interés en eveline actividades, trabajo y labor escolar      •Esconder alcohol o beber en privado      •Depresión o sentimiento de culpa por beber      •Pensamientos constantes acerca del alcohol      •Beber por la mañana para aliviar los efectos de la resaca      •No poder controlar la cantidad que se rolo      •Inquietud, comportamiento errático y violento      ¿Qué problemas de papi puede causar el abuso del alcohol?  •Cáncer en hígado, páncreas, estómago, colon, riñón o mamas      •Derrame cerebral o ataque cardíaco      •Enfermedad hepática, renal o pulmonar      •Desmayos, pérdida de memoria, daño cerebral o demencia      •Diabetes, problemas del sistema inmunológico o deficiencia de tiamina (vitamina B1)      •Problemas para usted y bennett bebé si debbie kevin el embarazo      ¿Cómo se trata el abuso del alcohol?El tratamiento lo va a ayudar a comprender la razón por la cual usted abusa el alcohol. Los consejeros y terapeutas le van a proveer apoyo y lo van a ayudar a encontrar formas de afrontamiento en vez de mirza. Usted podría necesitar tratamiento con internación para proveerle un ambiente controlado. Usted podría necesitar tratamiento ambulatorio después de que bennett tratamiento hospitalario haya sido completado.  •La desintoxicaciónes un programa utilizado para eliminar el alcohol de bennett cuerpo. Kevin la desintoxicación se administran medicamentos para ayudar a evitar los síntomas de abstinencia que se presentan cuando se suspende el consumo de alcohol.      •En la terapia de intervención breve,un profesional de la appi lo ayuda a pensar de manera diferente sobre bennett consumo de alcohol. Radha profesional también lo ayuda a fijar metas para disminuir bennett consumo de bebidas alcohólicas. La terapia puede continuar después de que sale del hospital.      •Suplementos vitamínicos, christophe B1, pueden ser necesarios. El consumo de alcohol puede dificultar la capacidad del organismo de absorber suficiente vitamina B1. Es posible que le den vitamina B1 si bennett nivel es bajo. También se administra para prevenir el daño cerebral por consumo de alcohol.      ¿Qué puedo hacer para manejar mi abuso de alcohol?  •Disminuya la cantidad que debbie.[place] puede ayudar a prevenir problemas de papi, christophe daño cerebral, cardíaco y hepático, presión arterial cabrera, diabetes y cáncer. Si usted no puede dejar de beber por completo, los proveedores de atención médica pueden ayudarlo a establecer metas para disminuir la cantidad que usted debbie.      •Planifique el uso semanal de alcohol.Es menos probable que danilo más de lo recomendado si lo planifica con anticipación.      •Cuando danilo alcohol, acompáñelo con comida.La comida evitará que el alcohol entre en bennett sistema demasiado rápido. Coma antes de mirza bennett primera bebida alcohólica.      •Calcule con cuidado el tiempo de eveline bebidas.No tome más de nikos bebida por hora. Dellroy líquido, christophe agua, café o un refresco, entre las bebidas alcohólicas.      •No maneje si ha tomado alcohol.Asegúrese que alguien que no haya estado bebiendo lo pueda llevar a casa.      •No danilo alcohol si está tomando radha medicamento.El alcohol es peligroso cuando se combina con ciertos medicamentos, christophe acetaminofeno o un medicamento para la presión arterial. Hable con bennett médico acerca de todos los medicamentos que está tomando.      ¿Dónde puedo obtener apoyo y más información?  •Alcoholics Anonymous  Web Address: http://www.aa.org      •Substance Abuse and Mental Health Services Administration  [address]  [place] [address]  Web Address: http://www.samhsa.gov        Llame al número de emergencias local (911 en los [place]) en cualquiera de los siguientes casos:  •Tiene dolor en el pecho o dificultad para respirar de forma repentina.      •Usted quiere lastimarse o lastimar a otros.      •Usted sufre nikos convulsión o tiene temblores o estremecimientos.      ¿Cuándo alistair llamar a mi médico?  •Usted tiene alucinaciones (ve o escucha cosas que no son reales).      •Usted no puede dejar de vomitar o vomita ye.      •Usted necesita ayuda para dejar de mirza alcohol.      •Usted tiene preguntas o inquietudes acerca de bennett condición o cuidado.      ACUERDOS SOBRE BENNETT CUIDADO:    Usted tiene el derecho de ayudar a planear bennett cuidado. Aprenda todo lo que pueda sobre bennett condición y christophe darle tratamiento. Discuta eveline opciones de tratamiento con eveline médicos para decidir el cuidado que usted desea recibir. Usted siempre tiene el derecho de rechazar el tratamiento.       © Copyright [employer] 2021           back to top                          © Copyright [employer] 2021                     ArabicBosnianCanadian FrenchEnglishHaitian CreoleKoreanSoutheast Arizona Medical CenterishPortCurahealth Hospital [facility] – Oklahoma CityRuianSpanUNC HealthTagalogTraditional ChineseVietnamConnecticut Children's Medical Center                                                                                                                 Dolor de pecho inespecífico en los adultos    Nonspecific Chest Pain, Adult      El dolor de pecho puede deberse a muchas afecciones diferentes. Puede ser provocado por nikos afección que es potencialmente mortal y requiere tratamiento hospitalario de inmediato. También puede ser provocado por algo que no es potencialmente mortal. Si tiene dolor de pecho, puede ser difícil saber la diferencia, por lo tanto es importante que obtenga ayuda de inmediato para asegurarse de que no tiene nikos afección grave.  Algunas causas potencialmente mortales del dolor de pecho incluyen:  •Infarto de miocardio.      •Un desgarro en el vaso sanguíneo principal del cuerpo (disección aórtica).      •Inflamación alrededor del corazón (pericarditis).      •Un problema en los pulmones, christophe un coágulo de ye (embolia pulmonar) o un pulmón colapsado (neumotórax).    Algunas causas de dolor de pecho que no son potencialmente mortales incluyen:  •Acidez estomacal.      •Ansiedad o estrés.      •Daño de los huesos, los músculos y los cartílagos que conforman la pared torácica.      •Neumonía o bronquitis.      •Culebrilla (virus de la varicela zóster).    El dolor de pecho puede provocar las siguientes sensaciones:  •Dolor o molestias en la superficie o en lo profundo del pecho.      •Dolor opresivo, continuo o constrictivo.      •Ardor u hormigueo.      •Dolor sordo o intenso que empeora al moverse, toser o inhalar profundamente.      •Dolor o molestias que también se sienten en la espalda, el sebastian, la mandíbula, el hombro o el brazo, o dolor que se extiende a cualquiera de estas zonas.      El dolor de pecho puede aparecer y desaparecer. También puede ser eliel. Bennett médico le hará análisis clínicos y otros estudios para tratar de determinar la causa del dolor. El tratamiento dependerá de la causa del dolor de pecho.      Siga estas indicaciones en bennett casa:    Medicamentos     •Dellroy los medicamentos de venta mara y los recetados solamente christophe se lo haya indicado el médico.      •Si le recetaron un antibiótico, tómelo o úselo christophe se lo haya indicado el médico. No deje de mirza los antibióticos aunque comience a sentirse mejor.        Estilo de adilene      •Mary Anne reposo christophe se lo haya indicado el médico.      • No consuma ningún producto que contenga nicotina o tabaco, christophe cigarrillos y cigarrillos electrónicos. Si necesita ayuda para dejar de fumar, consulte al médico.      • No danilo alcohol.    •Opte por un estilo de adilene saludable según lo recomendado. [place] puede incluir lo siguiente:  •Practicar actividad física con regularidad. Pida al médico que le sugiera algunas actividades que guera seguras para usted.      •Seguir nikos dieta cardiosaludable. Esta debe incluir muchas frutas y verduras frescas, cereales integrales, proteínas (magras) con bajo contenido de grasa y productos lácteos bajos en grasa. Un nutricionista podrá ayudarlo a hacer elecciones de alimentación saludables.      •Mantener un peso saludable.      •Controlar cualquier otra afección que tenga, christophe presión arterial cabrera (hipertensión) o diabetes.      •Disminuir el nivel de estrés; por ejemplo, con yoga o técnicas de relajación.        Indicaciones generales     •Esté atento a cualquier cambio en los síntomas. Informe al médico sobre estos o si tiene síntomas nuevos.      •Evite las actividades que le causen dolor de pecho.      •Concurra a todas las visitas de seguimiento christophe se lo haya indicado el médico. [place] es importante. [place] incluye las visitas para realizarle otros estudios si el dolor de pecho no desaparece.        Comuníquese con un médico si:    •El dolor de pecho no desaparece.      •Se siente deprimido.      •Tiene fiebre.        Solicite ayuda inmediatamente si:    •El dolor en el pecho empeora.      •Tiene tos que empeora o tose con ye.      •Siente un dolor intenso en el abdomen.      •Se desmaya.      •Tiene nikos molestia repentina e inexplicable en el pecho.      •Tiene molestias repentinas e inexplicables en los brazos, la espalda, el sebastian o la mandíbula.      •Le falta el aire en cualquier momento.      •Comienza a sudar de manera repentina o la piel se le humedece.      •Siente náuseas o vomita.      •Se siente repentinamente mareado o se desmaya.      •Tiene debilidad intensa, o debilidad o fatiga sin explicación.      •Siente que el corazón comienza a latir rápidamente o que se saltea latidos.      Estos síntomas pueden representar un problema grave que constituye nikos emergencia. No espere a mana si los síntomas desaparecen. Solicite atención médica de inmediato. Comuníquese con el servicio de emergencias de bennett localidad (911 en los [place]). No conduzca por eveline propios medios hasta el hospital.       Resumen    •El dolor de pecho puede ser provocado por nikos afección que es grave y requiere tratamiento urgente. También puede ser provocado por algo que no es potencialmente mortal.      •Si tiene dolor de pecho, es muy importante que consulte con el médico. El médico podrá hacerle análisis clínicos y otros estudios para tratar de determinar la causa del dolor.      •Siga las indicaciones de bennett médico sobre mirza medicamentos, hacer cambios en bennett estilo de adilene y recibir tratamiento de urgencia si los síntomas empeoran.      •Concurra a todas las visitas de seguimiento christophe se lo haya indicado el médico. [place] incluye las visitas para realizarle otros estudios si el dolor de pecho no desaparece.      Esta información no tiene christophe fin reemplazar el consejo del médico. Asegúrese de hacerle al médico cualquier pregunta que tenga.      Document Revised: 08/19/2019 Document Reviewed: 08/19/2019    Elsevier Patient Education © 2021 [employer]

## 2021-09-28 NOTE — ED BEHAVIORAL HEALTH ASSESSMENT NOTE - SUMMARY
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 At present patient is denying auditory, visual, or tactile hallucinations, denying suicide ideation, does not want psych hospitalization or psych referrals but does request substance use referrals. On exam he is linear without evidence of psychosis. He does not want to come into the hospital and it does not appear that he would meet criteria for involuntary hospitalization at this time, and psychiatric symptoms seem secondary to substance use    COVID Exposure Screen- Patient    1. *Have you had a COVID-19 test in the last 90 days? (X ) Yes ( ) No ( ) Unknown- Reason: _____    IF YES PROCEED TO QUESTION #2. IF NO OR UNKNOWN, PLEASE SKIP TO QUESTION #3.    2. Date of test(s) and result(s): 9/18, negative    3. *Have you tested positive for COVID-19 antibodies? ( ) Yes (X ) No ( ) Unknown- Reason: _____    IF YES PROCEED TO QUESTION #4. IF NO or UNKNOWN, PLEASE SKIP TO QUESTION #5.    4. Date of positive antibody test: ________    5. *Have you received 2 doses of the COVID-19 vaccine? ( X) Yes ( ) No ( ) Unknown- Reason: _____    IF YES PROCEED TO QUESTION #6. IF NO or UNKNOWN, PLEASE SKIP TO QUESTION #7.    6. Date of second dose: __9/3/21    7. *In the past 10 days, have you been around anyone with a positive COVID-19 test?* ( ) Yes ( X) No ( ) Unknown- Reason: ____    IF YES PROCEED TO QUESTION #8. IF NO or UNKNOWN, PLEASE SKIP TO QUESTION #13.    8. Were you within 6 feet of them for at least 15 minutes? ( ) Yes ( ) No ( ) Unknown- Reason: _____    9. Have you provided care for them? ( ) Yes ( ) No ( ) Unknown- Reason: ______    10. Have you had direct physical contact with them (touched, hugged, or kissed them)? ( ) Yes ( ) No ( ) Unknown- Reason: _____    11. Have you shared eating or drinking utensils with them? ( ) Yes ( ) No ( ) Unknown- Reason: ____    12. Have they sneezed, coughed, or somehow gotten respiratory droplets on you? ( ) Yes ( ) No ( ) Unknown- Reason: ______    13. *Have you been out of [place] within the past 10 days?* ( ) Yes (X ) No ( ) Unknown- Reason: _____    IF YES PLEASE ANSWER THE FOLLOWING QUESTIONS:    14. Which state/country have you been to? ______    15. Were you there over 24 hours? ( ) Yes ( ) No ( ) Unknown- Reason: ______    16. Date of return to [facility]: _____

## 2021-09-30 LAB — DRUG SCREEN, SERUM: SIGNIFICANT CHANGE UP

## 2021-09-30 NOTE — ED BEHAVIORAL HEALTH ASSESSMENT NOTE - RECENT MEMORY
Pt presents via EMS with c/o increased SOB and dyspnea since being discharged from the hospital for COVID related symptoms on Monday.  Pt states he can't stop coughing and is unable to sleep.  Pt sats 95% on RA.     Normal

## 2021-12-23 NOTE — BH INPATIENT PSYCHIATRY PROGRESS NOTE - NSTXSUICIDINTERMD_PSY_ALL_CORE
PC ratio 0.4, Bps 152/95, 144/91, pt given tylenol for headache and reports no relief. Betamethasone given 0020 in right ventogluteal. MD ordered to start magnesium and transfer to [place] for continued care.   
re-start prozac 

## 2022-01-27 NOTE — ED PROVIDER NOTE - WR INTERPRETED BY 1
Quality 130: Documentation Of Current Medications In The Medical Record: Current Medications Documented
Quality 110: Preventive Care And Screening: Influenza Immunization: Influenza Immunization Administered during Influenza season
Quality 226: Preventive Care And Screening: Tobacco Use: Screening And Cessation Intervention: Patient screened for tobacco use and is an ex/non-smoker
Detail Level: Detailed
Joseph Washburn

## 2022-02-17 NOTE — DISCHARGE NOTE PROVIDER - NS AS DC PROVIDER CONTACT Y/N MULTI
Quality 110: Preventive Care And Screening: Influenza Immunization: Influenza Immunization previously received during influenza season
Detail Level: Detailed
Yes

## 2022-03-12 NOTE — ED BEHAVIORAL HEALTH ASSESSMENT NOTE - PREFERRED LANGUAGE
Pt presents reporting this am she estrellita from bed at 10:30 feeling 'out of it' in her words, pt reprots her vision faded to black, then she fainted hitting her head, states she quickly got back up and fainted again. Pt got self up and made it to her bed where she napped until shortly before presenting to ED. Pt currently being treated for UTI w/ macrobid. Pt also on medications for ptsd, no recent changes in regimen: prazosin, gabapentin 900mg TID, Remeron 900 HS, Olanzepine 5mg.
Mexican

## 2022-05-04 ENCOUNTER — EMERGENCY (EMERGENCY)
Facility: HOSPITAL | Age: 32
LOS: 1 days | Discharge: HOME | End: 2022-05-04
Attending: EMERGENCY MEDICINE
Payer: MEDICAID

## 2022-05-04 DIAGNOSIS — Z87.19 PERSONAL HISTORY OF OTHER DISEASES OF THE DIGESTIVE SYSTEM: Chronic | ICD-10-CM

## 2022-05-04 PROCEDURE — 99284 EMERGENCY DEPT VISIT MOD MDM: CPT

## 2022-05-05 VITALS
DIASTOLIC BLOOD PRESSURE: 100 MMHG | WEIGHT: 187.39 LBS | SYSTOLIC BLOOD PRESSURE: 143 MMHG | HEART RATE: 113 BPM | HEIGHT: 60 IN | RESPIRATION RATE: 18 BRPM | OXYGEN SATURATION: 97 % | TEMPERATURE: 100 F

## 2022-05-05 VITALS
HEART RATE: 99 BPM | OXYGEN SATURATION: 97 % | TEMPERATURE: 98 F | SYSTOLIC BLOOD PRESSURE: 145 MMHG | DIASTOLIC BLOOD PRESSURE: 84 MMHG | RESPIRATION RATE: 18 BRPM

## 2022-05-05 DIAGNOSIS — F10.20 ALCOHOL DEPENDENCE, UNCOMPLICATED: ICD-10-CM

## 2022-05-05 LAB
ALBUMIN SERPL ELPH-MCNC: 3.3 G/DL — LOW (ref 3.5–5)
ALP SERPL-CCNC: 89 U/L — SIGNIFICANT CHANGE UP (ref 40–120)
ALT FLD-CCNC: 102 U/L DA — HIGH (ref 10–60)
ANION GAP SERPL CALC-SCNC: 9 MMOL/L — SIGNIFICANT CHANGE UP (ref 5–17)
APAP SERPL-MCNC: <4 UG/ML — LOW (ref 10–30)
AST SERPL-CCNC: 69 U/L — HIGH (ref 10–40)
BASOPHILS # BLD AUTO: 0.04 K/UL — SIGNIFICANT CHANGE UP (ref 0–0.2)
BASOPHILS NFR BLD AUTO: 0.9 % — SIGNIFICANT CHANGE UP (ref 0–2)
BILIRUB SERPL-MCNC: 0.3 MG/DL — SIGNIFICANT CHANGE UP (ref 0.2–1.2)
BUN SERPL-MCNC: 9 MG/DL — SIGNIFICANT CHANGE UP (ref 7–18)
CALCIUM SERPL-MCNC: 8.5 MG/DL — SIGNIFICANT CHANGE UP (ref 8.4–10.5)
CHLORIDE SERPL-SCNC: 104 MMOL/L — SIGNIFICANT CHANGE UP (ref 96–108)
CO2 SERPL-SCNC: 25 MMOL/L — SIGNIFICANT CHANGE UP (ref 22–31)
CREAT SERPL-MCNC: 0.89 MG/DL — SIGNIFICANT CHANGE UP (ref 0.5–1.3)
EGFR: 119 ML/MIN/1.73M2 — SIGNIFICANT CHANGE UP
EOSINOPHIL # BLD AUTO: 0.03 K/UL — SIGNIFICANT CHANGE UP (ref 0–0.5)
EOSINOPHIL NFR BLD AUTO: 0.7 % — SIGNIFICANT CHANGE UP (ref 0–6)
ETHANOL SERPL-MCNC: 256 MG/DL — HIGH (ref 0–10)
GLUCOSE SERPL-MCNC: 116 MG/DL — HIGH (ref 70–99)
HCT VFR BLD CALC: 45.2 % — SIGNIFICANT CHANGE UP (ref 39–50)
HGB BLD-MCNC: 15.5 G/DL — SIGNIFICANT CHANGE UP (ref 13–17)
IMM GRANULOCYTES NFR BLD AUTO: 0 % — SIGNIFICANT CHANGE UP (ref 0–1.5)
LYMPHOCYTES # BLD AUTO: 2.09 K/UL — SIGNIFICANT CHANGE UP (ref 1–3.3)
LYMPHOCYTES # BLD AUTO: 46.4 % — HIGH (ref 13–44)
MCHC RBC-ENTMCNC: 28 PG — SIGNIFICANT CHANGE UP (ref 27–34)
MCHC RBC-ENTMCNC: 34.3 GM/DL — SIGNIFICANT CHANGE UP (ref 32–36)
MCV RBC AUTO: 81.6 FL — SIGNIFICANT CHANGE UP (ref 80–100)
MONOCYTES # BLD AUTO: 0.39 K/UL — SIGNIFICANT CHANGE UP (ref 0–0.9)
MONOCYTES NFR BLD AUTO: 8.7 % — SIGNIFICANT CHANGE UP (ref 2–14)
NEUTROPHILS # BLD AUTO: 1.95 K/UL — SIGNIFICANT CHANGE UP (ref 1.8–7.4)
NEUTROPHILS NFR BLD AUTO: 43.3 % — SIGNIFICANT CHANGE UP (ref 43–77)
NRBC # BLD: 0 /100 WBCS — SIGNIFICANT CHANGE UP (ref 0–0)
PLATELET # BLD AUTO: 422 K/UL — HIGH (ref 150–400)
POTASSIUM SERPL-MCNC: 3.4 MMOL/L — LOW (ref 3.5–5.3)
POTASSIUM SERPL-SCNC: 3.4 MMOL/L — LOW (ref 3.5–5.3)
PROT SERPL-MCNC: 8.8 G/DL — HIGH (ref 6–8.3)
RBC # BLD: 5.54 M/UL — SIGNIFICANT CHANGE UP (ref 4.2–5.8)
RBC # FLD: 13.2 % — SIGNIFICANT CHANGE UP (ref 10.3–14.5)
SALICYLATES SERPL-MCNC: <1.7 MG/DL — LOW (ref 2.8–20)
SARS-COV-2 RNA SPEC QL NAA+PROBE: SIGNIFICANT CHANGE UP
SODIUM SERPL-SCNC: 138 MMOL/L — SIGNIFICANT CHANGE UP (ref 135–145)
WBC # BLD: 4.5 K/UL — SIGNIFICANT CHANGE UP (ref 3.8–10.5)
WBC # FLD AUTO: 4.5 K/UL — SIGNIFICANT CHANGE UP (ref 3.8–10.5)

## 2022-05-05 PROCEDURE — 96375 TX/PRO/DX INJ NEW DRUG ADDON: CPT

## 2022-05-05 PROCEDURE — 96374 THER/PROPH/DIAG INJ IV PUSH: CPT

## 2022-05-05 PROCEDURE — 36415 COLL VENOUS BLD VENIPUNCTURE: CPT

## 2022-05-05 PROCEDURE — 80053 COMPREHEN METABOLIC PANEL: CPT

## 2022-05-05 PROCEDURE — 90792 PSYCH DIAG EVAL W/MED SRVCS: CPT | Mod: 95

## 2022-05-05 PROCEDURE — 93005 ELECTROCARDIOGRAM TRACING: CPT

## 2022-05-05 PROCEDURE — 93010 ELECTROCARDIOGRAM REPORT: CPT

## 2022-05-05 PROCEDURE — 82962 GLUCOSE BLOOD TEST: CPT

## 2022-05-05 PROCEDURE — 85025 COMPLETE CBC W/AUTO DIFF WBC: CPT

## 2022-05-05 PROCEDURE — 80307 DRUG TEST PRSMV CHEM ANLYZR: CPT

## 2022-05-05 PROCEDURE — 87635 SARS-COV-2 COVID-19 AMP PRB: CPT

## 2022-05-05 PROCEDURE — 99285 EMERGENCY DEPT VISIT HI MDM: CPT | Mod: 25

## 2022-05-05 RX ORDER — FAMOTIDINE 10 MG/ML
20 INJECTION INTRAVENOUS ONCE
Refills: 0 | Status: COMPLETED | OUTPATIENT
Start: 2022-05-05 | End: 2022-05-05

## 2022-05-05 RX ORDER — ONDANSETRON 8 MG/1
4 TABLET, FILM COATED ORAL ONCE
Refills: 0 | Status: COMPLETED | OUTPATIENT
Start: 2022-05-05 | End: 2022-05-05

## 2022-05-05 RX ADMIN — Medication 30 MILLILITER(S): at 06:44

## 2022-05-05 RX ADMIN — FAMOTIDINE 20 MILLIGRAM(S): 10 INJECTION INTRAVENOUS at 06:44

## 2022-05-05 RX ADMIN — ONDANSETRON 4 MILLIGRAM(S): 8 TABLET, FILM COATED ORAL at 06:45

## 2022-05-05 NOTE — ED PROVIDER NOTE - OBJECTIVE STATEMENT
31 year old male PMH etoh abuse coming in with hearing voices that are telling him to kill himself. states it started about 4 days ago. denies having this happen before. states last drink was yesterday. states never admitted before. denies all other complaints. 31 year old male PMH etoh abuse, subtance abuse, schizophrenia, bipolar (states not taking meds) coming in with hearing voices that are telling him to kill himself. states it started about 4 days ago. denies having this happen before. states last drink was yesterday. states never admitted before. denies all other complaints.

## 2022-05-05 NOTE — SBIRT NOTE ADULT - NSSBIRTALCPASSREFTXDET_GEN_A_CORE
Patient presented guarded although A&O during assessment.  Pt. denied history of substance abuse treatment.  Pt. indicated interest in print resources for outpatient substance abuse programs.  Pt. also indicated interest in local AA meetings in-person.  Nathanr provided patient with print resources for outpatient substance abuse programs, AA meetings located in [place] & [place].  Pt. noted that he will likely reach out to [facility] and is aware that there is a [place] track option if he prefers.

## 2022-05-05 NOTE — ED BEHAVIORAL HEALTH ASSESSMENT NOTE - REFERRAL / APPOINTMENT DETAILS
patient would benefit from referral for rehab for EtOH use disorder; discussed recommendation with EM attending who will consult with SW; if residential rehab referral is not possible, outpatient resources will be shared to connect patient to care

## 2022-05-05 NOTE — ED PROVIDER NOTE - CLINICAL SUMMARY MEDICAL DECISION MAKING FREE TEXT BOX
31 year old male with hearing voices telling him to kill himself. PE as above.  psych labs/consult, reassess, 1:1

## 2022-05-05 NOTE — ED PROVIDER NOTE - NSFOLLOWUPINSTRUCTIONS_ED_ALL_ED_FT
Información sobre el abuso y la dependencia del alcohol en los adultos    Alcohol Abuse and Dependence Information, Adult      El alcohol es nikos droga ampliamente disponible. Las personas beben alcohol en distintas cantidades. Las personas que beben alcohol con mucha frecuencia y en grandes cantidades suelen tener problemas mientras beben y después de beber. Pueden desarrollar lo que se llama trastorno por consumo de alcohol. Hay dos tipos principales de trastornos por consumo de alcohol:  •Consumo excesivo de alcohol. [place] se refiere a beber demasiada cantidad de alcohol o con demasiada frecuencia. Puede beber alcohol para sentirse kisha o para reducir el estrés. Es posible que tenga dificultades para establecer un límite en la cantidad que debbie.      •Dependencia del alcohol. Hace referencia a beber alcohol de forma continua elisa un período determinado, y christophe resultado, el cuerpo sufre cambios. [place] hace que sea difícil dejar de beber porque puede empezar a sentirse enfermo o diferente si no consume alcohol. Estos síntomas se conocen christophe abstinencia.        ¿Cómo pueden afectarme el abuso y la dependencia del alcohol?    El abuso y la dependencia del alcohol pueden tener un efecto negativo en bennett adilene. Beber demasiado puede causar adicción. Puede sentir que necesita el alcohol para realizar eveline actividades normalmente. Puede beber alcohol antes de ir al trabajo por la mañana, elisa el día o tan pronto christophe llega a bennett casa del trabajo por la noche. Estas acciones pueden causar lo siguiente:  •Desempeño laboral deficiente.      •Pérdida del trabajo.      •Problemas financieros.       •Accidentes automovilísticos o cargos penales por conducir después de beber alcohol.      •Problemas en las relaciones con eveline amigos y familiares.       •Pérdida de la confianza y del respeto de compañeros de trabajo, amigos y familiares.      El consumo excesivo de alcohol elisa un período prolongado puede dañar el cuerpo y el cerebro de forma permanente, y puede causar problemas de papi de por adilene, christophe por ejemplo:  •Daño en el hígado o el páncreas.      •Problemas cardíacos, hipertensión arterial o accidentes cerebrovasculares.      •Ciertos tipos de cáncer.      •Reducción de la capacidad para combatir las infecciones.      •Daño cerebral o nervioso.      •Depresión.      •Muerte temprana (prematura).      Si no tiene cuidado o si siente un deseo intenso de beber alcohol, es fácil beber más de lo que el cuerpo puede controlar (sobredosis). La sobredosis de alcohol es nikos situación grave que requiere hospitalización. Puede producir lesiones permanentes o la muerte.      ¿Qué puede aumentar el riesgo?    •Tener antecedentes familiares de abuso de alcohol.      •Tener depresión u otras afecciones de papi mental.      •Comenzar a beber a nikos edad temprana.      •Consumir alcohol de manera compulsiva y con frecuencia.      •Experimentar traumatismos, estrés y nikos adilene familiar inestable elisa la infancia.      •Pasar tiempo con personas que beben con frecuencia.        ¿Qué medidas puedo mirza para prevenir o controlar el abuso y la dependencia del alcohol?  • No danilo alcohol si:  •Bennett médico le indica no hacerlo.       •Está embarazada, puede estar embarazada o está tratando de quedar embarazada.      •Si debbie alcohol:•Limite la cantidad que debbie:  •De 0 a 1 medida por día para las mujeres.       •De 0 a 2 medidas por día para los hombres.        •Esté atento a la cantidad de alcohol que hay en las bebidas que rolo. En los [place], nikos medida equivale a nikos botella de cerveza de 12 oz (355 ml), un vaso de vino de 5 oz (148 ml) o un vaso de nikos bebida alcohólica de cabrera graduación de 1½ oz (44 ml).        •Deje de beber si ha estado bebiendo demasiado. [place] puede ser muy difícil de hacer si está acostumbrado a abusar del alcohol. Si comienza a tener síntomas de abstinencia, hable con bennett médico o con nikos persona en quien confíe. Estos síntomas pueden incluir ansiedad, temblores en las faustina, dolor de yasmin, náuseas, sudoración o imposibilidad para dormir.      •Elija mirza bebidas sin alcohol en reuniones sociales y lugares donde puede miguel alcohol.      Actividad     •Dedique más tiempo a las actividades que disfrute que no incluyan el alcohol, christophe pasatiempos o actividad física.      • Busque maneras saludables de sobrellevar el estrés, christophe hacer ejercicios o meditación, o pasar tiempo con las personas que más quiere.       Información general     •Hable con familiares, compañeros de trabajo o amigos y pídales que lo apoyen en eveline esfuerzos para dejar de beber. Si beben, pídales que no beban a bennett alrededor. Pase más tiempo con gente que no debbie alcohol.    •Si benjamín que tiene un problema de dependencia de alcohol:  •Cuénteles a eveline amigos o familiares acerca de eveline inquietudes.      •Hable con bennett médico u otro profesional de la papi sobre dónde obtener ayuda.      •Trabaje con un terapeuta y un consejero de dependencia química.      •Considere unirse a un leyda de apoyo para personas que luchan contra el abuso y la dependencia del alcohol.          Dónde encontrar apoyo     •Bennett médico.      •SMART Recovery: www.smartrecovery.org      Terapia y grupos de apoyo.     •Centros locales de tratamiento o consejeros de dependencia química.      •Grupos locales de AA en bennett comunidad: www.aa.org        Dónde buscar más información    •Centers for Disease Control and Prevention (Centros para el Control y la Prevención de Enfermedades): www.cdc.gov      •National [place] on Alcohol Abuse and Alcoholism (Instituto Nacional de Consumo de Alcohol y Alcoholismo): www.niaaa.nih.gov      •Alcoholics Anonymous (AA): www.aa.org        Comuníquese con un médico si:    •Bebió más o por más tiempo de lo previsto, en más de nikos ocasión.      •Trató de dejar de beber alcohol o de reducir la cantidad de alcohol que consume, norma no pudo lograrlo.      •Debbie a menudo hasta el punto de vomitar o perder la conciencia.      •Ansía tanto beber que no puede pensar en otra cosa.      •Tiene problemas en bennett adilene debido a la bebida, norma continúa bebiendo.      •Continúa bebiendo aunque se siente ansioso, deprimido o ha experimentado nikos pérdida de memoria.      •Dejó de hacer las cosas que solía disfrutar para beber alcohol.      •Debe beber más de lo que solía para lograr el efecto que desea.      •Tiene ansiedad, sudoración, náuseas, temblores y problemas para dormir cuando trata de dejar de beber.        Solicite ayuda inmediatamente si:    •Tiene pensamientos acerca de lastimarse a usted mismo o a otras personas.    •Tiene síntomas de abstinencia graves, por ejemplo:  •Confusión.      •Corazón acelerado.      •Presión arterial cabrera.      •Fiebre.        Si alguna vez siente que puede lastimarse o lastimar a otras personas, o tiene pensamientos de poner fin a bennett adilene, busque ayuda de inmediato. Puede dirigirse al servicio de emergencias más cercano o comunicarse con:   • El servicio de emergencias de bennett localidad (911 en [place]       • Nikos línea de asistencia al suicida y atención en crisis, christophe National Suicide Prevention Lifeline (Línea Nacional de Prevención del Suicidio), al 1-335.898.8266. Está disponible las 24 horas del día.         Resumen    •El abuso y la dependencia del alcohol pueden tener un efecto negativo en bennett adilene. Beber demasiado o con demasiada frecuencia puede causar adicción.      •Si debbie alcohol, limite la cantidad que consume.      •Si tiene dificultad para mantener bennett consumo de alcohol bajo control, busque formas de cambiar bennett conducta. Los pasatiempos, algunas actividades tranquilas, el ejercicio o los grupos de apoyo pueden ayudar.      •Si benjamín que necesita ayuda para cambiar eveline hábitos de consumo de alcohol, hable con bennett médico, un buen amigo o un terapeuta, o vaya a un leyda de AA.      Esta información no tiene christophe fin reemplazar el consejo del médico. Asegúrese de hacerle al médico cualquier pregunta que tenga.

## 2022-05-05 NOTE — ED ADULT TRIAGE NOTE - CHIEF COMPLAINT QUOTE
hearing voices to kill himself for 3 days , admit drinking beer daily about 10-15 bottles , last drink yesterday

## 2022-05-05 NOTE — CHART NOTE - NSCHARTNOTEFT_GEN_A_CORE
ED SW consult received for positive SBIRT screen, alcohol rehab resources.  Pt. was seen by telepsych earlier today and deemed appropriate for discharge.  SWer met with pt. at bedside this afternoon.  Patient presented guarded although A&O during assessment.  Pt. speaks both English and Berm[place]ian.  Pt. declined  services.   Pt. listed his cellphone number as [phone].  He was unsure where his cellpohone was at the time of assessment, and was unable to provide phone numbers for his brothers who he reports reside in [place], [place].   SBIRT screen was completed with patient.  Pt. denied history of substance abuse treatment.  Pt. indicated interest in print resources for outpatient substance abuse programs.  Pt. also indicated interest in local AA meetings in-person.  SWer provided patient with print resources for outpatient substance abuse programs as well as information for AA meetings located in [place] & [place].  Pt. noted that he will likely reach out to [facility] Depend[facility] and is aware that there is a [place] track option if he prefers.  Nathanr was informed by ED staff that pt. is from a shelter.  Nathanr spoke with [facility]erral [facility] ([phone]) who informed that patient is not known to [facility].  Pt. denied that he is homeless and reported that he lives with friends at the following address: [address] [place] [address].  Pt. is ambulation and ADL independent.  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  Pt. denied concerns related to accessing food, clothing, and other necessities.  Pt. denied any issue in paying or contributing to rent.  Pt. denied any past psychiatric hx or treatment.  Pt. reported that he heard voices this morning but he no longer hears any voices.  Pt. initially remarked "I don't know" when asked what voices were saying, but verbalized agreement when SWer asked for clarification if voices were telling him to kill himself.  Pt. denied feeling depressed or suicidal.  Pt. denied any hx SI or SA.  SWer provided pt. with list of outpatient community mental health services/clinics including numbers for Mental Health/Depression Hotline (695-[facility] access [facility]LL/298.863.9829) and National Suicide Prevention Lifeline (022-072-4517).  Information/brochure for [facility] provided; address [phone] [place] [address].  SWer also provided pt. with print info for walk-in clinic [facility] as pt. informed that he is not connected to any medical clinic, does not have PCP.  Pt. reported that he will take public transportation home and requested [employer].  Case discussed with attending MD and assigned RN.

## 2022-05-05 NOTE — ED BEHAVIORAL HEALTH ASSESSMENT NOTE - RISK ASSESSMENT
Modifiable risk factors: lack of connection to care; ongoing alcohol use  Unmodifiable risk factors: history of psychiatric hospitalization; history of psychotic disorder; h/o etoh use disorder  Protective factors: No past SA; no past NSSIB; domiciled status; engaged at work; supportive friend; future-orientation; lack of current suicidality or homicidally; lack of urges to self-harm or engage in NSSIB; identifies various reasons for living Low Acute Suicide Risk

## 2022-05-05 NOTE — ED BEHAVIORAL HEALTH ASSESSMENT NOTE - HPI (INCLUDE ILLNESS QUALITY, SEVERITY, DURATION, TIMING, CONTEXT, MODIFYING FACTORS, ASSOCIATED SIGNS AND SYMPTOMS)
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 Patient interviewed with [facility]  Tim #[id number]. Patient says AH of voices that are talking but he cannot understand what they are saying. He describes the voices as only occurring around sleep, specifically when he is trying to fall asleep. Denies experiencing AH while awake during the daytime. Denies any AH in the ED currently. Patient adamantly denies suicidal ideation, intent or plan. Denies any history of SA or NSSIB. Denies any homicidal/aggressive ideation. Denies dysphoria or anhedonia. Denies significant neurovegetative symptoms of depression (denies disturbances around sleep or appetite before his 3-day binge drinking began). Denies any symptoms concerning for jackelyn/hypomania. Denies current AH. Denies any VH/paranoid ideation. Denies any other perceptual disturbances. No delusions elicited on exam. Denies significant anxiety symptoms. Denies panic attacks. Denies symptoms consistent with OCD. Denies trauma history or any symptoms consistent with PTSD. Denies nicotine use. Denies marijuana use. Denies any other illicit substance use. Endorses drinking EtOH "too much" but is unable to elaborate or provide frequency or quantity of use. Describes binge drinking behaviors. Denies any h/o detox/rehab. Denies any history of seizures or DTs.    Patient consents to obtaining collateral from Mr. Tovar. Collateral obtained from his roommate, Mr. Tovar. Please see [facility] note for details. No acute safety concerns relayed.    COVID Exposure Screen- Patient  Have you had a COVID-19 test in the last 90 days? Yes, 3 weeks ago; negative result.  Have you received 2 doses of the COVID-19 vaccine? Yes, Pfizer. "Last year"  In the past 10 days, have you been around anyone with a positive COVID-19 test? No.

## 2022-05-05 NOTE — ED BEHAVIORAL HEALTH ASSESSMENT NOTE - ORAL MEDICATION DETAILS
patient was given Maalox 30 mg PO at 6:35 AM, Pepcid 20 mg IV at 6:35 AM, and Zofran 4 mg IV at 6:35 AM

## 2022-05-05 NOTE — ED BEHAVIORAL HEALTH ASSESSMENT NOTE - OTHER
future-oriented; goal directed; roommate roommate, Mr. Tovar [facility] ED Visits, [facility] Outpatient Medical, [facility] Outpatient [facility], [facility], [facility], [facility],  [facility],  [facility], [facility], [facility], [facility]iat[facility], [facility], [facility], [facility], [facility], [facility], [facility], [employer] [facility]

## 2022-05-05 NOTE — ED BEHAVIORAL HEALTH ASSESSMENT NOTE - DESCRIPTION
Patient arrived in ED with elevated BAL (256) at 1:24 AM. Patient arrived in ED and has been calm and cooperative in no acute distress. Telepsychiatry consulted. domiciled, employed as , single, living with roommate h/o appendectomy in 2021

## 2022-05-05 NOTE — ED ADULT NURSE NOTE - SUICIDE SCREENING DEPRESSION
Chief complaint:   Chief Complaint   Patient presents with   â¢ Video Visit     Vaginal prolapse       Vitals:  Visit Vitals  LMP 11/18/2020       HISTORY OF PRESENT ILLNESS     This visit is being performed via video to discuss Video Visit (Vaginal prolapse)    Clinician Location: [facility]    Rick Richter is in [facility] and her identity has been established. She was informed that consent to treat includes permission to submit charges to the applicable insurance on file. Rick Richter was advised regarding the potential risk inherent in video visits, as the assessment may be limited due to what can be seen on the screen which potentially results in an incomplete assessment; as well as either of us may discontinue the video visit if it is felt that the videoconferencing connections are not adequate for his/her situation. 5-10 minutes were spent in this encounter. Vaginal prolapse  Chronic insomnia    Ms. Vaca is a 44-year-old woman presenting by video visit to discuss the above-mentioned chronic medical problems. For the past year so she has been experiencing prolapsing organs in to the entrance of her vagina. She feels that it is both her bladder and sometimes her cervix. This has led to some degree of urinary incontinence. Sleep remains a chronic difficulty for her. She cites ongoing family stressors and a very active 3year-old daughter whom she has to watch through the night otherwise she would get into mischief . She is only averaging several hours of sleep per night.   Other significant problems:  Patient Active Problem List    Diagnosis Date Noted   â¢ Vitamin D deficiency 12/04/2020     Priority: Low   â¢ Lumbar disc herniation with radiculopathy 03/31/2019     Priority: Low   â¢ Family planning, Depo-Provera contraception monitoring/administration 07/27/2017     Priority: Low   â¢ ASCUS of cervix with negative high risk HPV 02/15/2017     Priority: Low     Pap in one year (02/2018)     â¢ Radiculopathy of lumbosacral region 02/05/2015     Priority: Low   â¢ Closed dislocation, sacrum 11/03/2014     Priority: Low   â¢ Closed dislocation, lumbar vertebra 11/03/2014     Priority: Low   â¢ Closed dislocation, thoracic vertebra 11/03/2014     Priority: Low   â¢ Closed dislocation, fifth cervical vertebra 11/03/2014     Priority: Low   â¢ Closed dislocation, second cervical vertebra 11/03/2014     Priority: Low   â¢ Family history of breast cancer in first degree relative 06/25/2014     Priority: Low   â¢ Abnormal uterine bleeding (AUB) 06/25/2014     Priority: Low   â¢ Chronic sinusitis 03/24/2014     Priority: Low   â¢ Status post LEEP (loop electrosurgical excision procedure) of cervix 7/13/2000 02/25/2014     Priority: Low     LEEP path with mild dysplasia only.      â¢ Simple endometrial hyperplasia without atypia 02/25/2014     Priority: Low     History of focal adenomatous hyperplasia without atypia May 26, 2009     â¢ Pure hypercholesterolemia 02/07/2014     Priority: Low   â¢ Rotator cuff syndrome of left shoulder 05/20/2013     Priority: Low   â¢ Osteoarthritis of both knees 11/29/2012     Priority: Low   â¢ Bilirubinuria 02/17/2012     Priority: Low   â¢ Depressive disorder, not elsewhere classified 02/17/2012     Priority: Low   â¢ Essential hypertension 02/17/2012     Priority: Low   â¢ Fibromyalgia 02/17/2012     Priority: Low   â¢ GERD (gastroesophageal reflux disease) 02/17/2012     Priority: Low   â¢ Globus syndrome 02/17/2012     Priority: Low   â¢ Hiatal hernia 02/17/2012     Priority: Low     By EGD done 11/07/02     â¢ Irritable bowel syndrome 02/17/2012     Priority: Low   â¢ Migraine 02/17/2012     Priority: Low     L hemicrania          PAST MEDICAL, FAMILY AND SOCIAL HISTORY     Medications:  Current Outpatient Medications   Medication   â¢ loratadine-pseudoephedrine (Loratadine-D 24HR)  MG per 24 hr tablet   â¢ naproxen (NAPROSYN) 500 MG tablet   â¢ fluconazole (DIFLUCAN) 150 MG tablet   â¢ cyclobenzaprine (FLEXERIL) 10 MG tablet   â¢ loratadine (CLARITIN) 10 MG tablet   â¢ ketoconazole (NIZORAL) 2 % cream   â¢ amoxicillin-clavulanate (Augmentin) 875-125 MG per tablet   â¢ pregabalin (LYRICA) 150 MG capsule   â¢ hydrOXYzine (ATARAX) 25 MG tablet   â¢ predniSONE (DELTASONE) 20 MG tablet   â¢ albuterol 108 (90 Base) MCG/ACT inhaler   â¢ rosuvastatin (CRESTOR) 20 MG tablet   â¢ atenolol-chlorthalidone (TENORETIC) 50-25 MG per tablet   â¢ SUMAtriptan (IMITREX STATDOSE) 6 MG/0.5ML auto-injector   â¢ Vitamin D, Ergocalciferol, 1.25 mg (50,000 units) capsule   â¢ triamcinolone (ARISTOSPAN) 0.1 % lotion   â¢ topiramate (TOPAMAX) 50 MG tablet   â¢ spironolactone (ALDACTONE) 25 MG tablet   â¢ furosemide (LASIX) 40 MG tablet   â¢ hydroCORTisone (CORTIZONE) 2.5 % cream   â¢ nystatin (Nystop) 277494 UNIT/GM powder   â¢ sumatriptan (IMITREX) 100 MG tablet   â¢ beclomethasone (BECONASE AQ) 42 MCG/SPRAY nasal spray   â¢ pantoprazole (PROTONIX) 40 MG tablet   â¢ mometasone (NASONEX) 50 MCG/ACT nasal spray   â¢ lubiprostone (AMITIZA) 8 MCG capsule   â¢ amitriptyline (ELAVIL) 25 MG tablet   â¢ DISPENSE   â¢ PROCTOZONE-HC 2.5 % rectal cream   â¢ verapamil (CALAN SR) 180 MG CR tablet   â¢ fluticasone (FLONASE) 50 MCG/ACT nasal spray   â¢ triamcinolone (ARISTOCORT) 0.1 % cream   â¢ promethazine-dextromethorphan (PROMETHAZINE-DM) 6.25-15 MG/5ML syrup   â¢ hydroCORTisone (CORTIZONE) 2.5 % cream   â¢ diphenhydrAMINE (BENADRYL ALLERGY) 25 MG tablet   â¢ Sodium Phosphates (FLEET ENEMA RE)   â¢ butalbital-APAP-caffeine (FIORICET) -40 MG per tablet   â¢ ammonium lactate (AMLACTIN) 12 % cream     Current Facility-Administered Medications   Medication   â¢ medroxyPROGESTERone (DEPO-PROVERA) 150 MG/ML injection 150 mg       Allergies:  ALLERGIES:   Allergen Reactions   â¢ Erythromycin      Rash,but no hives.        Past Medical  History/Surgeries:  Past Medical History:   Diagnosis Date   â¢ Adverse effect of anesthesia    â¢ Anxiety    â¢ Depression    â¢ Difficulty initiating walking    â¢ Fibromyalgia 8/15/2002   â¢ GERD (gastroesophageal reflux disease)    â¢ Hammertoe    â¢ Herniated disc, cervical feb 2015   â¢ Hiatal hernia    â¢ Hypertension    â¢ IBS (irritable bowel syndrome)    â¢ Lumbar disc herniation with radiculopathy 3/31/2019   â¢ Migraine headache    â¢ Osteoarthritis of both knees 11/30/2012   â¢ Overactive bladder    â¢ Radiculopathy of lumbosacral region 2/5/2015   â¢ Rotator cuff syndrome of left shoulder 5/20/2013   â¢ Sciatica feb 2015   â¢ Tinea versicolor 11/29/2012   â¢ Uterine fibroid        Past Surgical History:   Procedure Laterality Date   â¢ Colonoscopy diagnostic  05/22/2006   â¢ Colposcopy,loop electrd cervix excis  2001   â¢ Tonsillectomy and adenoidectomy         Family History:  Family History   Problem Relation Age of Onset   â¢ Hypertension Mother    â¢ Cancer, Breast Mother 48   â¢ Rheumatoid Arthritis Mother    â¢ Aneurysm Mother    â¢ Diabetes Father    â¢ High cholesterol Father    â¢ Hypertension Father    â¢ Hepatitis C Father    â¢ Kidney disease Father    â¢ Cancer Father         Cholangiocarcinoma   â¢ Diabetes Daughter         DM1   â¢ Cancer, Ovarian Other         Aunt and cousin   â¢ Cancer, Breast Other    â¢ Cancer, Breast Paternal Aunt         late 45s       Social History:  Social History     Tobacco Use   â¢ Smoking status: Former Smoker     Packs/day: 0.00     Types: Cigarettes   â¢ Smokeless tobacco: Never Used   â¢ Tobacco comment: Quit in 2006   Substance Use Topics   â¢ Alcohol use: Not Currently     Alcohol/week: 0.0 standard drinks     Comment: Quit at age 33 yo       REVIEW OF SYSTEMS     Review of Systems    PHYSICAL EXAM     Physical Exam  No physical exam was done except to note that she appeared quite tired. ASSESSMENT/PLAN     Vaginal prolapse: Will refer to OB/GYN for a more in-depth assessment. Chronic insomnia:  She has tried and failed multiple hypnotics in other sleep aids. Nothing new to add at this time.   At this juncture she feels that the family stressors cannot be quickly corrected. Feliberto Garland MD  03/29/2021 Positive

## 2022-05-05 NOTE — ED PROVIDER NOTE - PATIENT PORTAL LINK FT
You can access the FollowMyHealth Patient Portal offered by [facility] by registering at the following website: [email or web address]. By joining [facility] FollowMyHealth portal, you will also be able to view your health information using other applications (apps) compatible with our system.

## 2022-05-05 NOTE — ED BEHAVIORAL HEALTH ASSESSMENT NOTE - SUMMARY
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 Patient reports hypnagogic hallucinations of voices only for the last 3 days since he began drinking heavily. Denies these AH have been occurring while awake and denying experiencing them now. He is denying any acute psychiatric symptoms or exhibiting any acutely dangerous behaviors. Offered voluntary psychiatric hospitalization, patient declines. He says he is amenable to a referral for residential rehabilitation or outpatient rehab for treatment of his alcohol addiction. Presentation is most consistent at this time with diagnosis of alcohol use disorder. Discussed recs and plan with EM attending, who will consult with SW to provide patient with options for treatment.

## 2022-05-05 NOTE — ED PROVIDER NOTE - PROGRESS NOTE DETAILS
labs with elevated etoh. ecg NSR, RRR, no acute ischemic changes. will consult psych in AM once etoh <100. states feeling a little nauseous and with upper abd pain. GI cocktail and zofran ordered. will consult psych in 1 hour. states feeling a little nauseous and with upper abd pain. GI cocktail and zofran ordered. will consult psych in 1 hour. pt still complains of hearing voices telling him to kill himself. pt ate meal. completed telepsych eval. Deemed appropriate for d/c. Awaiting SW for alcohol rehab resources.

## 2022-05-05 NOTE — ED BEHAVIORAL HEALTH NOTE - BEHAVIORAL HEALTH NOTE
===================  PRE-HOSPITAL COURSE  ===================  SOURCE:  ED MAYNOR Tinajero and secondhand documentation   DETAILS:  Patient walked into [facility] ED requesting to be seen for AH that have been occurring for approximately 3 days. Patient also acutely intoxicated at time of triage.     ============  ED COURSE   ============  SOURCE: ED MAYNOR Tinajero and EHR review   ARRIVAL:  Per chart and RN, patient arrived as a walk in. At time of arrival (00:20), BAL = 256.   BELONGINGS:  Patient is currently on 1:1. Belongings secured and stowed with security.   BEHAVIOR: As per MAYNOR Tinajero, patient is currently calm and cooperative. He is resting in his bed at time of this report.   TREATMENT:  Patient required Zofran 4mg, Pepcid 20mg and Maalox 30mls for nausea and upper abdominal pain. Patient has not required any medication for EtOH withdrawal at this time. Continues with CIWA protocol.   VISITORS:  None at bedside.     Patient gives permission to obtain collateral from friend-Chuck Tovar  ( x ) Yes  (  )  No  Rationale for overriding objection           	 (  ) Lack of capacity. Details: ________           	 (  ) Assessing risk of danger to self/others. Details: ________      Rationale for selecting specific collateral source            		(  ) Potential to impact risk of danger to self/others and no alternative equivalent. Details: _____”      ========================  FOR EACH COLLATERAL  ========================  NAME: Chuck Tovar  NUMBER: [phone]  RELATIONSHIP: Friend/Roommate 	  RELIABILITY: Reliable but only able to provide limited information   Collateral has requested that the information provided remain confidential: Yes [  ] No [ X ]  Collateral has provided information that patient is/may be unaware of: Yes [  ] No [ X ]    COMMENTS:    Patient is a 31-year-old Haitian speaking male, non-caregiver, domiciled with multiple roommates, recently unemployed. Patient’s friend/roommate- Chuck Tovar provided collateral. Collateral is Haitian speaking only; writer addressed collateral in Haitian- no  required. Mr. Tovar was not aware that patient was in the hospital. Upon finding out that patient was in the ED; Mr. Tovar reported that patient has frequent ED visits for drinking alcohol. Per friend- "he is always drinking". When discussing patient’s psychiatric history- Mr. Tovar could not elaborate, only stating “he never has wanted to kill himself or anything like that”. Per friend, patient recently also lost his job as a  which he felt was related to his alcohol use. Since his employment ended, patient has been unable to pay for rent. Friend has been supporting patient throughout this time financially. Friend is unaware of any previous psychiatric admissions, inpatient rehab stays or diagnoses. As per friend, patient is primarily from [place]- has been living in the  for about 10 years and has one brother who lives in [place] but patient is not very close with him.     COVID Exposure Screen- collateral (i.e. third-party, chart review, belongings, etc; include EMS and ED staff)  1.        *Has the patient had a COVID-19 test in the last 90 days?  ( x ) Yes   ( ) No   (  ) Unknown- Reason: _____  IF YES PROCEED TO QUESTION #2. IF NO OR UNKNOWN, PLEASE SKIP TO QUESTION #3.  2.        Date of test(s) and result(s): negative-5/5/2022   3.        *Has the patient tested positive for COVID-19 antibodies? (  ) Yes   (  ) No   ( x ) Unknown- Reason: patient was not checked   IF YES PROCEED TO QUESTION #4. IF NO or UNKNOWN, PLEASE SKIP TO QUESTION #5.  4.        Date of positive antibody test: ________  5.        *Has the patient received 2 doses of the COVID-19 vaccine? ( x ) Yes   (  ) No   (  ) Unknown- Reason: _____  IF YES PROCEED TO QUESTION #6. IF NO or UNKNOWN, PLEASE SKIP TO QUESTION #7.  6.         Date of second dose: July of 2021   7.        *In the past 10 days, has the patient been around anyone with a positive COVID-19 test?* (  ) Yes   ( x ) No   (  ) Unknown- Reason: __  IF YES PROCEED TO QUESTION #8. IF NO or UNKNOWN, PLEASE SKIP TO QUESTION #13.  8.        Was the patient within 6 feet of them for at least 15 minutes? (  ) Yes   (  ) No   (  ) Unknown- Reason: ____  9.        Did the patient provide care for them? (  ) Yes   (  ) No   (  ) Unknown- Reason: ______  10.     Did the patient have direct physical contact with them (touched, hugged, or kissed them)? (  ) Yes   (  ) No (  ) Unknown- Reason: __  11.     Did the patient share eating or drinking utensils with them? (  ) Yes   (  ) No (  ) Unknown- Reason: ____  12.     Did they sneeze, cough, or somehow get respiratory droplets on the patient? (  ) Yes   (  ) No (  ) Unknown- Reason: ______  13.     *Has the patient been out of [place] within the past 10 days?* (  ) Yes   ( x ) No   (  ) Unknown- Reason: _____  IF YES PLEASE ANSWER THE FOLLOWING QUESTIONS:  14.     Which state/country did they go to? ______  15.     Were they there over 24 hours? (  ) Yes   (  ) No (  ) Unknown- Reason: ______ ===================  PRE-HOSPITAL COURSE  ===================  SOURCE:  ED MAYNOR Tinajero and secondhand documentation   DETAILS:  Patient walked into [facility] ED requesting to be seen for AH that have been occurring for approximately 3 days. Patient also acutely intoxicated at time of triage.     ============  ED COURSE   ============  SOURCE: ED MAYNOR Tinajero and EHR review   ARRIVAL:  Per chart and RN, patient arrived as a walk in. At time of arrival (00:20), BAL = 256.   BELONGINGS:  Patient is currently on 1:1. Belongings secured and stowed with security.   BEHAVIOR: As per MAYNOR Tinajero, patient is currently calm and cooperative. He is resting in his bed at time of this report. Patient still c/o AH with voices directing him to kill himself.   TREATMENT:  Patient required Zofran 4mg, Pepcid 20mg and Maalox 30mls for nausea and upper abdominal pain. Patient has not required any medication for EtOH withdrawal at this time. Continues with CIWA protocol.   VISITORS:  None at bedside.     Patient gives permission to obtain collateral from friend-Chuck Tovar  ( x ) Yes  (  )  No  Rationale for overriding objection           	 (  ) Lack of capacity. Details: ________           	 (  ) Assessing risk of danger to self/others. Details: ________      Rationale for selecting specific collateral source            		(  ) Potential to impact risk of danger to self/others and no alternative equivalent. Details: _____”      ========================  FOR EACH COLLATERAL  ========================  NAME: Chuck Tovar  NUMBER: [phone]  RELATIONSHIP: Friend/Roommate 	  RELIABILITY: Reliable but only able to provide limited information   Collateral has requested that the information provided remain confidential: Yes [  ] No [ X ]  Collateral has provided information that patient is/may be unaware of: Yes [  ] No [ X ]    COMMENTS:    Patient is a 31-year-old South African speaking male, non-caregiver, domiciled with multiple roommates, recently unemployed. Patient’s friend/roommate- Chuck Tovar provided collateral. Collateral is South African speaking only; writer addressed collateral in South African- no  required. Mr. Tovar was not aware that patient was in the hospital. Upon finding out that patient was in the ED; Mr. Tovar reported that patient has frequent ED visits for drinking alcohol. Per friend- "he is always drinking". When discussing patient’s psychiatric history- Mr. Tovar could not elaborate, only stating “he never has wanted to kill himself or anything like that”. Per friend, patient recently also lost his job as a  which he felt was related to his alcohol use. Since his employment ended, patient has been unable to pay for rent. Friend has been supporting patient throughout this time financially. Friend is unaware of any previous psychiatric admissions, inpatient rehab stays or diagnoses. As per friend, patient is primarily from [place]- has been living in the  for about 10 years and has one brother who lives in [place] but patient is not very close with him.     COVID Exposure Screen- collateral (i.e. third-party, chart review, belongings, etc; include EMS and ED staff)  1.        *Has the patient had a COVID-19 test in the last 90 days?  ( x ) Yes   ( ) No   (  ) Unknown- Reason: _____  IF YES PROCEED TO QUESTION #2. IF NO OR UNKNOWN, PLEASE SKIP TO QUESTION #3.  2.        Date of test(s) and result(s): negative-5/5/2022   3.        *Has the patient tested positive for COVID-19 antibodies? (  ) Yes   (  ) No   ( x ) Unknown- Reason: patient was not checked   IF YES PROCEED TO QUESTION #4. IF NO or UNKNOWN, PLEASE SKIP TO QUESTION #5.  4.        Date of positive antibody test: ________  5.        *Has the patient received 2 doses of the COVID-19 vaccine? ( x ) Yes   (  ) No   (  ) Unknown- Reason: _____  IF YES PROCEED TO QUESTION #6. IF NO or UNKNOWN, PLEASE SKIP TO QUESTION #7.  6.         Date of second dose: July of 2021   7.        *In the past 10 days, has the patient been around anyone with a positive COVID-19 test?* (  ) Yes   ( x ) No   (  ) Unknown- Reason: __  IF YES PROCEED TO QUESTION #8. IF NO or UNKNOWN, PLEASE SKIP TO QUESTION #13.  8.        Was the patient within 6 feet of them for at least 15 minutes? (  ) Yes   (  ) No   (  ) Unknown- Reason: ____  9.        Did the patient provide care for them? (  ) Yes   (  ) No   (  ) Unknown- Reason: ______  10.     Did the patient have direct physical contact with them (touched, hugged, or kissed them)? (  ) Yes   (  ) No (  ) Unknown- Reason: __  11.     Did the patient share eating or drinking utensils with them? (  ) Yes   (  ) No (  ) Unknown- Reason: ____  12.     Did they sneeze, cough, or somehow get respiratory droplets on the patient? (  ) Yes   (  ) No (  ) Unknown- Reason: ______  13.     *Has the patient been out of [place] within the past 10 days?* (  ) Yes   ( x ) No   (  ) Unknown- Reason: _____  IF YES PLEASE ANSWER THE FOLLOWING QUESTIONS:  14.     Which state/country did they go to? ______  15.     Were they there over 24 hours? (  ) Yes   (  ) No (  ) Unknown- Reason: ______ ===================  PRE-HOSPITAL COURSE  ===================  SOURCE:  ED MAYNOR Tinajero and secondhand documentation   DETAILS:  Patient walked into [facility] ED requesting to be seen for AH that have been occurring for approximately 3 days. Patient also acutely intoxicated at time of triage.     ============  ED COURSE   ============  SOURCE: ED MAYNOR Tinajero and EHR review   ARRIVAL:  Per chart and RN, patient arrived as a walk in. At time of arrival (00:20), BAL = 256.   BELONGINGS:  Patient is currently on 1:1. Belongings secured and stowed with security.   BEHAVIOR: As per MAYNOR Tinajero, patient is currently calm and cooperative. He is resting in his bed at time of this report. Patient still c/o AH with voices directing him to kill himself.   TREATMENT:  Patient required Zofran 4mg, Pepcid 20mg and Maalox 30mls for nausea and upper abdominal pain. Patient has not required any medication for EtOH withdrawal at this time. Continues with CIWA protocol.   VISITORS:  None at bedside.     Patient gives permission to obtain collateral from friend-Chuck Tovar  ( x ) Yes  (  )  No  Rationale for overriding objection           	 (  ) Lack of capacity. Details: ________           	 (  ) Assessing risk of danger to self/others. Details: ________      ========================  FOR EACH COLLATERAL  ========================  NAME: Chuck Tovar  NUMBER: [phone]  RELATIONSHIP: Friend/Roommate 	  RELIABILITY: Reliable but only able to provide limited information   Collateral has requested that the information provided remain confidential: Yes [  ] No [ X ]  Collateral has provided information that patient is/may be unaware of: Yes [  ] No [ X ]    COMMENTS:    Patient is a 31-year-old Zambian speaking male, non-caregiver, domiciled with multiple roommates, recently unemployed. Patient’s friend/roommate- Chuck Tovar provided collateral. Collateral is Zambian speaking only; writer addressed collateral in Zambian- no  required. Mr. Tovar was not aware that patient was in the hospital. Upon finding out that patient was in the ED; Mr. Tovar reported that patient has frequent ED visits for drinking alcohol. Per friend- "he is always drinking". When discussing patient’s psychiatric history- Mr. Tovar could not elaborate, only stating “he never has wanted to kill himself or anything like that”. Per friend, patient recently also lost his job as a  which he felt was related to his alcohol use. Since his employment ended, patient has been unable to pay for rent. Friend has been supporting patient throughout this time financially. Friend is unaware of any previous psychiatric admissions, inpatient rehab stays or diagnoses. As per friend, patient is primarily from [place]- has been living in the  for about 10 years and has one brother who lives in [place] but patient is not very close with him.     COVID Exposure Screen- collateral (i.e. third-party, chart review, belongings, etc; include EMS and ED staff)  1.        *Has the patient had a COVID-19 test in the last 90 days?  ( x ) Yes   ( ) No   (  ) Unknown- Reason: _____  IF YES PROCEED TO QUESTION #2. IF NO OR UNKNOWN, PLEASE SKIP TO QUESTION #3.  2.        Date of test(s) and result(s): negative-5/5/2022   3.        *Has the patient tested positive for COVID-19 antibodies? (  ) Yes   (  ) No   ( x ) Unknown- Reason: patient was not checked   IF YES PROCEED TO QUESTION #4. IF NO or UNKNOWN, PLEASE SKIP TO QUESTION #5.  4.        Date of positive antibody test: ________  5.        *Has the patient received 2 doses of the COVID-19 vaccine? ( x ) Yes   (  ) No   (  ) Unknown- Reason: _____  IF YES PROCEED TO QUESTION #6. IF NO or UNKNOWN, PLEASE SKIP TO QUESTION #7.  6.         Date of second dose: July of 2021   7.        *In the past 10 days, has the patient been around anyone with a positive COVID-19 test?* (  ) Yes   ( x ) No   (  ) Unknown- Reason: __  IF YES PROCEED TO QUESTION #8. IF NO or UNKNOWN, PLEASE SKIP TO QUESTION #13.  8.        Was the patient within 6 feet of them for at least 15 minutes? (  ) Yes   (  ) No   (  ) Unknown- Reason: ____  9.        Did the patient provide care for them? (  ) Yes   (  ) No   (  ) Unknown- Reason: ______  10.     Did the patient have direct physical contact with them (touched, hugged, or kissed them)? (  ) Yes   (  ) No (  ) Unknown- Reason: __  11.     Did the patient share eating or drinking utensils with them? (  ) Yes   (  ) No (  ) Unknown- Reason: ____  12.     Did they sneeze, cough, or somehow get respiratory droplets on the patient? (  ) Yes   (  ) No (  ) Unknown- Reason: ______  13.     *Has the patient been out of [place] within the past 10 days?* (  ) Yes   ( x ) No   (  ) Unknown- Reason: _____  IF YES PLEASE ANSWER THE FOLLOWING QUESTIONS:  14.     Which state/country did they go to? ______  15.     Were they there over 24 hours? (  ) Yes   (  ) No (  ) Unknown- Reason: ______    Clementine Zepeda LMSW

## 2022-05-05 NOTE — ED BEHAVIORAL HEALTH ASSESSMENT NOTE - OTHER PAST PSYCHIATRIC HISTORY (INCLUDE DETAILS REGARDING ONSET, COURSE OF ILLNESS, INPATIENT/OUTPATIENT TREATMENT)
hx of alcohol use, alcohol induced mood and psychosis, no past SA/SIB, hx of past psychiatric hospitalization, last in March 2021; not in any psychiatric treatment since that time

## 2022-05-16 NOTE — PATIENT PROFILE ADULT - FALL HARM RISK
other [facility] Counseling:  I discussed with the patient the risks of etanercept including but not limited to myelosuppression, immunosuppression, autoimmune hepatitis, demyelinating diseases, lymphoma, and infections.  The patient understands that monitoring is required including a PPD at baseline and must alert us or the primary physician if symptoms of infection or other concerning signs are noted.

## 2022-06-19 ENCOUNTER — EMERGENCY (EMERGENCY)
Facility: HOSPITAL | Age: 32
LOS: 1 days | Discharge: HOME | End: 2022-06-19
Attending: EMERGENCY MEDICINE
Payer: MEDICAID

## 2022-06-19 VITALS
HEART RATE: 94 BPM | TEMPERATURE: 99 F | SYSTOLIC BLOOD PRESSURE: 154 MMHG | OXYGEN SATURATION: 98 % | HEIGHT: 60 IN | DIASTOLIC BLOOD PRESSURE: 89 MMHG | RESPIRATION RATE: 16 BRPM | WEIGHT: 160.06 LBS

## 2022-06-19 DIAGNOSIS — Z87.19 PERSONAL HISTORY OF OTHER DISEASES OF THE DIGESTIVE SYSTEM: Chronic | ICD-10-CM

## 2022-06-19 PROCEDURE — 99283 EMERGENCY DEPT VISIT LOW MDM: CPT

## 2022-06-19 RX ADMIN — Medication 1 TABLET(S): at 04:46

## 2022-06-19 NOTE — ED PROVIDER NOTE - NSFOLLOWUPINSTRUCTIONS_ED_ALL_ED_FT
Absceso anorrectal    Anorectal Abscess      Un absceso es nikos abel infectada que contiene nikos acumulación de pus. Un absceso anorrectal es un absceso que está cerca de la abertura del ano o en los alrededores del recto. Sin tratamiento, un absceso anorrectal se puede agrandar y provocar otros problemas, christophe nikos infección más grave en todo el cuerpo o dolor, especialmente al defecar.      ¿Cuáles son las causas?    La causa de esta afección es la obstrucción de las glándulas o nikos infección en nikos de estas zonas:  •El ano.      •La abel que está entre el ano y el escroto en los hombres o entre el ano y la vagina en las mujeres (perineo).        ¿Qué incrementa el riesgo?    Los siguientes factores pueden hacer que usted sea más propenso a tener esta afección:  •Diabetes o enfermedad inflamatoria del intestino.      •Debilitamiento del sistema de defensa del cuerpo (sistema inmunitario).      •Tener sexo anal.      •Tener nikos infección de transmisión sexual (ITS).      •Determinados tipos de cáncer, christophe el carcinoma rectal, la leucemia o el linfoma.        ¿Cuáles son los signos o los síntomas?    El síntoma principal de esta afección es el dolor. Radha puede ser un dolor pulsante que empeora al defecar. Otros síntomas pueden ser los siguientes:  •Hinchazón y enrojecimiento en la abel del absceso. El enrojecimiento se puede extender más allá del absceso y convertirse en nikos línea edy en la piel.      •Un bulto visible y doloroso o un bulto que se puede sentir a la palpación.      •Hemorragia o secreción parecida al pus en la abel.      •Fiebre.      •Debilidad general.      •Estreñimiento.      •Diarrea.        ¿Cómo se diagnostica?    Esta afección se diagnostica en función de eveline antecedentes médicos y de un examen físico de la abel afectada.  •[facility] puede incluir el examen del área rectal con la mano enguantada (examen rectal digital).      •En ocasiones, el médico debe examinar el recto mediante el uso de nikos sonda, un endoscopio o un estudio de diagnóstico por imágenes.      •En el ashley de las mujeres, posiblemente se requiera un examen exhaustivo de la vagina.        ¿Cómo se trata?    El tratamiento de esta afección puede incluir lo siguiente:  •Cirugía con incisión y drenaje. Esta implica realizar nikos incisión en el absceso para drenar el pus.      •Medicamentos, christophe antibióticos, analgésicos, medicamentos para ablandar las heces o laxantes.        Siga estas indicaciones en bennett casa:    Medicamentos     •Orchid los medicamentos de venta mara y los recetados solamente christophe se lo haya indicado el médico.      •Si le recetaron un antibiótico, tómelo christophe se lo haya indicado el médico. No deje de usar el antibiótico aunque comience a sentirse mejor.      • No conduzca ni use maquinaria pesada mientras rolo analgésicos recetados.        Cuidado de la herida      •Si se usó gasa en el absceso, siga las instrucciones del médico sobre cómo retirarla o cambiarla. Normalmente se puede retirar tras 2 o 3 días.      •Lávese las faustina con agua y jabón antes de retirar o cambiar la gasa. Use desinfectante para faustina si no dispone de agua y jabón.      •Si se colocaron jose o más drenajes en la cavidad del absceso, tenga cuidado de no tirar de estos. El médico le dirá cuánto tiempo debe tenerlos allí.    •Controle todos los días la abel de la incisión para detectar signos de infección. Esté atento a los siguientes signos:  •Aumento del enrojecimiento, la hinchazón o el dolor.      •Mayor presencia de líquido o ye.      •Calor.      •Pus o mal olor.          Control del dolor, la rigidez y la hinchazón      •Orchid analilia de asiento entre 3 y 4 veces por día y después de defecar. [facility] ayudará a reducir el dolor y la hinchazón.    •Para aliviar el dolor, pruebe sentarse:  •Sobre nikos almohadilla térmica con el ajuste en temperatura baja.      •Sobre un almohadón inflable con forma de marian.      •Si se lo indican, aplique hielo sobre la abel afectada:  •Ponga el hielo en nikos bolsa plástica.      •Coloque nikos toalla entre la piel y la bolsa.      •Coloque el hielo elisa 20 minutos, de 2 a 3 veces por día.        Instrucciones generales     •Siga las indicaciones proporcionadas por bennett médico con respecto a la alimentación.      •Concurra a todas las visitas de control christophe se lo haya indicado el médico. [facility] es importante.        Comuníquese con un médico si tiene:    •Sangrado de la incisión.      •Dolor, hinchazón o enrojecimiento que no mejora o que empeora.      •Dificultad para defecar u orinar.      •Síntomas que reaparecen después del tratamiento.        [place] ayuda inmediatamente si:    •Tiene problemas para  o usar las piernas.      •El dolor es intenso o aumenta.      •La hinchazón en la abel afectada empeora repentinamente.      •El sangrado o la secreción de pus aumenta demasiado.      •Tiene escalofríos o fiebre.        Resumen    •Un absceso anorrectal es un absceso que está cerca de la abertura del ano o en los alrededores del recto. Un absceso es nikos abel infectada que contiene nikos acumulación de pus.      •El síntoma principal de esta afección es el dolor. Puede ser un dolor pulsante que empeora al defecar.      •El tratamiento de un absceso anorrectal puede incluir cirugía para drenar el pus del absceso. Los medicamentos y los analilia de asiento pueden ser también parte del plan de tratamiento.      Esta información no tiene christophe fin reemplazar el consejo del médico. Asegúrese de hacerle al médico cualquier pregunta que tenga.

## 2022-06-19 NOTE — ED PROVIDER NOTE - SKIN, MLM
Right perirectal area of fluctuance 1.5cm by 1.5cm. Left perirectal open draining abscess with slight erythema. No warmth. No tenderness. Right SQ perirectal area of fluctuance 1.5cm by 1.5cm. Left perirectal open draining abscess with slight surrounding erythema. No warmth. No tenderness.

## 2022-06-19 NOTE — ED ADULT NURSE NOTE - OBJECTIVE STATEMENT
c/o rectal itchy  there is like pimple inside, unable to sleep , had rectal bleed a week a go as per patient

## 2022-06-19 NOTE — ED PROVIDER NOTE - OBJECTIVE STATEMENT
31 year old male presents to the ED with complaints of rectal itching for 2 weeks. Denies fevers, chills, bleeding or history of hemorrhoids. Patient denies currently being on any medications.   NKDA. 31 year old male presents to the ED with complaints of rectal itching for 2 weeks, new onset. Otherwise relates normal BM. Denies fevers, chills, bleeding or history of hemorrhoids. Denies h/o anal intercourse. Currently not on any medications.   NKDA.

## 2022-06-19 NOTE — ED PROVIDER NOTE - CLINICAL SUMMARY MEDICAL DECISION MAKING FREE TEXT BOX
31 year old male with rectal itching for 2 weeks. Clinically with signs of perirectal abscess, currently draining on the left side and small area on the right. Will initiate PO antibiotics and warm compress. Will instruct to return if symptoms fail to improve or worsen. 31 year old male with rectal itching for 2 weeks. Clinically with signs of subcutaneous perirectal abscess, currently draining on the left side and small area on the right. Will initiate PO antibiotics and warm compresses with sitz baths. Will provide surgery f/u. Will instruct to return if symptoms fail to improve or worsen.

## 2022-06-19 NOTE — ED ADULT NURSE NOTE - NSIMPLEMENTINTERV_GEN_ALL_ED
Implemented All Universal Safety Interventions:  [place] to call system. Call bell, personal items and telephone within reach. Instruct patient to call for assistance. Room bathroom lighting operational. Non-slip footwear when patient is off stretcher. Physically safe environment: no spills, clutter or unnecessary equipment. Stretcher in lowest position, wheels locked, appropriate side rails in place.

## 2022-06-19 NOTE — ED ADULT TRIAGE NOTE - CHIEF COMPLAINT QUOTE
c/o rectal itchy , unable to sleep , had rectal bleed a week a go as per patient c/o rectal itchy  there is like pimple inside, unable to sleep , had rectal bleed a week a go as per patient

## 2022-06-19 NOTE — ED PROVIDER NOTE - PATIENT PORTAL LINK FT
You can access the FollowMyHealth Patient Portal offered by [facility] by registering at the following website: [email or web address]. By joining [employer]’s FollowMyHealth portal, you will also be able to view your health information using other applications (apps) compatible with our system.

## 2022-07-10 ENCOUNTER — EMERGENCY (EMERGENCY)
Facility: HOSPITAL | Age: 32
LOS: 1 days | Discharge: HOME | End: 2022-07-10
Attending: STUDENT IN AN ORGANIZED HEALTH CARE EDUCATION/TRAINING PROGRAM
Payer: MEDICAID

## 2022-07-10 ENCOUNTER — EMERGENCY (EMERGENCY)
Facility: HOSPITAL | Age: 32
LOS: 1 days | Discharge: HOME | End: 2022-07-10
Attending: STUDENT IN AN ORGANIZED HEALTH CARE EDUCATION/TRAINING PROGRAM
Payer: SELF-PAY

## 2022-07-10 VITALS
WEIGHT: 180.78 LBS | RESPIRATION RATE: 16 BRPM | HEIGHT: 65.35 IN | TEMPERATURE: 98 F | SYSTOLIC BLOOD PRESSURE: 155 MMHG | DIASTOLIC BLOOD PRESSURE: 87 MMHG | OXYGEN SATURATION: 98 % | HEART RATE: 101 BPM

## 2022-07-10 VITALS
RESPIRATION RATE: 18 BRPM | HEART RATE: 90 BPM | SYSTOLIC BLOOD PRESSURE: 125 MMHG | DIASTOLIC BLOOD PRESSURE: 76 MMHG | OXYGEN SATURATION: 100 % | TEMPERATURE: 99 F

## 2022-07-10 VITALS
DIASTOLIC BLOOD PRESSURE: 84 MMHG | RESPIRATION RATE: 19 BRPM | OXYGEN SATURATION: 96 % | WEIGHT: 182.98 LBS | TEMPERATURE: 99 F | HEIGHT: 65.35 IN | SYSTOLIC BLOOD PRESSURE: 138 MMHG | HEART RATE: 101 BPM

## 2022-07-10 VITALS
TEMPERATURE: 98 F | RESPIRATION RATE: 18 BRPM | SYSTOLIC BLOOD PRESSURE: 115 MMHG | HEART RATE: 92 BPM | OXYGEN SATURATION: 97 % | DIASTOLIC BLOOD PRESSURE: 73 MMHG

## 2022-07-10 DIAGNOSIS — Z87.19 PERSONAL HISTORY OF OTHER DISEASES OF THE DIGESTIVE SYSTEM: Chronic | ICD-10-CM

## 2022-07-10 LAB
ALBUMIN SERPL ELPH-MCNC: 3.4 G/DL — LOW (ref 3.5–5)
ALP SERPL-CCNC: 92 U/L — SIGNIFICANT CHANGE UP (ref 40–120)
ALT FLD-CCNC: 67 U/L DA — HIGH (ref 10–60)
ANION GAP SERPL CALC-SCNC: 13 MMOL/L — SIGNIFICANT CHANGE UP (ref 5–17)
APTT BLD: 29.5 SEC — SIGNIFICANT CHANGE UP (ref 27.5–35.5)
AST SERPL-CCNC: 38 U/L — SIGNIFICANT CHANGE UP (ref 10–40)
BASOPHILS # BLD AUTO: 0.05 K/UL — SIGNIFICANT CHANGE UP (ref 0–0.2)
BASOPHILS NFR BLD AUTO: 0.4 % — SIGNIFICANT CHANGE UP (ref 0–2)
BILIRUB SERPL-MCNC: 0.6 MG/DL — SIGNIFICANT CHANGE UP (ref 0.2–1.2)
BUN SERPL-MCNC: 11 MG/DL — SIGNIFICANT CHANGE UP (ref 7–18)
CALCIUM SERPL-MCNC: 8.1 MG/DL — LOW (ref 8.4–10.5)
CHLORIDE SERPL-SCNC: 103 MMOL/L — SIGNIFICANT CHANGE UP (ref 96–108)
CO2 SERPL-SCNC: 21 MMOL/L — LOW (ref 22–31)
CREAT SERPL-MCNC: 0.93 MG/DL — SIGNIFICANT CHANGE UP (ref 0.5–1.3)
EGFR: 101 ML/MIN/1.73M2 — SIGNIFICANT CHANGE UP
EOSINOPHIL # BLD AUTO: 0.01 K/UL — SIGNIFICANT CHANGE UP (ref 0–0.5)
EOSINOPHIL NFR BLD AUTO: 0.1 % — SIGNIFICANT CHANGE UP (ref 0–6)
GLUCOSE SERPL-MCNC: 102 MG/DL — HIGH (ref 70–99)
HCT VFR BLD CALC: 43 % — SIGNIFICANT CHANGE UP (ref 39–50)
HGB BLD-MCNC: 14.2 G/DL — SIGNIFICANT CHANGE UP (ref 13–17)
IMM GRANULOCYTES NFR BLD AUTO: 0.4 % — SIGNIFICANT CHANGE UP (ref 0–1.5)
INR BLD: 1.17 RATIO — HIGH (ref 0.88–1.16)
LIDOCAIN IGE QN: 40 U/L — LOW (ref 73–393)
LYMPHOCYTES # BLD AUTO: 1.99 K/UL — SIGNIFICANT CHANGE UP (ref 1–3.3)
LYMPHOCYTES # BLD AUTO: 16.6 % — SIGNIFICANT CHANGE UP (ref 13–44)
MCHC RBC-ENTMCNC: 27.1 PG — SIGNIFICANT CHANGE UP (ref 27–34)
MCHC RBC-ENTMCNC: 33 GM/DL — SIGNIFICANT CHANGE UP (ref 32–36)
MCV RBC AUTO: 82.1 FL — SIGNIFICANT CHANGE UP (ref 80–100)
MONOCYTES # BLD AUTO: 0.77 K/UL — SIGNIFICANT CHANGE UP (ref 0–0.9)
MONOCYTES NFR BLD AUTO: 6.4 % — SIGNIFICANT CHANGE UP (ref 2–14)
NEUTROPHILS # BLD AUTO: 9.1 K/UL — HIGH (ref 1.8–7.4)
NEUTROPHILS NFR BLD AUTO: 76.1 % — SIGNIFICANT CHANGE UP (ref 43–77)
NRBC # BLD: 0 /100 WBCS — SIGNIFICANT CHANGE UP (ref 0–0)
PLATELET # BLD AUTO: 352 K/UL — SIGNIFICANT CHANGE UP (ref 150–400)
POTASSIUM SERPL-MCNC: 3.6 MMOL/L — SIGNIFICANT CHANGE UP (ref 3.5–5.3)
POTASSIUM SERPL-SCNC: 3.6 MMOL/L — SIGNIFICANT CHANGE UP (ref 3.5–5.3)
PROT SERPL-MCNC: 7.8 G/DL — SIGNIFICANT CHANGE UP (ref 6–8.3)
PROTHROM AB SERPL-ACNC: 13.9 SEC — HIGH (ref 10.5–13.4)
RBC # BLD: 5.24 M/UL — SIGNIFICANT CHANGE UP (ref 4.2–5.8)
RBC # FLD: 13 % — SIGNIFICANT CHANGE UP (ref 10.3–14.5)
SODIUM SERPL-SCNC: 137 MMOL/L — SIGNIFICANT CHANGE UP (ref 135–145)
TROPONIN I, HIGH SENSITIVITY RESULT: 7.3 NG/L — SIGNIFICANT CHANGE UP
WBC # BLD: 11.97 K/UL — HIGH (ref 3.8–10.5)
WBC # FLD AUTO: 11.97 K/UL — HIGH (ref 3.8–10.5)

## 2022-07-10 PROCEDURE — 99284 EMERGENCY DEPT VISIT MOD MDM: CPT

## 2022-07-10 PROCEDURE — 93010 ELECTROCARDIOGRAM REPORT: CPT

## 2022-07-10 PROCEDURE — 99053 MED SERV 10PM-8AM 24 HR FAC: CPT

## 2022-07-10 PROCEDURE — 99285 EMERGENCY DEPT VISIT HI MDM: CPT

## 2022-07-10 RX ORDER — SODIUM CHLORIDE 9 MG/ML
1000 INJECTION INTRAMUSCULAR; INTRAVENOUS; SUBCUTANEOUS ONCE
Refills: 0 | Status: DISCONTINUED | OUTPATIENT
Start: 2022-07-10 | End: 2022-07-13

## 2022-07-10 RX ORDER — KETOROLAC TROMETHAMINE 30 MG/ML
30 SYRINGE (ML) INJECTION ONCE
Refills: 0 | Status: DISCONTINUED | OUTPATIENT
Start: 2022-07-10 | End: 2022-07-10

## 2022-07-10 RX ORDER — FAMOTIDINE 10 MG/ML
20 INJECTION INTRAVENOUS ONCE
Refills: 0 | Status: COMPLETED | OUTPATIENT
Start: 2022-07-10 | End: 2022-07-10

## 2022-07-10 RX ORDER — FAMOTIDINE 10 MG/ML
1 INJECTION INTRAVENOUS
Qty: 10 | Refills: 0
Start: 2022-07-10 | End: 2022-07-14

## 2022-07-10 RX ORDER — FAMOTIDINE 10 MG/ML
20 INJECTION INTRAVENOUS DAILY
Refills: 0 | Status: DISCONTINUED | OUTPATIENT
Start: 2022-07-10 | End: 2022-07-14

## 2022-07-10 RX ADMIN — Medication 30 MILLIGRAM(S): at 08:25

## 2022-07-10 RX ADMIN — FAMOTIDINE 20 MILLIGRAM(S): 10 INJECTION INTRAVENOUS at 20:14

## 2022-07-10 RX ADMIN — FAMOTIDINE 20 MILLIGRAM(S): 10 INJECTION INTRAVENOUS at 08:25

## 2022-07-10 RX ADMIN — Medication 30 MILLILITER(S): at 20:14

## 2022-07-10 RX ADMIN — Medication 30 MILLILITER(S): at 08:25

## 2022-07-10 NOTE — ED PROVIDER NOTE - CLINICAL SUMMARY MEDICAL DECISION MAKING FREE TEXT BOX
31-year-old male no medical hx presenting for second time today for chest discomfort - ECG normal. Likely gastritis. Feels better after GI meds, will send rx for Pepcid/Mylanta and advise GI followup.

## 2022-07-10 NOTE — ED PROVIDER NOTE - OBJECTIVE STATEMENT
51M, no significant pmh, presenting with chest pain. patient reports he has had 2-3 days of chest pain but has also been drinking 10 beers a day during this time. no vomiting, abdominal pain, coughing, fever, pain or swelling of lower extremities.

## 2022-07-10 NOTE — ED PROVIDER NOTE - PRO INTERPRETER NEED 2
Per EMR, patient has cardiologist appointment today.  Will reschedule outreach call for tomorrow per standard workflow.  
Swazi

## 2022-07-10 NOTE — ED PROVIDER NOTE - NSFOLLOWUPINSTRUCTIONS_ED_ALL_ED_FT
Lo vieron en el departamento de emergencias por: dolor en el pecho  Bennett diagnóstico para esta visita fue: gastritis  De esta visita al servicio de urgencias se le recetó: Pepcid, Mylanta  Le recomendamos seguimiento con: Gastroenterólogo    Regrese al Departamento de Emergencias si experimenta alguno de los siguientes síntomas:  - Dificultad para respirar o dificultad para respirar  - Presión, dolor u opresión en el pecho  - Telma caída, debilidad en los brazos o dificultad para hablar  - Persistencia de vómitos severos  - Lesión en la yasmin o pérdida del conocimiento  - Sangrado continuo o nikos herida abierta    (1) Mary Anne un seguimiento con bennett médico de atención primaria dentro de las próximas 24 a 48 horas, según lo discutido. Además, no encontramos evidencia de nikos enfermedad potencialmente mortal en eveline pruebas aquí hoy, norma a continuación se enumeran los especialistas que será necesario mana christophe paciente ambulatorio para continuar con el estudio. Llame a los números que se indican a continuación o al [phone] para programar las citas necesarias.  (2) Rossie Tylenol (hasta 1000 mg o 1 g) y/o Motrin (hasta 600 mg) hasta cada 6 horas según sea necesario para el dolor.  (3) Si le colocaron nikos línea IV (intravenosa), se la quitaron. A veces, después de la extracción de la vía intravenosa, jonathan área puede estar sensible elisa unos días; si desarrolla enrojecimiento e hinchazón, podrían ser signos de infección; en cuyo ashley, regrese al Departamento de Emergencias para bennett evaluación.  (4) Continúe tomando todos eveline medicamentos en el hogar según las indicaciones.        You were seen in the emergency department for: chest pain  Your diagnosis for this visit was: gastritis  From this ED visit you were prescribed: Lisa, Malika  We recommend you follow up with: Gastroenterologist    Please return to the Emergency Department if you experience any of the following symptoms:   - Shortness of breath or trouble breathing  - Pressure, pain or tightness in the chest  - Face drooping, arm weakness or speech difficulty  - Persistence of severe vomiting  - Head injury or loss of consciousness  - Nonstop bleeding or an open wound    (1) Follow up with your primary care physician within the next 24-48 hours as discussed. In addition, we did not find evidence of a life threatening illness on your testing here today, but listed below are the specialists that will be necessary to see as an outpatient to continue the workup.  Please call the numbers listed below or [phone] to set up the necessary appointments.  (2) Take Tylenol (up to 1000mg or 1 g)  and/or Motrin (up to 600mg) up to every 6 hours as needed for pain.   (3) If you had an IV (intravenous) line placed, it was removed. Sometimes, after IV removal, that area can be tender for a few days; if it develops redness and swelling, those could be signs of infection; in which case, return to the Emergency Department for assessment.  (4) Please continue taking all of your home medications as directed.

## 2022-07-10 NOTE — ED PROVIDER NOTE - NSFOLLOWUPINSTRUCTIONS_ED_ALL_ED_FT
You were seen in the emergency department for chest pain.     Please follow-up with your primary care doctor in the next 24-48 hours.     If you have any worsening symptoms, severe headache, chest pain, shortness of breath, abdominal pain, please return to the emergency department.

## 2022-07-10 NOTE — ED PROVIDER NOTE - CLINICAL SUMMARY MEDICAL DECISION MAKING FREE TEXT BOX
51M presenting with chest pain. has been drinking alcohol heavily. non-tender abdomen. denies any other recreational drug use. will get labs, symptom control. will reassess.

## 2022-07-10 NOTE — ED ADULT NURSE NOTE - NSIMPLEMENTINTERV_GEN_ALL_ED
Implemented All Universal Safety Interventions:  [place] to call system. Call bell, personal items and telephone within reach. Instruct patient to call for assistance. Room bathroom lighting operational. Non-slip footwear when patient is off stretcher. Physically safe environment: no spills, clutter or unnecessary equipment. Stretcher in lowest position, wheels locked, appropriate side rails in place.

## 2022-07-10 NOTE — ED PROVIDER NOTE - PATIENT PORTAL LINK FT
You can access the FollowMyHealth Patient Portal offered by [facility] by registering at the following website: [email or web address]. By joining [facility]’s FollowMyHealth portal, you will also be able to view your health information using other applications (apps) compatible with our system.

## 2022-07-10 NOTE — ED PROVIDER NOTE - PATIENT PORTAL LINK FT
You can access the FollowMyHealth Patient Portal offered by [facility] by registering at the following website: [email or web address]. By joining [employer]’s FollowMyHealth portal, you will also be able to view your health information using other applications (apps) compatible with our system.

## 2022-07-10 NOTE — ED PROVIDER NOTE - NSFOLLOWUPCLINICS_GEN_ALL_ED_FT
[facility]  Gastroenterology  [address] [place] [address]  Phone: [phone]  Fax: [phone]

## 2022-07-10 NOTE — ED PROVIDER NOTE - OBJECTIVE STATEMENT
31-year-old male no medical hx presenting for second time today for chest discomfort. Had a negative workup today. Pain is sternal and burning. Patient drinks 10+ drinks a day, was drinking earlier. No other symptoms.

## 2022-07-13 NOTE — PATIENT PROFILE ADULT - HAVE YOU EXPERIENCED VIOLENCE OR A TRAUMATIC EVENT?
"Encounter Date: 7/12/2022       History     Chief Complaint   Patient presents with    URI     Pt states," I have a cough for two days."     Chief complaint:  URI    HPI:  38-year-old female history of asthma (1 childhood hospitalizations) presenting for evaluation of 5 day history of nasal congestion, white rhinorrhea productive cough with white sputum and intermittent shortness of breath that occurs with coughing spells.  She reports she has been in contact with her nephew who was having a cough.  She attempted treatment with Mucinex without relief of her symptoms.  She states she feels like this is a flare of her asthma and feels that she has been wheezing.  Attempted treatment with her inhaler.  She does not have a nebulizer machine has concern that she is not able to do 1 breathing treatments.  Denies any fever, chills, ear pain, myalgias, headache, nausea vomiting, diarrhea, dysuria, urinary frequency or urgency or other associated symptoms.        Review of patient's allergies indicates:  No Known Allergies  Past Medical History:   Diagnosis Date    Asthma      Past Surgical History:   Procedure Laterality Date    HERNIA REPAIR       Family History   Problem Relation Age of Onset    Diabetes Father     Hypertension Sister      Social History     Tobacco Use    Smoking status: Heavy Tobacco Smoker     Packs/day: 1.50     Types: Cigarettes    Smokeless tobacco: Never Used   Substance Use Topics    Alcohol use: Yes     Comment: occasionally    Drug use: No     Review of Systems   Constitutional: Negative for chills and fever.   HENT: Positive for congestion and rhinorrhea. Negative for ear pain, nosebleeds, sore throat and trouble swallowing.    Eyes: Negative for redness.   Respiratory: Positive for cough, shortness of breath and wheezing. Negative for stridor.    Cardiovascular: Negative for chest pain.   Gastrointestinal: Negative for abdominal pain, constipation, diarrhea, nausea and vomiting. "   Genitourinary: Negative for decreased urine volume, dysuria, frequency, hematuria and urgency.   Musculoskeletal: Negative for back pain and neck pain.   Skin: Negative for rash and wound.   Neurological: Negative for dizziness, speech difficulty, weakness, light-headedness, numbness and headaches.   Hematological: Does not bruise/bleed easily.   Psychiatric/Behavioral: Negative for confusion.       Physical Exam     Initial Vitals [07/12/22 1728]   BP Pulse Resp Temp SpO2   138/87 100 16 98.7 °F (37.1 °C) 97 %      MAP       --         Physical Exam    Nursing note and vitals reviewed.  Constitutional: She appears well-developed and well-nourished.   HENT:   Head: Normocephalic.   Right Ear: External ear normal.   Left Ear: External ear normal.   Nose: Mucosal edema and rhinorrhea present. Right sinus exhibits no maxillary sinus tenderness and no frontal sinus tenderness. Left sinus exhibits no maxillary sinus tenderness and no frontal sinus tenderness.   Mouth/Throat: Uvula is midline and mucous membranes are normal. Posterior oropharyngeal erythema present. No oropharyngeal exudate or posterior oropharyngeal edema.   Postnasal drip      Eyes: Conjunctivae are normal.   Cardiovascular: Normal rate and regular rhythm. Exam reveals no gallop and no friction rub.    No murmur heard.  Pulmonary/Chest: Breath sounds normal. She has no wheezes. She has no rhonchi. She has no rales.   Abdominal: Abdomen is soft. Bowel sounds are normal. She exhibits no distension. There is no abdominal tenderness. There is no rebound, no guarding, no tenderness at McBurney's point and negative Gusman's sign.   Musculoskeletal:         General: Normal range of motion.     Lymphadenopathy:     She has no cervical adenopathy.   Neurological: She is alert. She has normal strength. No cranial nerve deficit or sensory deficit.   Skin: Skin is warm and dry.   Psychiatric: She has a normal mood and affect.         ED Course   Procedures  Labs  Reviewed   POCT URINE PREGNANCY   SARS-COV-2 RDRP GENE    Narrative:     This test utilizes isothermal nucleic acid amplification   technology to detect the SARS-CoV-2 RdRp nucleic acid segment.   The analytical sensitivity (limit of detection) is 125 genome   equivalents/mL.   A POSITIVE result implies infection with the SARS-CoV-2 virus;   the patient is presumed to be contagious.     A NEGATIVE result means that SARS-CoV-2 nucleic acids are not   present above the limit of detection. A NEGATIVE result should be   treated as presumptive. It does not rule out the possibility of   COVID-19 and should not be the sole basis for treatment decisions.   If COVID-19 is strongly suspected based on clinical and exposure   history, re-testing using an alternate molecular assay should be   considered.   This test is only for use under the Food and Drug   Administration s Emergency Use Authorization (EUA).   Commercial kits are provided by [employer].   Performance characteristics of the EUA have been independently   verified by [facility] Department of   Pathology and Laboratory Medicine.   _________________________________________________________________   The authorized Fact Sheet for Healthcare Providers and the authorized Fact   Sheet for Patients of the ID NOW COVID-19 are available on the FDA   website:     https://www.fda.gov/media/784624/download  https://www.fda.gov/media/581107/download           POCT INFLUENZA A/B MOLECULAR   POCT RAPID INFLUENZA A/B          Imaging Results    None          Medications   cetirizine tablet 10 mg (10 mg Oral Given 7/12/22 2112)   benzonatate capsule 100 mg (100 mg Oral Given 7/12/22 2112)   albuterol-ipratropium 2.5 mg-0.5 mg/3 mL nebulizer solution 3 mL (3 mLs Nebulization Given 7/12/22 2140)     Medical Decision Making:   ED Management:  38-year-old female presenting for URI symptoms.  Patient is afebrile nontoxic appearing in no distress.  Exam above COVID flu  negative.  Lungs clear to auscultation. Considered but doubt PNA at this time. Patient reports she is feeling short of breath a feels consistent with her history of asthma.  DuoNeb x1 given.  Feeling better after treatment.  Will discharge with medications for URI.  Will have her follow up with primary care in 2 days return ER for worsening symptoms or as needed.                      Clinical Impression:   Final diagnoses:  [J06.9] Viral URI with cough (Primary)          ED Disposition Condition    Discharge Stable        ED Prescriptions     Medication Sig Dispense Start Date End Date Auth. Provider    cetirizine (ZYRTEC) 10 MG tablet Take 1 tablet (10 mg total) by mouth once daily. for 14 days 14 tablet 7/12/2022 7/26/2022 Kathy Eduardo PA-C    fluticasone propionate (FLONASE) 50 mcg/actuation nasal spray 1 spray (50 mcg total) by Each Nostril route 2 (two) times daily as needed for Rhinitis or Allergies. 15 g 7/12/2022 8/11/2022 Kathy Eduardo PA-C    ibuprofen (ADVIL,MOTRIN) 600 MG tablet Take 1 tablet (600 mg total) by mouth every 6 (six) hours as needed for Pain. 20 tablet 7/12/2022 7/17/2022 Kathy Eduardo PA-C    acetaminophen (TYLENOL) 500 MG tablet Take 1 tablet (500 mg total) by mouth every 4 (four) hours as needed. 20 tablet 7/12/2022 7/17/2022 Kathy Eduardo PA-C    benzonatate (TESSALON) 100 MG capsule Take 1 capsule (100 mg total) by mouth 3 (three) times daily as needed for Cough. 20 capsule 7/12/2022 7/19/2022 Kathy Eduardo PA-C    albuterol (PROVENTIL/VENTOLIN HFA) 90 mcg/actuation inhaler Inhale 1-2 puffs into the lungs every 6 (six) hours as needed for Wheezing or Shortness of Breath. Rescue 18 g 7/12/2022 8/11/2022 Kathy Eduardo PA-C        Follow-up Information     Follow up With Specialties Details Why Contact Info    Peri Martinez MD General Practice Schedule an appointment as soon as possible for a visit in 2 days for follow up [id number] [address]  [place] [address]  [phone]      [facility] ED Emergency Medicine Go to  As needed, If symptoms worsen [address] [facility] [address] [phone]           Kathy Eduardo PA-C  07/12/22 0962     no

## 2022-08-24 NOTE — BH INPATIENT PSYCHIATRY DISCHARGE NOTE - VETERAN
"Annual gyn exam    HPI:  Mary Souza is a 61 y.o. female  presents for a well woman exam.  LMP: Patient's last menstrual period was 2022..   She continued to take OCPs through the end of May, and her LMP was OCP related.  She continued to take the OCPs for there hormonal benefit.  She is now amenorrheic, consistent with menopause.  She complains of hot flashes, mood swings, and vaginal dryness with dyspareunia.  She also complains of a small nodule which protrudes next to her left nipple when she sits or strain.  It is nontender    Past Medical History:   Diagnosis Date    GERD (gastroesophageal reflux disease)     Hyperlipidemia      Past Surgical History:   Procedure Laterality Date    AUGMENTATION OF BREAST      CHOLECYSTECTOMY      KNEE SURGERY       Social History     Socioeconomic History    Marital status:    Tobacco Use    Smoking status: Never Smoker    Smokeless tobacco: Never Used   Substance and Sexual Activity    Alcohol use: Not Currently    Sexual activity: Yes     Family History   Problem Relation Age of Onset    Breast cancer Neg Hx     Colon cancer Neg Hx     Ovarian cancer Neg Hx     Uterine cancer Neg Hx      OB History        2    Para   2    Term   2            AB        Living   2       SAB        IAB        Ectopic        Multiple        Live Births                     /84 (BP Location: Right arm, Patient Position: Sitting)   Ht 5' 6" (1.676 m)   Wt 73.5 kg (162 lb)   LMP 2022 Comment:   BMI 26.15 kg/m²     ROS:  GENERAL: Denies weight gain or weight loss. Feeling well overall.   SKIN: Denies rash or lesions.   HEAD: Denies head injury or headache.   NODES: Denies enlarged lymph nodes.   CHEST: Denies chest pain or shortness of breath.   CARDIOVASCULAR: Denies palpitations or left sided chest pain.   ABDOMEN: No abdominal pain, constipation, diarrhea, nausea, vomiting or rectal bleeding.   URINARY: No frequency, " dysuria, hematuria, or burning on urination.  REPRODUCTIVE: See HPI.   BREASTS: The patient performs breast self-examination and denies pain, lumps, or nipple discharge.   HEMATOLOGIC: No easy bruisability or excessive bleeding.   MUSCULOSKELETAL: Denies joint pain or swelling.   NEUROLOGIC: Denies syncope or weakness.   PSYCHIATRIC: Denies depression, anxiety or mood swings.    PHYSICAL EXAM:    APPEARANCE: Well nourished, well developed, in no acute distress.  AFFECT: WNL, alert and oriented x 3  SKIN: No acne or hirsutism  NECK: Neck symmetric without masses or thyromegaly  NODES: No inguinal, cervical, axillary, or femoral lymph node enlargement  CHEST: Good respiratory effect  ABDOMEN: Soft.  No tenderness or masses.  No hepatosplenomegaly.  No hernias.  BREASTS: Symmetrical, no skin changes or visible lesions.  No palpable masses, nipple discharge bilaterally.  The area near the left nipple that the patient is concerned about palpates as part of the breast implant.  No abnormal mass palpated  PELVIC: Normal external genitalia without lesions.  Normal hair distribution.  Adequate perineal body, normal urethral meatus.  Vagina moist and well rugated without lesions or discharge.  Cervix pink, without lesions, discharge or tenderness.  No significant cystocele or rectocele.  Bimanual exam shows uterus to be normal size, regular, mobile and nontender.  Adnexa without masses or tenderness.    RECTAL: Rectovaginal exam confirms above with normal sphincter tone, no masses.  EXTREMITIES: No edema.      ICD-10-CM ICD-9-CM    1. Encounter for annual routine gynecological examination  Z01.419 V72.31    2. Pap smear for cervical cancer screening  Z12.4 V76.2 Liquid-Based Pap Smear, Screening      Liquid-Based Pap Smear, Screening   3. Hormone replacement therapy (HRT)  Z79.890 V07.4 estradioL (ESTRACE) 1 MG tablet      medroxyPROGESTERone (PROVERA) 2.5 MG tablet   4. Vasomotor symptoms due to menopause  N95.1 627.2  estradioL (ESTRACE) 1 MG tablet      medroxyPROGESTERone (PROVERA) 2.5 MG tablet     Assessment and plan:    1. Pap done  2. Mammogram and left breast ultrasound ordered.  Exam benign.  I feel that the nodule that she is feeling is really her breast implants, but will proceed with diagnostic mammogram and ultrasound of the left breast    3. Colonoscopy due in 2023  4. Menopausal symptoms off OCPs.  Discussed risks and benefits of HRT.  Patient wishes to start HRT.  Start estradiol 1 mg plus Provera 2.5 mg daily  Return in 1 year or as needed     No

## 2022-10-07 NOTE — BH CONSULTATION LIAISON ASSESSMENT NOTE - NSBHADMITIPOBS_PSY_A_CORE
Cantharidin Pregnancy And Lactation Text: The use of this medication during pregnancy or lactation is not recommended as there is insufficient data. Routine observation

## 2023-01-13 NOTE — ED PROVIDER NOTE - SKIN, MLM
Skin normal color for race, warm, dry and intact. No evidence of rash.
Pt with inadequate oral intake PTA and while admitted. Pt reports poor appetite.

## 2023-01-30 NOTE — H&P ADULT - SKIN
Progress Notes by Wilner Ferguson MD at 02/15/17 10:35 AM     Author:  Wilner Ferguson MD Service:  (none) Author Type:  Physician     Filed:  02/15/17 11:05 AM Encounter Date:  2/15/2017 Status:  Signed     :  Wilner Ferguson MD (Physician)            Roomed by Latia Brewer RN  2/15/2017    Last office visit[LB1.1T]  3/16/2016[LB1.1M]      SUBJECTIVE:   Moreno Roach is a 9[LB1.1C]5[LB1.1M] year old male who presents today for[LB1.1C] yearly[LB1.1M] up on AKs and SKs treated with LN2 at last appt. Hx of basal cell carcinoma on his right neck was edcx3 on 9/10/14 and a history SCC right ala. Doing well with no problems in the area and no changes. No wt change. No adenopathy. Areas of removal are well healed with no worrisome changes. Using sunscreens. He does[LB1.1C] crusty lesion to forehead, cheeks and ear lobes[LB1.1M] would like looked at today.  No[LB1.1C] pain, bleeding, itching.    ROS: No liver, kidney problems, diabetes, TB, hypertension-on medications or ulcers or bleeding problems reviewed 2/15/2017[LB1.2T]        OBJECTIVE:   Exam of head, neck ,chest, back and abdomen, both upper extremities and lower extremities reveals no suspicious lesions. Previously removed lesion well healed.  symmetric, sharply marginated, evenly pigmented, slightly scaly patches of actinic keratosis on right forehead x[LB1.1C] 10[FK1.1M]  left[LB1.1C] cheek x 2,[FK1.1M] left[FK1.1T] ear x 2,[FK1.1M] left[FK1.1T] hand x 2 and[FK1.1M] right[FK1.1T] hand.[FK1.1M]  seborrheic keratosis flesh colored, evenly pigmented, symmetric, sharply marginated on back  ASSESSMENT: No sign of recurrence. History of skin cancer - observe for recurrence-chronic problem   ak[LB1.1C]  sk[FK1.1M]  Sun protection discussed with patient as was monthly self exams and regular visits.  Plan: See me back in six months.  Sun protection discussed with patient    Patient warned about side effects of liquid nitrogen including failure to heal, scar, pigmentary  detailed exam problems and recurrence. Patient asked to return if lesion does not heal or recurrs. Liquid nitrogen with 20 sec thaw time applied to each lesion x[LB1.1C] 17[FK1.1M]    Electronically Signed by:    Wilner Ferguson MD , 2/15/2017[FK1.2T]            Revision History        User Key Date/Time User Provider Type Action    > FK1.2 02/15/17 11:05 AM Wilner Ferguson MD Physician Sign     FK1.1 02/15/17 11:02 AM Wilner Ferguson MD Physician      LB1.2 02/15/17 10:39 AM Latia Brewer, RN, BSN Registered Nurse Sign at close encounter     LB1.1 02/15/17 10:35 AM Latia Brewer, RN, BSN Registered Nurse     C - Copied, M - Manual, T - Template             4 = No assist / stand by assistance

## 2023-02-10 NOTE — ED PROVIDER NOTE - CONSTITUTIONAL MOOD
As demonstrated on CT  Recommend follow-up with contrast-enhanced abdominal MRI, pancreatic protocol.   DEPRESSED APPEARING

## 2023-03-08 NOTE — ED BEHAVIORAL HEALTH ASSESSMENT NOTE - NS ED [facility] MED ROS PSYCHIATRIC
Detail Level: Generalized Detail Level: Detailed High Dose Vitamin A Pregnancy And Lactation Text: High dose vitamin A therapy is contraindicated during pregnancy and breast feeding. Dapsone Counseling: I discussed with the patient the risks of dapsone including but not limited to hemolytic anemia, agranulocytosis, rashes, methemoglobinemia, kidney failure, peripheral neuropathy, headaches, GI upset, and liver toxicity.  Patients who start dapsone require monitoring including baseline LFTs and weekly CBCs for the first month, then every month thereafter.  The patient verbalized understanding of the proper use and possible adverse effects of dapsone.  All of the patient's questions and concerns were addressed. Benzoyl Peroxide Counseling: Patient counseled that medicine may cause skin irritation and bleach clothing.  In the event of skin irritation, the patient was advised to reduce the amount of the drug applied or use it less frequently.   The patient verbalized understanding of the proper use and possible adverse effects of benzoyl peroxide.  All of the patient's questions and concerns were addressed. Bactrim Counseling:  I discussed with the patient the risks of sulfa antibiotics including but not limited to GI upset, allergic reaction, drug rash, diarrhea, dizziness, photosensitivity, and yeast infections.  Rarely, more serious reactions can occur including but not limited to aplastic anemia, agranulocytosis, methemoglobinemia, blood dyscrasias, liver or kidney failure, lung infiltrates or desquamative/blistering drug rashes. Spironolactone Counseling: Patient advised regarding risks of diarrhea, abdominal pain, hyperkalemia, birth defects (for female patients), liver toxicity and renal toxicity. The patient may need blood work to monitor liver and kidney function and potassium levels while on therapy. The patient verbalized understanding of the proper use and possible adverse effects of spironolactone.  All of the patient's questions and concerns were addressed. Benzoyl Peroxide Pregnancy And Lactation Text: This medication is Pregnancy Category C. It is unknown if benzoyl peroxide is excreted in breast milk. Erythromycin Pregnancy And Lactation Text: This medication is Pregnancy Category B and is considered safe during pregnancy. It is also excreted in breast milk. Tazorac Pregnancy And Lactation Text: This medication is not safe during pregnancy. It is unknown if this medication is excreted in breast milk. Topical Clindamycin Counseling: Patient counseled that this medication may cause skin irritation or allergic reactions.  In the event of skin irritation, the patient was advised to reduce the amount of the drug applied or use it less frequently.   The patient verbalized understanding of the proper use and possible adverse effects of clindamycin.  All of the patient's questions and concerns were addressed. Minocycline Counseling: Patient advised regarding possible photosensitivity and discoloration of the teeth, skin, lips, tongue and gums.  Patient instructed to avoid sunlight, if possible.  When exposed to sunlight, patients should wear protective clothing, sunglasses, and sunscreen.  The patient was instructed to call the office immediately if the following severe adverse effects occur:  hearing changes, easy bruising/bleeding, severe headache, or vision changes.  The patient verbalized understanding of the proper use and possible adverse effects of minocycline.  All of the patient's questions and concerns were addressed. Dapsone Pregnancy And Lactation Text: This medication is Pregnancy Category C and is not considered safe during pregnancy or breast feeding. Topical Retinoid counseling:  Patient advised to apply a pea-sized amount only at bedtime and wait 30 minutes after washing their face before applying.  If too drying, patient may add a non-comedogenic moisturizer. The patient verbalized understanding of the proper use and possible adverse effects of retinoids.  All of the patient's questions and concerns were addressed. Isotretinoin Counseling: Patient should get monthly blood tests, not donate blood, not drive at night if vision affected, not share medication, and not undergo elective surgery for 6 months after tx completed. Side effects reviewed, pt to contact office should one occur. Minocycline Pregnancy And Lactation Text: This medication is Pregnancy Category D and not consider safe during pregnancy. It is also excreted in breast milk. Detail Level: Zone Bactrim Pregnancy And Lactation Text: This medication is Pregnancy Category D and is known to cause fetal risk.  It is also excreted in breast milk. Spironolactone Pregnancy And Lactation Text: This medication can cause feminization of the male fetus and should be avoided during pregnancy. The active metabolite is also found in breast milk. Include Pregnancy/Lactation Warning?: No Topical Clindamycin Pregnancy And Lactation Text: This medication is Pregnancy Category B and is considered safe during pregnancy. It is unknown if it is excreted in breast milk. Doxycycline Counseling:  Patient counseled regarding possible photosensitivity and increased risk for sunburn.  Patient instructed to avoid sunlight, if possible.  When exposed to sunlight, patients should wear protective clothing, sunglasses, and sunscreen.  The patient was instructed to call the office immediately if the following severe adverse effects occur:  hearing changes, easy bruising/bleeding, severe headache, or vision changes.  The patient verbalized understanding of the proper use and possible adverse effects of doxycycline.  All of the patient's questions and concerns were addressed. Tetracycline Counseling: Patient counseled regarding possible photosensitivity and increased risk for sunburn.  Patient instructed to avoid sunlight, if possible.  When exposed to sunlight, patients should wear protective clothing, sunglasses, and sunscreen.  The patient was instructed to call the office immediately if the following severe adverse effects occur:  hearing changes, easy bruising/bleeding, severe headache, or vision changes.  The patient verbalized understanding of the proper use and possible adverse effects of tetracycline.  All of the patient's questions and concerns were addressed. Patient understands to avoid pregnancy while on therapy due to potential birth defects. Sarecycline Counseling: Patient advised regarding possible photosensitivity and discoloration of the teeth, skin, lips, tongue and gums.  Patient instructed to avoid sunlight, if possible.  When exposed to sunlight, patients should wear protective clothing, sunglasses, and sunscreen.  The patient was instructed to call the office immediately if the following severe adverse effects occur:  hearing changes, easy bruising/bleeding, severe headache, or vision changes.  The patient verbalized understanding of the proper use and possible adverse effects of sarecycline.  All of the patient's questions and concerns were addressed. Doxycycline Pregnancy And Lactation Text: This medication is Pregnancy Category D and not consider safe during pregnancy. It is also excreted in breast milk but is considered safe for shorter treatment courses. Topical Retinoid Pregnancy And Lactation Text: This medication is Pregnancy Category C. It is unknown if this medication is excreted in breast milk. Isotretinoin Pregnancy And Lactation Text: This medication is Pregnancy Category X and is considered extremely dangerous during pregnancy. It is unknown if it is excreted in breast milk. Birth Control Pills Counseling: Birth Control Pill Counseling: I discussed with the patient the potential side effects of OCPs including but not limited to increased risk of stroke, heart attack, thrombophlebitis, deep venous thrombosis, hepatic adenomas, breast changes, GI upset, headaches, and depression.  The patient verbalized understanding of the proper use and possible adverse effects of OCPs. All of the patient's questions and concerns were addressed. Azithromycin Counseling:  I discussed with the patient the risks of azithromycin including but not limited to GI upset, allergic reaction, drug rash, diarrhea, and yeast infections. High Dose Vitamin A Counseling: Side effects reviewed, pt to contact office should one occur. Topical Sulfur Applications Counseling: Topical Sulfur Counseling: Patient counseled that this medication may cause skin irritation or allergic reactions.  In the event of skin irritation, the patient was advised to reduce the amount of the drug applied or use it less frequently.   The patient verbalized understanding of the proper use and possible adverse effects of topical sulfur application.  All of the patient's questions and concerns were addressed. Erythromycin Counseling:  I discussed with the patient the risks of erythromycin including but not limited to GI upset, allergic reaction, drug rash, diarrhea, increase in liver enzymes, and yeast infections. Tazorac Counseling:  Patient advised that medication is irritating and drying.  Patient may need to apply sparingly and wash off after an hour before eventually leaving it on overnight.  The patient verbalized understanding of the proper use and possible adverse effects of tazorac.  All of the patient's questions and concerns were addressed. Birth Control Pills Pregnancy And Lactation Text: This medication should be avoided if pregnant and for the first 30 days post-partum. Topical Sulfur Applications Pregnancy And Lactation Text: This medication is Pregnancy Category C and has an unknown safety profile during pregnancy. It is unknown if this topical medication is excreted in breast milk. Azithromycin Pregnancy And Lactation Text: This medication is considered safe during pregnancy and is also secreted in breast milk. Aklief Pregnancy And Lactation Text: It is unknown if this medication is safe to use during pregnancy.  It is unknown if this medication is excreted in breast milk.  Breastfeeding women should use the topical cream on the smallest area of the skin for the shortest time needed while breastfeeding.  Do not apply to nipple and areola. Winlevi Pregnancy And Lactation Text: This medication is considered safe during pregnancy and breastfeeding. Azelaic Acid Pregnancy And Lactation Text: This medication is considered safe during pregnancy and breast feeding. Aklief counseling:  Patient advised to apply a pea-sized amount only at bedtime and wait 30 minutes after washing their face before applying.  If too drying, patient may add a non-comedogenic moisturizer.  The most commonly reported side effects including irritation, redness, scaling, dryness, stinging, burning, itching, and increased risk of sunburn.  The patient verbalized understanding of the proper use and possible adverse effects of retinoids.  All of the patient's questions and concerns were addressed. Winlevi Counseling:  I discussed with the patient the risks of topical clascoterone including but not limited to erythema, scaling, itching, and stinging. Patient voiced their understanding. Azelaic Acid Counseling: Patient counseled that medicine may cause skin irritation and to avoid applying near the eyes.  In the event of skin irritation, the patient was advised to reduce the amount of the drug applied or use it less frequently.   The patient verbalized understanding of the proper use and possible adverse effects of azelaic acid.  All of the patient's questions and concerns were addressed. See HPI

## 2023-03-20 NOTE — BH INPATIENT PSYCHIATRY ASSESSMENT NOTE - NS ED BHA REVIEW OF ED CHART AVAILABLE INVESTIGATIONS REVIEWED
Yes How Severe Are Your Bumps?: mild Have Your Bumps Been Treated?: not been treated Is This A New Presentation, Or A Follow-Up?: Bump

## 2023-06-07 NOTE — ED ADULT TRIAGE NOTE - PRO INTERPRETER NEED 2
Sent to observation with RN, pt states his pain is now under control and he feels much better.  Report was given to staff in observation.  No further questions at this time   Greenlandic

## 2023-06-12 NOTE — ED ADULT NURSE NOTE - BEFAST EYES
PRE-TRANSPLANT INFECTIOUS DISEASE CONSULT    Reason for Visit:  Pre-transplant evaluation  Referring Provider: Aaareferral Self     History of Present Illness:    62 y.o. female with a history of IPF presents for pre-lung transplant evaluation.    Infectious History:  Recent hospital admissions: No  Recent infections: Yes. Gastroenteritis/Colitis due to E coli.  Recent or current antibiotic use: Yes. Cipro   History of recurrent infections *(sinus / pneumonia / UTI / SBP)*: No  Recent dental infections, issues or procedures: Yes. Undergoing dental evaluation/tooth extraction.  History of chicken pox or shingles: Yes  History of STI: Yes. Fever blisters  History of COVID infection: Yes    History of Immunosuppression:  Prior chemotherapy / immunosuppression: No  Prior transplant: No  History of splenectomy: No    Tuberculosis:  Prior screening for latent TB: No  Prior diagnosis of latent TB: Yes.  When/treatment? Exposed as a child. Unsure of treatment.  Risk factors for TB *known exposure, incarceration, homelessness*: Yes. 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    Geographical exposures:  Currently lives in [place] with roomates  Lived in the following states: [place], [place], [place], [place]  Lived in [facility] [place]: No  International travel: Yes  Travel-associated illness: No    Social/Environmental:  Occupation:  Retired.   Pets: No  Livestock: No  Fishing / hunting: Yes  Hobbies: Gardening  Water: [place]  Consumption of raw/undercooked meat or seafood?  Yes. Oysters, medium rare steaks  Tobacco: No. Past and quit 2.5 years ago  Alcohol: No. Recovery  Recreational drug use:  No. Past cocaine, BDZ  Sexual partners: Yes. Men      Past Histories:   Past Medical History:   Diagnosis Date    Anal fissure     Ureña's esophagus     CAD (coronary artery disease)     COPD (chronic obstructive pulmonary disease)     Diverticulitis     Hiatal hernia     IPF (idiopathic pulmonary fibrosis)      Past Surgical  History:   Procedure Laterality Date    COLECTOMY  2012    COLONOSCOPY  10/2019    most recent, has every three years    CORONARY ANGIOPLASTY WITH STENT PLACEMENT  2020    DILATION AND CURETTAGE OF UTERUS      x3 (miscarriages)    ESOPHAGEAL DILATION      EXCISION OF MELANOMA  2013    on back    HYSTERECTOMY  2020    SQUAMOUS CELL CARCINOMA EXCISION  2020    lip    THORACOTOMY Left 1991    THORACOTOMY Right 1993    spontaneous pneumo     Family History   Problem Relation Age of Onset    COPD Mother          at 72    Heart disease Mother     Other Mother         addiction history    Alcohol abuse Father         65    Kidney disease Father         ESRD    Breast cancer Sister         2016    No Known Problems Brother     Alzheimer's disease Brother         diagnosed in the last year    No Known Problems Brother     Bipolar disorder Sister     Coronary artery disease Sister         stented 2 months ago    Kfzm-Isfmf-Irtxzim disease Sister 6        hip replacement x 2, 'every ten years hip replacement'    No Known Problems Daughter     Other Daughter         spontaneous pneumo x 3 at 13    Hashimoto's thyroiditis Daughter     Other Daughter         alcohol abuse, 9 months sober 2020    Hypertension Daughter     Pneumonia Daughter         2017 influenza; intubated 14 days     Social History     Tobacco Use    Smoking status: Former     Packs/day: 1.50     Years: 30.00     Pack years: 45.00     Types: Cigarettes     Start date: 1976     Quit date: 10/5/2020     Years since quittin.6     Passive exposure: Past (occassionally at AA meetings people smoke, but no one in family)    Smokeless tobacco: Never    Tobacco comments:     23 - one cigarette a week and a half ago; otherwise quit since 2021   Substance Use Topics    Alcohol use: Not Currently     Comment: history of, last , would drink about a bottle of wine a day    Drug use: Not Currently     Types:  "Benzodiazepines, Cocaine, "Crack" cocaine     Comment: valium 'her mother used to give her it for cramps and travel'; detox 2016; rehab and currently a sponsor, active in AA     Review of patient's allergies indicates:   Allergen Reactions    Codeine Nausea And Vomiting    Morphine Nausea And Vomiting         Immunization History:  Received all childhood vaccines: Yes  All household members receive annual flu vaccine: No  All household members are up to date on COVID vaccine: No    Immunization History   Administered Date(s) Administered    COVID-19, MRNA, LN-S, PF (MODERNA FULL 0.5 ML DOSE) 03/30/2021, 05/04/2021    Influenza (FLUAD) - Quadrivalent - Adjuvanted - PF *Preferred* (65+) 06/12/2023    Influenza - Trivalent - PF (ADULT) 12/18/2018    Pneumococcal Conjugate - 20 Valent 06/12/2023    Tdap 09/26/2016          Current antibiotics:  Antibiotics (From admission, onward)      None          Review of Systems  Review of Systems   Constitutional: Negative for chills, decreased appetite, fever, malaise/fatigue, night sweats, weight gain and weight loss.   HENT:  Negative for congestion, ear pain, hearing loss, hoarse voice, sore throat and tinnitus.    Eyes:  Negative for blurred vision, redness and visual disturbance.   Cardiovascular:  Positive for leg swelling. Negative for chest pain and palpitations.   Respiratory:  Positive for cough and shortness of breath. Negative for hemoptysis, sputum production and wheezing.    Hematologic/Lymphatic: Negative for adenopathy. Bruises/bleeds easily.   Skin:  Positive for suspicious lesions. Negative for dry skin, itching and rash.   Musculoskeletal:  Negative for back pain, joint pain, myalgias and neck pain.   Gastrointestinal:  Negative for abdominal pain, constipation, diarrhea, heartburn, nausea and vomiting.   Genitourinary:  Negative for dysuria, flank pain, frequency, hematuria, hesitancy and urgency.   Neurological:  Negative for dizziness, headaches, numbness, " paresthesias and weakness.   Psychiatric/Behavioral:  Negative for depression and memory loss. The patient does not have insomnia and is not nervous/anxious.         Objective  Physical Exam  Vitals and nursing note reviewed.   Constitutional:       Appearance: She is well-developed.   HENT:      Head: Normocephalic and atraumatic.   Eyes:      General: No scleral icterus.        Right eye: No discharge.         Left eye: No discharge.      Conjunctiva/sclera: Conjunctivae normal.   Pulmonary:      Effort: Pulmonary effort is normal.   Musculoskeletal:         General: Normal range of motion.   Skin:     General: Skin is warm and dry.   Neurological:      Mental Status: She is alert and oriented to person, place, and time.   Psychiatric:         Behavior: Behavior normal.         Thought Content: Thought content normal.         Judgment: Judgment normal.         MELD: *liver transplant only*  MELD-Na: 6 at 6/12/2023  7:49 AM  MELD: 6 at 6/12/2023  7:49 AM  Calculated from:  Serum Creatinine: 0.8 mg/dL (Using min of 1 mg/dL) at 6/12/2023  7:49 AM  Serum Sodium: 136 mmol/L at 6/12/2023  7:49 AM  Total Bilirubin: 0.4 mg/dL (Using min of 1 mg/dL) at 6/12/2023  7:49 AM  INR(ratio): 1.0 at 6/12/2023  7:49 AM        Labs:    CBC:   Lab Results   Component Value Date    WBC 10.38 06/12/2023    HGB 14.0 06/12/2023    HCT 43.4 06/12/2023    MCV 98 06/12/2023     06/12/2023    GRAN 7.4 06/12/2023    GRAN 71.5 06/12/2023    LYMPH 1.8 06/12/2023    LYMPH 17.7 (L) 06/12/2023    MONO 0.8 06/12/2023    MONO 7.6 06/12/2023    EOSINOPHIL 1.6 06/12/2023       CMP:   Lab Results   Component Value Date     06/12/2023    K 3.9 06/12/2023     06/12/2023    CO2 27 06/12/2023     (H) 06/12/2023    BUN 8 06/12/2023    CREATININE 0.8 06/12/2023    CALCIUM 9.7 06/12/2023    MG 1.7 06/12/2023    ALBUMIN 3.8 06/12/2023    PROT 7.1 06/12/2023    ALT 38 06/12/2023    AST 33 06/12/2023    ALKPHOS 39 (L) 06/12/2023     BILITOT 0.4 06/12/2023    ESTGFRAFRICA >90 09/27/2022       Syphilis screening: No results found for: RPR, PRPQ, FTAABS     TB screening:   Lab Results   Component Value Date    TBGOLDPLUS Negative 06/12/2023       HIV screening:   Lab Results   Component Value Date    [id number] Non-reactive 06/13/2023       Strongyloides IgG: No results found for: STRONGANTIGG    Hepatitis Serologies:   Lab Results   Component Value Date    HEPAIGG Reactive 06/12/2023    HEPBCAB Reactive (A) 06/12/2023    HEPBSAB >1000.00 06/12/2023    HEPBSAB Reactive 06/12/2023    HEPCAB Non-reactive 06/12/2023        Varicella IgG:   Lab Results   Component Value Date    VARICELLAINT Positive 06/12/2023       Recent Microbiology:   Microbiology Results (last 7 days)       ** No results found for the last 168 hours. **                  Assessment and Plan      ICD-10-CM ICD-9-CM   1. Lung transplant candidate  Z76.82 V49.83         1. Risks of Infection: Available serologies were reviewed. No unusual risks of infection or significant barriers to transplantation were identified from the infectious disease standpoint.   - Pending serologies: Strongyloides IgG    2. Immunizations:  Based on the patient's immunization history and serologies, the following immunizations are recommended:  - Hepatitis A    Patient does have immunity to hepatitis A    Vaccination required: No   - Hepatitis B    Patient does have immunity to hepatitis B    Vaccination ordered today: No    If not ordered, reason for not vaccinating: Immunity   - COVID    Current CDC vaccination recommendations were discussed with the patient   - Influenza     Annual, high dose preferred   - Prevnar 20   - Tdap in 2026   - Shingrix- via retail pharmacy. Prescription given    Recommended Pre-Transplant Immunization Schedule  Vaccine  0m 1m 2m 6m   Pneumococcal conjugate vaccine (Prevnar 20) X      Tetanus-diphtheria-pertussis (Tdap)* X      Hepatitis A Vaccine (Havrix)** X   X   Hepatitis B  Vaccine (Heplisav)** X X     Influenza X      Zoster Recombinant Vaccine (Shingrix) X  X           *Administer booster every 10 years.       **Administer if no immunity demonstrated on serologies                 3. Counseling:   I discussed with the patient the risk for increased susceptibility to infections following transplantation including increased risk for infection right after transplant and if rejection should occur.  The patient has been counseled on the importance of vaccinations including but not limited to a yearly flu vaccine. Patient was also instructed to encourage that family/caretakers receive their flu vaccine yearly. The patient was encouraged to contact us about any problems that may develop after immunizations and possible side effects were reviewed.     Specific guidance has been provided to the patient regarding the patient's occupation, hobbies and activities to avoid future infectious complications. These include but are not limited to: avoiding raw/undercooked meats and seafood, avoiding unpasteurized milk/cheeses, proper (hand) hygiene, contact with animals and appropriate vaccination of animals, use of mosquito/tick precautions, avoiding walking barefoot, avoiding sick contacts, and seeking medical advice prior to foreign travel (specifically developing countries).     4. Transplant Candidacy: Based on available information, there are no identified significant barriers to transplantation from an infectious disease standpoint.  Final determination of transplant candidacy will be made once evaluation is complete and reviewed by the Selection Committee.      Follow up with infectious disease as needed. Please call if any pending serologic testing is positive.      The total time for evaluation and management services performed on 6/12/23 was greater than 35 minutes.        No

## 2023-07-09 NOTE — ED ADULT TRIAGE NOTE - BSA (M2)
1.82 Anesthesia Pre Eval Note    Anesthesia ROS/Med Hx    Overall Review:  EKG was reviewed, Echo was reviewed and Stress test was reviewed     Anesthetic Complication History:    History of postoperative nausea & vomiting (for 3-4 hours after hip surgery)    Pulmonary Review:  Comments: Chronic cough    Neuro/Psych Review:    Positive for neuromuscular disease - back pain  Positive for CVA - no residual deficits (01/2017)    Cardiovascular Review:    Positive for hypertension  Positive for hyperlipidemia    GI/HEPATIC/RENAL Review:   Positive for liver disease (fatty liver)   Positive for renal disease - chronic renal insufficiency    End/Other Review:    Positive for obesity class I - 30.00 - 34.99  Positive for arthritis  Positive for substance abuse - alcohol (14 shots/week)  Positive for chronic pain  Additional Results:     ALLERGIES:   -- Amoxicillin -- DIARRHEA and MYALGIA   -- Codeine -- VOMITING   -- Erythromycin Base -- DIARRHEA    --  diarrhea   -- Nsaids -- GI UPSET and DIARRHEA    --  diarrhea, GI   -- Pantoprazole -- Other (See Comments)    --  Itching and myalgias             Itching and myalgias             Itching and myalgias   -- Tolmetin -- DIARRHEA and Other (See Comments)    --  diarrhea, GI             diarrhea, GI       Last Labs        Component                Value               Date/Time                  WBC                      5.0                 06/16/2023 1324            RBC                      3.89 (L)            06/16/2023 1324            HGB                      13.0                06/16/2023 1324            HCT                      39.9                06/16/2023 1324            MCV                      102.6 (H)           06/16/2023 1324            MCH                      33.4                06/16/2023 1324            MCHC                     32.6                06/16/2023 1324            RDW-CV                   14.0                06/16/2023 1324            Sodium                    142                 06/16/2023 1324            Potassium                4.1                 06/16/2023 1324            Chloride                 105                 06/16/2023 1324            Carbon Dioxide           25                  06/16/2023 1324            Glucose                  94                  06/16/2023 1324            BUN                      18                  06/16/2023 1324            Creatinine               0.97 (H)            06/16/2023 1324            Glomerular Filtrati*     60                  06/16/2023 1324            Calcium                  8.9                 06/16/2023 1324            PLT                      162                 06/16/2023 1324        Past Medical History:  06/11/2009: Abdominal pain, generalized      Comment:  Dr. Drake no path.  03/16/2022: Age-related osteoporosis without current pathological   fracture  04/04/2005: Anal fissure      Comment:  Dr. Drake  02/18/2022: ARMD (age related macular degeneration)  06/21/2010: Bony exostosis  02/18/2022: Borderline glaucoma of both eyes with ocular hypertension      Comment:  [facility]  No date: Chronic kidney disease      Comment:  calculi  04/04/2005: Diarrhea      Comment:  Dr. Drake  04/17/2013: Epistaxis  No date: Essential hypertension, benign  03/14/2023: Fatty liver      Comment:  Incidental on CT  04/04/2005: Hemorrhage of rectum and anus      Comment:  Dr. Drake  05/19/2006: Hemorrhage of rectum and anus  No date: Lumbago  02/25/2022: Macrocytosis  02/18/2022: Mixed hyperlipidemia  No date: Osteoarthritis hip  No date: Other and unspecified hyperlipidemia      Comment:  with very high HDL  05/19/2006: Other and unspecified noninfectious gastroenteritis and   colitis(558.9)  07/12/2011: Other symptoms involving digestive system(787.99)      Comment:  Dr. Drake  04/14/2005: Personal history of colonic polyps      Comment:  tubular adenoma, hyperplastic  06/24/2008: Personal history of colonic polyps      Comment:   Dr. Drake: path: tubular adenoma x 2.  F/u 3 years.  06/24/2008: Personal history of colonic polyps      Comment:  Dr. Drake: path: tubular adenoma x 2.  F/u 3 years.  No date: PONV (postoperative nausea and vomiting)  02/25/2022: Protein malnutrition (CMD)      Comment:  Persistent decrease in serum albumin  No date: Stroke (CMD)  08/30/2016: Tubular adenoma      Comment:  x 1  02/25/2022: Vitamin D deficiency    Past Surgical History:  No date: Anes ovarian cystectomy unilat/bilat      Comment:  at 14 years of age  No date: Appendectomy  2005: Breast cyst aspiration; Left  01/01/2002: Colonoscopy diagnostic      Comment:  Normal  04/14/2005: Colonoscopy remove lesions by snare      Comment:  Dr. Drake tubular adenoma, hyperplastic. repeat colon                3 yrs.  08/30/2016: Colonoscopy remove lesions hot biopsy forceps      Comment:  Vidal ruiz 5 yr  06/24/2008: Colonoscopy w biopsy      Comment:  Dr. Drake path: tubular adenoma x 2.  F/u 3 years.  07/12/2011: Colonoscopy w biopsy      Comment:  DR. Drake - b9 path  05/19/2006: Flexible sigmoidoscopy diagnostic include specimens      Comment:  Dr. Drake no bx, no path.  06/11/2009: Flexible sigmoidoscopy diagnostic include specimens      Comment:  Dr. Drake no path.  01/01/2012: Kidney stone surgery  No date: Removal gallbladder      Comment:  Cholecystectomy  No date: Removal of ovary/tube(s)      Comment:  Ovary and Tube, removal  10/31/2014: Total hip replacement      Comment:  left hip replacement/Dr Lopez  No date: Vaginal hysterectomy       Prior to Admission medications :  Medication mupirocin (BACTROBAN) 2 % ointment, Sig Apply to each nare twice daily starting 5 days before surgery, Start Date 6/19/23, End Date , Taking? , Authorizing Provider Shyanne Gonzalez PA-C    Medication diclofenac (VOLTAREN) 1 % gel, Sig Apply 2-4 g topically 4 times daily as needed (pain)., Start Date 5/31/23, End Date , Taking? , Authorizing Provider  Yahaira Jean-Baptiste MD    Medication atorvastatin (LIPITOR) 40 MG tablet, Sig Take 1 tablet by mouth nightly., Start Date 12/5/22, End Date 12/5/23, Taking? , Authorizing Provider Provider, Outside    Medication latanoprost (XALATAN) 0.005 % ophthalmic solution, Sig Place 1 drop into both eyes daily., Start Date 5/12/22, End Date 6/16/23, Taking? , Authorizing Provider Provider, Outside    Medication lisinopril (ZESTRIL) 10 MG tablet, Sig Take 1 tablet by mouth nightly., Start Date 10/28/22, End Date 10/28/23, Taking? , Authorizing Provider Provider, Outside    Medication ondansetron (ZOFRAN) 4 MG tablet, Sig Take 4 mg by mouth 3 times daily as needed., Start Date 3/5/23, End Date , Taking? , Authorizing Provider Provider, Outside    Medication timolol (TIMOPTIC) 0.5 % ophthalmic solution, Sig Place 1 drop into both eyes daily., Start Date 3/31/22, End Date 6/16/23, Taking? , Authorizing Provider Provider, Outside    Medication cephalexin (KEFLEX) 500 MG capsule, Sig Take two capsules by mouth one hour before any dental or colon procedure., Start Date 7/14/22, End Date 7/14/25, Taking? , Authorizing Provider Ej Lopez MD    Medication docusate sodium (Dulcolax Pink Stool Softener) 100 MG capsule, Sig Take 1 capsule by mouth in the morning and 1 capsule before bedtime., Start Date 6/18/22, End Date , Taking? , Authorizing Provider Redd Serna MD    Medication triamcinolone (ARISTOSPAN) 0.1 % lotion, Sig Apply to ears twice daily  Patient taking differently: as needed. Apply to ears twice daily, Start Date 4/7/22, End Date , Taking? , Authorizing Provider Kinza Alfaro MD    Medication ketoconazole (NIZORAL) 2 % cream, Sig Apply to affected areas twice daily.  Patient taking differently: as needed. Apply to affected areas twice daily., Start Date 4/7/22, End Date , Taking? , Authorizing Provider Kinza Alfaro MD    Medication Multiple Vitamins-Minerals (PRESERVISION/LUTEIN PO), Sig Take by mouth  daily., Start Date , End Date , Taking? , Authorizing Provider Provider, Outside    Medication aspirin 81 MG tablet, Sig Take 1 tablet by mouth daily., Start Date 1/24/17, End Date , Taking? , Authorizing Provider Mark Antonio MD            Relevant Problems   No relevant active problems       Physical Exam     Airway   Mallampati: II  TM Distance: >3 FB  Neck ROM: Full    Cardiovascular  Cardiovascular exam normal    General Assessment  General Assessment: Alert and oriented and No acute distress    Dental Exam  Dental exam normal    Legend: C=Chipped  M=Missing  L=Loose B=Bridge Cr=[place] I=Implant    Pulmonary Exam  Pulmonary exam normal      Anesthesia Plan:    ASA Status: 3  Anesthesia Type: General    Induction: Intravenous  Preferred Airway Type: ETTPatient does not have a difficult airway or is not at risk of aspiration.   Maintenance: Combined    Post-op Pain Management: Single Shot Block      Checklist  Reviewed: Past Med History, Anesthesia Record, NPO Status, DNR Status, Patient Summary, Beta Blocker Status, Allergies, Medications, Problem list, EKG and Lab Results  Consent/Risks Discussed Statement:  The proposed anesthetic plan, including its risks and benefits, have been discussed with the Patient along with the risks and benefits of alternatives. Questions were encouraged and answered and the patient and/or representative understands and agrees to proceed.        I discussed with the patient (and/or patient's legal representative) the risks and benefits of the proposed anesthesia plan, General, which may include services performed by other anesthesia providers.    Alternative anesthesia plans, if available, were reviewed with the patient (and/or patient's legal representative). Discussion has been held with the patient (and/or patient's legal representative) regarding risks of anesthesia, which include emergent situations that may require change in anesthesia plan.  The patient (and/or patient's  legal representative) has indicated understanding, his/her questions have been answered, and he/she wishes to proceed with the planned anesthetic.    Blood Products: Not Anticipated    Comments  Plan Comments: Risks of general anesthesia discussed, including, but not limited to: PONV, damage to lips/teeth/gums, sore throat, difficulty with intubation, stroke, heart attack, death. Questions sought and answered. The patient understands and wishes to proceed.

## 2023-07-12 NOTE — ED CLERICAL - NS ED CLERK UNITS
[id number] Merged with [facility] NEUROLOGY PROGRESS NOTE    Attempted to see the patient but off the floor for  shunt.    Labs:  TSH 3.13  hgb a1c 4.9  B12 740    >  MRI brain shows prominent ventricular size relative to the sulcal size suggesting hydrocephalus.  There are postsurgical changes and other chronic changes likely secondary to past TBI.    > Tiny acute stroke in the right centrum semiovale.    > Possible pancreatitis     -Neurosurgery is following,  shunt placement today.  -Recommend starting ASA when ok with neurosurgery.  They have recommend holding for now in anticipation of a VPS placement.   -Start atorvastatin 40 mg nightly  -CTA head and neck w contrast negative   -TTE with bubble study report pending   -GI following    -General surgery assisting with  shunt procedure   -PT/OT   -If TTE is negative then we would have no additional neurologic recommendations at this time.  Further management of  shunt response and TBI would defer to neurosurgery.  We will sign off.  Please call if questions or concerns arise.      DISCUSSED WITH: Dr. Ailyn Dunlap, PA-C  [facility]

## 2023-08-10 NOTE — BH INPATIENT PSYCHIATRY ASSESSMENT NOTE - HPI (INCLUDE ILLNESS QUALITY, SEVERITY, DURATION, TIMING, CONTEXT, MODIFYING FACTORS, ASSOCIATED SIGNS AND SYMPTOMS)
This is a 31 YO Hebrew-speaking male domiciled in an apartment with friends, employed in a restaurant, no past med history, pphx alcohol dependence with detox history, frequently evaluated in EDs for agitation/psychosis in the context of ETOH intox, substance-induced psychosis, depression, several ED/CPEP encounters for alcohol related psychosis, recent psych admission at [facility] (for two weeks, discharged 3/10/2021) after presenting to ED with BAL in the 200s and psychotic symptoms, one brief inpatient admission to [facility] 11/13-11/14/2020 for SI, no known violence hx, no known legal history, presented with worsening depression to [facility] on 3/14/2021, reports AH since recent discharge from [facility] inpatient psych hospitalization on 3/10/2021. Review of [facility] inpatient records reveal  that working diagnosis was substance induced psychosis, with ETOH intox mediating psychotic symptoms. The patient reports that has not had any ETOH   since d/c from [facility]. He reports that he has had hallucinations, at times telling him to hurt himself or other people. He was medicated at [facility], presents as voluntary transfer to [facility]. Here he denies AH, reports that he has a headache ("pain" rather than voices), did get a head CT at [facility] (was negative). He also  reports feeling anxious and restless, and does appear to have some increased motor tone.     
no

## 2023-09-22 NOTE — ED BEHAVIORAL HEALTH ASSESSMENT NOTE - REFERRAL / APPOINTMENT DETAILS
There are no Wet Read(s) to document.
Patient faxed referral information for substance use treatment, declined referral for therapist or psychiatrist

## 2023-10-24 NOTE — ED ADULT NURSE NOTE - TEMPLATE
For information on Fall & Injury Prevention, visit: [email or web address] OR  [email or web address] OR  https://www.cdc.gov/steadi/patient.html Cold/Sinus

## 2023-11-05 NOTE — ED ADULT NURSE NOTE - NS ED NOTE ABUSE RESPONSE YN
Unable to assess due to medical condition No respiratory distress. No stridor, Lungs sounds with crackles in lower L field

## 2023-11-08 NOTE — ED BEHAVIORAL HEALTH ASSESSMENT NOTE - NSACTIVEVENT_PSY_ALL_CORE
Triggering events leading to humiliation, shame, and/or despair (e.g., Loss of relationship, financial or health status) (real or anticipated)
No

## 2024-05-10 PROBLEM — Z78.9 OTHER SPECIFIED HEALTH STATUS: Chronic | Status: INACTIVE | Noted: 2020-04-13 | Resolved: 2021-02-18

## 2024-05-11 PROBLEM — Z78.9 OTHER SPECIFIED HEALTH STATUS: Chronic | Status: ACTIVE | Noted: 2020-06-07

## 2024-05-11 PROBLEM — Z78.9 OTHER SPECIFIED HEALTH STATUS: Chronic | Status: ACTIVE | Noted: 2020-05-21

## 2025-01-09 NOTE — ED BEHAVIORAL HEALTH ASSESSMENT NOTE - NS ED BHA HOMICIDALITY PRESENT AGGRESSION OTHERS PAST MONTH
GOAL: Patient will improve strength by 1/4 grade in 2 weeks to improve overall functional mobility including gait, transfers, bed mobility and decrease risk of falls. GOAL: Patient will improve strength by 1/4 grade in 2 weeks to improve overall functional mobility including gait, transfers, bed mobility and decrease risk of falls. None known

## 2025-01-31 NOTE — ED ADULT TRIAGE NOTE - PAIN RATING/NUMBER SCALE (0-10): REST
Patient arrived stable and ambulatory for D1 C20 of Darzalex + Velcade injections. Patient denied any questions or concerns. Labs reviewed - meets parameters for tx for today. Orders released to pharmacy.    Darzalex injection given slowly over approximately 5 minutes SubQ in LLQ. Bandaid applied over site.    Velcade injection given slowly SubQ in RLQ of abdomen. Bandaid applied over site. Patient tolerated tx well.     Patient Dc'd stable and ambulatory. Printed AVS given to patient.    7

## 2025-02-07 NOTE — ED ADULT NURSE NOTE - PAIN RATING/NUMBER SCALE (0-10): REST
[facility]. Traction (75881)     Gait (74330)    Dry Needle 1-2 muscle (86940)     Aquatic Therex (71788)    Dry Needle 3+ muscle (96390)     Iontophoresis (84621)    VASO (91931)     Ultrasound (11524)    Group Therapy (14690)     Estim Attended (12087)    Canalith Repositioning (26327)     Physical Performance Test (14565)    Custom orthotic ()     Other:    Other:    Total Timed Code Tx Minutes 24 2  1     Total Treatment Minutes 50        Charge Justification:  (93333) THERAPEUTIC ACTIVITY - use of dynamic activities to improve functional performance. (Ex include squatting, ascending/descending stairs, walking, bending, lifting, catching, throwing, pushing, pulling, jumping.)  Direct, one on one contact, billed in 15-minute increments.  (12842) VASOPNEUMATIC    GOALS     Patient stated goal: \"Get back to work\"  [] Progressing: [] Met: [] Not Met: [] Adjusted    Therapist goals for Patient:   Short Term Goals: To be achieved in: 2 weeks  1Independent in HEP and progression per patient tolerance, in order to prevent re-injury.   [] Progressing: [] Met: [] Not Met: [] Adjusted  Patient will have a decrease in pain to <2/10 to facilitate improvement in movement, function, and ADLs as indicated by Functional Deficits.  [] Progressing: [] Met: [] Not Met: [] Adjusted    Long Term Goals: To be achieved in: 12 weeks  Disability index score of 31/96 or less for the WOMAC to assist with reaching prior level of function with activities such as IADLS/ADLs.  [] Progressing: [] Met: [] Not Met: [] Adjusted  Patient will demonstrate increased AROM of L knee flexion to 110 without pain to allow for proper joint functioning to enable patient to get in and out of cars.   [] Progressing: [] Met: [] Not Met: [] Adjusted  Patient will demonstrate increased Strength of L global hip/quad/HS to at least 4+/5 throughout without pain to allow for proper functional mobility to enable patient to return to ascending/descending 10 steps 
0